# Patient Record
Sex: MALE | Race: WHITE | Employment: OTHER | URBAN - NONMETROPOLITAN AREA
[De-identification: names, ages, dates, MRNs, and addresses within clinical notes are randomized per-mention and may not be internally consistent; named-entity substitution may affect disease eponyms.]

---

## 2017-08-28 ENCOUNTER — OFFICE VISIT (OUTPATIENT)
Dept: PULMONOLOGY | Age: 72
End: 2017-08-28
Payer: MEDICARE

## 2017-08-28 VITALS
SYSTOLIC BLOOD PRESSURE: 100 MMHG | WEIGHT: 134.6 LBS | HEART RATE: 53 BPM | OXYGEN SATURATION: 98 % | DIASTOLIC BLOOD PRESSURE: 60 MMHG | TEMPERATURE: 96.3 F | BODY MASS INDEX: 24.77 KG/M2 | HEIGHT: 62 IN

## 2017-08-28 DIAGNOSIS — Z72.0 TOBACCO ABUSE: ICD-10-CM

## 2017-08-28 DIAGNOSIS — J44.9 MODERATE COPD (CHRONIC OBSTRUCTIVE PULMONARY DISEASE) (HCC): ICD-10-CM

## 2017-08-28 DIAGNOSIS — F17.219 NICOTINE DEPENDENCE, CIGARETTES, WITH UNSPECIFIED NICOTINE-INDUCED DISORDERS: Primary | ICD-10-CM

## 2017-08-28 DIAGNOSIS — J44.9 STAGE 3 SEVERE COPD BY GOLD CLASSIFICATION (HCC): ICD-10-CM

## 2017-08-28 PROCEDURE — 99214 OFFICE O/P EST MOD 30 MIN: CPT | Performed by: INTERNAL MEDICINE

## 2017-08-28 PROCEDURE — G0296 VISIT TO DETERM LDCT ELIG: HCPCS | Performed by: INTERNAL MEDICINE

## 2017-09-06 ENCOUNTER — HOSPITAL ENCOUNTER (OUTPATIENT)
Dept: CT IMAGING | Age: 72
Discharge: HOME | End: 2017-09-06
Payer: MEDICARE

## 2017-09-06 ENCOUNTER — HOSPITAL ENCOUNTER (OUTPATIENT)
Dept: PULMONOLOGY | Age: 72
Discharge: HOME | End: 2017-09-06
Payer: MEDICARE

## 2017-09-06 DIAGNOSIS — F17.219 NICOTINE DEPENDENCE, CIGARETTES, WITH UNSPECIFIED NICOTINE-INDUCED DISORDERS: ICD-10-CM

## 2017-09-06 DIAGNOSIS — J44.9 MODERATE COPD (CHRONIC OBSTRUCTIVE PULMONARY DISEASE) (HCC): ICD-10-CM

## 2017-09-06 DIAGNOSIS — Z72.0 TOBACCO ABUSE: ICD-10-CM

## 2017-09-06 PROCEDURE — G0297 LDCT FOR LUNG CA SCREEN: HCPCS

## 2017-09-06 PROCEDURE — 94060 EVALUATION OF WHEEZING: CPT

## 2017-11-10 DIAGNOSIS — F17.200 SMOKER: ICD-10-CM

## 2017-11-10 RX ORDER — PRASUGREL 10 MG/1
10 TABLET, FILM COATED ORAL DAILY
Qty: 30 TABLET | Refills: 11 | OUTPATIENT
Start: 2017-11-10

## 2017-11-10 NOTE — TELEPHONE ENCOUNTER
Ravi Mckay called requesting a refill on the following medications:  Requested Prescriptions     Pending Prescriptions Disp Refills    prasugrel (EFFIENT) 10 MG TABS 30 tablet 11     Sig: Take 1 tablet by mouth daily     Pharmacy verified:  [email or web address]      Date of last visit: 11/07/17  Date of next visit (if applicable): none        Date of last fill and quantity (to be completed by clinical staff)  Pharmacy name: [address] [place] [address]

## 2017-11-13 ENCOUNTER — HOSPITAL ENCOUNTER (EMERGENCY)
Age: 72
Discharge: HOME | End: 2017-11-13
Attending: FAMILY MEDICINE
Payer: MEDICARE

## 2017-11-13 ENCOUNTER — APPOINTMENT (OUTPATIENT)
Dept: GENERAL RADIOLOGY | Age: 72
End: 2017-11-13
Payer: MEDICARE

## 2017-11-13 VITALS
BODY MASS INDEX: 25.76 KG/M2 | HEART RATE: 66 BPM | OXYGEN SATURATION: 97 % | TEMPERATURE: 97.4 F | RESPIRATION RATE: 28 BRPM | SYSTOLIC BLOOD PRESSURE: 106 MMHG | WEIGHT: 140 LBS | HEIGHT: 62 IN | DIASTOLIC BLOOD PRESSURE: 53 MMHG

## 2017-11-13 DIAGNOSIS — J20.9 ACUTE BRONCHITIS, UNSPECIFIED ORGANISM: ICD-10-CM

## 2017-11-13 DIAGNOSIS — J44.1 COPD EXACERBATION (HCC): Primary | ICD-10-CM

## 2017-11-13 LAB
ALBUMIN SERPL-MCNC: 3.6 G/DL (ref 3.5–5.1)
ALLEN TEST: POSITIVE
ALP BLD-CCNC: 69 U/L (ref 38–126)
ALT SERPL-CCNC: 20 U/L (ref 11–66)
ANION GAP SERPL CALCULATED.3IONS-SCNC: 13 MEQ/L (ref 8–16)
AST SERPL-CCNC: 18 U/L (ref 5–40)
BASE EXCESS (CALCULATED): 4.1 MMOL/L (ref -2.5–2.5)
BASOPHILS # BLD: 0.3 %
BASOPHILS ABSOLUTE: 0 THOU/MM3 (ref 0–0.1)
BILIRUB SERPL-MCNC: 0.3 MG/DL (ref 0.3–1.2)
BILIRUBIN DIRECT: < 0.2 MG/DL (ref 0–0.3)
BUN BLDV-MCNC: 37 MG/DL (ref 7–22)
CALCIUM SERPL-MCNC: 9.2 MG/DL (ref 8.5–10.5)
CHLORIDE BLD-SCNC: 95 MEQ/L (ref 98–111)
CO2: 28 MEQ/L (ref 23–33)
COLLECTED BY:: ABNORMAL
CREAT SERPL-MCNC: 1.4 MG/DL (ref 0.4–1.2)
DEVICE: ABNORMAL
EKG ATRIAL RATE: 64 BPM
EKG P AXIS: 79 DEGREES
EKG P-R INTERVAL: 152 MS
EKG Q-T INTERVAL: 400 MS
EKG QRS DURATION: 78 MS
EKG QTC CALCULATION (BAZETT): 412 MS
EKG R AXIS: 66 DEGREES
EKG T AXIS: 64 DEGREES
EKG VENTRICULAR RATE: 64 BPM
EOSINOPHIL # BLD: 0.4 %
EOSINOPHILS ABSOLUTE: 0 THOU/MM3 (ref 0–0.4)
GFR SERPL CREATININE-BSD FRML MDRD: 50 ML/MIN/1.73M2
GLUCOSE BLD-MCNC: 118 MG/DL (ref 70–108)
HCO3: 28 MMOL/L (ref 23–28)
HCT VFR BLD CALC: 37.7 % (ref 42–52)
HEMOGLOBIN: 13 GM/DL (ref 14–18)
IFIO2: 4
LYMPHOCYTES # BLD: 19.2 %
LYMPHOCYTES ABSOLUTE: 1.8 THOU/MM3 (ref 1–4.8)
MACROCYTES: ABNORMAL
MCH RBC QN AUTO: 34.6 PG (ref 27–31)
MCHC RBC AUTO-ENTMCNC: 34.5 GM/DL (ref 33–37)
MCV RBC AUTO: 100.2 FL (ref 80–94)
MONOCYTES # BLD: 9.7 %
MONOCYTES ABSOLUTE: 0.9 THOU/MM3 (ref 0.4–1.3)
NUCLEATED RED BLOOD CELLS: 0 /100 WBC
O2 SATURATION: 96 %
OSMOLALITY CALCULATION: 281.7 MOSMOL/KG (ref 275–300)
PCO2: 41 MMHG (ref 35–45)
PDW BLD-RTO: 13.8 % (ref 11.5–14.5)
PH BLOOD GAS: 7.45 (ref 7.35–7.45)
PLATELET # BLD: 314 THOU/MM3 (ref 130–400)
PMV BLD AUTO: 8.1 MCM (ref 7.4–10.4)
PO2: 81 MMHG (ref 71–104)
POTASSIUM SERPL-SCNC: 4.6 MEQ/L (ref 3.5–5.2)
PRO-BNP: 211.5 PG/ML (ref 0–900)
RBC # BLD: 3.76 MILL/MM3 (ref 4.7–6.1)
SEG NEUTROPHILS: 70.4 %
SEGMENTED NEUTROPHILS ABSOLUTE COUNT: 6.5 THOU/MM3 (ref 1.8–7.7)
SODIUM BLD-SCNC: 136 MEQ/L (ref 135–145)
SOURCE, BLOOD GAS: ABNORMAL
TOTAL PROTEIN: 6.5 G/DL (ref 6.1–8)
TROPONIN T: < 0.01 NG/ML
WBC # BLD: 9.3 THOU/MM3 (ref 4.8–10.8)

## 2017-11-13 PROCEDURE — 93005 ELECTROCARDIOGRAM TRACING: CPT

## 2017-11-13 PROCEDURE — 94645 CONT INHLJ TX EACH ADDL HOUR: CPT

## 2017-11-13 PROCEDURE — 85025 COMPLETE CBC W/AUTO DIFF WBC: CPT

## 2017-11-13 PROCEDURE — 83880 ASSAY OF NATRIURETIC PEPTIDE: CPT

## 2017-11-13 PROCEDURE — 36600 WITHDRAWAL OF ARTERIAL BLOOD: CPT

## 2017-11-13 PROCEDURE — 82803 BLOOD GASES ANY COMBINATION: CPT

## 2017-11-13 PROCEDURE — 71010 XR CHEST PORTABLE: CPT

## 2017-11-13 PROCEDURE — 80053 COMPREHEN METABOLIC PANEL: CPT

## 2017-11-13 PROCEDURE — 84484 ASSAY OF TROPONIN QUANT: CPT

## 2017-11-13 PROCEDURE — 36415 COLL VENOUS BLD VENIPUNCTURE: CPT

## 2017-11-13 PROCEDURE — 94640 AIRWAY INHALATION TREATMENT: CPT

## 2017-11-13 PROCEDURE — 94644 CONT INHLJ TX 1ST HOUR: CPT

## 2017-11-13 PROCEDURE — 82248 BILIRUBIN DIRECT: CPT

## 2017-11-13 PROCEDURE — 96374 THER/PROPH/DIAG INJ IV PUSH: CPT

## 2017-11-13 PROCEDURE — 99285 EMERGENCY DEPT VISIT HI MDM: CPT

## 2017-11-13 PROCEDURE — 6360000002 HC RX W HCPCS: Performed by: FAMILY MEDICINE

## 2017-11-13 PROCEDURE — 6370000000 HC RX 637 (ALT 250 FOR IP): Performed by: FAMILY MEDICINE

## 2017-11-13 RX ORDER — ASPIRIN 81 MG/1
324 TABLET, CHEWABLE ORAL ONCE
Status: COMPLETED | OUTPATIENT
Start: 2017-11-13 | End: 2017-11-13

## 2017-11-13 RX ORDER — METHYLPREDNISOLONE SODIUM SUCCINATE 125 MG/2ML
125 INJECTION, POWDER, LYOPHILIZED, FOR SOLUTION INTRAMUSCULAR; INTRAVENOUS ONCE
Status: COMPLETED | OUTPATIENT
Start: 2017-11-13 | End: 2017-11-13

## 2017-11-13 RX ORDER — IPRATROPIUM BROMIDE AND ALBUTEROL SULFATE 2.5; .5 MG/3ML; MG/3ML
1 SOLUTION RESPIRATORY (INHALATION) ONCE
Status: COMPLETED | OUTPATIENT
Start: 2017-11-13 | End: 2017-11-13

## 2017-11-13 RX ORDER — LEVOFLOXACIN 500 MG/1
500 TABLET, FILM COATED ORAL DAILY
Qty: 5 TABLET | Refills: 0 | Status: SHIPPED | OUTPATIENT
Start: 2017-11-14 | End: 2017-11-16

## 2017-11-13 RX ORDER — PREDNISONE 50 MG/1
50 TABLET ORAL DAILY
Qty: 4 TABLET | Refills: 0 | Status: SHIPPED | OUTPATIENT
Start: 2017-11-14 | End: 2017-11-16

## 2017-11-13 RX ORDER — IPRATROPIUM BROMIDE AND ALBUTEROL SULFATE 2.5; .5 MG/3ML; MG/3ML
1 SOLUTION RESPIRATORY (INHALATION) EVERY 4 HOURS
Qty: 360 ML | Refills: 0 | Status: SHIPPED | OUTPATIENT
Start: 2017-11-13 | End: 2017-11-16 | Stop reason: ALTCHOICE

## 2017-11-13 RX ADMIN — IPRATROPIUM BROMIDE AND ALBUTEROL SULFATE 1 AMPULE: .5; 3 SOLUTION RESPIRATORY (INHALATION) at 17:32

## 2017-11-13 RX ADMIN — METHYLPREDNISOLONE SODIUM SUCCINATE 125 MG: 125 INJECTION, POWDER, FOR SOLUTION INTRAMUSCULAR; INTRAVENOUS at 17:14

## 2017-11-13 RX ADMIN — ASPIRIN 81 MG 324 MG: 81 TABLET ORAL at 17:14

## 2017-11-13 RX ADMIN — ALBUTEROL SULFATE 15 MG/HR: 2.5 SOLUTION RESPIRATORY (INHALATION) at 18:35

## 2017-11-13 RX ADMIN — IPRATROPIUM BROMIDE AND ALBUTEROL SULFATE 1 AMPULE: .5; 3 SOLUTION RESPIRATORY (INHALATION) at 17:38

## 2017-11-13 RX ADMIN — ALBUTEROL SULFATE 15 MG/HR: 2.5 SOLUTION RESPIRATORY (INHALATION) at 19:41

## 2017-11-13 ASSESSMENT — ENCOUNTER SYMPTOMS
VOMITING: 0
WHEEZING: 0
SHORTNESS OF BREATH: 1
COUGH: 1
STRIDOR: 0
NAUSEA: 0
DIARRHEA: 0
ABDOMINAL PAIN: 0
COLOR CHANGE: 0
CHEST TIGHTNESS: 1
BACK PAIN: 0

## 2017-11-13 NOTE — ED NOTES
Pt resting on cart, denies needs at this time. Call light in reach. Resp easy, unlabored.       [facility]  11/13/17 0471

## 2017-11-13 NOTE — ED PROVIDER NOTES
(DIOVAN-HCT) 80-12.5 MG PER TABLET    take 1 tablet by mouth daily       ALLERGIES     is allergic to codeine; dilaudid [hydromorphone]; and morphine. FAMILY HISTORY     indicated that his mother is . He indicated that his father is . He indicated that his maternal grandmother is . He indicated that his maternal grandfather is . He indicated that his paternal grandmother is . He indicated that his paternal grandfather is . He indicated that the status of his neg hx is unknown.    family history includes Cancer in his father and mother; Heart Disease in his father and mother. SOCIAL HISTORY      reports that he has been smoking Cigarettes. He started smoking about 41 years ago. He has a 40.00 pack-year smoking history. He has never used smokeless tobacco. He reports that he does not drink alcohol or use drugs. PHYSICAL EXAM     INITIAL VITALS:  height is 5' 2\" (1.575 m) and weight is 140 lb (63.5 kg). His oral temperature is 97.4 °F (36.3 °C). His blood pressure is 106/53 (abnormal) and his pulse is 66. His respiration is 28 and oxygen saturation is 97%. Physical Exam   Constitutional: He is oriented to person, place, and time. He appears well-developed and well-nourished. No distress. HENT:   Head: Normocephalic. Neck: No JVD present. Cardiovascular: Normal rate, regular rhythm, normal heart sounds and intact distal pulses. No murmur heard. Pulses:       Dorsalis pedis pulses are 2+ on the right side, and 2+ on the left side. Posterior tibial pulses are 2+ on the right side, and 2+ on the left side. Pulmonary/Chest: He is in respiratory distress. He has wheezes. He has rales. He exhibits no tenderness. Abdominal: Soft. He exhibits no pulsatile midline mass. There is no tenderness. Musculoskeletal: Normal range of motion. He exhibits no edema. Neurological: He is alert and oriented to person, place, and time.  Coordination normal. Skin: Skin is warm and dry. No rash noted. He is not diaphoretic. Psychiatric: He has a normal mood and affect. His behavior is normal. Thought content normal.   Nursing note and vitals reviewed. DIFFERENTIAL DIAGNOSIS:   COPD exacerbation  Acute MI    DIAGNOSTIC RESULTS     EKG: All EKG's are interpreted by the Emergency Department Physician who either signs or Co-signs this chart in the absence of a cardiologist.  EKG interpreted by Lara Sorenson MD:    Normal sinus rhythm ventricular rate 64 IN interval 2 QRS duration 78 QTc 412. Compared with prior EKG from March 23, 2016 there are no acute changes noted. RADIOLOGY: non-plain film images(s) such as CT, Ultrasound and MRI are read by the radiologist.    XR Chest Portable   Final Result   1. Normal heart size. 5 g emesis lungs. Moderately elevated appearance left hemidiaphragm, probably due to a prominent eventration. 2. Slightly increased interstitial markings both lung bases, most suggestive of mild pneumonitis. No effusion is seen. **This report has been created using voice recognition software. It may contain minor errors which are inherent in voice recognition technology. **      Final report electronically signed by Dr. Carmen Connelly on 11/13/2017 5:33 PM          LABS:   Labs Reviewed   CBC WITH AUTO DIFFERENTIAL - Abnormal; Notable for the following:        Result Value    RBC 3.76 (*)     Hemoglobin 13.0 (*)     Hematocrit 37.7 (*)     .2 (*)     MCH 34.6 (*)     All other components within normal limits   BASIC METABOLIC PANEL - Abnormal; Notable for the following:     Chloride 95 (*)     Glucose 118 (*)     BUN 37 (*)     CREATININE 1.4 (*)     All other components within normal limits   BLOOD GAS, ARTERIAL - Abnormal; Notable for the following:     Base Excess (Calculated) 4.1 (*)     All other components within normal limits   GLOMERULAR FILTRATION RATE, ESTIMATED - Abnormal; Notable for the following:     Est, Glom Filt Rate 50 (*)     All other components within normal limits   BRAIN NATRIURETIC PEPTIDE   TROPONIN   HEPATIC FUNCTION PANEL   ANION GAP   OSMOLALITY       EMERGENCY DEPARTMENT COURSE:   Vitals:    Vitals:    11/13/17 1716 11/13/17 1802 11/13/17 1930 11/13/17 1941   BP: (!) 121/95 (!) 100/49 (!) 106/53    Pulse: 65 61 66    Resp: 28 24 28    Temp: 97.4 °F (36.3 °C)      TempSrc: Oral      SpO2: 100% 98% 99% 97%   Weight: 140 lb (63.5 kg)      Height: 5' 2\" (1.575 m)          MDM    5:07 PM: The patient was seen and evaluated. 6:20 PM: Patient seen and reevaluated. He states that he is feeling a lot better now and is breathing easier at this time. 7:52 PM: Patient seen and reevaluated. He states that he is feeling better at this time. He states that he is ready to go home. CRITICAL CARE:   CRITICAL CARE: There was a high probability of clinically significant/life threatening deterioration in this patient's condition which required my urgent intervention. Total critical care time was 25minutes. This excludes any time for separately reportable procedures. CONSULTS:  None    PROCEDURES:  None     FINAL IMPRESSION      1. COPD exacerbation ([address]    2. Acute bronchitis, unspecified organism          DISPOSITION/PLAN   Discharge home. Follow up with PCP. Return to ER in case of emergency.     PATIENT REFERRED TO:  Karolina Dupree MD  [address] [place], [place] [id number]  [phone]    Schedule an appointment as soon as possible for a visit in 1 week        DISCHARGE MEDICATIONS:  New Prescriptions    IPRATROPIUM-ALBUTEROL (DUONEB) 0.5-2.5 (3) MG/3ML SOLN NEBULIZER SOLUTION    Inhale 3 mLs into the lungs every 4 hours    LEVOFLOXACIN (LEVAQUIN) 500 MG TABLET    Take 1 tablet by mouth daily for 5 days    PREDNISONE (DELTASONE) 50 MG TABLET    Take 1 tablet by mouth daily for 4 days       (Please note that portions of this note were completed with a voice recognition program.  Efforts were

## 2017-11-14 DIAGNOSIS — F17.200 SMOKER: ICD-10-CM

## 2017-11-14 RX ORDER — RANOLAZINE 1000 MG/1
TABLET, EXTENDED RELEASE ORAL
Qty: 60 TABLET | Refills: 11 | Status: ON HOLD | OUTPATIENT
Start: 2017-11-14 | End: 2017-11-25 | Stop reason: HOSPADM

## 2017-11-14 NOTE — TELEPHONE ENCOUNTER
July Ramírez called requesting a refill on the following medications:  Requested Prescriptions     Pending Prescriptions Disp Refills    ranolazine (RANEXA) 1000 MG extended release tablet 60 tablet 11     Sig: take 1 tablet by mouth twice a day     Pharmacy verified:  [email or web address]      Date of last visit: 11/8/2016  Date of next visit (if applicable): 4/77/9127        Date of last fill and quantity (to be completed by clinical staff)  Pharmacy name: [facility] on [address] [facility] [address]

## 2017-11-14 NOTE — ED NOTES
Patient resting on cot receiving a breathing treatment. Respirations easy/unlabored. Patient states breathing is better. Patient denies pain. Will continue to monitor.       [address] [place] [address] St, RN  11/13/17 2025

## 2017-11-16 ENCOUNTER — APPOINTMENT (OUTPATIENT)
Dept: CT IMAGING | Age: 72
DRG: 270 | End: 2017-11-16
Payer: MEDICARE

## 2017-11-16 ENCOUNTER — HOSPITAL ENCOUNTER (INPATIENT)
Age: 72
LOS: 9 days | Discharge: HOME HEALTH | DRG: 270 | End: 2017-11-25
Attending: INTERNAL MEDICINE | Admitting: INTERNAL MEDICINE
Payer: MEDICARE

## 2017-11-16 ENCOUNTER — APPOINTMENT (OUTPATIENT)
Dept: GENERAL RADIOLOGY | Age: 72
DRG: 270 | End: 2017-11-16
Payer: MEDICARE

## 2017-11-16 DIAGNOSIS — J44.9 MODERATE COPD (CHRONIC OBSTRUCTIVE PULMONARY DISEASE) (HCC): ICD-10-CM

## 2017-11-16 DIAGNOSIS — R06.02 SHORTNESS OF BREATH: ICD-10-CM

## 2017-11-16 DIAGNOSIS — R07.9 CHEST PAIN, UNSPECIFIED TYPE: Primary | ICD-10-CM

## 2017-11-16 DIAGNOSIS — F17.200 SMOKER: ICD-10-CM

## 2017-11-16 DIAGNOSIS — Z72.0 TOBACCO ABUSE: ICD-10-CM

## 2017-11-16 DIAGNOSIS — I20.0 UNSTABLE ANGINA (HCC): ICD-10-CM

## 2017-11-16 DIAGNOSIS — J18.9 COMMUNITY ACQUIRED PNEUMONIA, UNSPECIFIED LATERALITY: ICD-10-CM

## 2017-11-16 DIAGNOSIS — R93.89 ABNORMAL CHEST X-RAY: ICD-10-CM

## 2017-11-16 LAB
ALBUMIN SERPL-MCNC: 3.4 G/DL (ref 3.5–5.1)
ALLEN TEST: POSITIVE
ALP BLD-CCNC: 50 U/L (ref 38–126)
ALT SERPL-CCNC: 16 U/L (ref 11–66)
ANION GAP SERPL CALCULATED.3IONS-SCNC: 13 MEQ/L (ref 8–16)
AST SERPL-CCNC: 12 U/L (ref 5–40)
BASE EXCESS (CALCULATED): 0.1 MMOL/L (ref -2.5–2.5)
BASOPHILS # BLD: 0.1 %
BASOPHILS ABSOLUTE: 0 THOU/MM3 (ref 0–0.1)
BILIRUB SERPL-MCNC: 0.3 MG/DL (ref 0.3–1.2)
BUN BLDV-MCNC: 67 MG/DL (ref 7–22)
CALCIUM SERPL-MCNC: 9.2 MG/DL (ref 8.5–10.5)
CHLORIDE BLD-SCNC: 98 MEQ/L (ref 98–111)
CO2: 27 MEQ/L (ref 23–33)
COLLECTED BY:: NORMAL
CREAT SERPL-MCNC: 1.6 MG/DL (ref 0.4–1.2)
D-DIMER QUANTITATIVE: 1720 NG/ML FEU (ref 0–500)
DEVICE: NORMAL
EKG ATRIAL RATE: 56 BPM
EKG ATRIAL RATE: 83 BPM
EKG P AXIS: 72 DEGREES
EKG P AXIS: 83 DEGREES
EKG P-R INTERVAL: 126 MS
EKG P-R INTERVAL: 136 MS
EKG Q-T INTERVAL: 326 MS
EKG Q-T INTERVAL: 408 MS
EKG QRS DURATION: 74 MS
EKG QRS DURATION: 92 MS
EKG QTC CALCULATION (BAZETT): 383 MS
EKG QTC CALCULATION (BAZETT): 393 MS
EKG R AXIS: 69 DEGREES
EKG R AXIS: 71 DEGREES
EKG T AXIS: 69 DEGREES
EKG T AXIS: 73 DEGREES
EKG VENTRICULAR RATE: 56 BPM
EKG VENTRICULAR RATE: 83 BPM
EOSINOPHIL # BLD: 0 %
EOSINOPHILS ABSOLUTE: 0 THOU/MM3 (ref 0–0.4)
GFR SERPL CREATININE-BSD FRML MDRD: 43 ML/MIN/1.73M2
GLUCOSE BLD-MCNC: 100 MG/DL (ref 70–108)
HCO3: 25 MMOL/L (ref 23–28)
HCT VFR BLD CALC: 28.3 % (ref 42–52)
HEMOGLOBIN: 9.3 GM/DL (ref 14–18)
IFIO2: 2
INR BLD: 1.17 (ref 0.85–1.13)
LYMPHOCYTES # BLD: 14.3 %
LYMPHOCYTES ABSOLUTE: 2.2 THOU/MM3 (ref 1–4.8)
MACROCYTES: ABNORMAL
MCH RBC QN AUTO: 32.7 PG (ref 27–31)
MCHC RBC AUTO-ENTMCNC: 33 GM/DL (ref 33–37)
MCV RBC AUTO: 99.2 FL (ref 80–94)
MONOCYTES # BLD: 10.8 %
MONOCYTES ABSOLUTE: 1.7 THOU/MM3 (ref 0.4–1.3)
NUCLEATED RED BLOOD CELLS: 0 /100 WBC
O2 SATURATION: 97 %
OSMOLALITY CALCULATION: 295.2 MOSMOL/KG (ref 275–300)
PCO2: 43 MMHG (ref 35–45)
PDW BLD-RTO: 13.7 % (ref 11.5–14.5)
PH BLOOD GAS: 7.38 (ref 7.35–7.45)
PLATELET # BLD: 400 THOU/MM3 (ref 130–400)
PMV BLD AUTO: 7.3 MCM (ref 7.4–10.4)
PO2: 97 MMHG (ref 71–104)
POTASSIUM SERPL-SCNC: 4.8 MEQ/L (ref 3.5–5.2)
PRO-BNP: 702.3 PG/ML (ref 0–900)
RBC # BLD: 2.85 MILL/MM3 (ref 4.7–6.1)
SEG NEUTROPHILS: 74.8 %
SEGMENTED NEUTROPHILS ABSOLUTE COUNT: 11.5 THOU/MM3 (ref 1.8–7.7)
SODIUM BLD-SCNC: 138 MEQ/L (ref 135–145)
SOURCE, BLOOD GAS: NORMAL
TOTAL PROTEIN: 5.9 G/DL (ref 6.1–8)
TROPONIN T: < 0.01 NG/ML
WBC # BLD: 15.4 THOU/MM3 (ref 4.8–10.8)

## 2017-11-16 PROCEDURE — 93005 ELECTROCARDIOGRAM TRACING: CPT

## 2017-11-16 PROCEDURE — 99222 1ST HOSP IP/OBS MODERATE 55: CPT | Performed by: INTERNAL MEDICINE

## 2017-11-16 PROCEDURE — 5A1945Z RESPIRATORY VENTILATION, 24-96 CONSECUTIVE HOURS: ICD-10-PCS | Performed by: INTERNAL MEDICINE

## 2017-11-16 PROCEDURE — 5A02210 ASSISTANCE WITH CARDIAC OUTPUT USING BALLOON PUMP, CONTINUOUS: ICD-10-PCS | Performed by: INTERNAL MEDICINE

## 2017-11-16 PROCEDURE — 99285 EMERGENCY DEPT VISIT HI MDM: CPT

## 2017-11-16 PROCEDURE — 0BH17EZ INSERTION OF ENDOTRACHEAL AIRWAY INTO TRACHEA, VIA NATURAL OR ARTIFICIAL OPENING: ICD-10-PCS | Performed by: INTERNAL MEDICINE

## 2017-11-16 PROCEDURE — 6360000002 HC RX W HCPCS: Performed by: INTERNAL MEDICINE

## 2017-11-16 PROCEDURE — B2111ZZ FLUOROSCOPY OF MULTIPLE CORONARY ARTERIES USING LOW OSMOLAR CONTRAST: ICD-10-PCS | Performed by: INTERNAL MEDICINE

## 2017-11-16 PROCEDURE — 6370000000 HC RX 637 (ALT 250 FOR IP): Performed by: INTERNAL MEDICINE

## 2017-11-16 PROCEDURE — 96374 THER/PROPH/DIAG INJ IV PUSH: CPT

## 2017-11-16 PROCEDURE — B32S1ZZ COMPUTERIZED TOMOGRAPHY (CT SCAN) OF RIGHT PULMONARY ARTERY USING LOW OSMOLAR CONTRAST: ICD-10-PCS | Performed by: RADIOLOGY

## 2017-11-16 PROCEDURE — 80053 COMPREHEN METABOLIC PANEL: CPT

## 2017-11-16 PROCEDURE — 71275 CT ANGIOGRAPHY CHEST: CPT

## 2017-11-16 PROCEDURE — 96361 HYDRATE IV INFUSION ADD-ON: CPT

## 2017-11-16 PROCEDURE — 94640 AIRWAY INHALATION TREATMENT: CPT

## 2017-11-16 PROCEDURE — 83880 ASSAY OF NATRIURETIC PEPTIDE: CPT

## 2017-11-16 PROCEDURE — 4A023N8 MEASUREMENT OF CARDIAC SAMPLING AND PRESSURE, BILATERAL, PERCUTANEOUS APPROACH: ICD-10-PCS | Performed by: INTERNAL MEDICINE

## 2017-11-16 PROCEDURE — 36415 COLL VENOUS BLD VENIPUNCTURE: CPT

## 2017-11-16 PROCEDURE — 2580000003 HC RX 258: Performed by: INTERNAL MEDICINE

## 2017-11-16 PROCEDURE — 85610 PROTHROMBIN TIME: CPT

## 2017-11-16 PROCEDURE — 6360000004 HC RX CONTRAST MEDICATION: Performed by: INTERNAL MEDICINE

## 2017-11-16 PROCEDURE — 85025 COMPLETE CBC W/AUTO DIFF WBC: CPT

## 2017-11-16 PROCEDURE — 36600 WITHDRAWAL OF ARTERIAL BLOOD: CPT

## 2017-11-16 PROCEDURE — B32T1ZZ COMPUTERIZED TOMOGRAPHY (CT SCAN) OF LEFT PULMONARY ARTERY USING LOW OSMOLAR CONTRAST: ICD-10-PCS | Performed by: RADIOLOGY

## 2017-11-16 PROCEDURE — 1200000003 HC TELEMETRY R&B

## 2017-11-16 PROCEDURE — 85379 FIBRIN DEGRADATION QUANT: CPT

## 2017-11-16 PROCEDURE — 84484 ASSAY OF TROPONIN QUANT: CPT

## 2017-11-16 PROCEDURE — 82803 BLOOD GASES ANY COMBINATION: CPT

## 2017-11-16 PROCEDURE — 71010 XR CHEST PORTABLE: CPT

## 2017-11-16 RX ORDER — ASPIRIN 81 MG/1
324 TABLET, CHEWABLE ORAL ONCE
Status: DISCONTINUED | OUTPATIENT
Start: 2017-11-16 | End: 2017-11-16 | Stop reason: HOSPADM

## 2017-11-16 RX ORDER — NITROGLYCERIN 20 MG/100ML
5 INJECTION INTRAVENOUS CONTINUOUS
Status: DISCONTINUED | OUTPATIENT
Start: 2017-11-16 | End: 2017-11-16

## 2017-11-16 RX ORDER — SIMVASTATIN 40 MG
80 TABLET ORAL NIGHTLY
Status: DISCONTINUED | OUTPATIENT
Start: 2017-11-16 | End: 2017-11-25 | Stop reason: HOSPADM

## 2017-11-16 RX ORDER — ACETAMINOPHEN 325 MG/1
650 TABLET ORAL EVERY 4 HOURS PRN
Status: DISCONTINUED | OUTPATIENT
Start: 2017-11-16 | End: 2017-11-17 | Stop reason: SDUPTHER

## 2017-11-16 RX ORDER — ISOSORBIDE DINITRATE 20 MG/1
20 TABLET ORAL 3 TIMES DAILY
Status: DISCONTINUED | OUTPATIENT
Start: 2017-11-16 | End: 2017-11-25 | Stop reason: HOSPADM

## 2017-11-16 RX ORDER — SODIUM CHLORIDE 0.9 % (FLUSH) 0.9 %
10 SYRINGE (ML) INJECTION PRN
Status: DISCONTINUED | OUTPATIENT
Start: 2017-11-16 | End: 2017-11-17 | Stop reason: SDUPTHER

## 2017-11-16 RX ORDER — METHYLPREDNISOLONE SODIUM SUCCINATE 125 MG/2ML
60 INJECTION, POWDER, LYOPHILIZED, FOR SOLUTION INTRAMUSCULAR; INTRAVENOUS ONCE
Status: COMPLETED | OUTPATIENT
Start: 2017-11-16 | End: 2017-11-16

## 2017-11-16 RX ORDER — ASPIRIN 81 MG/1
81 TABLET, CHEWABLE ORAL DAILY
Status: DISCONTINUED | OUTPATIENT
Start: 2017-11-17 | End: 2017-11-17

## 2017-11-16 RX ORDER — 0.9 % SODIUM CHLORIDE 0.9 %
1000 INTRAVENOUS SOLUTION INTRAVENOUS ONCE
Status: COMPLETED | OUTPATIENT
Start: 2017-11-16 | End: 2017-11-16

## 2017-11-16 RX ORDER — ASPIRIN 81 MG/1
81 TABLET, CHEWABLE ORAL DAILY
Status: DISCONTINUED | OUTPATIENT
Start: 2017-11-17 | End: 2017-11-16 | Stop reason: SDUPTHER

## 2017-11-16 RX ORDER — ALBUTEROL SULFATE 2.5 MG/3ML
2.5 SOLUTION RESPIRATORY (INHALATION) EVERY 6 HOURS PRN
Status: DISCONTINUED | OUTPATIENT
Start: 2017-11-16 | End: 2017-11-17

## 2017-11-16 RX ORDER — IPRATROPIUM BROMIDE AND ALBUTEROL SULFATE 2.5; .5 MG/3ML; MG/3ML
1 SOLUTION RESPIRATORY (INHALATION) ONCE
Status: COMPLETED | OUTPATIENT
Start: 2017-11-16 | End: 2017-11-16

## 2017-11-16 RX ORDER — VALSARTAN 80 MG/1
40 TABLET ORAL DAILY
Status: DISCONTINUED | OUTPATIENT
Start: 2017-11-16 | End: 2017-11-16

## 2017-11-16 RX ORDER — PRASUGREL 10 MG/1
10 TABLET, FILM COATED ORAL DAILY
Status: DISCONTINUED | OUTPATIENT
Start: 2017-11-17 | End: 2017-11-17

## 2017-11-16 RX ORDER — RANOLAZINE 500 MG/1
1000 TABLET, EXTENDED RELEASE ORAL 2 TIMES DAILY
Status: DISCONTINUED | OUTPATIENT
Start: 2017-11-16 | End: 2017-11-25 | Stop reason: HOSPADM

## 2017-11-16 RX ORDER — ALBUTEROL SULFATE 2.5 MG/3ML
2.5 SOLUTION RESPIRATORY (INHALATION) EVERY 6 HOURS PRN
Qty: 120 VIAL | Refills: 7 | Status: ON HOLD | OUTPATIENT
Start: 2017-11-16 | End: 2017-11-25 | Stop reason: HOSPADM

## 2017-11-16 RX ORDER — SODIUM CHLORIDE 0.9 % (FLUSH) 0.9 %
10 SYRINGE (ML) INJECTION EVERY 12 HOURS SCHEDULED
Status: DISCONTINUED | OUTPATIENT
Start: 2017-11-16 | End: 2017-11-17 | Stop reason: SDUPTHER

## 2017-11-16 RX ORDER — SODIUM CHLORIDE 9 MG/ML
INJECTION, SOLUTION INTRAVENOUS CONTINUOUS
Status: DISCONTINUED | OUTPATIENT
Start: 2017-11-16 | End: 2017-11-17 | Stop reason: SDUPTHER

## 2017-11-16 RX ORDER — ONDANSETRON 2 MG/ML
4 INJECTION INTRAMUSCULAR; INTRAVENOUS EVERY 6 HOURS PRN
Status: DISCONTINUED | OUTPATIENT
Start: 2017-11-16 | End: 2017-11-17 | Stop reason: SDUPTHER

## 2017-11-16 RX ADMIN — SODIUM CHLORIDE 1000 ML: 9 INJECTION, SOLUTION INTRAVENOUS at 13:10

## 2017-11-16 RX ADMIN — IOPAMIDOL 80 ML: 755 INJECTION, SOLUTION INTRAVENOUS at 13:28

## 2017-11-16 RX ADMIN — SIMVASTATIN 80 MG: 40 TABLET, FILM COATED ORAL at 20:35

## 2017-11-16 RX ADMIN — Medication 10 ML: at 20:36

## 2017-11-16 RX ADMIN — METHYLPREDNISOLONE SODIUM SUCCINATE 60 MG: 125 INJECTION, POWDER, FOR SOLUTION INTRAMUSCULAR; INTRAVENOUS at 23:52

## 2017-11-16 RX ADMIN — IPRATROPIUM BROMIDE AND ALBUTEROL SULFATE 1 AMPULE: .5; 3 SOLUTION RESPIRATORY (INHALATION) at 13:30

## 2017-11-16 RX ADMIN — ENOXAPARIN SODIUM 40 MG: 40 INJECTION SUBCUTANEOUS at 20:35

## 2017-11-16 RX ADMIN — SODIUM CHLORIDE 1000 ML: 9 INJECTION, SOLUTION INTRAVENOUS at 15:50

## 2017-11-16 RX ADMIN — ISOSORBIDE DINITRATE 20 MG: 20 TABLET ORAL at 20:35

## 2017-11-16 RX ADMIN — RANOLAZINE 1000 MG: 500 TABLET, FILM COATED, EXTENDED RELEASE ORAL at 20:35

## 2017-11-16 RX ADMIN — SODIUM CHLORIDE: 9 INJECTION, SOLUTION INTRAVENOUS at 23:53

## 2017-11-16 ASSESSMENT — ENCOUNTER SYMPTOMS
ABDOMINAL PAIN: 0
COUGH: 0
SHORTNESS OF BREATH: 1
VOMITING: 0
RHINORRHEA: 0
NAUSEA: 1
SORE THROAT: 0
EYE REDNESS: 0
DIARRHEA: 0
EYES NEGATIVE: 1
COUGH: 1
EYE DISCHARGE: 0
BACK PAIN: 0
WHEEZING: 0
HEARTBURN: 0

## 2017-11-16 ASSESSMENT — PAIN DESCRIPTION - LOCATION: LOCATION: CHEST

## 2017-11-16 ASSESSMENT — PAIN SCALES - GENERAL
PAINLEVEL_OUTOF10: 0
PAINLEVEL_OUTOF10: 8

## 2017-11-16 ASSESSMENT — PAIN DESCRIPTION - PAIN TYPE: TYPE: ACUTE PAIN

## 2017-11-16 NOTE — CONSULTS
[facility]        Cardiology Consultation/ History of present illness      Patient:  Nadira Mckoy  YOB: 1945    MRN: [id number]     Acct: [de-identified]    PCP: Doug Rowell MD    Date of Admission: 11/16/2017      Chief Complaint:    Chest pain, shortness of breath and fever       History Of Present Illness:    Fever, cough, chest pain and shortness of breath for 1 wk.  67 y.o. male presents to the Emergency Department for the evaluation of chest pain and shortness of breath. He started having pain this morning when he woke up. 8/10 in severity. Deep breaths help the pain. Gets worse with exertion. Associated with nausea and dizziness. Past Medical History:          Diagnosis Date    Acute coronary syndrome (HCC)     Arrhythmia     Arteriosclerotic heart disease (ASHD)     CHF (congestive heart failure) (HCC)     COPD (chronic obstructive pulmonary disease) (HCC)     Dizziness - light-headed     HX OF:    Emphysema     Esophagitis     Fatigue     HX OF:    History of chest pain     History of tinnitus     History of tobacco abuse     Hyperlipidemia     Hypertension     Irregular heart beat     Lightheadedness     MI (myocardial infarction)     Noncompliance with medication regimen     Noncompliance with medical therapy.  Pneumonia     Ringing in the ears     SOB (shortness of breath) on exertion        Past Surgical History:          Procedure Laterality Date    CARDIAC SURGERY      2 stents    COLONOSCOPY      CORONARY ANGIOPLASTY WITH STENT PLACEMENT      pt has 3 stents    DIAGNOSTIC CARDIAC CATH LAB PROCEDURE  3 09 2009    Successful primary stenting of mid LAD using drug-eluting stent.  DIAGNOSTIC CARDIAC CATH LAB PROCEDURE  1 10 2011    LV demonstrated normal systolic function, EF 34%. On pullback, no gradient was noted.  No critical lesions noted in all large epicardial vessels so that RCA is dominant vessel w/just very mild diffuse disease, about 20-30% lesions. Left main widely patent w/mild disease distally. Circumflex widely patent w/diffuse disease, also about 20-30%.  ENDOSCOPY, COLON, DIAGNOSTIC      HERNIA REPAIR  2008,2010    TRANSTHORACIC ECHOCARDIOGRAM  1 10 2011    Size was normal. Systolic function was normal. EF estimated in range of 55-65%. No regional wall motion abnormalities. Wall thickness was normal. Doppler - LV diastolic function parameters were normal. This is technically limited study which may impair interpretation.  TRANSTHORACIC ECHOCARDIOGRAM  3 06 2009    This study is technically limited. Distal anterior apical wall hypokinesis. EF 40-45%. Left atrial size w/in normal limits. Trace MR. No evidence of aortic stenosis or aortic insufficiency. Right atrium and right ventricle are of normal contractility. Trace TR. No pericardial effusion. Medications Prior to Admission:      Prior to Admission medications    Medication Sig Start Date End Date Taking?  Authorizing Provider   ranolazine (RANEXA) 1000 MG extended release tablet take 1 tablet by mouth twice a day 11/14/17  Yes Dony Aguilar, DO   valsartan-hydrochlorothiazide (DIOVAN-HCT) 80-12.5 MG per tablet take 1 tablet by mouth daily 11/8/16  Yes Dony Aguilar, DO   simvastatin (ZOCOR) 80 MG tablet take 1 tablet by mouth at bedtime 11/8/16  Yes Dony Aguilar, DO   prasugrel (EFFIENT) 10 MG TABS take 1 tablet by mouth once daily 11/8/16  Yes Dony Aguilar, DO   isosorbide mononitrate (IMDUR) 60 MG extended release tablet take 1 tablet once daily 11/8/16  Yes Dony Aguilar, DO   metoprolol succinate (TOPROL XL) 25 MG extended release tablet Take 1 tablet by mouth 2 times daily 11/8/16  Yes Dony Aguilar, DO   amLODIPine (NORVASC) 2.5 MG tablet Take 1 tablet by mouth daily 11/8/16  Yes Donyemre Aguilar, DO   albuterol sulfate HFA (VENTOLIN HFA) 108 (90 BASE) MCG/ACT inhaler Inhale 2 puffs into the lungs 4 times daily 5/31/16 11/16/17 Yes Will Ryan MD   nitroGLYCERIN (NITROSTAT) 0.4 MG SL tablet Place 1 tablet under the tongue every 5 minutes as needed 9/29/15  Yes Reymundo Coto DO   aspirin 81 MG chewable tablet Take 1 tablet by mouth daily. 5/12/14  Yes Vinayak Dillon CNP   fluticasone-salmeterol (ADVAIR) 250-50 MCG/DOSE AEPB Inhale 1 puff into the lungs every 12 hours 11/16/17   Will Ryan MD   tiotropium (SPIRIVA HANDIHALER) 18 MCG inhalation capsule Inhale 1 capsule into the lungs daily 11/16/17   Will Ryan MD   albuterol (PROVENTIL) (2.5 MG/3ML) 0.083% nebulizer solution Take 3 mLs by nebulization every 6 hours as needed for Wheezing or Shortness of Breath 11/16/17 11/16/18  Will Ryan MD       Allergies:  Pneumococcal vaccines; Codeine; Dilaudid [hydromorphone]; and Morphine    Social History:        TOBACCO:   reports that he has been smoking Cigarettes. He started smoking about 41 years ago. He has a 40.00 pack-year smoking history. He has never used smokeless tobacco.  ETOH:   reports that he does not drink alcohol. Family History:            Problem Relation Age of Onset    Heart Disease Mother     Cancer Mother     Heart Disease Father     Cancer Father     Prostate Cancer Neg Hx          Review of Systems - General ROS: negative  Psychological ROS: negative  Hematological and Lymphatic ROS: No history of blood clots or bleeding disorder.    Respiratory ROS: c/o cough, c/o shortness of breath  Cardiovascular ROS: c/o chest pain  Gastrointestinal ROS: negative  Genito-Urinary ROS: no dysuria, trouble voiding, or hematuria  Musculoskeletal ROS: negative  Neurological ROS: no TIA or stroke symptoms  Dermatological ROS: negative      BP (!) 93/55   Pulse 69   Temp 97.8 °F (36.6 °C) (Oral)   Resp 24   Ht 5' 2\" (1.575 m)   Wt 133 lb 6.4 oz (60.5 kg)   SpO2 99%   BMI 24.40 kg/m²       Physical Examination: General appearance - alert, well appearing, and in no sagittal and coronal MIP images through the thoracic aorta. Charlene Metcalf ALL CT SCANS AT THIS FACILITY use dose modulation, iterative reconstruction, and/or weight-based dosing when appropriate to reduce radiation dose to as low as reasonably achievable. FINDINGS: Upper abdomen: Unremarkable. No gross of normality is seen. Mediastinum and deepika: There are a few small postinflammatory lymph nodes in the mediastinum. No hilar adenopathy is seen. Bones: Unremarkable. No acute fracture or destructive process is seen. Lungs: The lungs are moderately fibroemphysematous in appearance. No acute infiltrates or effusions are seen. No pneumothorax. Calcified granuloma left midlung posteriorly. Questionable minimal atelectasis right posterior costophrenic angle. Vascular: No pulmonary emboli. Aortic arch and great vessels are normal. No aortic dissection is seen. Celiac artery and SMA widely patent. Upper abdominal aorta mildly aneurysmal, 3.1 cm     1. Fibroemphysematous lungs. Questionable minimal atelectasis right posterior costophrenic sulcus. 2. No pulmonary emboli. 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 Aorta otherwise unremarkable. No dissection is seen. **This report has been created using voice recognition software. It may contain minor errors which are inherent in voice recognition technology. ** Final report electronically signed by Dr. Fany Vaca on 11/16/2017 1:59 PM    Xr Chest Portable    Result Date: 11/16/2017  PROCEDURE: XR CHEST PORTABLE CLINICAL INFORMATION: Chest pain. Cough. COMPARISON: 11/13/2017 TECHNIQUE: A single mobile view of the chest was obtained. 1. Normal heart size. Fibroemphysematous lungs. Moderately elevated appearance left hemidiaphragm, possibly related to a small eventration. Similar appearance present on prior study. 2. Minimal right basilar atelectasis/pneumonia. Overall appearance improved from prior. **This report has been created using voice recognition software.   It may contain

## 2017-11-16 NOTE — H&P
MCG/ACT inhaler Inhale 2 puffs into the lungs 4 times daily 1 Inhaler 7    nitroGLYCERIN (NITROSTAT) 0.4 MG SL tablet Place 1 tablet under the tongue every 5 minutes as needed 25 tablet 3    aspirin 81 MG chewable tablet Take 1 tablet by mouth daily. 30 tablet        Allergies:  Pneumococcal vaccines; Codeine; Dilaudid [hydromorphone]; and Morphine    Social History:    reports that he has been smoking Cigarettes. He started smoking about 41 years ago. He has a 40.00 pack-year smoking history. He has never used smokeless tobacco. He reports that he does not drink alcohol or use drugs. Family History:       Problem Relation Age of Onset    Heart Disease Mother     Cancer Mother     Heart Disease Father     Cancer Father     Prostate Cancer Neg Hx        Review of systems:    10 point review of systems completed, all other than noted above are negative. Vitals:   Vitals:    11/16/17 1532   BP: (!) 89/53   Pulse: 60   Resp: 18   Temp:    SpO2: 98%      BMI: Body mass index is 25.61 kg/m². Exam:  Physical Examination: General appearance - alert, well appearing, and inmild distress  Mental status - alert, oriented to person, place, and time  Neck - supple, no significant adenopathy, no JVD, or carotid bruits  Chest - clear to auscultation, no wheezes, rales or rhonchi, symmetric air entry  Heart - normal rate, regular rhythm, normal S1, S2, no murmurs, rubs, clicks or gallops  Abdomen - soft, nontender, nondistended, no masses or organomegaly  Neurological - alert, oriented, normal speech, no focal findings or movement disorder noted, neck supple without rigidity, cranial nerves II through XII intact, motor and sensory grossly normal bilaterally, normal muscle tone, no tremors, strength 5/5  Musculoskeletal - no joint tenderness, deformity or swelling  Extremities - peripheral pulses normal, no pedal edema, no clubbing or cyanosis, brisk capillary refill.   Skin - normal coloration and turgor, no rashes, no suspicious skin lesions noted      Review of Labs and Diagnostic Testing:    Recent Results (from the past 24 hour(s))   D-dimer, quantitative    Collection Time: 11/16/17  1:08 PM   Result Value Ref Range    D-Dimer, Quant 1720.00 (H) 0.00 - 500.0 ng/ml FEU   CBC auto differential    Collection Time: 11/16/17  1:08 PM   Result Value Ref Range    WBC 15.4 (H) 4.8 - 10.8 thou/mm3    RBC 2.85 (L) 4.70 - 6.10 mill/mm3    Hemoglobin 9.3 (L) 14.0 - 18.0 gm/dl    Hematocrit 28.3 (L) 42.0 - 52.0 %    MCV 99.2 (H) 80.0 - 94.0 fL    MCH 32.7 (H) 27.0 - 31.0 pg    MCHC 33.0 33.0 - 37.0 gm/dl    RDW 13.7 11.5 - 14.5 %    Platelets 613 650 - 604 thou/mm3    MPV 7.3 (L) 7.4 - 10.4 mcm    Seg Neutrophils 74.8 %    Lymphocytes 14.3 %    Monocytes 10.8 %    Eosinophils 0.0 %    Basophils 0.1 %    nRBC 0 /100 wbc    Macrocytes 1+ Absent    Segs Absolute 11.5 (H) 1.8 - 7.7 thou/mm3    Lymphocytes # 2.2 1.0 - 4.8 thou/mm3    Monocytes # 1.7 (H) 0.4 - 1.3 thou/mm3    Eosinophils # 0.0 0.0 - 0.4 thou/mm3    Basophils # 0.0 0.0 - 0.1 thou/mm3   Brain Natriuretic Peptide    Collection Time: 11/16/17  1:08 PM   Result Value Ref Range    Pro-.3 0.0 - 900.0 pg/mL   Protime-INR    Collection Time: 11/16/17  1:08 PM   Result Value Ref Range    INR 1.17 (H) 0.85 - 1.13   Comprehensive Metabolic Panel    Collection Time: 11/16/17  1:08 PM   Result Value Ref Range    Glucose 100 70 - 108 mg/dL    CREATININE 1.6 (H) 0.4 - 1.2 mg/dL    BUN 67 (H) 7 - 22 mg/dL    Sodium 138 135 - 145 meq/L    Potassium 4.8 3.5 - 5.2 meq/L    Chloride 98 98 - 111 meq/L    CO2 27 23 - 33 meq/L    Calcium 9.2 8.5 - 10.5 mg/dL    AST 12 5 - 40 U/L    Alkaline Phosphatase 50 38 - 126 U/L    Total Protein 5.9 (L) 6.1 - 8.0 g/dL    Alb 3.4 (L) 3.5 - 5.1 g/dL    Total Bilirubin 0.3 0.3 - 1.2 mg/dL    ALT 16 11 - 66 U/L   Troponin    Collection Time: 11/16/17  1:08 PM   Result Value Ref Range    Troponin T < 0.010 ng/ml   Anion Gap    Collection atelectasis right posterior costophrenic angle. Vascular: No pulmonary emboli. Aortic arch and great vessels are normal. No aortic dissection is seen. Celiac artery and SMA widely patent. Upper abdominal aorta mildly aneurysmal, 3.1 cm     1. Fibroemphysematous lungs. Questionable minimal atelectasis right posterior costophrenic sulcus. 2. No pulmonary emboli. 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 Aorta otherwise unremarkable. No dissection is seen. **This report has been created using voice recognition software. It may contain minor errors which are inherent in voice recognition technology. ** Final report electronically signed by Dr. Chloe Pérez on 11/16/2017 1:59 PM    Xr Chest Portable    Result Date: 11/16/2017  PROCEDURE: XR CHEST PORTABLE CLINICAL INFORMATION: Chest pain. Cough. COMPARISON: 11/13/2017 TECHNIQUE: A single mobile view of the chest was obtained. 1. Normal heart size. Fibroemphysematous lungs. Moderately elevated appearance left hemidiaphragm, possibly related to a small eventration. Similar appearance present on prior study. 2. Minimal right basilar atelectasis/pneumonia. Overall appearance improved from prior. **This report has been created using voice recognition software. It may contain minor errors which are inherent in voice recognition technology. ** Final report electronically signed by Dr. Chloe Pérez on 11/16/2017 1:12 PM        EKG: normal sinus rhythm, no blocks or conduction defects, no ischemic changes    Assessment:    Principal Problem:    Unstable angina ([address]      Plan:  1. Admit for further evaluation  2. Cardiology consult  3. Continue ASA, Prasugrel, Ranolazine, Imdur and Simvastatin  4.  Trend troponin, serial EKG prn chest pain and follow AM labs      DVT prophylaxis: [x] Lovenox                                 [] SCDs                                 [] SQ Heparin                                 [] Encourage ambulation, low risk for DVT, no chemical or

## 2017-11-16 NOTE — ED PROVIDER NOTES
[facility]  eMERGENCY dEPARTMENT eNCOUnter          [address] [facility]       Chief Complaint   Patient presents with    Chest Pain    Shortness of Breath       Nurses Notes reviewed and I agree except as noted in the HPI. HISTORY OF PRESENT ILLNESS    Chano Tran is a 67 y.o. male who presents to the Emergency Department for the evaluation of chest pain and shortness of breath. The patient reports that his symptoms started at 9 AM this morning when he woke up. He took his daily medications as prescribed and took a Norco and baby aspirin at 9:30 AM. He states that the [address] did help take his pain down to an 8/10 in severity and states that the pain is on the left side. The patient says that deep breaths help the pain and walking makes the pain worse to the point where he can barely walk. He reports that he is nauseous and dizzy. His daughter states that on Monday he was diagnosed with acute bronchitis with COPD. He says that he sees Dr. Lenore Douglass and had a stress test done in August 2017. The HPI was provided by the patient. REVIEW OF SYSTEMS     Review of Systems   Constitutional: Negative for appetite change, chills, fatigue and fever. HENT: Negative for congestion, ear pain, rhinorrhea and sore throat. Eyes: Negative for discharge, redness and visual disturbance. Respiratory: Positive for shortness of breath. Negative for cough and wheezing. Cardiovascular: Positive for chest pain. Negative for palpitations and leg swelling. Gastrointestinal: Positive for nausea. Negative for abdominal pain, diarrhea and vomiting. Genitourinary: Negative for decreased urine volume, difficulty urinating and dysuria. Musculoskeletal: Negative for arthralgias, back pain, joint swelling and neck pain. Skin: Negative for pallor and rash. Allergic/Immunologic: Negative for environmental allergies. Neurological: Positive for dizziness.  Negative for syncope, weakness, light-headedness and and well-nourished. HENT:   Head: Normocephalic and atraumatic. Right Ear: External ear normal.   Left Ear: External ear normal.   Eyes: Conjunctivae are normal. Right eye exhibits no discharge. Left eye exhibits no discharge. No scleral icterus. Neck: Normal range of motion. Neck supple. No JVD present. Cardiovascular: Normal rate, regular rhythm and normal heart sounds. Exam reveals no gallop and no friction rub. No murmur heard. Pulmonary/Chest: Effort normal. No respiratory distress. He has no decreased breath sounds. He has no wheezes. He has rhonchi (defused). He has no rales. He exhibits tenderness (left). Right breast exhibits no inverted nipple, no mass, no nipple discharge, no skin change and no tenderness. Left breast exhibits no inverted nipple, no mass, no nipple discharge, no skin change and no tenderness. Abdominal: Soft. He exhibits no distension. There is no tenderness. There is no rebound and no guarding. Musculoskeletal: Normal range of motion. He exhibits no edema. Neurological: He is alert and oriented to person, place, and time. He exhibits normal muscle tone. He displays no seizure activity. GCS eye subscore is 4. GCS verbal subscore is 5. GCS motor subscore is 6. Skin: Skin is warm and dry. No rash noted. He is not diaphoretic. Psychiatric: He has a normal mood and affect. His behavior is normal. Thought content normal.   Nursing note and vitals reviewed. DIFFERENTIAL DIAGNOSIS:       DIAGNOSTIC RESULTS     EKG: All EKG's are interpreted by the Emergency Department Physician who either signs or Co-signs this chart in the absence of a cardiologist.  EKG interpreted by Eliazar Somers MD:    Vent. Rate: 83 bpm  OK interval: 126 ms  QRS duration: 74 ms  QTc: 383 ms  P-R-T axes: 83, 71, 73  Normal sinus rhythm  No STEMI.   Compared to old EKG on 13-Nov-2017      RADIOLOGY: non-plain film images(s) such as CT, Ultrasound and MRI are read by the radiologist.    CTA Chest W WO Contrast   Final Result   1. Fibroemphysematous lungs. Questionable minimal atelectasis right posterior costophrenic sulcus. 2. No pulmonary emboli. 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 Aorta otherwise unremarkable. No dissection is seen. **This report has been created using voice recognition software. It may contain minor errors which are inherent in voice recognition technology. **      Final report electronically signed by Dr. Chi Cortes on 11/16/2017 1:59 PM      XR Chest Portable   Final Result   1. Normal heart size. Fibroemphysematous lungs. Moderately elevated appearance left hemidiaphragm, possibly related to a small eventration. Similar appearance present on prior study. 2. Minimal right basilar atelectasis/pneumonia. Overall appearance improved from prior. **This report has been created using voice recognition software. It may contain minor errors which are inherent in voice recognition technology. **      Final report electronically signed by Dr. Chi Cortes on 11/16/2017 1:12 PM          LABS:   Labs Reviewed   D-DIMER, QUANTITATIVE - Abnormal; Notable for the following:        Result Value    D-Dimer, Quant 1720.00 (*)     All other components within normal limits   CBC WITH AUTO DIFFERENTIAL - Abnormal; Notable for the following:     WBC 15.4 (*)     RBC 2.85 (*)     Hemoglobin 9.3 (*)     Hematocrit 28.3 (*)     MCV 99.2 (*)     MCH 32.7 (*)     MPV 7.3 (*)     Segs Absolute 11.5 (*)     Monocytes # 1.7 (*)     All other components within normal limits   PROTIME-INR - Abnormal; Notable for the following:     INR 1.17 (*)     All other components within normal limits   COMPREHENSIVE METABOLIC PANEL - Abnormal; Notable for the following:     CREATININE 1.6 (*)     BUN 67 (*)     Total Protein 5.9 (*)     Alb 3.4 (*)     All other components within normal limits   GLOMERULAR FILTRATION RATE, ESTIMATED - Abnormal; Notable for the following:     Est, Glom Filt

## 2017-11-16 NOTE — ED NOTES
Pt updated on POC- denies needs at this time- states feeling better and more restful at this time. BP still low- #2 bolus started per Dr Dory Vega orders.       Izzy Mckay RN  11/16/17 2270

## 2017-11-16 NOTE — ED NOTES
Pt in bed talking to family at bedside. IV infusing KVO. No concerns voiced at this time. Call light within reach.       Nadia Valentino RN  11/16/17 1421       Nadia Valentino RN  11/16/17 3646

## 2017-11-16 NOTE — ED TRIAGE NOTES
Pt arrived per EMS to rm 2 c/o CP/SOB since approx 0900 today- feels like it is getting worse. Pt was given SL NTG per EMS, pain jyoti from 10/10 to 8/10, but medics report drop in BP. On arrival pt appears SOB, pale, cool, dry- states that he was just here a few days ago dx COPD exacerbation. DR Harris to bedside.

## 2017-11-16 NOTE — PROGRESS NOTES
Pharmacy Medication History Note      List of current medications patient is taking is COMPLETE. Source of information: Patient. Changes made to medication list:  Medications removed (include reason, ex. therapy complete or physician discontinued):  · Proventil; therapy complete. · Effient; duplicate therapy         · Denies use of other OTC or herbal medications.       Allergies reviewed      Electronically signed by Nik Rodriguez on 11/16/2017 at 3:38 PM

## 2017-11-16 NOTE — ED NOTES
Pt returned from CT scan- respiratory at bedside for treatment     Marti Landaverde RN  11/16/17 6169

## 2017-11-16 NOTE — ED NOTES
Bed: 002A  Expected date: 11/16/17  Expected time:   [facility]  Comments:     Elliott Ohara RN  11/16/17 8657

## 2017-11-17 ENCOUNTER — APPOINTMENT (OUTPATIENT)
Dept: CARDIAC CATH/INVASIVE PROCEDURES | Age: 72
DRG: 270 | End: 2017-11-17
Payer: MEDICARE

## 2017-11-17 ENCOUNTER — APPOINTMENT (OUTPATIENT)
Dept: GENERAL RADIOLOGY | Age: 72
DRG: 270 | End: 2017-11-17
Payer: MEDICARE

## 2017-11-17 LAB
ABO: NORMAL
ALLEN TEST: ABNORMAL
ANION GAP SERPL CALCULATED.3IONS-SCNC: 7 MEQ/L (ref 8–16)
ANTIBODY SCREEN: NORMAL
APTT: 27.7 SECONDS (ref 22–38)
BASE EXCESS (CALCULATED): -16.7 MMOL/L (ref -2.5–2.5)
BASE EXCESS (CALCULATED): -6.4 MMOL/L (ref -2.5–2.5)
BASE EXCESS (CALCULATED): -7.3 MMOL/L (ref -2.5–2.5)
BASOPHILS # BLD: 0 %
BASOPHILS ABSOLUTE: 0 THOU/MM3 (ref 0–0.1)
BUN BLDV-MCNC: 65 MG/DL (ref 7–22)
CALCIUM SERPL-MCNC: 8.2 MG/DL (ref 8.5–10.5)
CHLORIDE BLD-SCNC: 103 MEQ/L (ref 98–111)
CHOLESTEROL, TOTAL: 73 MG/DL (ref 100–199)
CO2: 25 MEQ/L (ref 23–33)
COLLECTED BY:: ABNORMAL
CREAT SERPL-MCNC: 1.3 MG/DL (ref 0.4–1.2)
DEVICE: ABNORMAL
DIFFERENTIAL, MANUAL: NORMAL
EKG ATRIAL RATE: 38 BPM
EKG P AXIS: 61 DEGREES
EKG P-R INTERVAL: 202 MS
EKG Q-T INTERVAL: 372 MS
EKG QRS DURATION: 80 MS
EKG QTC CALCULATION (BAZETT): 295 MS
EKG R AXIS: 93 DEGREES
EKG T AXIS: 66 DEGREES
EKG VENTRICULAR RATE: 38 BPM
EOSINOPHIL # BLD: 0 %
EOSINOPHILS ABSOLUTE: 0 THOU/MM3 (ref 0–0.4)
GFR SERPL CREATININE-BSD FRML MDRD: 54 ML/MIN/1.73M2
GLUCOSE BLD-MCNC: 132 MG/DL (ref 70–108)
GLUCOSE BLD-MCNC: 178 MG/DL (ref 70–108)
HCO3: 14 MMOL/L (ref 23–28)
HCO3: 18 MMOL/L (ref 23–28)
HCO3: 20 MMOL/L (ref 23–28)
HCT VFR BLD CALC: 19.2 % (ref 42–52)
HCT VFR BLD CALC: 21.2 % (ref 42–52)
HCT VFR BLD CALC: 30.3 % (ref 42–52)
HCT VFR BLD CALC: 31.6 % (ref 42–52)
HDLC SERPL-MCNC: 22 MG/DL
HEMOGLOBIN: 10 GM/DL (ref 14–18)
HEMOGLOBIN: 10.5 GM/DL (ref 14–18)
HEMOGLOBIN: 6.2 GM/DL (ref 14–18)
HEMOGLOBIN: 7.1 GM/DL (ref 14–18)
IFIO2: 100
IFIO2: 15
IFIO2: 30
LDL CHOLESTEROL CALCULATED: 27 MG/DL
LV EF: 55 %
LVEF MODALITY: NORMAL
LYMPHOCYTES # BLD: 10.5 %
LYMPHOCYTES ABSOLUTE: 1 THOU/MM3 (ref 1–4.8)
MACROCYTES: ABNORMAL
MCH RBC QN AUTO: 33.5 PG (ref 27–31)
MCH RBC QN AUTO: 33.7 PG (ref 27–31)
MCHC RBC AUTO-ENTMCNC: 32.5 GM/DL (ref 33–37)
MCHC RBC AUTO-ENTMCNC: 33.5 GM/DL (ref 33–37)
MCV RBC AUTO: 103.7 FL (ref 80–94)
MCV RBC AUTO: 99.9 FL (ref 80–94)
MODE: AC
MODE: AC
MONOCYTES # BLD: 1.8 %
MONOCYTES ABSOLUTE: 0.2 THOU/MM3 (ref 0.4–1.3)
MRSA SCREEN RT-PCR: NEGATIVE
NUCLEATED RED BLOOD CELLS: 0 /100 WBC
O2 SATURATION: 100 %
O2 SATURATION: 100 %
O2 SATURATION: 97 %
PCO2: 32 MMHG (ref 35–45)
PCO2: 47 MMHG (ref 35–45)
PCO2: 68 MMHG (ref 35–45)
PDW BLD-RTO: 14.1 % (ref 11.5–14.5)
PDW BLD-RTO: 14.1 % (ref 11.5–14.5)
PH BLOOD GAS: 6.92 (ref 7.35–7.45)
PH BLOOD GAS: 7.23 (ref 7.35–7.45)
PH BLOOD GAS: 7.37 (ref 7.35–7.45)
PLATELET # BLD: 226 THOU/MM3 (ref 130–400)
PLATELET # BLD: 288 THOU/MM3 (ref 130–400)
PMV BLD AUTO: 7.3 MCM (ref 7.4–10.4)
PMV BLD AUTO: 7.8 MCM (ref 7.4–10.4)
PO2: 438 MMHG (ref 71–104)
PO2: 519 MMHG (ref 71–104)
PO2: 95 MMHG (ref 71–104)
POC O2 SATURATION: 67 % (ref 94–97)
POC O2 SATURATION: 98 % (ref 94–97)
POTASSIUM SERPL-SCNC: 4.8 MEQ/L (ref 3.5–5.2)
PROCALCITONIN: 0.14 NG/ML (ref 0.01–0.09)
RBC # BLD: 1.85 MILL/MM3 (ref 4.7–6.1)
RBC # BLD: 2.12 MILL/MM3 (ref 4.7–6.1)
RH FACTOR: NORMAL
SEG NEUTROPHILS: 87.7 %
SEGMENTED NEUTROPHILS ABSOLUTE COUNT: 8.4 THOU/MM3 (ref 1.8–7.7)
SET PEEP: 5 MMHG
SET PEEP: 5 MMHG
SET RESPIRATORY RATE: 16 BPM
SET RESPIRATORY RATE: 20 BPM
SET TIDAL VOLUME: 500 ML
SET TIDAL VOLUME: 500 ML
SODIUM BLD-SCNC: 135 MEQ/L (ref 135–145)
SOURCE, BLOOD GAS: ABNORMAL
TRIGL SERPL-MCNC: 118 MG/DL (ref 0–199)
WBC # BLD: 14.5 THOU/MM3 (ref 4.8–10.8)
WBC # BLD: 9.6 THOU/MM3 (ref 4.8–10.8)

## 2017-11-17 PROCEDURE — A6212 FOAM DRG <=16 SQ IN W/BORDER: HCPCS

## 2017-11-17 PROCEDURE — 93306 TTE W/DOPPLER COMPLETE: CPT

## 2017-11-17 PROCEDURE — C1760 CLOSURE DEV, VASC: HCPCS

## 2017-11-17 PROCEDURE — 2500000003 HC RX 250 WO HCPCS: Performed by: INTERNAL MEDICINE

## 2017-11-17 PROCEDURE — C1751 CATH, INF, PER/CENT/MIDLINE: HCPCS

## 2017-11-17 PROCEDURE — 86850 RBC ANTIBODY SCREEN: CPT

## 2017-11-17 PROCEDURE — 93005 ELECTROCARDIOGRAM TRACING: CPT

## 2017-11-17 PROCEDURE — 82948 REAGENT STRIP/BLOOD GLUCOSE: CPT

## 2017-11-17 PROCEDURE — 36592 COLLECT BLOOD FROM PICC: CPT

## 2017-11-17 PROCEDURE — 87081 CULTURE SCREEN ONLY: CPT

## 2017-11-17 PROCEDURE — 36430 TRANSFUSION BLD/BLD COMPNT: CPT

## 2017-11-17 PROCEDURE — 86900 BLOOD TYPING SEROLOGIC ABO: CPT

## 2017-11-17 PROCEDURE — 6370000000 HC RX 637 (ALT 250 FOR IP): Performed by: INTERNAL MEDICINE

## 2017-11-17 PROCEDURE — 85014 HEMATOCRIT: CPT

## 2017-11-17 PROCEDURE — 85027 COMPLETE CBC AUTOMATED: CPT

## 2017-11-17 PROCEDURE — 87641 MR-STAPH DNA AMP PROBE: CPT

## 2017-11-17 PROCEDURE — 84145 PROCALCITONIN (PCT): CPT

## 2017-11-17 PROCEDURE — 6360000002 HC RX W HCPCS

## 2017-11-17 PROCEDURE — 2580000003 HC RX 258: Performed by: INTERNAL MEDICINE

## 2017-11-17 PROCEDURE — 82810 BLOOD GASES O2 SAT ONLY: CPT

## 2017-11-17 PROCEDURE — C1887 CATHETER, GUIDING: HCPCS

## 2017-11-17 PROCEDURE — A4421 OSTOMY SUPPLY MISC: HCPCS

## 2017-11-17 PROCEDURE — 85025 COMPLETE CBC W/AUTO DIFF WBC: CPT

## 2017-11-17 PROCEDURE — 99291 CRITICAL CARE FIRST HOUR: CPT | Performed by: INTERNAL MEDICINE

## 2017-11-17 PROCEDURE — 85730 THROMBOPLASTIN TIME PARTIAL: CPT

## 2017-11-17 PROCEDURE — 80061 LIPID PANEL: CPT

## 2017-11-17 PROCEDURE — 82803 BLOOD GASES ANY COMBINATION: CPT

## 2017-11-17 PROCEDURE — C9113 INJ PANTOPRAZOLE SODIUM, VIA: HCPCS | Performed by: INTERNAL MEDICINE

## 2017-11-17 PROCEDURE — 33967 INSERT I-AORT PERCUT DEVICE: CPT

## 2017-11-17 PROCEDURE — 86901 BLOOD TYPING SEROLOGIC RH(D): CPT

## 2017-11-17 PROCEDURE — 80048 BASIC METABOLIC PNL TOTAL CA: CPT

## 2017-11-17 PROCEDURE — 71010 XR CHEST PORTABLE: CPT

## 2017-11-17 PROCEDURE — 94002 VENT MGMT INPAT INIT DAY: CPT

## 2017-11-17 PROCEDURE — 2500000003 HC RX 250 WO HCPCS

## 2017-11-17 PROCEDURE — A6258 TRANSPARENT FILM >16<=48 IN: HCPCS

## 2017-11-17 PROCEDURE — C1757 CATH, THROMBECTOMY/EMBOLECT: HCPCS

## 2017-11-17 PROCEDURE — 2780000010 HC IMPLANT OTHER

## 2017-11-17 PROCEDURE — 85018 HEMOGLOBIN: CPT

## 2017-11-17 PROCEDURE — 36415 COLL VENOUS BLD VENIPUNCTURE: CPT

## 2017-11-17 PROCEDURE — C1894 INTRO/SHEATH, NON-LASER: HCPCS

## 2017-11-17 PROCEDURE — 92950 HEART/LUNG RESUSCITATION CPR: CPT

## 2017-11-17 PROCEDURE — P9016 RBC LEUKOCYTES REDUCED: HCPCS

## 2017-11-17 PROCEDURE — 6360000002 HC RX W HCPCS: Performed by: INTERNAL MEDICINE

## 2017-11-17 PROCEDURE — 37799 UNLISTED PX VASCULAR SURGERY: CPT

## 2017-11-17 PROCEDURE — 2580000003 HC RX 258

## 2017-11-17 PROCEDURE — 2100000000 HC CCU R&B

## 2017-11-17 PROCEDURE — 94640 AIRWAY INHALATION TREATMENT: CPT

## 2017-11-17 PROCEDURE — 36600 WITHDRAWAL OF ARTERIAL BLOOD: CPT

## 2017-11-17 PROCEDURE — 93460 R&L HRT ART/VENTRICLE ANGIO: CPT

## 2017-11-17 PROCEDURE — C1769 GUIDE WIRE: HCPCS

## 2017-11-17 PROCEDURE — 99232 SBSQ HOSP IP/OBS MODERATE 35: CPT | Performed by: INTERNAL MEDICINE

## 2017-11-17 PROCEDURE — 86923 COMPATIBILITY TEST ELECTRIC: CPT

## 2017-11-17 RX ORDER — IPRATROPIUM BROMIDE AND ALBUTEROL SULFATE 2.5; .5 MG/3ML; MG/3ML
1 SOLUTION RESPIRATORY (INHALATION) EVERY 6 HOURS
Status: DISCONTINUED | OUTPATIENT
Start: 2017-11-17 | End: 2017-11-19

## 2017-11-17 RX ORDER — ATROPINE SULFATE 0.4 MG/ML
0.5 AMPUL (ML) INJECTION
Status: ACTIVE | OUTPATIENT
Start: 2017-11-17 | End: 2017-11-17

## 2017-11-17 RX ORDER — HEPARIN SODIUM 10000 [USP'U]/100ML
INJECTION, SOLUTION INTRAVENOUS
Status: DISPENSED
Start: 2017-11-17 | End: 2017-11-17

## 2017-11-17 RX ORDER — HEPARIN SODIUM 10000 [USP'U]/100ML
12 INJECTION, SOLUTION INTRAVENOUS CONTINUOUS
Status: DISCONTINUED | OUTPATIENT
Start: 2017-11-17 | End: 2017-11-17

## 2017-11-17 RX ORDER — HEPARIN SODIUM 1000 [USP'U]/ML
60 INJECTION, SOLUTION INTRAVENOUS; SUBCUTANEOUS PRN
Status: DISCONTINUED | OUTPATIENT
Start: 2017-11-17 | End: 2017-11-17

## 2017-11-17 RX ORDER — HEPARIN SODIUM 1000 [USP'U]/ML
60 INJECTION, SOLUTION INTRAVENOUS; SUBCUTANEOUS ONCE
Status: DISCONTINUED | OUTPATIENT
Start: 2017-11-17 | End: 2017-11-25 | Stop reason: HOSPADM

## 2017-11-17 RX ORDER — SODIUM CHLORIDE 0.9 % (FLUSH) 0.9 %
10 SYRINGE (ML) INJECTION PRN
Status: DISCONTINUED | OUTPATIENT
Start: 2017-11-17 | End: 2017-11-25 | Stop reason: HOSPADM

## 2017-11-17 RX ORDER — SODIUM CHLORIDE 9 MG/ML
INJECTION, SOLUTION INTRAVENOUS CONTINUOUS
Status: DISCONTINUED | OUTPATIENT
Start: 2017-11-17 | End: 2017-11-18

## 2017-11-17 RX ORDER — ONDANSETRON 2 MG/ML
4 INJECTION INTRAMUSCULAR; INTRAVENOUS EVERY 6 HOURS PRN
Status: DISCONTINUED | OUTPATIENT
Start: 2017-11-17 | End: 2017-11-25 | Stop reason: HOSPADM

## 2017-11-17 RX ORDER — HEPARIN SODIUM 1000 [USP'U]/ML
30 INJECTION, SOLUTION INTRAVENOUS; SUBCUTANEOUS PRN
Status: DISCONTINUED | OUTPATIENT
Start: 2017-11-17 | End: 2017-11-17

## 2017-11-17 RX ORDER — SODIUM CHLORIDE 0.9 % (FLUSH) 0.9 %
10 SYRINGE (ML) INJECTION EVERY 12 HOURS SCHEDULED
Status: DISCONTINUED | OUTPATIENT
Start: 2017-11-17 | End: 2017-11-25 | Stop reason: HOSPADM

## 2017-11-17 RX ORDER — PANTOPRAZOLE SODIUM 40 MG/10ML
40 INJECTION, POWDER, LYOPHILIZED, FOR SOLUTION INTRAVENOUS EVERY 8 HOURS
Status: DISCONTINUED | OUTPATIENT
Start: 2017-11-17 | End: 2017-11-21

## 2017-11-17 RX ORDER — 0.9 % SODIUM CHLORIDE 0.9 %
250 INTRAVENOUS SOLUTION INTRAVENOUS ONCE
Status: COMPLETED | OUTPATIENT
Start: 2017-11-17 | End: 2017-11-17

## 2017-11-17 RX ORDER — ACETAMINOPHEN 325 MG/1
650 TABLET ORAL EVERY 4 HOURS PRN
Status: DISCONTINUED | OUTPATIENT
Start: 2017-11-17 | End: 2017-11-25 | Stop reason: HOSPADM

## 2017-11-17 RX ORDER — CHLORHEXIDINE GLUCONATE 0.12 MG/ML
15 RINSE ORAL 2 TIMES DAILY
Status: DISCONTINUED | OUTPATIENT
Start: 2017-11-17 | End: 2017-11-18

## 2017-11-17 RX ADMIN — Medication 10 ML: at 11:46

## 2017-11-17 RX ADMIN — SODIUM CHLORIDE: 9 INJECTION, SOLUTION INTRAVENOUS at 19:03

## 2017-11-17 RX ADMIN — TAZOBACTAM SODIUM AND PIPERACILLIN SODIUM 3.38 G: 375; 3 INJECTION, SOLUTION INTRAVENOUS at 22:29

## 2017-11-17 RX ADMIN — SODIUM CHLORIDE 250 ML: 9 INJECTION, SOLUTION INTRAVENOUS at 15:40

## 2017-11-17 RX ADMIN — Medication 15 ML: at 22:29

## 2017-11-17 RX ADMIN — Medication 10 ML: at 21:00

## 2017-11-17 RX ADMIN — FENTANYL CITRATE 75 MCG/HR: 50 INJECTION, SOLUTION INTRAMUSCULAR; INTRAVENOUS at 20:24

## 2017-11-17 RX ADMIN — PANTOPRAZOLE SODIUM 40 MG: 40 INJECTION, POWDER, FOR SOLUTION INTRAVENOUS at 11:46

## 2017-11-17 RX ADMIN — IPRATROPIUM BROMIDE AND ALBUTEROL SULFATE 1 AMPULE: .5; 3 SOLUTION RESPIRATORY (INHALATION) at 20:01

## 2017-11-17 RX ADMIN — SIMVASTATIN 80 MG: 40 TABLET, FILM COATED ORAL at 22:30

## 2017-11-17 RX ADMIN — FENTANYL CITRATE 25 MCG/HR: 50 INJECTION, SOLUTION INTRAMUSCULAR; INTRAVENOUS at 11:17

## 2017-11-17 RX ADMIN — ALBUTEROL SULFATE 2.5 MG: 2.5 SOLUTION RESPIRATORY (INHALATION) at 04:39

## 2017-11-17 RX ADMIN — PANTOPRAZOLE SODIUM 40 MG: 40 INJECTION, POWDER, FOR SOLUTION INTRAVENOUS at 20:28

## 2017-11-17 RX ADMIN — Medication 2 MCG/MIN: at 11:53

## 2017-11-17 ASSESSMENT — PAIN SCALES - GENERAL
PAINLEVEL_OUTOF10: 0

## 2017-11-17 ASSESSMENT — PULMONARY FUNCTION TESTS
PIF_VALUE: 26
PIF_VALUE: 18
PIF_VALUE: 33
PIF_VALUE: 30

## 2017-11-17 NOTE — CODE DOCUMENTATION
Compressions started.     Compressions alternating per April Hamel; John Lopez and Chantelle Rubio rotating throughout code

## 2017-11-17 NOTE — CONSULTS
in the ears     SOB (shortness of breath) on exertion      Past Surgical History:   Procedure Laterality Date    CARDIAC SURGERY      2 stents    COLONOSCOPY      CORONARY ANGIOPLASTY WITH STENT PLACEMENT      pt has 3 stents    DIAGNOSTIC CARDIAC CATH LAB PROCEDURE  3 09 2009    Successful primary stenting of mid LAD using drug-eluting stent.  DIAGNOSTIC CARDIAC CATH LAB PROCEDURE  1 10 2011    LV demonstrated normal systolic function, EF 80%. On pullback, no gradient was noted. No critical lesions noted in all large epicardial vessels so that RCA is dominant vessel w/just very mild diffuse disease, about 20-30% lesions. Left main widely patent w/mild disease distally. Circumflex widely patent w/diffuse disease, also about 20-30%.  ENDOSCOPY, COLON, DIAGNOSTIC      HERNIA REPAIR  2008,2010    TRANSTHORACIC ECHOCARDIOGRAM  1 10 2011    Size was normal. Systolic function was normal. EF estimated in range of 55-65%. No regional wall motion abnormalities. Wall thickness was normal. Doppler - LV diastolic function parameters were normal. This is technically limited study which may impair interpretation.  TRANSTHORACIC ECHOCARDIOGRAM  3 06 2009    This study is technically limited. Distal anterior apical wall hypokinesis. EF 40-45%. Left atrial size w/in normal limits. Trace MR. No evidence of aortic stenosis or aortic insufficiency. Right atrium and right ventricle are of normal contractility. Trace TR. No pericardial effusion. Social History     Social History    Marital status:      Spouse name: N/A    Number of children: 3    Years of education: N/A     Occupational History    Not on file.      Social History Main Topics    Smoking status: Current Every Day Smoker     Packs/day: 1.00     Years: 40.00     Types: Cigarettes     Start date: 5/31/1976    Smokeless tobacco: Never Used    Alcohol use No    Drug use: No    Sexual activity: Not on file     Other Topics Concern    Not heart sounds. No murmur heard. Pulmonary/Chest: Effort normal and breath sounds normal. No stridor. No respiratory distress. He has no rales. He exhibits no tenderness. Abdominal: Soft. Bowel sounds are normal. He exhibits no distension. There is no tenderness. Musculoskeletal: He exhibits no edema or tenderness. Neurological: He is alert and oriented to person, place, and time. Skin: Skin is warm and dry. No rash noted. He is not diaphoretic. No erythema. Psychiatric: Mood, memory and affect normal.   Vitals reviewed. Vitals:    11/16/17 2006   BP: 112/62   Pulse: 68   Resp: 20   Temp: 98.2 °F (36.8 °C)   SpO2: 96%     Labs, Radiology and Tests       Recent Labs      11/16/17   1308   WBC  15.4*   RBC  2.85*   HGB  9.3*   HCT  28.3*   MCV  99.2*   MCH  32.7*   MCHC  33.0   RDW  13.7   PLT  400     Recent Labs      11/16/17   1308   NA  138   K  4.8   CL  98   CO2  27   BUN  67*   CREATININE  1.6*   CALCIUM  9.2   PROT  5.9*   LABALBU  3.4*   BILITOT  0.3   ALKPHOS  50   AST  12   ALT  16       Radiology :Chest x-ray-pulmonary Vasculature Opiates to be within normal limits. COPD changes    Other : All laboratory data have been reviewed and noted the patient's baseline creatinine is around 1.1-1.2. Echocardiogram-6/2014 shows ejection fraction of 55-65%     Assessment    Renal - Acute kidney injury most likely secondary to prerenal etiology combined with the use of ACE inhibitor and diuretic.  - Clinically the patient looks dry.   He has also received IV contrast today for the CT chest.  - He is a higher risk for contrast-induced nephropathy I will start him on some gentle normal saline at 60 mL per hour.        -     Urine lites not helpful in this setting due to IV contrast administration  Electrolytes appear to be within normal limits  Essential hypertension burning well continue current medications  Chest pain-patient will proceed for noninvasive cardiac testing tomorrow morning. COPD  meds reviewed and D/W patient    **This report has been created using voice recognition software. It may contain minor  errors which are inherent in voice recognition technology. **    Lisa Holliday M.D  [facility].

## 2017-11-17 NOTE — FLOWSHEET NOTE
Contacted to inform of elevated WBC of 15.4 and CXR shows min. Right basilar atelectasis/pneumonia. Would you like Antibiotics ordered?

## 2017-11-17 NOTE — CONSULTS
[address] [facility] 11-17-17     [facility]  [id number]  1945  male      Requesting provider: Dr Luis Poe   Indications CC: Anemia, GI bleed   Dictating for Dr Wilmar Rogel     HPI: This is a 67year old male seen as a new consult for anemia on Effient for history of cardiac stent, GI bleed S/P code this morning. He was admitted yesterday with chest pain and shortness of breath. He was planning to have non invasive cardiac per cardiology. His hemoglobin was 9.3 on admission which was down from 13 at previous ER visit on 11-13-17. Hemoglobin dropped from 9.3 to 6.2 this morning. He had code blue this morning requiring compression and defib. He was intubated and noted to have bright blood in OG tube. He also had urgent cath this morning with Dr Lisa Stephens findings RCA 70% stenosis; patent LAD stent, anemia s/p 2U PRBCs/RHC pressures demonstrated preserved CO. EF 55-60%. Currently he is hypotensive, light sedation on vent although eyes open and able to nod head yes and no. He indicated he is constipated at home and does not take anything for it. He denied any previous abdominal pain or bleeding. No family members present during consult. RN is at bedside and discussed today's event and care. He was previously seen per Dr Marc Vigil in 2010 and had EGD and Colonoscopy. EGD on 7-21-10 with findings of grade A esophagitis. Patient has a C shaped stomach as it wraps around the internal organs. Otherwise normal appearing stomach. No evidence of bleeding. Recommendations for PPI bid. Gastric emptying study. It was felt he may have mesenteric ischemia causing abdominal pain and continued to smoke. He never had gastric emptying study. Colonoscopy 5-26-10 with findings of normal appearing terminal ileum. Small amount erythema in rectum, biopsy negative for colitis. Sigmoid colon diverticulosis . Tortuous colon. Rectal polyp, hyperplastic. Moderate non bleeding internal hemorrhoids.  Otherwise normal appearing colonoscopy. Recommendations for daily Miralax.        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[address]    S/P angioplasty with stent    COPD exacerbation ([address]    CAP (community acquired pneumonia)    Pneumonia of lower lobe due to infectious organism ([address]    Smoker    Unstable angina ([address]    Chest pain    History of placement of stent in LAD coronary artery    Renal failure syndrome     Past Medical History:   Diagnosis Date    Acute coronary syndrome (HCC)     Arrhythmia     Arteriosclerotic heart disease (ASHD)     CHF (congestive heart failure) (HCC)     COPD [facility]ronic obstructive [facility]) [facility]     Dizziness - light-headed     HX OF:    Emphysema     Esophagitis     Fatigue     HX OF:    History of chest pain     History of tinnitus     History of tobacco abuse     Hyperlipidemia     Hypertension     Irregular heart beat     Lightheadedness     MI (myocardial infarction)     Noncompliance with medication regimen     Noncompliance with medical therapy.  Pneumonia     Renal failure syndrome     Ringing in the ears     SOB (shortness of breath) on exertion        Past Surgical History:   Procedure Laterality Date    CARDIAC SURGERY      2 stents    COLONOSCOPY      CORONARY ANGIOPLASTY WITH STENT PLACEMENT      pt has 3 stents    DIAGNOSTIC CARDIAC CATH LAB PROCEDURE  3 09 2009    Successful primary stenting of mid LAD using drug-eluting stent.     [address] CATH LAB PROCEDURE  1 10 2011    LV demonstrated normal systolic function, EF 48%. On pullback, no gradient was noted. No critical lesions noted in all large epicardial vessels so that RCA is dominant vessel w/just very mild diffuse disease, about 20-30% lesions. Left main widely patent w/mild disease distally. Circumflex widely patent w/diffuse disease, also about 20-30%.  ENDOSCOPY, COLON, DIAGNOSTIC      HERNIA REPAIR  2008,2010    TRANSTHORACIC ECHOCARDIOGRAM  1 10 2011    Size was normal. Systolic function was normal. EF estimated in range of 55-65%. No regional wall motion abnormalities. Wall thickness was normal. Doppler - LV diastolic function parameters were normal. This is technically limited study which may impair interpretation.  TRANSTHORACIC ECHOCARDIOGRAM  3 06 2009    This study is technically limited. Distal anterior apical wall hypokinesis. EF 40-45%. Left atrial size w/in normal limits. Trace MR. No evidence of aortic stenosis or aortic insufficiency. Right atrium and right ventricle are of normal contractility. Trace TR. No pericardial effusion. Prior to Admission medications    Medication Sig Start Date End Date Taking?  Authorizing Provider   ranolazine (RANEXA) 1000 MG extended release tablet take 1 tablet by mouth twice a day 11/14/17  Yes Rohit Mott, DO   valsartan-hydrochlorothiazide (DIOVAN-HCT) 80-12.5 MG per tablet take 1 tablet by mouth daily 11/8/16  Yes Rohit Mott, DO   simvastatin (ZOCOR) 80 MG tablet take 1 tablet by mouth at bedtime 11/8/16  Yes Rohit Mott, DO   prasugrel (EFFIENT) 10 MG TABS take 1 tablet by mouth once daily 11/8/16  Yes Rohit Mott, DO   isosorbide mononitrate (IMDUR) 60 MG extended release tablet take 1 tablet once daily 11/8/16  Yes Rohit Mott, DO   metoprolol succinate (TOPROL XL) 25 MG extended release tablet Take 1 tablet by mouth 2 times daily 11/8/16  Yes Yevgeniy Mccoy, DO   amLODIPine (NORVASC) 2.5 MG tablet Take 1 tablet by mouth AP diameter is normal   Back: no scoliosis or kyphosis. No CVA tenderness  Abdomen: soft + generalized tender to plapation. Normal active bowel sounds heard all   quadrants. No organomegaly or masses noted. Extremities: warm. No clubbing cyanosis or peripheral edema  noted. Skin: no rashes or ulcerations   Neurologic: patient is awake intubated     DATA REVIEW: WBC:    Recent Labs      11/17/17   0419  11/17/17   0710  11/17/17   1045   WBC  9.6  14.5*   --    PLT  288  226   --    HGB  7.1*  6.2*  10.5*   HCT  21.2*  19.2*  31.6*       BMP:     Recent Labs      11/16/17   1308  11/17/17   0419   NA  138  135   K  4.8  4.8   CL  98  103   CO2  27  25   BUN  67*  65*   LABALBU  3.4*   --    CREATININE  1.6*  1.3*   CALCIUM  9.2  8.2*   LABGLOM  43*  54*   GLUCOSE  100  132*     Hepatic Function Panel:     Recent Labs      11/16/17   1308   ALKPHOS  50   ALT  16   AST  12   PROT  5.9*   BILITOT  0.3   LABALBU  3.4*     Calcium:     Recent Labs      11/17/17   0419   CALCIUM  8.2*     PT/INR:     Recent Labs      11/16/17   1308   INR  1.17*     PTT:     Recent Labs      11/17/17   0710   APTT  27.7       No results for input(s): MG, LIPASE, AMYLASE in the last 72 hours. Impression: Anemia drop from 9 on admission to 6.2 this morning.  Bright blood noted after OG placed post intubation   S/P code this morning, Inferior STEMI/Cardiac Arrest, cardiology felt likely related to blood loss   Abdominal pain, generalized per exam   Constipation per patient prior to admission     Chest pain on admission   History cardiac stent, 5 years ago   Effient use     Acute kidney injury, nephrology following   History hypertension   Dyslipidemia   COPD    History esophagitis per EGD with Dr Dudley Hein in 2010   History of diverticulosis per colonoscopy 2010   History internal hemorrhoids       Plan: H&H q 6 hrs, transfuse as needed  Hold Effient   Continue Pantoprazole 40mg IV push tid   EGD timing pending clinical course, likely

## 2017-11-17 NOTE — CONSULTS
for the past 96 hrs (Last 3 readings):   Weight   11/16/17 1703 133 lb 6.4 oz (60.5 kg)   11/16/17 1237 140 lb (63.5 kg)       Exam   General Appearance: Patient appears moderately built and moderately nourished in no acute distress. Patient remain on vent and sedated. HEENT: Normal, Head is normocephalic, atraumatic. Oral exam is limited by endotracheal tube. PERRL. Neck - Supple, No JVD present. No tracheal deviation noted. Lungs - Bilateral air entry present with good breath sounds. no wheezes, rales or rhonchi. Cardiovascular - Heart sounds are normal.  Regular rhythm normal rate without murmur, gallop or rub. Abdomen - Soft, nontender, nondistended, no masses or organomegaly. BS +ve  Neurologic - Sedated and unresponsive. Extremities - No cyanosis, clubbing or edema. Musculoskeletal: Sedated and unresponsive. Lymphadenopathy:  No cervical adenopathy. Skin - No bruising or bleeding.       Labs  - Old records and notes have been reviewed in [facility]   ABG  Lab Results   Component Value Date    PH 7.37 11/17/2017    PO2 95 11/17/2017    PCO2 32 11/17/2017    HCO3 18 11/17/2017    O2SAT 97 11/17/2017     Lab Results   Component Value Date    IFIO2 30 11/17/2017    MODE AC 11/17/2017    SETTIDVOL 500 11/17/2017    SETPEEP 5.0 11/17/2017     CBC  Recent Labs      11/16/17   1308  11/17/17   0419  11/17/17   0710  11/17/17   1045  11/17/17   1615   WBC  15.4*  9.6  14.5*   --    --    RBC  2.85*  2.12*  1.85*   --    --    HGB  9.3*  7.1*  6.2*  10.5*  10.0*   HCT  28.3*  21.2*  19.2*  31.6*  30.3*   MCV  99.2*  99.9*  103.7*   --    --    MCH  32.7*  33.5*  33.7*   --    --    MCHC  33.0  33.5  32.5*   --    --    RDW  13.7  14.1  14.1   --    --    PLT  400  288  226   --    --    MPV  7.3*  7.3*  7.8   --    --       BMP  Recent Labs      11/16/17   1308  11/17/17   0419   NA  138  135   K  4.8  4.8   CL  98  103   CO2  27  25   BUN  67*  65*   CREATININE  1.6*  1.3*   GLUCOSE  100  132*   CALCIUM  9.2 8.2*     LFT  Recent Labs      11/16/17   1308   AST  12   ALT  16   BILITOT  0.3   ALKPHOS  50     TROP  Lab Results   Component Value Date    TROPONINT < 0.010 11/16/2017    TROPONINT < 0.010 11/16/2017    TROPONINT < 0.010 11/16/2017     BNP  No results for input(s): BNP in the last 72 hours. Lactic Acid  No results for input(s): LACTA in the last 72 hours. INR  Recent Labs      11/16/17   1308   INR  1.17*     PTT  Recent Labs      11/17/17   0710   APTT  27.7     Glucose  Recent Labs      11/17/17   0635   POCGLU  178*     UA No results for input(s): SPECGRAV, PHUR, COLORU, CLARITYU, MUCUS, PROTEINU, BLOODU, RBCUA, WBCUA, BACTERIA, NITRU, GLUCOSEU, BILIRUBINUR, UROBILINOGEN, KETUA, LABCAST, LABCASTTY, AMORPHOS in the last 72 hours. Invalid input(s): CRYSTALS. PFTs   PFTS:                Sleep studies   None    Cultures    None  EKG     Echocardiogram   11/17/2017  Transthoracic Echocardiography Report (TTE)     Demographics      Patient Name   Emory Tompkins  Gender              Male                  H      MR #           [id number]       Race                     Left Ventricle   Normal left ventricle size and systolic function. Ejection fraction was   estimated at 55%. Unable to determine wall motion abnormalities due to poor image quality. Conclusions      Summary      Technically difficult examination. Normal left ventricle size and systolic function. Ejection fraction was   estimated at 55%. Unable to determine wall motion abnormalities due to poor image quality. Valves were not well visualized. Signature      ----------------------------------------------------------------   Electronically signed by Alejandrina Medina MD (Interpreting   physician) on 11/17/2017 at 06:26 PM   ----------------------------------------------------------------      Findings      Radiology    CXR  Nov 17, 2017  PROCEDURE: XR CHEST PORTABLE  1. ET and OG tubes as described.  2. Lower lung volumes

## 2017-11-17 NOTE — PROGRESS NOTES
Supple, no JVD  Lungs:  Breath sounds: clear  Heart[de-identified]  S1,S2 heard  Abdomen:  Soft, non - tender  Musculoskeletal:  Edema - none         Last 3 CBC   Recent Labs      11/16/17   1308  11/17/17   0419  11/17/17   0710  11/17/17   1045   WBC  15.4*  9.6  14.5*   --    RBC  2.85*  2.12*  1.85*   --    HGB  9.3*  7.1*  6.2*  10.5*   HCT  28.3*  21.2*  19.2*  31.6*   PLT  400  288  226   --      Last 3 CMP  Recent Labs      11/16/17   1308  11/17/17   0419   NA  138  135   K  4.8  4.8   CL  98  103   CO2  27  25   BUN  67*  65*   CREATININE  1.6*  1.3*   CALCIUM  9.2  8.2*   LABALBU  3.4*   --    BILITOT  0.3   --              Assessment / Plan   Renal - Acute kidney injury most likely secondary to prerenal etiology combined with the use of ACE inhibitor and diuretic. · S/P cardiac arrest and also contrast exposure twice 11/16 & 11/17. · He is a hige risk for contrast-induced nephropathy. Continue gentle IV hydration  · creatinine better this AM, but may be now worse after the cardiac arrest, closley follow. BMP in AM    Electrolytes appear to be within normal limits  Essential hypertension currently low o n pressors. Wean to a MAP > 65 mm  Chest pain- S/P cath and no sig stenosis  Anemia - ?? Cause, GI consulted. Getting multiple units of blood transfusion. Prn diuretics if needed  COPD  meds reviewed and D/W RN    RUSSELL Palacios D.  [facility].

## 2017-11-17 NOTE — FLOWSHEET NOTE
Patient admitted from ER yesterday. This morning, patient coded. House supervisor stated the family requested spiritual support when she called them, so I was able to be her when daughter(GERARDO) and son in law came in. Patient was intubated and awake but not responding to commands. I asked daughter if he would appreciate prayer or if it would upset him. NO PRAYER- daughter states he would appreciate the visit and support but prayer would likely upset him. Doctor told family that patient has internal bleeding and they don't know where, but they have to do the cath because he coded. Doctor was forthcoming in telling family the patient may not tolerate the procedure. A son lives in [facility] and will be in later today. Second son is incarcerated, but there is estrangement and patient's daughter thinks a bedside visit would be upsetting for all involved. However, if patient dies and the brother can receive permission, Aliya Gabriel feels he should be allowed grave side visit sans other family. I notified nursing supervisor Dajuan Cuevas so she could chart this information to facilitate the possibility of bereavement visit. After procedure, patient will be in 3A. Offered song and prayer. Family welcomes spiritual support.

## 2017-11-17 NOTE — PROGRESS NOTES
0730- at approximately this time, I took over bagging pt, from raúl montejo sia. Pt was already intubated, with haydee on,  from being a code blue at approx. 0640 this a.m. Then I assisted with taking this pt to cath lab, where he was set up on ventilator by Jennifer Forte rcp. Ventilator settings at A/C, rr 16, vt 500, peep 5, fio2 100%. ABG's obtained by cath lab staff, I ran on epoc, resp rate increased to 20 per dr. Pro Alexis. When cath completed, I then assisted bringing  pt , on ventilator, to ccu 1, approximately 0920.

## 2017-11-17 NOTE — CARE COORDINATION
11/17/17, 2:45 PM  Lanna Dakin       Admitted from: ED 11/16/2017/ 1237 Hospital day: 1   Location: 3A-01/001-A Reason for admit: Unstable angina ([facility] [I20.0]  Unstable angina ([facility] [I20.0] Status: inpt  Admit order signed?: yes  PMH:  has a past medical history of Acute coronary syndrome ([facility] Arrhythmia; Arteriosclerotic heart disease (ASHD); CHF (congestive heart failure) ([address] COPD (chronic obstructive pulmonary disease) ([address] Dizziness - light-headed; Emphysema; Esophagitis; Fatigue; History of chest pain; History of tinnitus; History of tobacco abuse; Hyperlipidemia; Hypertension; Irregular heart beat; Lightheadedness; MI (myocardial infarction); Noncompliance with medication regimen; Pneumonia; Renal failure syndrome; Ringing in the ears; and SOB (shortness of breath) on exertion. Medications:  Scheduled Meds:   heparin (porcine)  60 Units/kg Intravenous Once    sodium chloride flush  10 mL Intravenous 2 times per day    pantoprazole  40 mg Intravenous Q8H    aspirin  81 mg Oral Daily    isosorbide dinitrate  20 mg Oral TID    metoprolol tartrate  25 mg Oral BID    prasugrel  10 mg Oral Daily    ranolazine  1,000 mg Oral BID    simvastatin  80 mg Oral Nightly    tiotropium  18 mcg Inhalation Daily     Continuous Infusions:   heparin (porcine)      heparin (porcine)      midazolam      fentaNYL (SUBLIMAZE) 500 mcg in sodium chloride 0.9 % 100 mL 25 mcg/hr (11/17/17 1117)    sodium chloride 75 mL/hr at 11/17/17 1158    norepinephrine 8 mcg/min (11/17/17 1316)      Pertinent Info/Orders/Treatment Plan: S/P code blue this am, pt went to cath lab, per Dr Brian Rizo note: Post-operative Diagnosis: RCA 70% stenosis; patent LAD stent, anemia s/p 2U PRBCs/RHC pressures demonstrated preserved CO. EF 55-60%. Pt continues on 3A now, intubated, on fentanyl and norepinephrine gtt. Hgb was 9.3 and is now 6.2 today. GI consulted and plan is to keep patient intubated until EGD can be done.    Diet: Diet

## 2017-11-18 ENCOUNTER — APPOINTMENT (OUTPATIENT)
Dept: GENERAL RADIOLOGY | Age: 72
DRG: 270 | End: 2017-11-18
Payer: MEDICARE

## 2017-11-18 LAB
ALLEN TEST: ABNORMAL
ALLEN TEST: ABNORMAL
AMORPHOUS: ABNORMAL
ANION GAP SERPL CALCULATED.3IONS-SCNC: 13 MEQ/L (ref 8–16)
ANISOCYTOSIS: ABNORMAL
BACTERIA: ABNORMAL /HPF
BANDED NEUTROPHILS ABSOLUTE COUNT: 0.7 THOU/MM3
BANDS PRESENT: 2 %
BASE EXCESS (CALCULATED): -3.7 MMOL/L (ref -2.5–2.5)
BASE EXCESS (CALCULATED): -3.8 MMOL/L (ref -2.5–2.5)
BASOPHILIA: SLIGHT
BASOPHILS # BLD: 0 %
BASOPHILS ABSOLUTE: 0 THOU/MM3 (ref 0–0.1)
BILIRUBIN URINE: NEGATIVE
BLOOD, URINE: NEGATIVE
BUN BLDV-MCNC: 63 MG/DL (ref 7–22)
CALCIUM IONIZED: 1.13 MMOL/L (ref 1.12–1.32)
CALCIUM SERPL-MCNC: 7.3 MG/DL (ref 8.5–10.5)
CASTS 2: PRESENT /LPF
CASTS UA: ABNORMAL /LPF
CHARACTER, URINE: CLEAR
CHLORIDE BLD-SCNC: 112 MEQ/L (ref 98–111)
CO2: 19 MEQ/L (ref 23–33)
COLLECTED BY:: ABNORMAL
COLLECTED BY:: ABNORMAL
COLOR: YELLOW
CREAT SERPL-MCNC: 1.3 MG/DL (ref 0.4–1.2)
CRENATED RBC'S: ABNORMAL
CRYSTALS, UA: ABNORMAL
DEVICE: ABNORMAL
DEVICE: ABNORMAL
EOSINOPHIL # BLD: 0 %
EOSINOPHILS ABSOLUTE: 0 THOU/MM3 (ref 0–0.4)
EPITHELIAL CELLS, UA: ABNORMAL /HPF
GFR SERPL CREATININE-BSD FRML MDRD: 54 ML/MIN/1.73M2
GLUCOSE BLD-MCNC: 174 MG/DL (ref 70–108)
GLUCOSE URINE: NEGATIVE MG/DL
HCO3: 21 MMOL/L (ref 23–28)
HCO3: 21 MMOL/L (ref 23–28)
HCT VFR BLD CALC: 31.1 % (ref 42–52)
HCT VFR BLD CALC: 32.1 % (ref 42–52)
HCT VFR BLD CALC: 32.1 % (ref 42–52)
HCT VFR BLD CALC: 35.8 % (ref 42–52)
HEMOGLOBIN: 10.4 GM/DL (ref 14–18)
HEMOGLOBIN: 10.7 GM/DL (ref 14–18)
HEMOGLOBIN: 11 GM/DL (ref 14–18)
HEMOGLOBIN: 12 GM/DL (ref 14–18)
IFIO2: 25
IFIO2: 30
KETONES, URINE: NEGATIVE
LEUKOCYTE ESTERASE, URINE: ABNORMAL
LYMPHOCYTES # BLD: 4 %
LYMPHOCYTES ABSOLUTE: 1.4 THOU/MM3 (ref 1–4.8)
MAGNESIUM: 1.8 MG/DL (ref 1.6–2.4)
MCH RBC QN AUTO: 30.7 PG (ref 27–31)
MCH RBC QN AUTO: 30.9 PG (ref 27–31)
MCH RBC QN AUTO: 31.2 PG (ref 27–31)
MCHC RBC AUTO-ENTMCNC: 33.6 GM/DL (ref 33–37)
MCHC RBC AUTO-ENTMCNC: 33.7 GM/DL (ref 33–37)
MCHC RBC AUTO-ENTMCNC: 34.2 GM/DL (ref 33–37)
MCV RBC AUTO: 91.2 FL (ref 80–94)
MCV RBC AUTO: 91.4 FL (ref 80–94)
MCV RBC AUTO: 92 FL (ref 80–94)
MISCELLANEOUS 2: ABNORMAL
MODE: ABNORMAL
MODE: AC
MONOCYTES # BLD: 4 %
MONOCYTES ABSOLUTE: 1.4 THOU/MM3 (ref 0.4–1.3)
NITRITE, URINE: NEGATIVE
NUCLEATED RED BLOOD CELLS: 0 /100 WBC
O2 SATURATION: 95 %
O2 SATURATION: 95 %
PATHOLOGIST REVIEW: ABNORMAL
PCO2: 34 MMHG (ref 35–45)
PCO2: 37 MMHG (ref 35–45)
PDW BLD-RTO: 17.6 % (ref 11.5–14.5)
PDW BLD-RTO: 17.7 % (ref 11.5–14.5)
PDW BLD-RTO: 18 % (ref 11.5–14.5)
PH BLOOD GAS: 7.37 (ref 7.35–7.45)
PH BLOOD GAS: 7.4 (ref 7.35–7.45)
PH UA: 5
PHOSPHORUS: 3.8 MG/DL (ref 2.4–4.7)
PLATELET # BLD: 159 THOU/MM3 (ref 130–400)
PLATELET # BLD: 168 THOU/MM3 (ref 130–400)
PLATELET # BLD: 193 THOU/MM3 (ref 130–400)
PLATELET ESTIMATE: ADEQUATE
PMV BLD AUTO: 7.6 MCM (ref 7.4–10.4)
PMV BLD AUTO: 7.9 MCM (ref 7.4–10.4)
PMV BLD AUTO: 8.1 MCM (ref 7.4–10.4)
PO2: 72 MMHG (ref 71–104)
PO2: 76 MMHG (ref 71–104)
POIKILOCYTES: SLIGHT
POTASSIUM SERPL-SCNC: 4.2 MEQ/L (ref 3.5–5.2)
PROTEIN UA: NEGATIVE
RBC # BLD: 3.38 MILL/MM3 (ref 4.7–6.1)
RBC # BLD: 3.52 MILL/MM3 (ref 4.7–6.1)
RBC # BLD: 3.91 MILL/MM3 (ref 4.7–6.1)
RBC URINE: ABNORMAL /HPF
RENAL EPITHELIAL, UA: ABNORMAL
SEG NEUTROPHILS: 90 %
SEGMENTED NEUTROPHILS ABSOLUTE COUNT: 30.7 THOU/MM3 (ref 1.8–7.7)
SET PEEP: 5 MMHG
SET PEEP: 5 MMHG
SET PRESS SUPP: 8 CMH2O
SET RESPIRATORY RATE: 20 BPM
SET TIDAL VOLUME: 500 ML
SODIUM BLD-SCNC: 144 MEQ/L (ref 135–145)
SOURCE, BLOOD GAS: ABNORMAL
SOURCE, BLOOD GAS: ABNORMAL
SPECIFIC GRAVITY, URINE: 1.03 (ref 1–1.03)
TARGET CELLS: ABNORMAL
UROBILINOGEN, URINE: 0.2 EU/DL
WBC # BLD: 26.6 THOU/MM3 (ref 4.8–10.8)
WBC # BLD: 28.3 THOU/MM3 (ref 4.8–10.8)
WBC # BLD: 34.1 THOU/MM3 (ref 4.8–10.8)
WBC UA: ABNORMAL /HPF
YEAST: ABNORMAL

## 2017-11-18 PROCEDURE — 36600 WITHDRAWAL OF ARTERIAL BLOOD: CPT

## 2017-11-18 PROCEDURE — 2700000000 HC OXYGEN THERAPY PER DAY

## 2017-11-18 PROCEDURE — 99291 CRITICAL CARE FIRST HOUR: CPT | Performed by: INTERNAL MEDICINE

## 2017-11-18 PROCEDURE — A6258 TRANSPARENT FILM >16<=48 IN: HCPCS

## 2017-11-18 PROCEDURE — 36415 COLL VENOUS BLD VENIPUNCTURE: CPT

## 2017-11-18 PROCEDURE — 6370000000 HC RX 637 (ALT 250 FOR IP): Performed by: INTERNAL MEDICINE

## 2017-11-18 PROCEDURE — 2500000003 HC RX 250 WO HCPCS: Performed by: INTERNAL MEDICINE

## 2017-11-18 PROCEDURE — 82330 ASSAY OF CALCIUM: CPT

## 2017-11-18 PROCEDURE — 36592 COLLECT BLOOD FROM PICC: CPT

## 2017-11-18 PROCEDURE — 94660 CPAP INITIATION&MGMT: CPT

## 2017-11-18 PROCEDURE — 2580000003 HC RX 258: Performed by: INTERNAL MEDICINE

## 2017-11-18 PROCEDURE — 80048 BASIC METABOLIC PNL TOTAL CA: CPT

## 2017-11-18 PROCEDURE — 2100000000 HC CCU R&B

## 2017-11-18 PROCEDURE — C9113 INJ PANTOPRAZOLE SODIUM, VIA: HCPCS | Performed by: INTERNAL MEDICINE

## 2017-11-18 PROCEDURE — 37799 UNLISTED PX VASCULAR SURGERY: CPT

## 2017-11-18 PROCEDURE — 83735 ASSAY OF MAGNESIUM: CPT

## 2017-11-18 PROCEDURE — 82803 BLOOD GASES ANY COMBINATION: CPT

## 2017-11-18 PROCEDURE — 6360000002 HC RX W HCPCS: Performed by: INTERNAL MEDICINE

## 2017-11-18 PROCEDURE — 2720000010 HC SURG SUPPLY STERILE

## 2017-11-18 PROCEDURE — 85014 HEMATOCRIT: CPT

## 2017-11-18 PROCEDURE — 85027 COMPLETE CBC AUTOMATED: CPT

## 2017-11-18 PROCEDURE — 84100 ASSAY OF PHOSPHORUS: CPT

## 2017-11-18 PROCEDURE — 81001 URINALYSIS AUTO W/SCOPE: CPT

## 2017-11-18 PROCEDURE — 94003 VENT MGMT INPAT SUBQ DAY: CPT

## 2017-11-18 PROCEDURE — 87086 URINE CULTURE/COLONY COUNT: CPT

## 2017-11-18 PROCEDURE — 94640 AIRWAY INHALATION TREATMENT: CPT

## 2017-11-18 PROCEDURE — 99232 SBSQ HOSP IP/OBS MODERATE 35: CPT | Performed by: INTERNAL MEDICINE

## 2017-11-18 PROCEDURE — 71010 XR CHEST PORTABLE: CPT

## 2017-11-18 PROCEDURE — 85018 HEMOGLOBIN: CPT

## 2017-11-18 RX ORDER — ALPRAZOLAM 0.25 MG/1
0.25 TABLET ORAL EVERY 6 HOURS PRN
Status: DISCONTINUED | OUTPATIENT
Start: 2017-11-18 | End: 2017-11-20

## 2017-11-18 RX ORDER — BUMETANIDE 0.25 MG/ML
1 INJECTION, SOLUTION INTRAMUSCULAR; INTRAVENOUS ONCE
Status: COMPLETED | OUTPATIENT
Start: 2017-11-18 | End: 2017-11-18

## 2017-11-18 RX ORDER — SODIUM CHLORIDE 9 MG/ML
INJECTION, SOLUTION INTRAVENOUS CONTINUOUS
Status: DISCONTINUED | OUTPATIENT
Start: 2017-11-18 | End: 2017-11-19

## 2017-11-18 RX ORDER — METHYLPREDNISOLONE SODIUM SUCCINATE 125 MG/2ML
60 INJECTION, POWDER, LYOPHILIZED, FOR SOLUTION INTRAMUSCULAR; INTRAVENOUS DAILY
Status: DISCONTINUED | OUTPATIENT
Start: 2017-11-18 | End: 2017-11-21

## 2017-11-18 RX ADMIN — ISOSORBIDE DINITRATE 20 MG: 20 TABLET ORAL at 20:23

## 2017-11-18 RX ADMIN — TAZOBACTAM SODIUM AND PIPERACILLIN SODIUM 3.38 G: 375; 3 INJECTION, SOLUTION INTRAVENOUS at 08:17

## 2017-11-18 RX ADMIN — FENTANYL CITRATE 75 MCG/HR: 50 INJECTION, SOLUTION INTRAMUSCULAR; INTRAVENOUS at 02:53

## 2017-11-18 RX ADMIN — METOPROLOL TARTRATE 25 MG: 25 TABLET ORAL at 08:18

## 2017-11-18 RX ADMIN — Medication 10 ML: at 08:31

## 2017-11-18 RX ADMIN — BUMETANIDE 1 MG: 0.25 INJECTION INTRAMUSCULAR; INTRAVENOUS at 11:45

## 2017-11-18 RX ADMIN — ISOSORBIDE DINITRATE 20 MG: 20 TABLET ORAL at 14:21

## 2017-11-18 RX ADMIN — ALPRAZOLAM 0.25 MG: 0.25 TABLET ORAL at 20:27

## 2017-11-18 RX ADMIN — TAZOBACTAM SODIUM AND PIPERACILLIN SODIUM 3.38 G: 375; 3 INJECTION, SOLUTION INTRAVENOUS at 22:42

## 2017-11-18 RX ADMIN — METOPROLOL TARTRATE 25 MG: 25 TABLET ORAL at 20:23

## 2017-11-18 RX ADMIN — METHYLPREDNISOLONE SODIUM SUCCINATE 60 MG: 125 INJECTION, POWDER, FOR SOLUTION INTRAMUSCULAR; INTRAVENOUS at 16:57

## 2017-11-18 RX ADMIN — SODIUM CHLORIDE: 9 INJECTION, SOLUTION INTRAVENOUS at 14:20

## 2017-11-18 RX ADMIN — ACETAMINOPHEN 650 MG: 325 TABLET ORAL at 20:27

## 2017-11-18 RX ADMIN — PANTOPRAZOLE SODIUM 40 MG: 40 INJECTION, POWDER, FOR SOLUTION INTRAVENOUS at 11:44

## 2017-11-18 RX ADMIN — IPRATROPIUM BROMIDE AND ALBUTEROL SULFATE 1 AMPULE: .5; 3 SOLUTION RESPIRATORY (INHALATION) at 19:42

## 2017-11-18 RX ADMIN — PANTOPRAZOLE SODIUM 40 MG: 40 INJECTION, POWDER, FOR SOLUTION INTRAVENOUS at 01:58

## 2017-11-18 RX ADMIN — TAZOBACTAM SODIUM AND PIPERACILLIN SODIUM 3.38 G: 375; 3 INJECTION, SOLUTION INTRAVENOUS at 14:20

## 2017-11-18 RX ADMIN — SIMVASTATIN 80 MG: 40 TABLET, FILM COATED ORAL at 20:23

## 2017-11-18 RX ADMIN — RANOLAZINE 1000 MG: 500 TABLET, FILM COATED, EXTENDED RELEASE ORAL at 20:23

## 2017-11-18 RX ADMIN — IPRATROPIUM BROMIDE AND ALBUTEROL SULFATE 1 AMPULE: .5; 3 SOLUTION RESPIRATORY (INHALATION) at 15:51

## 2017-11-18 RX ADMIN — RANOLAZINE 1000 MG: 500 TABLET, FILM COATED, EXTENDED RELEASE ORAL at 08:18

## 2017-11-18 RX ADMIN — IPRATROPIUM BROMIDE AND ALBUTEROL SULFATE 1 AMPULE: .5; 3 SOLUTION RESPIRATORY (INHALATION) at 01:07

## 2017-11-18 RX ADMIN — Medication 15 ML: at 08:17

## 2017-11-18 RX ADMIN — IPRATROPIUM BROMIDE AND ALBUTEROL SULFATE 1 AMPULE: .5; 3 SOLUTION RESPIRATORY (INHALATION) at 07:23

## 2017-11-18 RX ADMIN — PANTOPRAZOLE SODIUM 40 MG: 40 INJECTION, POWDER, FOR SOLUTION INTRAVENOUS at 20:24

## 2017-11-18 RX ADMIN — ISOSORBIDE DINITRATE 20 MG: 20 TABLET ORAL at 08:18

## 2017-11-18 ASSESSMENT — PAIN SCALES - GENERAL
PAINLEVEL_OUTOF10: 0
PAINLEVEL_OUTOF10: 3
PAINLEVEL_OUTOF10: 0

## 2017-11-18 ASSESSMENT — PAIN DESCRIPTION - DESCRIPTORS: DESCRIPTORS: ACHING

## 2017-11-18 ASSESSMENT — PAIN DESCRIPTION - LOCATION: LOCATION: CHEST

## 2017-11-18 ASSESSMENT — PULMONARY FUNCTION TESTS
PIF_VALUE: 23
PIF_VALUE: 13
PIF_VALUE: 29
PIF_VALUE: 24

## 2017-11-18 ASSESSMENT — PAIN DESCRIPTION - PAIN TYPE: TYPE: ACUTE PAIN

## 2017-11-18 ASSESSMENT — PAIN DESCRIPTION - ORIENTATION: ORIENTATION: RIGHT;LEFT

## 2017-11-18 NOTE — PLAN OF CARE
Problem: RESPIRATORY  Goal: Clear lung sounds  Clear lung sounds    Outcome: Ongoing  Patient continues on ventilator and breathing treatments; tolerating well.   Will continue to monitor patient for weaning readiness

## 2017-11-18 NOTE — FLOWSHEET NOTE
0930  Pt arrived to 3A 01 per bed from cardiac cath lab. Pt was from 3B as a code this AM and went to cath. Pt is on vent and intubated. Alert and following commands and nodding head appropriately to questions. Denies any pain. Nods he understands what is going on. Arterial line to left femoral. Venous sheath to right femoral with IV fluids infusing. Cardiac cath site in right femoral was perclosed. Dressing is dry and intact. All sites soft. No signs of hematoma or bleeding. 1441  Dr Cesar Barnes in room and updated. States to keep systolic BP greater than 90 and can start levophed drip as needed for low BP. Ordered HGB to be drawn in one hour and to start protonix IV.    3216  Discussed with pharmacist and states due to shortage that the protonix is approved as 40 mg q 8hrs and not as a continuous drip. 1000  Morin catheter inserted after explaining to pt of procedure. Nods he understands. No difficulty with insertion. MRSA and VRE swabs obtained and sent to lab. 1015  OG tube inserted without any difficulty. Connected to LIWS after verifying with air bolus and immediate return of dark brown. 1050  Type and screen drawn and sent to lab. Daughter here and updated. In to see pt. Discussed orders and plan to follow HGB and watch for any bleeding. Voices understanding. 1055  \"GI consult. Dr Sherine Butcher on call and will be seeing. Family informed. 1110  Portable chest xray done for ETT and OG placement. Pt gets anxious when moved around. Indicates feels SOB with the moving. Offered reassurance. O2 saturations %. Pt nods he would like to be more comfortable. Indicates ETT hurting throat. 1117  Started on fentanyl drip. [address]  Dr Kathie Barcenas in unit and updated. No new orders. 1130  Message sent thru perfect serve to Dr Tanya Gaviria regarding pt. Informed needed to give update. 1140  HGB redraw came back at 10.5. Family informed. 1153  Started levophed drip. See MAR and flowsheet for vitals.  Pt is alert and awake. No complaints. Family in room and kept updated. 65  Dr Latosha Tam in and questioning if pt can be extubated as he is alert and awake. Informed him that call to primary Dr Nesha Chandler to update him and see if he had wanted a pulmonary consult. Informed Dr Latosha Tam that resp therapy in to do ABGs at this time. Dr Latosha Tam states to check with GI as to their plan. [address] [facility] [id number] [facility] CNP in to see pt. Pt alert and nodding to her questions. Updated on HGb and treatment at this point. Informed that he is on protonix 40 mg q 8hours. Also informed her that Dr Latosha Tam is wanting to know plan as he feels pt can be extubated. States she will discuss with Dr Samantha Alvarado as to plan. 31 Aby Marquita called back. States she discussed with Dr Samantha Alvarado and he plans no EGD today until pt more stable. And recommends keeping on vent and recommends stopping effient if able. 0  Daughter Floresita Jorgensen and grand daughter updated as to GI plan and feel that he should remain intubated till more stable and do an EGD. Voices understanding. 1039 [facility]  Dr Nesha Chandler repaged thru one to one as no call back. 1440  Dr Kirby Rao returned the page. Updated on condition, HGB results and treatment at this point. Also informed of GI recommendations to leave on vent. Orders received. 1450  Pulmonary consult. Office states Dr Halima Miranda will see pt.    [id number]  Paged Dr Apryl Cisneros and Herrera Hughes CNP taking call. Discussed holding ASA and effient. States to stop and also order to discontinue heparin off MAR.    1520  Dr Halima Miranda here and in to see pt. Pt very alert and awake. Dr Halima Miranda states no plan to extubate till AM and see if GI scoping. States to keep pt comfortable on vent. Informed on fentanyl drip at this time and states can use this as needed. Also states if needed can add the versed drip. Pt nods he is not comfortable with tube and wants to rest more. 1600  OG tube clearing up. Now a clear brown. No flecks in it. 1645  3 stools since 1540.

## 2017-11-18 NOTE — PROGRESS NOTES
Patient has been successfully weaned from Mechanical Ventilation. RSBI before extubation was 28 with EtCO2 of  and SpO2 of 100 on 30% FiO2. Patient extubated and placed on 2 liters/min via nasal cannula. Post extubation SpO2 is 99% with HR  71 bpm and RR 18 breaths/min. Patient had strong cough that was productive of clear  sputum. Extubation Well tolerated by patient. Mariposa Pacheco

## 2017-11-18 NOTE — FLOWSHEET NOTE
0109-1323 Arterial sheath removed from left femoral artery. Manual pressure held x 20 minutes with quickclot in place. No evidence of bleeding, hematoma, swelling or bruising. Site remains soft. Refer to doc flow sheet for vitals and extremity assessment. Patient tolerates procedure well.

## 2017-11-18 NOTE — PROGRESS NOTES
2150 Dr. Phyllis Joseph sent a message via perfect serve with H/H results. 2153 Margarette called back, unless any active bleeding or anything changes through the night he wants to recheck H/H at 0600 in am. He did not want to address the critical value (WBC) - said to report to hospitalist.  2159 Message sent to Surya Suárez regarding critical WBC (34.1)  2204 Levo 6mcg  2205 Juan called back with new orders to start Zosyn, Duonebs q4 instead of q6, repeat bloodwork in am.  2217 Levo 4mcg  2250 Held lopressor and isordil d/t HR 61, /74.  2302 Levo 2mcg  2309 Spoke with julien, pts granddaughter, hippa code verified, updated on status and POC. Will come in the morning.   0200 Pt resting in bed, no needs at this time. 0400 AM labs drawn, pt has no needs. 2313 Pt's granddaughter, James Waller called in for update on POC. Verified hippa code.

## 2017-11-18 NOTE — FLOWSHEET NOTE
made a follow-up visit to offer spiritual support. Patient rested in bed, described things as going better and the worse is now behind him. He did not identify any concerns or worries at this time. He has no Mormon affiliation that he wants notified of his admission. No family members are present. 11/18/17 1104   Encounter Summary   Services provided to: Patient   Referral/Consult From: Other    Support System Children;Family members   Place of Mormon No Mormon identified. Contact Mosque No   Continue Visiting Yes  (11/18 yes for emotional and spiritual support.)   Complexity of Encounter Low   Length of Encounter 15 minutes   Spiritual/Restorationism   Type Spiritual support   Assessment Approachable; Anxious; Coping; Hopeful   Intervention Explored feelings, thoughts, concerns;Sustaining presence/ Ministry of presence   Outcome Expressed feelings/needs/concerns;Coping   Staff offered words of encouragement and spiritual care remains available.

## 2017-11-18 NOTE — PROGRESS NOTES
[facility]   Nephrology progress note  11/18/2017, 9:23 AM      Pt Name:    Cipriano Mcintyre  MRN:     [id number]     [place]:    1945  Admit Date:    11/16/2017 12:37 PM  Primary Care Physician:  Karolina Dupree MD   Room number  3A-01/001-A    Chief Complaint: Nephrology following for KEANU    Subjective:  Patient seen and examined  Just extubated  Now on 2L NC  On  cc/hr - reduced to 75 cc/hr earlier today  Has mitchell  Feels okay  Does not say much    Objective:  24HR INTAKE/OUTPUT:    Intake/Output Summary (Last 24 hours) at 11/18/17 0923  Last data filed at 11/18/17 0800   Gross per 24 hour   Intake          4928.59 ml   Output             1975 ml   Net          2953.59 ml     I/O last 3 completed shifts: In: 4828.6 [I.V.:4545.6; Blood:283]  Out: 4132 [[id number]]  I/O this shift:  In: 100 [NG/GT:100]  Out: 500 [Emesis/NG output:500]  Admission weight: 140 lb (63.5 kg)  Wt Readings from Last 3 Encounters:   11/18/17 143 lb 4.8 oz (65 kg)   11/13/17 140 lb (63.5 kg)   08/28/17 134 lb 9.6 oz (61.1 kg)     Body mass index is 26.21 kg/m². Physical examination  VITALS:     Vitals:    11/18/17 0800   BP: 119/69   Pulse: 71   Resp: 17   Temp: 98.2 °F (36.8 °C)   SpO2: 91%     General Appearance: alert and cooperative with exam, appears comfortable, no distress  Mouth/Throat: Oral mucosa moist  Neck: No JVD  Lungs: Air entry B/L, no rales, no use of accessory muscles  Heart:  S1, S2 heard  GI: soft, non-tender, no guarding  Extremities: no sig LE edema      Lab Data  CBC:   Recent Labs      11/17/17   0710   11/17/17   1615  11/17/17   2035  11/18/17   0419   WBC  14.5*   --    --   34.1*  26.6*   HGB  6.2*   < >  10.0*  12.0*  11.0*   HCT  19.2*   < >  30.3*  35.8*  32.1*   PLT  226   --    --   193  168    < > = values in this interval not displayed.      BMP:  Recent Labs      11/16/17   1308  11/17/17   0419  11/18/17 0419   NA  138  135  144   K  4.8  4.8  4.2   CL  98  103  112*

## 2017-11-18 NOTE — PROGRESS NOTES
[facility]      Patient - Jerrel Goodell   MRN -  [id number]   [facility]   - 1945      Date of Admission -  2017 12:37 PM  Date of evaluation -  2017  Room - 3A--JAY Cancino MD Primary Care Physician - Jonelle Yao MD     Problem List      Active Hospital Problems    Diagnosis Date Noted    Acute respiratory failure with hypoxia ([address] [J96.01]     Postprocedural hypotension [I95.81]     Unstable angina ([address] [I20.0] 2017    Chest pain [R07.9]     History of placement of stent in LAD coronary artery [Z95.5]     Renal failure syndrome [N19]      Chief Complaint:   Continuation of critical care, Patient remain on the ventilator. HPI   History Obtained From: Patient electronic medical record. Jerrel Goodell is a 67 y.o. male initially admitted under Dr. Buffy Moreland service for chest pain of 1 day duration. He underwent cardiac cath by Dr. Lupe Kingsley MD on 17 for further evaluation of chest pain on abnormal EKG. He was intubated and kept on mechanical ventilation. At the time of my initial evaluation patient remain on vent with settings of Vent Mode: SPONTRate Set: 20 bmp/Vt Ordered: 500 mL/ /FiO2 : 21 %. He was sedated. No further history obtained from patient. He was found to have severe anemia along with black tarry stools. Dr. Christine Scott from GI service is following. He received PRBC transfusion along with Hb/HCT monitoring. He was diagnosed with moderate COPD. He is currently on treatment with Advair discuss 250/50 1INH bid, Spiriva 18mcg/Cap DPI. 1Cap via inhalation daily, Albuterol HFA 90mcg/Spray MDI, 2puffs  Q6Hprn/Albuterol 2.5mg nebs Q6h prn. He is currently not using any home O2      Subjective/Events Past 24 hours/ROS   Remain on the vent. Off Levophed drip. Off fentanyl drip. S/p PRBC transfusion.  -Rest of the body systems were reviewed.      PMHx   Past Medical History      Diagnosis Date    Acute coronary syndrome ([facility]     Arrhythmia     Arteriosclerotic heart disease (ASHD)     CHF (congestive heart failure) (HCC)     COPD (chronic obstructive pulmonary disease) (HCC)     Dizziness - light-headed     HX OF:    Emphysema     Esophagitis     Fatigue     HX OF:    History of chest pain     History of tinnitus     History of tobacco abuse     Hyperlipidemia     Hypertension     Irregular heart beat     Lightheadedness     MI (myocardial infarction)     Noncompliance with medication regimen     Noncompliance with medical therapy.     Pneumonia     Renal failure syndrome     Ringing in the ears     SOB (shortness of breath) on exertion         Meds    Current Medications    heparin (porcine)  60 Units/kg Intravenous Once    sodium chloride flush  10 mL Intravenous 2 times per day    pantoprazole  40 mg Intravenous Q8H    ipratropium-albuterol  1 ampule Inhalation Q6H    magnesium replacement protocol   Other RX Placeholder    calcium replacement protocol   Other RX Placeholder    phosphorus replacement protocol   Other RX Placeholder    potassium replacement protocol   Other RX Placeholder    piperacillin-tazobactam  3.375 g Intravenous Q8H    isosorbide dinitrate  20 mg Oral TID    metoprolol tartrate  25 mg Oral BID    ranolazine  1,000 mg Oral BID    simvastatin  80 mg Oral Nightly     sodium chloride flush, acetaminophen, magnesium hydroxide, ondansetron  IV Drips/Infusions   midazolam      fentaNYL (SUBLIMAZE) 500 mcg in sodium chloride 0.9 % 100 mL Stopped (11/18/17 0819)    sodium chloride 75 mL/hr at 11/18/17 0843    norepinephrine Stopped (11/17/17 2341)     Home Medications  Prescriptions Prior to Admission: ranolazine (RANEXA) 1000 MG extended release tablet, take 1 tablet by mouth twice a day  valsartan-hydrochlorothiazide (DIOVAN-HCT) 80-12.5 MG per tablet, take 1 tablet by mouth daily  simvastatin (ZOCOR) 80 MG ----------------------------------------------------------------   Electronically signed by Semaj Jensen MD (Interpreting   physician) on 11/17/2017 at 06:26 PM   ----------------------------------------------------------------      Findings      Radiology    CXR  Nov 18, 2017  PROCEDURE: XR CHEST PORTABLE  Stable medial left lower lobe atelectasis. Possible mild interstitial pulmonary edema. CT Scans  (See actual reports for details)  Nov 16, 2017  PROCEDURE: CTA THORACIC AORTA  1. Fibroemphysematous lungs. Questionable minimal atelectasis right posterior costophrenic sulcus. 2. No pulmonary emboli. 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 Aorta otherwise unremarkable. No dissection is seen. SYSTEMS ASSESSMENT AND PLANS     Neuro   Awake and alert. He is off sedation. Respiratory   Acute post operative pulmonary insufficiecy- improving  Moderate COPD  Will continue on nebs Q6h. Will start weaning patient per weaning protocols per RN/RT from today Am with CPAP trial  Wean FIO2 to keep saturation 90-92%    Cardiovascular   S/p Cardiac cath for chest pain and abnormal EKG. Hypotension due to hypovolemic shock Vs GI bleeding- resolved  Levophed weaned off to keep MAP >65mm Hg. Gastrointestinal   GI service is following for ? GI bleeding. Hb/HCT is stable after PRBC transfusion  Peptic ulcer disease prophylaxis with PPI. Renal   Will decrease IVF to 75ml/hr. Monitor input and out put. Supplement all electrolytes per ICU electrolytes replacement protocols. Infectious Disease   Will monitor    Hematology/Oncology   Anemia most likely due to GI bleeding- stable Hb/HCT  Acute blood loss anemia. S/p PRBC transfusion  Deep Venous Thrombosis Prophylaxis: SCDs. Social/Spiritual/DNR/Disposition/Miscellaneous   Full code     Case discussed with nurse Ms Silvia Eaton and patient/family.  -Discussed with Respiratory therapist Mr. Chris Sharma taking care of patient.   Questions and concerns

## 2017-11-18 NOTE — PROGRESS NOTES
Enzymes:  No results for input(s): CKTOTAL, CKMB, CKMBINDEX, TROPONINI in the last 72 hours. CBC:   Lab Results   Component Value Date    WBC 28.3 11/18/2017    RBC 3.38 11/18/2017    RBC 3.93 08/31/2011    HGB 10.4 11/18/2017    HCT 31.1 11/18/2017     11/18/2017       CMP:  Lab Results   Component Value Date     11/18/2017    K 4.2 11/18/2017     11/18/2017    CO2 19 11/18/2017    BUN 63 11/18/2017    CREATININE 1.3 11/18/2017    LABGLOM 54 11/18/2017    GLUCOSE 174 11/18/2017    GLUCOSE 154 08/31/2011    CALCIUM 7.3 11/18/2017       Hepatic Function Panel:  Lab Results   Component Value Date    ALKPHOS 50 11/16/2017    ALT 16 11/16/2017    AST 12 11/16/2017    PROT 5.9 11/16/2017    BILITOT 0.3 11/16/2017    BILIDIR <0.2 11/13/2017    LABALBU 3.4 11/16/2017    LABALBU 4.0 08/31/2011       Magnesium:    Lab Results   Component Value Date    MG 1.8 11/18/2017       PT/INR:    Lab Results   Component Value Date    INR 1.17 11/16/2017       HgBA1c:  No results found for: LABA1C    FLP:  Lab Results   Component Value Date    TRIG 118 11/17/2017    HDL 22 11/17/2017    LDLCALC 27 11/17/2017       TSH:    Lab Results   Component Value Date    TSH 2.094 08/25/2011         Assessment:    Admission with CP- trop negative    S\p keith arrhythmic arrest with cpr- see code sheet  S\p cath with RCA 70% stenosis; patent LAD stent    GIB   Acute blood loss anemia s\p PRBC    S\p resp failure with intubation- now extubated   Acute fluid volume overload   ? PNA    Hx CAD: prior LAD PCI  KEANU / CKD stage 3  HTN  HLP      Plan:    Stable cardiac course  Off antiplatelet therapy at present with GIB    ?  GI intervention             Electronically signed by Galina Carey CNP on 11/18/2017 at 11:56 AM

## 2017-11-18 NOTE — PROGRESS NOTES
Gastroenterology Progress Note:     Patient Name:  Ashley Crow   MRN: [id number]  [id number]  YOB: 1945  Admit Date: 11/16/2017 12:37 PM  Primary Care Physician: Poli Law MD   3A-01/001-A   This is a 67year old male seen as a new consult for anemia on Effient for history of cardiac stent, GI bleed S/P Code Blue 11/17 He was admitted 11/16  with chest pain and shortness of breath His hemoglobin was 9.3 on admission which was down from 13 at previous ER visit on 11-13-17. Hemoglobin dropped from 9.3 to 6.2 over 24 h    He also had urgent cathwith Dr Meño Coffman findings RCA 70% stenosis; patent LAD stent, anemia s/p 2U PRBCs/RHC pressures demonstrated preserved CO. EF 55-60%. He was previously seen per Dr Bryson Shelton in 2010 and had EGD and Colonoscopy. EGD on 7-21-10 with findings of grade A esophagitis. Patient has a C shaped stomach as it wraps around the internal organs. Otherwise normal appearing stomach. No evidence of bleeding. Recommendations for PPI bid. Gastric emptying study. It was felt he may have mesenteric ischemia causing abdominal pain and continued to smoke. He never had gastric emptying study.      Colonoscopy 5-26-10 with findings of normal appearing terminal ileum. Small amount erythema in rectum, biopsy negative for colitis. Sigmoid colon diverticulosis . Tortuous colon. Rectal polyp, hyperplastic. Moderate non bleeding internal hemorrhoids. Otherwise normal appearing colonoscopy. Recommendations for daily Miralax.        Patient seen and examined. 24 hours events and chart reviewed. Staff report dark drainage from OG yesterday and had 3 dark stools yesterday. No stools overnight. Daughter and granddaughter at bedside. Patient awake, intubated. Nods head and follows commands. Denies pain with palpation of abdomen. Denies nausea.  Staff RN reports no stools overnight, plan is to extubate this am.      (  + )Medications Reviewed ;(   )Old records reviewed    I/O last 3 completed shifts: In: 4828.6 [I.V.:4545.6; Blood:283]  Out: 3258 [Urine:1475]  No intake/output data recorded. Diet:  Diet NPO, After Midnight      /65   Pulse 71   Temp 98.2 °F (36.8 °C)   Resp 24   Ht 5' 2\" (1.575 m)   Wt 143 lb 4.8 oz (65 kg)   SpO2 93%   BMI 26.21 kg/m²     Physical Exam:    General: male appears stated age, intubated on vent,   HEENT: Atraumatic, normocephalic. Sclera anicteric. CV: Heart RRR with s1 s2 heard  Resp: Lungs clear to ascultation bilaterally. Abd: Round, soft,minimal distension, nontender. No hepatosplenomegaly or mass present. Active bowel sounds heard. OG with brownish green drainage  Ext:  Without cyanosis, clubbing, edema. Skin: Pink, warm, dry  Neuro:  Alert, oriented x3 with no obvious deficits.        Rectal: deferred  Lines/tubes:       Labs: WBC:    Lab Results   Component Value Date    WBC 26.6 11/18/2017     Platelets:    Lab Results   Component Value Date     11/18/2017     Hemoglobin/Hematocrit:    Lab Results   Component Value Date    HGB 11.0 11/18/2017    HCT 32.1 11/18/2017     BMP:    Lab Results   Component Value Date     11/18/2017    K 4.2 11/18/2017     11/18/2017    CO2 19 11/18/2017    BUN 63 11/18/2017    LABALBU 3.4 11/16/2017    LABALBU 4.0 08/31/2011    CREATININE 1.3 11/18/2017    CALCIUM 7.3 11/18/2017    LABGLOM 54 11/18/2017    GLUCOSE 174 11/18/2017    GLUCOSE 154 08/31/2011     Hepatic Function Panel:    Lab Results   Component Value Date    ALKPHOS 50 11/16/2017    ALT 16 11/16/2017    AST 12 11/16/2017    PROT 5.9 11/16/2017    BILITOT 0.3 11/16/2017    BILIDIR <0.2 11/13/2017    LABALBU 3.4 11/16/2017    LABALBU 4.0 08/31/2011     Calcium:    Lab Results   Component Value Date    CALCIUM 7.3 11/18/2017     PT/INR:    Lab Results   Component Value Date    INR 1.17 11/16/2017     PTT:    Lab Results   Component Value Date    APTT 27.7 11/17/2017   [APTT     Significant Diagnostic Studies:     Current Meds:  Scheduled Meds:   heparin (porcine)  60 Units/kg Intravenous Once    sodium chloride flush  10 mL Intravenous 2 times per day    pantoprazole  40 mg Intravenous Q8H    ipratropium-albuterol  1 ampule Inhalation Q6H    chlorhexidine  15 mL Mouth/Throat BID    magnesium replacement protocol   Other RX Placeholder    calcium replacement protocol   Other RX Placeholder    phosphorus replacement protocol   Other RX Placeholder    potassium replacement protocol   Other RX Placeholder    piperacillin-tazobactam  3.375 g Intravenous Q8H    isosorbide dinitrate  20 mg Oral TID    metoprolol tartrate  25 mg Oral BID    ranolazine  1,000 mg Oral BID    simvastatin  80 mg Oral Nightly     Continuous Infusions:   midazolam      fentaNYL (SUBLIMAZE) 500 mcg in sodium chloride 0.9 % 100 mL 75 mcg/hr (11/18/17 0253)    sodium chloride 125 mL/hr at 11/17/17 1903    norepinephrine Stopped (11/17/17 2341)     PRN Meds:.sodium chloride flush, acetaminophen, magnesium hydroxide, ondansetron    Assessment:  Anemia, acute drop from 9.3 on admission to 7.1 on 11/17   SP 3  Unite PRBCs 11/17; hemoglobin 11 today   Melena, 3 stools yesterday, none reported overnight   OG with dark greenish brown drainage  Hypotension--improved--off pressor support  S/P Code Blue 11/17 inferior STEMI/Cardiac Arrest, cardiology felt likely related to blood loss    Abdominal pain, generalized per exam   History esophagitis per EGD with Dr Alfredo Camarillo in 2010   History of diverticulosis per colonoscopy 2010   History internal hemorrhoids   Constipation per patient prior to admission   Chest pain on admission   History cardiac stent, 5 years ago    Effient use    Acute kidney injury, nephrology following   History hypertension   Dyslipidemia   COPD       Plan:    H&H q 6 hrs, transfuse as needed  Hold Effient   Continue Pantoprazole 40mg IV push tid   EGD timing pending I will discuss with  Dr Tanisha Lima     Case reviewed and impression/plan

## 2017-11-18 NOTE — PLAN OF CARE
Problem: Discharge Planning:  Goal: Discharged to appropriate level of care  Discharged to appropriate level of care   Outcome: Ongoing  Patient plans home at discharge. Patient extubated today and femoral lines removed. Patient had increased WOB this afternoon. Will continue to assess for potential discharge needs. Problem: Airway Clearance - Ineffective:  Goal: Ability to maintain a clear airway will improve  Ability to maintain a clear airway will improve   Outcome: Ongoing  Patient extubated from ventilator. Developed increased work of breathing this afternoon. Bumex given. BiPAP ordered. Solumedrol started. Problem: Anxiety/Stress:  Goal: Level of anxiety will decrease  Level of anxiety will decrease   Outcome: Ongoing  Increased anxiety when on BiPAP. Otherwise patient appears calm and cooperative. Problem: Cardiac Output - Decreased:  Goal: Hemodynamic stability will improve  Hemodynamic stability will improve   Outcome: Ongoing  Patient's blood pressure remains borderline. 'S systolic. Giving patient BP medications when tolerated. Problem: Fluid Volume - Imbalance:  Goal: Absence of imbalanced fluid volume signs and symptoms  Absence of imbalanced fluid volume signs and symptoms   Outcome: Ongoing  Chest x ray this AM showed mild pulm edema. Patient had increased WOB. Bumex, bipap and steroids initiated. Breathing treatments continue. Swelling of extremities noted. Morin catheter in place for accurate urine output. Problem: Pain:  Goal: Pain level will decrease  Pain level will decrease   Outcome: Completed Date Met: 11/18/17  Denies pain. Problem: Tissue Perfusion - Cardiopulmonary, Altered:  Goal: Absence of angina  Absence of angina   Outcome: Met This Shift  No c/o chest pain throughout shift.      Problem: Risk for Impaired Skin Integrity  Goal: Tissue integrity - skin and mucous membranes  Structural intactness and normal physiological function of skin and  mucous membranes. Outcome: Ongoing  Femoral venous and arterial lines removed today. Right and left groins are soft, without hematoma, swelling, bruising or bleeding. HGB is stable. Dressing dry and intact. Buttocks and bony prominences are intact without breakdown. Problem: Falls - Risk of  Goal: Absence of falls  Outcome: Met This Shift  Patient able to use call light to communicate needs. Alert and oriented. Bed alarm in place. No falls noted on this shift. Comments: POC discussed with patient and with family who are present at bedside. Questions answered as needed. Verbalizes understanding.

## 2017-11-18 NOTE — PROGRESS NOTES
IM Progress Note  Dr. Caty Tim for Dr Anna Lynn  11/18/2017 5:52 AM      Patient name Chyna Wilson  [id number]  PCP: Wanda Godinez MD  Admit Date: 11/16/2017  Acct No. [de-identified]    Subjective: Interval History:   Pt lying in bed  On vent support  Off pressors  On Fentanyl and NS  Responds to name    Diet: Diet NPO, After Midnight    I/O last 3 completed shifts: In: 4160.1 [I.V.:3877.1; Blood:283]  Out: 4951 [ZSUAE:7365]  I/O this shift:  In: 1174.3 [I.V.:1174.3]  Out: 525 [Urine:525]        Admission weight: 140 lb (63.5 kg) as of 11/16/2017 12:37 PM  Wt Readings from Last 3 Encounters:   11/18/17 143 lb 4.8 oz (65 kg)   11/13/17 140 lb (63.5 kg)   08/28/17 134 lb 9.6 oz (61.1 kg)     Body mass index is 26.21 kg/m².     ROS   CVS;  no cp or palpitation  Resp: breathing stable  Neuro:  No numbness or weakness or dizziness  Abd: no nausea or vomiting or abd pain, agrees to dark stools before admission, Had OG tube with dark blood      Medications:   Scheduled Meds:   heparin (porcine)  60 Units/kg Intravenous Once    sodium chloride flush  10 mL Intravenous 2 times per day    pantoprazole  40 mg Intravenous Q8H    ipratropium-albuterol  1 ampule Inhalation Q6H    chlorhexidine  15 mL Mouth/Throat BID    magnesium replacement protocol   Other RX Placeholder    calcium replacement protocol   Other RX Placeholder    phosphorus replacement protocol   Other RX Placeholder    potassium replacement protocol   Other RX Placeholder    piperacillin-tazobactam  3.375 g Intravenous Q8H    isosorbide dinitrate  20 mg Oral TID    metoprolol tartrate  25 mg Oral BID    ranolazine  1,000 mg Oral BID    simvastatin  80 mg Oral Nightly     Continuous Infusions:   midazolam      fentaNYL (SUBLIMAZE) 500 mcg in sodium chloride 0.9 % 100 mL 75 mcg/hr (11/18/17 0253)    sodium chloride 125 mL/hr at 11/17/17 1903    norepinephrine Stopped (11/17/17 2341)       Labs :     CBC:   Recent Labs      11/17/17   0710 11/17/17   1615  11/17/17 2035 11/18/17 0419   WBC  14.5*   --    --   34.1*  26.6*   HGB  6.2*   < >  10.0*  12.0*  11.0*   PLT  226   --    --   193  168    < > = values in this interval not displayed. BMP:    Recent Labs      11/16/17   1308  11/17/17   0419  11/18/17   0419   NA  138  135  144   K  4.8  4.8  4.2   CL  98  103  112*   CO2  27  25  19*   BUN  67*  65*  63*   CREATININE  1.6*  1.3*  1.3*   GLUCOSE  100  132*  174*     Hepatic:   Recent Labs      11/16/17   1308   AST  12   ALT  16   BILITOT  0.3   ALKPHOS  50     Troponin: No results for input(s): TROPONINI in the last 72 hours. BNP: No results for input(s): BNP in the last 72 hours.   Lipids:   Recent Labs      11/17/17   0419   CHOL  73*   HDL  22     INR:   Recent Labs      11/16/17   1308   INR  1.17*       Radiology    Objective:   Vitals: /66   Pulse 64   Temp 98.4 °F (36.9 °C) (Core)   Resp 21   Ht 5' 2\" (1.575 m)   Wt 143 lb 4.8 oz (65 kg)   SpO2 97%   BMI 26.21 kg/m²   HEENT: Head:pupils react OG tube n place  Neck: supple  Lungs: clear to auscultation  Heart: regular rate and rhythm   Abdomen: soft BS heard NG NT  Extremities: warm no  edema  Neurologic:  Alert, oriented X3, moves ext     Impression:   :   S/p Cardio pulm arrest with STEMI s/p cath and prob related to acute blood loss   Acute blood loss anemia s/p 3 units PRBC  Chest pain s/p cath  Showed RCA 70% stenosis; patent LAD stent, EF 55-60%  Resp failure on vent  KEANU per renal  Hypotension resolved off pressors with h/o HTN  Severe COPD  Leucocytosis prob sec to epi   Recent bronchitis with elevated procalcitonin  Hyperlipidemia      Plan:    Plan vent weaning according to Gi decision on Endoscopy  Monitor Hb and Hct  Cont B Blockers if BP tolerated  Cont GI prophylaxis and SCD  Decrease IVF when oK with renal  Cont bronchodilators  Empiric antibiotics  Appreciate all consultants input    Stan Mcnally MD

## 2017-11-19 ENCOUNTER — APPOINTMENT (OUTPATIENT)
Dept: GENERAL RADIOLOGY | Age: 72
DRG: 270 | End: 2017-11-19
Payer: MEDICARE

## 2017-11-19 LAB
ALBUMIN SERPL-MCNC: 2.4 G/DL (ref 3.5–5.1)
ALLEN TEST: POSITIVE
ALP BLD-CCNC: 45 U/L (ref 38–126)
ALT SERPL-CCNC: 32 U/L (ref 11–66)
ANION GAP SERPL CALCULATED.3IONS-SCNC: 13 MEQ/L (ref 8–16)
ANISOCYTOSIS: ABNORMAL
AST SERPL-CCNC: 42 U/L (ref 5–40)
BASE EXCESS (CALCULATED): -1 MMOL/L (ref -2.5–2.5)
BASOPHILIA: SLIGHT
BASOPHILS # BLD: 0 %
BASOPHILS ABSOLUTE: 0 THOU/MM3 (ref 0–0.1)
BILIRUB SERPL-MCNC: 0.4 MG/DL (ref 0.3–1.2)
BUN BLDV-MCNC: 49 MG/DL (ref 7–22)
CALCIUM SERPL-MCNC: 8.1 MG/DL (ref 8.5–10.5)
CHLORIDE BLD-SCNC: 111 MEQ/L (ref 98–111)
CO2: 20 MEQ/L (ref 23–33)
COLLECTED BY:: ABNORMAL
CREAT SERPL-MCNC: 1.2 MG/DL (ref 0.4–1.2)
DEVICE: ABNORMAL
DIFFERENTIAL, MANUAL: NORMAL
EOSINOPHIL # BLD: 0 %
EOSINOPHILS ABSOLUTE: 0 THOU/MM3 (ref 0–0.4)
GFR SERPL CREATININE-BSD FRML MDRD: 59 ML/MIN/1.73M2
GLUCOSE BLD-MCNC: 166 MG/DL (ref 70–108)
GLUCOSE BLD-MCNC: 214 MG/DL (ref 70–108)
GLUCOSE BLD-MCNC: 226 MG/DL (ref 70–108)
HCO3: 24 MMOL/L (ref 23–28)
HCT VFR BLD CALC: 28.8 % (ref 42–52)
HCT VFR BLD CALC: 29.2 % (ref 42–52)
HCT VFR BLD CALC: 29.4 % (ref 42–52)
HCT VFR BLD CALC: 30.5 % (ref 42–52)
HEMOGLOBIN: 10.2 GM/DL (ref 14–18)
HEMOGLOBIN: 9.6 GM/DL (ref 14–18)
HEMOGLOBIN: 9.7 GM/DL (ref 14–18)
HEMOGLOBIN: 9.7 GM/DL (ref 14–18)
IFIO2: 2
LYMPHOCYTES # BLD: 2 %
LYMPHOCYTES ABSOLUTE: 0.5 THOU/MM3 (ref 1–4.8)
MAGNESIUM: 2 MG/DL (ref 1.6–2.4)
MCH RBC QN AUTO: 31 PG (ref 27–31)
MCHC RBC AUTO-ENTMCNC: 33.2 GM/DL (ref 33–37)
MCV RBC AUTO: 93.3 FL (ref 80–94)
MONOCYTES # BLD: 1 %
MONOCYTES ABSOLUTE: 0.2 THOU/MM3 (ref 0.4–1.3)
MRSA SCREEN: NORMAL
NUCLEATED RED BLOOD CELLS: 0 /100 WBC
O2 SATURATION: 94 %
PCO2: 39 MMHG (ref 35–45)
PDW BLD-RTO: 17.4 % (ref 11.5–14.5)
PH BLOOD GAS: 7.39 (ref 7.35–7.45)
PHOSPHORUS: 3.3 MG/DL (ref 2.4–4.7)
PLATELET # BLD: 152 THOU/MM3 (ref 130–400)
PLATELET ESTIMATE: ADEQUATE
PMV BLD AUTO: 8.3 MCM (ref 7.4–10.4)
PO2: 69 MMHG (ref 71–104)
POIKILOCYTES: SLIGHT
POTASSIUM SERPL-SCNC: 3.9 MEQ/L (ref 3.5–5.2)
RBC # BLD: 3.09 MILL/MM3 (ref 4.7–6.1)
SEG NEUTROPHILS: 97 %
SEGMENTED NEUTROPHILS ABSOLUTE COUNT: 23.2 THOU/MM3 (ref 1.8–7.7)
SODIUM BLD-SCNC: 144 MEQ/L (ref 135–145)
SOURCE, BLOOD GAS: ABNORMAL
TOTAL PROTEIN: 4.9 G/DL (ref 6.1–8)
VRE CULTURE: NORMAL
WBC # BLD: 23.9 THOU/MM3 (ref 4.8–10.8)

## 2017-11-19 PROCEDURE — 2580000003 HC RX 258: Performed by: INTERNAL MEDICINE

## 2017-11-19 PROCEDURE — 2500000003 HC RX 250 WO HCPCS: Performed by: INTERNAL MEDICINE

## 2017-11-19 PROCEDURE — 6360000002 HC RX W HCPCS: Performed by: INTERNAL MEDICINE

## 2017-11-19 PROCEDURE — 2700000000 HC OXYGEN THERAPY PER DAY

## 2017-11-19 PROCEDURE — 6370000000 HC RX 637 (ALT 250 FOR IP): Performed by: INTERNAL MEDICINE

## 2017-11-19 PROCEDURE — 82948 REAGENT STRIP/BLOOD GLUCOSE: CPT

## 2017-11-19 PROCEDURE — 36600 WITHDRAWAL OF ARTERIAL BLOOD: CPT

## 2017-11-19 PROCEDURE — 36415 COLL VENOUS BLD VENIPUNCTURE: CPT

## 2017-11-19 PROCEDURE — C9113 INJ PANTOPRAZOLE SODIUM, VIA: HCPCS | Performed by: INTERNAL MEDICINE

## 2017-11-19 PROCEDURE — 85018 HEMOGLOBIN: CPT

## 2017-11-19 PROCEDURE — 99233 SBSQ HOSP IP/OBS HIGH 50: CPT | Performed by: INTERNAL MEDICINE

## 2017-11-19 PROCEDURE — 94640 AIRWAY INHALATION TREATMENT: CPT

## 2017-11-19 PROCEDURE — 85025 COMPLETE CBC W/AUTO DIFF WBC: CPT

## 2017-11-19 PROCEDURE — 99232 SBSQ HOSP IP/OBS MODERATE 35: CPT | Performed by: NURSE PRACTITIONER

## 2017-11-19 PROCEDURE — 85014 HEMATOCRIT: CPT

## 2017-11-19 PROCEDURE — 2500000003 HC RX 250 WO HCPCS: Performed by: NURSE PRACTITIONER

## 2017-11-19 PROCEDURE — 99232 SBSQ HOSP IP/OBS MODERATE 35: CPT | Performed by: INTERNAL MEDICINE

## 2017-11-19 PROCEDURE — 71010 XR CHEST PORTABLE: CPT

## 2017-11-19 PROCEDURE — 2100000000 HC CCU R&B

## 2017-11-19 PROCEDURE — 84100 ASSAY OF PHOSPHORUS: CPT

## 2017-11-19 PROCEDURE — 80053 COMPREHEN METABOLIC PANEL: CPT

## 2017-11-19 PROCEDURE — 83735 ASSAY OF MAGNESIUM: CPT

## 2017-11-19 PROCEDURE — 82803 BLOOD GASES ANY COMBINATION: CPT

## 2017-11-19 RX ORDER — IPRATROPIUM BROMIDE AND ALBUTEROL SULFATE 2.5; .5 MG/3ML; MG/3ML
1 SOLUTION RESPIRATORY (INHALATION) 4 TIMES DAILY
Status: DISCONTINUED | OUTPATIENT
Start: 2017-11-19 | End: 2017-11-25 | Stop reason: HOSPADM

## 2017-11-19 RX ORDER — BUMETANIDE 0.25 MG/ML
1 INJECTION, SOLUTION INTRAMUSCULAR; INTRAVENOUS EVERY 12 HOURS
Status: DISCONTINUED | OUTPATIENT
Start: 2017-11-19 | End: 2017-11-20

## 2017-11-19 RX ORDER — NICOTINE POLACRILEX 4 MG
15 LOZENGE BUCCAL PRN
Status: DISCONTINUED | OUTPATIENT
Start: 2017-11-19 | End: 2017-11-25 | Stop reason: HOSPADM

## 2017-11-19 RX ORDER — DEXTROSE MONOHYDRATE 50 MG/ML
100 INJECTION, SOLUTION INTRAVENOUS PRN
Status: DISCONTINUED | OUTPATIENT
Start: 2017-11-19 | End: 2017-11-25 | Stop reason: HOSPADM

## 2017-11-19 RX ORDER — DEXTROSE MONOHYDRATE 25 G/50ML
12.5 INJECTION, SOLUTION INTRAVENOUS PRN
Status: DISCONTINUED | OUTPATIENT
Start: 2017-11-19 | End: 2017-11-25 | Stop reason: HOSPADM

## 2017-11-19 RX ORDER — BUMETANIDE 0.25 MG/ML
1 INJECTION, SOLUTION INTRAMUSCULAR; INTRAVENOUS EVERY 12 HOURS
Status: DISCONTINUED | OUTPATIENT
Start: 2017-11-19 | End: 2017-11-19 | Stop reason: SDUPTHER

## 2017-11-19 RX ORDER — IPRATROPIUM BROMIDE AND ALBUTEROL SULFATE 2.5; .5 MG/3ML; MG/3ML
1 SOLUTION RESPIRATORY (INHALATION) EVERY 4 HOURS PRN
Status: DISCONTINUED | OUTPATIENT
Start: 2017-11-19 | End: 2017-11-25 | Stop reason: HOSPADM

## 2017-11-19 RX ORDER — BUMETANIDE 0.25 MG/ML
1 INJECTION, SOLUTION INTRAMUSCULAR; INTRAVENOUS ONCE
Status: COMPLETED | OUTPATIENT
Start: 2017-11-19 | End: 2017-11-19

## 2017-11-19 RX ADMIN — SIMVASTATIN 80 MG: 40 TABLET, FILM COATED ORAL at 20:08

## 2017-11-19 RX ADMIN — IPRATROPIUM BROMIDE AND ALBUTEROL SULFATE 1 AMPULE: .5; 3 SOLUTION RESPIRATORY (INHALATION) at 16:39

## 2017-11-19 RX ADMIN — ALPRAZOLAM 0.25 MG: 0.25 TABLET ORAL at 18:34

## 2017-11-19 RX ADMIN — ALPRAZOLAM 0.25 MG: 0.25 TABLET ORAL at 04:26

## 2017-11-19 RX ADMIN — ISOSORBIDE DINITRATE 20 MG: 20 TABLET ORAL at 20:08

## 2017-11-19 RX ADMIN — IPRATROPIUM BROMIDE AND ALBUTEROL SULFATE 1 AMPULE: .5; 3 SOLUTION RESPIRATORY (INHALATION) at 22:20

## 2017-11-19 RX ADMIN — IPRATROPIUM BROMIDE AND ALBUTEROL SULFATE 1 AMPULE: .5; 3 SOLUTION RESPIRATORY (INHALATION) at 07:32

## 2017-11-19 RX ADMIN — Medication 10 ML: at 23:05

## 2017-11-19 RX ADMIN — ALPRAZOLAM 0.25 MG: 0.25 TABLET ORAL at 23:54

## 2017-11-19 RX ADMIN — ALPRAZOLAM 0.25 MG: 0.25 TABLET ORAL at 12:08

## 2017-11-19 RX ADMIN — RANOLAZINE 1000 MG: 500 TABLET, FILM COATED, EXTENDED RELEASE ORAL at 08:05

## 2017-11-19 RX ADMIN — TAZOBACTAM SODIUM AND PIPERACILLIN SODIUM 3.38 G: 375; 3 INJECTION, SOLUTION INTRAVENOUS at 06:17

## 2017-11-19 RX ADMIN — INSULIN LISPRO 1 UNITS: 100 INJECTION, SOLUTION INTRAVENOUS; SUBCUTANEOUS at 20:08

## 2017-11-19 RX ADMIN — IPRATROPIUM BROMIDE AND ALBUTEROL SULFATE 1 AMPULE: .5; 3 SOLUTION RESPIRATORY (INHALATION) at 12:23

## 2017-11-19 RX ADMIN — METOPROLOL TARTRATE 25 MG: 25 TABLET ORAL at 08:05

## 2017-11-19 RX ADMIN — PANTOPRAZOLE SODIUM 40 MG: 40 INJECTION, POWDER, FOR SOLUTION INTRAVENOUS at 18:34

## 2017-11-19 RX ADMIN — BUMETANIDE 1 MG: 0.25 INJECTION INTRAMUSCULAR; INTRAVENOUS at 23:01

## 2017-11-19 RX ADMIN — PANTOPRAZOLE SODIUM 40 MG: 40 INJECTION, POWDER, FOR SOLUTION INTRAVENOUS at 04:26

## 2017-11-19 RX ADMIN — TAZOBACTAM SODIUM AND PIPERACILLIN SODIUM 3.38 G: 375; 3 INJECTION, SOLUTION INTRAVENOUS at 23:01

## 2017-11-19 RX ADMIN — METHYLPREDNISOLONE SODIUM SUCCINATE 60 MG: 125 INJECTION, POWDER, FOR SOLUTION INTRAMUSCULAR; INTRAVENOUS at 08:05

## 2017-11-19 RX ADMIN — PANTOPRAZOLE SODIUM 40 MG: 40 INJECTION, POWDER, FOR SOLUTION INTRAVENOUS at 10:24

## 2017-11-19 RX ADMIN — RANOLAZINE 1000 MG: 500 TABLET, FILM COATED, EXTENDED RELEASE ORAL at 20:08

## 2017-11-19 RX ADMIN — ISOSORBIDE DINITRATE 20 MG: 20 TABLET ORAL at 08:05

## 2017-11-19 RX ADMIN — BUMETANIDE 1 MG: 0.25 INJECTION INTRAMUSCULAR; INTRAVENOUS at 08:36

## 2017-11-19 RX ADMIN — IPRATROPIUM BROMIDE AND ALBUTEROL SULFATE 1 AMPULE: .5; 3 SOLUTION RESPIRATORY (INHALATION) at 01:04

## 2017-11-19 RX ADMIN — METOPROLOL TARTRATE 25 MG: 25 TABLET ORAL at 20:08

## 2017-11-19 RX ADMIN — Medication 2 UNITS: at 16:57

## 2017-11-19 RX ADMIN — ISOSORBIDE DINITRATE 20 MG: 20 TABLET ORAL at 14:53

## 2017-11-19 RX ADMIN — TAZOBACTAM SODIUM AND PIPERACILLIN SODIUM 3.38 G: 375; 3 INJECTION, SOLUTION INTRAVENOUS at 14:54

## 2017-11-19 ASSESSMENT — PAIN SCALES - WONG BAKER: WONGBAKER_NUMERICALRESPONSE: 4

## 2017-11-19 ASSESSMENT — PAIN DESCRIPTION - PROGRESSION: CLINICAL_PROGRESSION: NOT CHANGED

## 2017-11-19 ASSESSMENT — PAIN SCALES - GENERAL
PAINLEVEL_OUTOF10: 0
PAINLEVEL_OUTOF10: 0
PAINLEVEL_OUTOF10: 3
PAINLEVEL_OUTOF10: 0

## 2017-11-19 ASSESSMENT — PAIN DESCRIPTION - LOCATION: LOCATION: CHEST

## 2017-11-19 ASSESSMENT — PAIN DESCRIPTION - ORIENTATION: ORIENTATION: MID

## 2017-11-19 ASSESSMENT — PAIN DESCRIPTION - DESCRIPTORS: DESCRIPTORS: ACHING

## 2017-11-19 ASSESSMENT — PAIN DESCRIPTION - PAIN TYPE
TYPE: ACUTE PAIN
TYPE: ACUTE PAIN

## 2017-11-19 ASSESSMENT — PAIN DESCRIPTION - FREQUENCY: FREQUENCY: INTERMITTENT

## 2017-11-19 ASSESSMENT — PAIN DESCRIPTION - ONSET: ONSET: ON-GOING

## 2017-11-19 NOTE — FLOWSHEET NOTE
Patient continues to have wheezing and rhonchi at times throughout lungs. Patient easily SOB with exertion. Continuing to monitor. Anxiety increased- xanax given as ordered.

## 2017-11-19 NOTE — FLOWSHEET NOTE
[id number] Assessment completed. Patient alert and oriented. Denies pain. Orthostatic vital signs completed. Patient sat and stood at side of bed. Increased shortness of breath and tripod positioning with minimal exertion. Patient assisted into chair. Lung sounds are rhonchorous. Orders received for bumex and chest x ray.

## 2017-11-19 NOTE — PLAN OF CARE
Outcome: Ongoing  Patient c/o pain in chest from coughing. Medication given per [facility]. Discussed nonpharm mgmt of pain - rest, relax, bracing when coughing, distraction, elevation, etc.. Patient verbalized satisfaction with pain mgmt. Problem: Tissue Perfusion - Cardiopulmonary, Altered:  Goal: Absence of angina  Absence of angina   Outcome: Ongoing  Patient states he has chest pain - aching - from coughing. Pain on both sides of chest when patient coughs. meds given per [facility] VSS    Problem: Risk for Impaired Skin Integrity  Goal: Tissue integrity - skin and mucous membranes  Structural intactness and normal physiological function of skin and  mucous membranes. Outcome: Met This Shift  Patient turns self, pillow support, encouraged to turn, no s/s of skin breakdown noted. Problem: Falls - Risk of  Goal: Absence of falls  Outcome: Met This Shift  Patient remained free from falls this shift. Used call light appropriately, up with assistance.

## 2017-11-19 NOTE — PROGRESS NOTES
[facility]   Nephrology progress note  11/19/2017, 10:24 AM      Pt Name:    June Peters  MRN:     [id number]     YOB: 1945  Admit Date:    11/16/2017 12:37 PM  Primary Care Physician:  Grabiel Cardenas MD   Room number  3A-01/001-A    Chief Complaint: Nephrology following for KEANU    Subjective:  Patient seen and examined  No chest pain   Feels weak  No distress  Family member in room      Objective:  24HR INTAKE/OUTPUT:      Intake/Output Summary (Last 24 hours) at 11/19/17 1024  Last data filed at 11/19/17 1013   Gross per 24 hour   Intake          3878.36 ml   Output             2000 ml   Net          1878.36 ml     I/O last 3 completed shifts: In: 3346.4 [P.O.:1560; I.V.:1686. 4; NG/GT:100]  Out: 2500 [Urine:2000; Emesis/NG output:500]  I/O this shift:  In: 632 [P.O.:480; I.V.:152]  Out: -   Admission weight: 140 lb (63.5 kg)  Wt Readings from Last 3 Encounters:   11/19/17 143 lb 4.8 oz (65 kg)   11/13/17 140 lb (63.5 kg)   08/28/17 134 lb 9.6 oz (61.1 kg)     Body mass index is 26.21 kg/m².     Physical examination  VITALS:     Vitals:    11/19/17 1000   BP: 112/66   Pulse: 80   Resp: 25   Temp:    SpO2: 95%     General Appearance: alert and cooperative with exam, appears comfortable, no distress  Mouth/Throat: Oral mucosa moist  Neck: No JVD  Lungs: Air entry B/L, no rales, no use of accessory muscles  Heart:  S1, S2 heard  GI: soft, non-tender, no guarding  Extremities: no sig LE edema      Lab Data  CBC:   Recent Labs      11/18/17   0419  11/18/17   1103  11/18/17   1734  11/19/17   0031  11/19/17   0450   WBC  26.6*  28.3*   --    --   23.9*   HGB  11.0*  10.4*  10.7*  9.7*  9.6*   HCT  32.1*  31.1*  32.1*  29.2*  28.8*   PLT  168  159   --    --   152     BMP:  Recent Labs      11/17/17   0419  11/18/17 0419  11/19/17   0450   NA  135  144  144   K  4.8  4.2  3.9   CL  103  112*  111   CO2  25  19*  20*   BUN  65*  63*  49*   CREATININE  1.3*  1.3*  1.2   GLUCOSE 132*  174*  166*   CALCIUM  8.2*  7.3*  8.1*   MG   --   1.8  2.0   PHOS   --   3.8  3.3     Hepatic:   Recent Labs      11/16/17   1308  11/19/17   0450   LABALBU  3.4*  2.4*   AST  12  42*   ALT  16  32   BILITOT  0.3  0.4   ALKPHOS  50  45         Meds:  Infusion:    fentaNYL (SUBLIMAZE) 500 mcg in sodium chloride 0.9 % 100 mL Stopped (11/18/17 0819)     Meds:    ipratropium-albuterol  1 ampule Inhalation 4x daily    bumetanide  1 mg Intravenous Q12H    methylPREDNISolone  60 mg Intravenous Daily    heparin (porcine)  60 Units/kg Intravenous Once    sodium chloride flush  10 mL Intravenous 2 times per day    pantoprazole  40 mg Intravenous Q8H    magnesium replacement protocol   Other RX Placeholder    calcium replacement protocol   Other RX Placeholder    phosphorus replacement protocol   Other RX Placeholder    potassium replacement protocol   Other RX Placeholder    piperacillin-tazobactam  3.375 g Intravenous Q8H    isosorbide dinitrate  20 mg Oral TID    metoprolol tartrate  25 mg Oral BID    ranolazine  1,000 mg Oral BID    simvastatin  80 mg Oral Nightly     Meds prn: ipratropium-albuterol, ALPRAZolam, sodium chloride flush, acetaminophen, magnesium hydroxide, ondansetron       Impression and Plan:  1. KEANU: nonoliguric at this time  - KEANU in setting of cardiac arrest and contrast exposure along with ACEI  - creatinine/renal function continues to improve  - creat is down to 1.2 today  - lytes and volume status are stable    2. Respiratory failure: s/p extubation yesterday  - CXR report reviewed  - saline IV stopped already  - agree with a dose of bumex    3. Pneumonia  4. CAD s/p stents  5. S/p cardiac arrest  6. Anemia: s/p PRBC several units, GI following  7.  Mild met acidosis: improving    D/W patient     Jaxon Gallo MD  [facility]

## 2017-11-19 NOTE — PROGRESS NOTES
Gastroenterology Progress Note:     Patient Name:  July Ramírez   MRN: [id number]  [id number]  YOB: 1945  Admit Date: 11/16/2017 12:37 PM  Primary Care Physician: Javon Velasquez MD   3A-01/001-A    This is a 67year old male seen as a new consult for anemia on Effient for history of cardiac stent, GI bleed S/P Code Blue 11/17 He was admitted 11/16  with chest pain and shortness of breath His hemoglobin was 9.3 on admission which was down from 13 at previous ER visit on 11-13-17. Hemoglobin dropped from 9.3 to 6.2 over 24 h     He also had urgent cathwith Dr Maurice Early findings RCA 70% stenosis; patent LAD stent, anemia s/p 2U PRBCs/RHC pressures demonstrated preserved CO. EF 55-60%.      He was previously seen per Dr Donis Last in 2010 and had EGD and Colonoscopy. EGD on 7-21-10 with findings of grade A esophagitis. Patient has a C shaped stomach as it wraps around the internal organs.  Otherwise normal appearing stomach. No evidence of bleeding. Recommendations for PPI bid. Gastric emptying study. It was felt he may have mesenteric ischemia causing abdominal pain and continued to smoke. He never had gastric emptying study.      Colonoscopy 5-26-10 with findings of normal appearing terminal ileum. Small amount erythema in rectum, biopsy negative for colitis. Sigmoid colon diverticulosis . Tortuous colon. Rectal polyp, hyperplastic. Moderate non bleeding internal hemorrhoids. Otherwise normal appearing colonoscopy. Recommendations for daily Miralax.      Patient seen and examined. 24 hours events and chart reviewed. Patient extubated. He reports 2 weeks prior to admission noted epigastric tenderness, dark stools and intermittent dysphagia. RN reports no stools overnight. He is tender in his epigastric area, no nausea or vomiting. He has sob with conversation.     Feeling: ( [x]  )Better  ([]   ) Worse      ([]   )Same    Review of Systems  Neuro- negative for headache, dizziness, altered level of exerction   CXR 11/18;left lower lobe atelectasis; Possible mild interstitial pulmonary edema. On 2lpm oxygen   Leukocytosis   Source uclear ? lung, on zosyn  History esophagitis per EGD with Dr Jose Figueroa in 2010   History of diverticulosis per colonoscopy 2010   Internal hemorrhoids   History cardiac stent              Effient use    Acute kidney injury, nephrology following   History hypertension   Dyslipidemia   COPD    Plan:    Hold Effient   NPO for now  Continue Pantoprazole 40mg IV push tid   EGD prior to discharge,possibly 11/20/2017 if  Clinically stable.   Case reviewed and impression/plan reviewed in collaboration with Dr Barby Falcon, CARLOS for Dr. Bria Kaur   11/19/2017   6:04 AM

## 2017-11-19 NOTE — PROGRESS NOTES
[facility]      Patient - Fernando Boo   MRN -  [id number]   [facility] # - [de-identified]   - 1945      Date of Admission -  2017 12:37 PM  Date of evaluation -  2017  Room - 3A--JAY   [address] Nadege Santos MD Primary Care Physician - Kendall Robbins MD     Problem List      Active Hospital Problems    Diagnosis Date Noted    Acute pulmonary edema ([address] [A06.0]     Metabolic acidosis [E26.0]     Cardiac arrest ([address] [I46.9]     ST elevation myocardial infarction (STEMI) ([facility] [I21.3]     Acute respiratory failure with hypoxia ([address] [J96.01]     Postprocedural hypotension [I95.81]     Unstable angina ([address] [I20.0] 2017    Chest pain [R07.9]     History of placement of stent in LAD coronary artery [Z95.5]     Renal failure syndrome [N19]      Chief Complaint: To follow respiratory failure. HPI   History Obtained From: Patient electronic medical record. Fernando Boo is a 67 y.o. male initially admitted under Dr. Maru Booth service for chest pain of 1 day duration. He underwent cardiac cath by Dr. Chari Kingsley MD on 17 for further evaluation of chest pain on abnormal EKG. He was intubated and kept on mechanical ventilation. At the time of my initial evaluation patient remain on vent with settings of Vent Mode: SPONTRate Set: 20 bmp/Vt Ordered: 500 mL/ /FiO2 : 40 %. He was sedated. No further history obtained from patient. He was found to have severe anemia along with black tarry stools. Dr. Christopher Lopez from GI service is following. He received PRBC transfusion along with Hb/HCT monitoring. He was diagnosed with moderate COPD. He is currently on treatment with Advair discuss 250/50 1INH bid, Spiriva 18mcg/Cap DPI. 1Cap via inhalation daily, Albuterol HFA 90mcg/Spray MDI, 2puffs  Q6Hprn/Albuterol 2.5mg nebs Q6h prn.  He is currently not using any home O2      Subjective/Events Past 24 hours/ROS   Extubated and weaned off from vent. Started on BiPAP yesterday due to respiratory distress  He refused to use BiPAP last night. Sitting in chair on 2LPM.  Started on Solumedrol 60mg IV daily for COPD exacerbation.  -Rest of the body systems were reviewed. PMHx   Past Medical History      Diagnosis Date    Acute coronary syndrome (HCC)     Arrhythmia     Arteriosclerotic heart disease (ASHD)     CHF (congestive heart failure) (HCC)     COPD (chronic obstructive pulmonary disease) (HCC)     Dizziness - light-headed     HX OF:    Emphysema     Esophagitis     Fatigue     HX OF:    History of chest pain     History of tinnitus     History of tobacco abuse     Hyperlipidemia     Hypertension     Irregular heart beat     Lightheadedness     MI (myocardial infarction)     Noncompliance with medication regimen     Noncompliance with medical therapy.     Pneumonia     Renal failure syndrome     Ringing in the ears     SOB (shortness of breath) on exertion         Meds    Current Medications    ipratropium-albuterol  1 ampule Inhalation 4x daily    bumetanide  1 mg Intravenous Q12H    methylPREDNISolone  60 mg Intravenous Daily    heparin (porcine)  60 Units/kg Intravenous Once    sodium chloride flush  10 mL Intravenous 2 times per day    pantoprazole  40 mg Intravenous Q8H    magnesium replacement protocol   Other RX Placeholder    calcium replacement protocol   Other RX Placeholder    phosphorus replacement protocol   Other RX Placeholder    potassium replacement protocol   Other RX Placeholder    piperacillin-tazobactam  3.375 g Intravenous Q8H    isosorbide dinitrate  20 mg Oral TID    metoprolol tartrate  25 mg Oral BID    ranolazine  1,000 mg Oral BID    simvastatin  80 mg Oral Nightly     ipratropium-albuterol, ALPRAZolam, sodium chloride flush, acetaminophen, magnesium hydroxide, ondansetron  IV Drips/Infusions   fentaNYL (SUBLIMAZE) 500 mcg in sodium chloride 0.9 % 100 mL Stopped (11/18/17 0181)     Home Medications  Prescriptions Prior to Admission: ranolazine (RANEXA) 1000 MG extended release tablet, take 1 tablet by mouth twice a day  valsartan-hydrochlorothiazide (DIOVAN-HCT) 80-12.5 MG per tablet, take 1 tablet by mouth daily  simvastatin (ZOCOR) 80 MG tablet, take 1 tablet by mouth at bedtime  prasugrel (EFFIENT) 10 MG TABS, take 1 tablet by mouth once daily  isosorbide mononitrate (IMDUR) 60 MG extended release tablet, take 1 tablet once daily  metoprolol succinate (TOPROL XL) 25 MG extended release tablet, Take 1 tablet by mouth 2 times daily  amLODIPine (NORVASC) 2.5 MG tablet, Take 1 tablet by mouth daily  albuterol sulfate HFA (VENTOLIN HFA) 108 (90 BASE) MCG/ACT inhaler, Inhale 2 puffs into the lungs 4 times daily  nitroGLYCERIN (NITROSTAT) 0.4 MG SL tablet, Place 1 tablet under the tongue every 5 minutes as needed  aspirin 81 MG chewable tablet, Take 1 tablet by mouth daily. fluticasone-salmeterol (ADVAIR) 250-50 MCG/DOSE AEPB, Inhale 1 puff into the lungs every 12 hours  tiotropium (SPIRIVA HANDIHALER) 18 MCG inhalation capsule, Inhale 1 capsule into the lungs daily  albuterol (PROVENTIL) (2.5 MG/3ML) 0.083% nebulizer solution, Take 3 mLs by nebulization every 6 hours as needed for Wheezing or Shortness of Breath  Diet    DIET CLEAR LIQUID; Allergies    Pneumococcal vaccines; Codeine; Dilaudid [hydromorphone]; and Morphine    Vitals     height is 5' 2\" (1.575 m) and weight is 143 lb 4.8 oz (65 kg). His core temperature is 98.6 °F (37 °C). His blood pressure is 112/66 and his pulse is 80. His respiration is 25 and oxygen saturation is 95%. Body mass index is 26.21 kg/m². SUPPLEMENTAL O2: O2 Flow Rate (L/min): 1 L/min     BP SUPPORTING AGENTS: Levophed drip off    SEDATION/ANALGESIA: Fentanyl drip off    NUTRITION SUPPORT: NPO for planned EGD.     I/O        Intake/Output Summary (Last 24 hours) at 11/19/17 1021  Last data filed at 11/19/17 0434   Gross per 24 hour   Intake ventricle size and systolic function. Ejection fraction was   estimated at 55%. Unable to determine wall motion abnormalities due to poor image quality. Conclusions      Summary      Technically difficult examination. Normal left ventricle size and systolic function. Ejection fraction was   estimated at 55%. Unable to determine wall motion abnormalities due to poor image quality. Valves were not well visualized. Signature      ----------------------------------------------------------------   Electronically signed by Benjamin Bernard MD (Interpreting   physician) on 11/17/2017 at 06:26 PM   ----------------------------------------------------------------      Findings      Radiology    CXR  Nov 19, 2017  PROCEDURE: XR CHEST PORTABLE  INTERVAL EXTUBATION. REDEMONSTRATED AND INTERVALLY WORSENED ATELECTASIS VERSUS DEVELOPING INFILTRATES OF BOTH LOWER LUNGS. CT Scans  (See actual reports for details)  Nov 16, 2017  PROCEDURE: CTA THORACIC AORTA  1. Fibroemphysematous lungs. Questionable minimal atelectasis right posterior costophrenic sulcus. 2. No pulmonary emboli. 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 Aorta otherwise unremarkable. No dissection is seen. SYSTEMS ASSESSMENT AND PLANS     Neuro   Awake and alert. Respiratory   Acute post operative pulmonary insufficiecy- improving  Moderate COPD with exacerbation. Continue solumedrol 60mg IV daily  Will continue on nebs Q6h. Wean FIO2 to keep saturation 90-92%    Cardiovascular   S/p Cardiac cath for chest pain and abnormal EKG. Hypotension due to hypovolemic shock Vs GI bleeding- resolved    Gastrointestinal   GI service is following for ? GI bleeding. Hb/HCT is stable after PRBC transfusion  Peptic ulcer disease prophylaxis with PPI. Renal   Monitor input and out put. Supplement all electrolytes per ICU electrolytes replacement protocols.     Infectious Disease   Will monitor    Hematology/Oncology   Anemia most

## 2017-11-19 NOTE — PLAN OF CARE
Problem: RESPIRATORY  Goal: Clear lung sounds  Clear lung sounds     Outcome: Met This Shift  Breath sounds are clear

## 2017-11-19 NOTE — PROGRESS NOTES
IM Progress Note  Dr. Mckeon Cap for Dr Sara Shea  11/19/2017 6:30 AM      Patient name Anastacia Gordillo  [id number]  PCP: Tanisha Mary MD  Admit Date: 11/16/2017  Acct No. [de-identified]    Subjective: Interval History:   Extubated  Sob even with minimal exertion  No cp   No further active gi bleed noted  Xanax helped    Diet: Diet NPO, After Midnight    I/O last 3 completed shifts: In: 8327 [P.O.:1080; I.V.:2376; NG/GT:100]  Out: 2700 [Urine:2200; Emesis/NG output:500]  I/O this shift: In: 964.7 [P.O.:480; I.V.:484.7]  Out: 500 [Urine:500]        Admission weight: 140 lb (63.5 kg) as of 11/16/2017 12:37 PM  Wt Readings from Last 3 Encounters:   11/18/17 143 lb 4.8 oz (65 kg)   11/13/17 140 lb (63.5 kg)   08/28/17 134 lb 9.6 oz (61.1 kg)     Body mass index is 26.21 kg/m².     ROS   CVS;  no cp or palpitation  Resp: sob with exertion  Neuro:  No numbness or weakness or dizziness  Abd: no nausea or vomiting or abd pain, agrees to dark stools before admission, Had OG tube with dark blood      Medications:   Scheduled Meds:   methylPREDNISolone  60 mg Intravenous Daily    heparin (porcine)  60 Units/kg Intravenous Once    sodium chloride flush  10 mL Intravenous 2 times per day    pantoprazole  40 mg Intravenous Q8H    ipratropium-albuterol  1 ampule Inhalation Q6H    magnesium replacement protocol   Other RX Placeholder    calcium replacement protocol   Other RX Placeholder    phosphorus replacement protocol   Other RX Placeholder    potassium replacement protocol   Other RX Placeholder    piperacillin-tazobactam  3.375 g Intravenous Q8H    isosorbide dinitrate  20 mg Oral TID    metoprolol tartrate  25 mg Oral BID    ranolazine  1,000 mg Oral BID    simvastatin  80 mg Oral Nightly     Continuous Infusions:   sodium chloride 50 mL/hr at 11/18/17 1420    midazolam      fentaNYL (SUBLIMAZE) 500 mcg in sodium chloride 0.9 % 100 mL Stopped (11/18/17 0819)    norepinephrine Stopped (11/17/17 9351) Labs :     CBC:   Recent Labs      11/18/17   0419  11/18/17   1103  11/18/17   1734  11/19/17   0031  11/19/17   0450   WBC  26.6*  28.3*   --    --   23.9*   HGB  11.0*  10.4*  10.7*  9.7*  9.6*   PLT  168  159   --    --   152     BMP:    Recent Labs      11/17/17   0419  11/18/17   0419  11/19/17   0450   NA  135  144  144   K  4.8  4.2  3.9   CL  103  112*  111   CO2  25  19*  20*   BUN  65*  63*  49*   CREATININE  1.3*  1.3*  1.2   GLUCOSE  132*  174*  166*     Hepatic:   Recent Labs      11/16/17   1308  11/19/17   0450   AST  12  42*   ALT  16  32   BILITOT  0.3  0.4   ALKPHOS  50  45     Troponin: No results for input(s): TROPONINI in the last 72 hours. BNP: No results for input(s): BNP in the last 72 hours.   Lipids:   Recent Labs      11/17/17   0419   CHOL  73*   HDL  22     INR:   Recent Labs      11/16/17   1308   INR  1.17*       Radiology    Objective:   Vitals: /61   Pulse 70   Temp 97.7 °F (36.5 °C) (Oral)   Resp 20   Ht 5' 2\" (1.575 m)   Wt 143 lb 4.8 oz (65 kg)   SpO2 98%   BMI 26.21 kg/m²   HEENT: Head:pupils react OG tube n place  Neck: supple  Lungs: decreased both base with rales   Heart: regular rate and rhythm   Abdomen: soft BS heard NG NT  Extremities: warm no  edema  Neurologic:  Alert, oriented X3, moves ext     Impression:   :   S/p Cardio pulm arrest with STEMI s/p cath and prob related to acute blood loss   Acute blood loss anemia s/p 3 units PRBC  Chest pain s/p cath  Showed RCA 70% stenosis; patent LAD stent, EF 55-60%  Resp failure extubated   KEANU per renal improved  Hypotension resolved off pressors with h/o HTN  Severe COPD  Leucocytosis prob sec to epi   Recent bronchitis with elevated procalcitonin  Hyperlipidemia      Plan:    Incentive spirometry , bronchodilators  Cont to monitor  Hb and Hct  D/w GI staff regarding endoscopy  Stop IVF   Empiric antibiotics to continue   F/u labs   Up in chair    Ashley Valentino MD

## 2017-11-20 ENCOUNTER — APPOINTMENT (OUTPATIENT)
Dept: CT IMAGING | Age: 72
DRG: 270 | End: 2017-11-20
Payer: MEDICARE

## 2017-11-20 ENCOUNTER — APPOINTMENT (OUTPATIENT)
Dept: GENERAL RADIOLOGY | Age: 72
DRG: 270 | End: 2017-11-20
Payer: MEDICARE

## 2017-11-20 LAB
ALLEN TEST: POSITIVE
ALLEN TEST: POSITIVE
AMMONIA: 20 UMOL/L (ref 11–60)
ANION GAP SERPL CALCULATED.3IONS-SCNC: 10 MEQ/L (ref 8–16)
BASE EXCESS (CALCULATED): 2.2 MMOL/L (ref -2.5–2.5)
BASE EXCESS (CALCULATED): 5.1 MMOL/L (ref -2.5–2.5)
BUN BLDV-MCNC: 41 MG/DL (ref 7–22)
CALCIUM SERPL-MCNC: 8.6 MG/DL (ref 8.5–10.5)
CHLORIDE BLD-SCNC: 106 MEQ/L (ref 98–111)
CO2: 28 MEQ/L (ref 23–33)
COLLECTED BY:: 4648
COLLECTED BY:: ABNORMAL
CREAT SERPL-MCNC: 1.1 MG/DL (ref 0.4–1.2)
DEVICE: ABNORMAL
DEVICE: ABNORMAL
EKG ATRIAL RATE: 79 BPM
EKG P AXIS: 78 DEGREES
EKG P-R INTERVAL: 160 MS
EKG Q-T INTERVAL: 332 MS
EKG QRS DURATION: 86 MS
EKG QTC CALCULATION (BAZETT): 380 MS
EKG R AXIS: 53 DEGREES
EKG T AXIS: 47 DEGREES
EKG VENTRICULAR RATE: 79 BPM
GFR SERPL CREATININE-BSD FRML MDRD: 66 ML/MIN/1.73M2
GLUCOSE BLD-MCNC: 118 MG/DL (ref 70–108)
GLUCOSE BLD-MCNC: 121 MG/DL (ref 70–108)
GLUCOSE BLD-MCNC: 141 MG/DL (ref 70–108)
GLUCOSE BLD-MCNC: 143 MG/DL (ref 70–108)
GLUCOSE BLD-MCNC: 168 MG/DL (ref 70–108)
GLUCOSE BLD-MCNC: 169 MG/DL (ref 70–108)
HCO3: 29 MMOL/L (ref 23–28)
HCO3: 31 MMOL/L (ref 23–28)
HCT VFR BLD CALC: 28.6 % (ref 42–52)
HCT VFR BLD CALC: 29.2 % (ref 42–52)
HCT VFR BLD CALC: 30.1 % (ref 42–52)
HCT VFR BLD CALC: 30.8 % (ref 42–52)
HEMOGLOBIN: 10.1 GM/DL (ref 14–18)
HEMOGLOBIN: 10.1 GM/DL (ref 14–18)
HEMOGLOBIN: 9.5 GM/DL (ref 14–18)
HEMOGLOBIN: 9.7 GM/DL (ref 14–18)
IFIO2: 3
IFIO2: 6
LACTIC ACID: 2.2 MMOL/L (ref 0.5–2.2)
LIPASE: 13.7 U/L (ref 5.6–51.3)
MCH RBC QN AUTO: 30.7 PG (ref 27–31)
MCHC RBC AUTO-ENTMCNC: 33 GM/DL (ref 33–37)
MCV RBC AUTO: 93 FL (ref 80–94)
O2 SATURATION: 96 %
O2 SATURATION: 98 %
PCO2: 50 MMHG (ref 35–45)
PCO2: 55 MMHG (ref 35–45)
PDW BLD-RTO: 17.8 % (ref 11.5–14.5)
PH BLOOD GAS: 7.33 (ref 7.35–7.45)
PH BLOOD GAS: 7.4 (ref 7.35–7.45)
PLATELET # BLD: 173 THOU/MM3 (ref 130–400)
PMV BLD AUTO: 7.9 MCM (ref 7.4–10.4)
PO2: 110 MMHG (ref 71–104)
PO2: 81 MMHG (ref 71–104)
POTASSIUM SERPL-SCNC: 4 MEQ/L (ref 3.5–5.2)
PROCALCITONIN: 0.37 NG/ML (ref 0.01–0.09)
RBC # BLD: 3.08 MILL/MM3 (ref 4.7–6.1)
SODIUM BLD-SCNC: 144 MEQ/L (ref 135–145)
SOURCE, BLOOD GAS: ABNORMAL
SOURCE, BLOOD GAS: ABNORMAL
TROPONIN T: < 0.01 NG/ML
TSH SERPL DL<=0.05 MIU/L-ACNC: 1.05 UIU/ML (ref 0.4–4.2)
URINE CULTURE REFLEX: NORMAL
WBC # BLD: 26.6 THOU/MM3 (ref 4.8–10.8)

## 2017-11-20 PROCEDURE — 6360000002 HC RX W HCPCS: Performed by: INTERNAL MEDICINE

## 2017-11-20 PROCEDURE — 85027 COMPLETE CBC AUTOMATED: CPT

## 2017-11-20 PROCEDURE — 2580000003 HC RX 258: Performed by: INTERNAL MEDICINE

## 2017-11-20 PROCEDURE — C9113 INJ PANTOPRAZOLE SODIUM, VIA: HCPCS | Performed by: INTERNAL MEDICINE

## 2017-11-20 PROCEDURE — 6370000000 HC RX 637 (ALT 250 FOR IP): Performed by: INTERNAL MEDICINE

## 2017-11-20 PROCEDURE — 82803 BLOOD GASES ANY COMBINATION: CPT

## 2017-11-20 PROCEDURE — 82140 ASSAY OF AMMONIA: CPT

## 2017-11-20 PROCEDURE — 94640 AIRWAY INHALATION TREATMENT: CPT

## 2017-11-20 PROCEDURE — 99233 SBSQ HOSP IP/OBS HIGH 50: CPT | Performed by: INTERNAL MEDICINE

## 2017-11-20 PROCEDURE — 2100000000 HC CCU R&B

## 2017-11-20 PROCEDURE — 84145 PROCALCITONIN (PCT): CPT

## 2017-11-20 PROCEDURE — 93000 ELECTROCARDIOGRAM COMPLETE: CPT | Performed by: INTERNAL MEDICINE

## 2017-11-20 PROCEDURE — 36415 COLL VENOUS BLD VENIPUNCTURE: CPT

## 2017-11-20 PROCEDURE — 99232 SBSQ HOSP IP/OBS MODERATE 35: CPT | Performed by: INTERNAL MEDICINE

## 2017-11-20 PROCEDURE — 83690 ASSAY OF LIPASE: CPT

## 2017-11-20 PROCEDURE — 85014 HEMATOCRIT: CPT

## 2017-11-20 PROCEDURE — 6360000002 HC RX W HCPCS: Performed by: NURSE PRACTITIONER

## 2017-11-20 PROCEDURE — 71010 XR CHEST PORTABLE: CPT

## 2017-11-20 PROCEDURE — 2500000003 HC RX 250 WO HCPCS: Performed by: INTERNAL MEDICINE

## 2017-11-20 PROCEDURE — 83605 ASSAY OF LACTIC ACID: CPT

## 2017-11-20 PROCEDURE — 99231 SBSQ HOSP IP/OBS SF/LOW 25: CPT | Performed by: NURSE PRACTITIONER

## 2017-11-20 PROCEDURE — 2700000000 HC OXYGEN THERAPY PER DAY

## 2017-11-20 PROCEDURE — 84443 ASSAY THYROID STIM HORMONE: CPT

## 2017-11-20 PROCEDURE — 70450 CT HEAD/BRAIN W/O DYE: CPT

## 2017-11-20 PROCEDURE — 36600 WITHDRAWAL OF ARTERIAL BLOOD: CPT

## 2017-11-20 PROCEDURE — 84484 ASSAY OF TROPONIN QUANT: CPT

## 2017-11-20 PROCEDURE — 80048 BASIC METABOLIC PNL TOTAL CA: CPT

## 2017-11-20 PROCEDURE — 85018 HEMOGLOBIN: CPT

## 2017-11-20 PROCEDURE — 94669 MECHANICAL CHEST WALL OSCILL: CPT

## 2017-11-20 PROCEDURE — 82948 REAGENT STRIP/BLOOD GLUCOSE: CPT

## 2017-11-20 PROCEDURE — 93005 ELECTROCARDIOGRAM TRACING: CPT

## 2017-11-20 RX ORDER — HALOPERIDOL 5 MG/ML
2 INJECTION INTRAMUSCULAR EVERY 6 HOURS PRN
Status: DISCONTINUED | OUTPATIENT
Start: 2017-11-20 | End: 2017-11-25 | Stop reason: HOSPADM

## 2017-11-20 RX ORDER — BISACODYL 10 MG
10 SUPPOSITORY, RECTAL RECTAL DAILY
Status: DISCONTINUED | OUTPATIENT
Start: 2017-11-20 | End: 2017-11-25 | Stop reason: HOSPADM

## 2017-11-20 RX ORDER — BUMETANIDE 0.25 MG/ML
0.5 INJECTION, SOLUTION INTRAMUSCULAR; INTRAVENOUS EVERY 12 HOURS
Status: DISCONTINUED | OUTPATIENT
Start: 2017-11-20 | End: 2017-11-20 | Stop reason: ALTCHOICE

## 2017-11-20 RX ORDER — NICOTINE 21 MG/24HR
1 PATCH, TRANSDERMAL 24 HOURS TRANSDERMAL EVERY 24 HOURS
Status: DISCONTINUED | OUTPATIENT
Start: 2017-11-20 | End: 2017-11-25 | Stop reason: HOSPADM

## 2017-11-20 RX ORDER — FUROSEMIDE 10 MG/ML
20 INJECTION INTRAMUSCULAR; INTRAVENOUS EVERY 12 HOURS
Status: DISCONTINUED | OUTPATIENT
Start: 2017-11-20 | End: 2017-11-25 | Stop reason: HOSPADM

## 2017-11-20 RX ADMIN — Medication 10 ML: at 09:32

## 2017-11-20 RX ADMIN — FUROSEMIDE 20 MG: 10 INJECTION, SOLUTION INTRAMUSCULAR; INTRAVENOUS at 23:23

## 2017-11-20 RX ADMIN — INSULIN LISPRO 1 UNITS: 100 INJECTION, SOLUTION INTRAVENOUS; SUBCUTANEOUS at 20:26

## 2017-11-20 RX ADMIN — METHYLPREDNISOLONE SODIUM SUCCINATE 60 MG: 125 INJECTION, POWDER, FOR SOLUTION INTRAMUSCULAR; INTRAVENOUS at 09:36

## 2017-11-20 RX ADMIN — METOPROLOL TARTRATE 25 MG: 25 TABLET ORAL at 09:39

## 2017-11-20 RX ADMIN — SIMVASTATIN 80 MG: 40 TABLET, FILM COATED ORAL at 20:25

## 2017-11-20 RX ADMIN — TAZOBACTAM SODIUM AND PIPERACILLIN SODIUM 3.38 G: 375; 3 INJECTION, SOLUTION INTRAVENOUS at 14:00

## 2017-11-20 RX ADMIN — METOPROLOL TARTRATE 25 MG: 25 TABLET ORAL at 20:25

## 2017-11-20 RX ADMIN — IPRATROPIUM BROMIDE AND ALBUTEROL SULFATE 1 AMPULE: .5; 3 SOLUTION RESPIRATORY (INHALATION) at 19:48

## 2017-11-20 RX ADMIN — IPRATROPIUM BROMIDE AND ALBUTEROL SULFATE 1 AMPULE: .5; 3 SOLUTION RESPIRATORY (INHALATION) at 11:34

## 2017-11-20 RX ADMIN — Medication 1 UNITS: at 13:51

## 2017-11-20 RX ADMIN — RANOLAZINE 1000 MG: 500 TABLET, FILM COATED, EXTENDED RELEASE ORAL at 20:24

## 2017-11-20 RX ADMIN — IPRATROPIUM BROMIDE AND ALBUTEROL SULFATE 1 AMPULE: .5; 3 SOLUTION RESPIRATORY (INHALATION) at 07:37

## 2017-11-20 RX ADMIN — PANTOPRAZOLE SODIUM 40 MG: 40 INJECTION, POWDER, FOR SOLUTION INTRAVENOUS at 20:26

## 2017-11-20 RX ADMIN — RANOLAZINE 1000 MG: 500 TABLET, FILM COATED, EXTENDED RELEASE ORAL at 12:30

## 2017-11-20 RX ADMIN — FUROSEMIDE 20 MG: 10 INJECTION, SOLUTION INTRAMUSCULAR; INTRAVENOUS at 09:32

## 2017-11-20 RX ADMIN — Medication 10 ML: at 12:31

## 2017-11-20 RX ADMIN — ISOSORBIDE DINITRATE 20 MG: 20 TABLET ORAL at 20:25

## 2017-11-20 RX ADMIN — IPRATROPIUM BROMIDE AND ALBUTEROL SULFATE 1 AMPULE: .5; 3 SOLUTION RESPIRATORY (INHALATION) at 15:55

## 2017-11-20 RX ADMIN — Medication 10 ML: at 17:20

## 2017-11-20 RX ADMIN — ISOSORBIDE DINITRATE 20 MG: 20 TABLET ORAL at 13:56

## 2017-11-20 RX ADMIN — Medication 1 UNITS: at 17:24

## 2017-11-20 RX ADMIN — Medication 10 ML: at 20:26

## 2017-11-20 RX ADMIN — TAZOBACTAM SODIUM AND PIPERACILLIN SODIUM 3.38 G: 375; 3 INJECTION, SOLUTION INTRAVENOUS at 23:28

## 2017-11-20 RX ADMIN — PANTOPRAZOLE SODIUM 40 MG: 40 INJECTION, POWDER, FOR SOLUTION INTRAVENOUS at 12:33

## 2017-11-20 ASSESSMENT — PAIN SCALES - GENERAL
PAINLEVEL_OUTOF10: 0

## 2017-11-20 ASSESSMENT — PAIN SCALES - WONG BAKER: WONGBAKER_NUMERICALRESPONSE: 0

## 2017-11-20 NOTE — PLAN OF CARE
Problem: RESPIRATORY  Goal: Clear lung sounds  Clear lung sounds     Outcome: Ongoing  Patient continues on breathing treatments; tolerating well.   Returned to oxygen

## 2017-11-20 NOTE — PROGRESS NOTES
7104 RN to check on pt, pt found to be minimally responsive to physical stimulation. Responding to sternal rub only, not opening eyes. Agonal breathing assessed and pt was bagged for approx 5-10 mins. ABGs, chest xray, EKG done, and blood glucose 122. See chart for results. Pt more responsive now, oriented to self only. 9406 Code Stroke called, Rapid/Resource RN at bedside NIH completed with a score of 5 assessed. Dr. Burks Ates at bedside to assess pt. Head CT, ammonia, lactic, cbc, bmp, procal, trops q 6 hours ordered. Pt able to follow commands, becoming more oriented. 0510 Pt to CT. Dr. Vince Dorantes canceled code stroke  0530 Pt back to floor. Moved from room 1 to room 4 for pt safety.

## 2017-11-20 NOTE — PROGRESS NOTES
non-tender, non-distended, normal bowel sounds, no masses Extremities: no cyanosis, clubbing or edema, pulses present   Skin: warm and dry, no rash or erythema  Head: normocephalic and atraumatic   Musculoskeletal: normal range of motion, no joint swelling, deformity or tenderness  Neurological: alert, oriented to self, normal speech, no focal findings or movement disorder noted    Medications:    ipratropium-albuterol  1 ampule Inhalation 4x daily    insulin lispro  0-6 Units Subcutaneous TID WC    insulin lispro  0-3 Units Subcutaneous Nightly    bumetanide  1 mg Intravenous Q12H    methylPREDNISolone  60 mg Intravenous Daily    heparin (porcine)  60 Units/kg Intravenous Once    sodium chloride flush  10 mL Intravenous 2 times per day    pantoprazole  40 mg Intravenous Q8H    magnesium replacement protocol   Other RX Placeholder    calcium replacement protocol   Other RX Placeholder    phosphorus replacement protocol   Other RX Placeholder    potassium replacement protocol   Other RX Placeholder    piperacillin-tazobactam  3.375 g Intravenous Q8H    isosorbide dinitrate  20 mg Oral TID    metoprolol tartrate  25 mg Oral BID    ranolazine  1,000 mg Oral BID    simvastatin  80 mg Oral Nightly      dextrose      fentaNYL (SUBLIMAZE) 500 mcg in sodium chloride 0.9 % 100 mL Stopped (17 0819)       ipratropium-albuterol 1 ampule Q4H PRN   glucose 15 g PRN   dextrose 12.5 g PRN   glucagon (rDNA) 1 mg PRN   dextrose 100 mL/hr PRN   ALPRAZolam 0.25 mg Q6H PRN   sodium chloride flush 10 mL PRN   acetaminophen 650 mg Q4H PRN   magnesium hydroxide 30 mL Daily PRN   ondansetron 4 mg Q6H PRN       Diagnostics:  EK2017 04:23:34 [facility] ROUTINE RETRIEVAL  Normal sinus rhythm  Nonspecific T wave abnormality  Abnormal ECG  When compared with ECG of 2017 06:41,  Significant changes have occurred    Echo:   Electronically signed by Toshia Moe MD (Interpreting   physician) on 11/17/2017 at 06:26 PM   ----------------------------------------------------------------      Findings      Mitral Valve   The mitral valve was not well visualized .      Aortic Valve   The aortic valve leaflets were not well visualized.      Tricuspid Valve   Tricuspid valve was not well visualized.      Pulmonic Valve   The pulmonic valve was not well visualized .      Left Atrium   Left atrial size was normal.      Left Ventricle   Normal left ventricle size and systolic function. Ejection fraction was   estimated at 55%.   Unable to determine wall motion abnormalities due to poor image quality.      Right Atrium   Right atrial size was normal.      Right Ventricle   The right ventricular size was normal.      Pericardial Effusion   The pericardium was normal in appearance with no evidence of a pericardial   effusion. Left Heart Cath: 11/17/2017  RCA 70% stenosis; patent LAD stent      Lab Data:    Cardiac Enzymes:  No results for input(s): CKTOTAL, CKMB, CKMBINDEX, TROPONINI in the last 72 hours.     CBC:   Lab Results   Component Value Date    WBC 26.6 11/20/2017    RBC 3.08 11/20/2017    RBC 3.93 08/31/2011    HGB 9.5 11/20/2017    HCT 28.6 11/20/2017     11/20/2017       CMP:    Lab Results   Component Value Date     11/20/2017    K 4.0 11/20/2017     11/20/2017    CO2 28 11/20/2017    BUN 41 11/20/2017    CREATININE 1.1 11/20/2017    LABGLOM 66 11/20/2017    GLUCOSE 118 11/20/2017    GLUCOSE 154 08/31/2011    CALCIUM 8.6 11/20/2017       Hepatic Function Panel:    Lab Results   Component Value Date    ALKPHOS 45 11/19/2017    ALT 32 11/19/2017    AST 42 11/19/2017    PROT 4.9 11/19/2017    BILITOT 0.4 11/19/2017    BILIDIR <0.2 11/13/2017    LABALBU 2.4 11/19/2017    LABALBU 4.0 08/31/2011       Magnesium:    Lab Results   Component Value Date    MG 2.0 11/19/2017       PT/INR:    Lab Results   Component Value Date    INR 1.17 11/16/2017       HgBA1c: No results found for: LABA1C    FLP:    Lab Results   Component Value Date    TRIG 118 11/17/2017    HDL 22 11/17/2017    LDLCALC 27 11/17/2017       TSH:    Lab Results   Component Value Date    TSH 1.050 11/20/2017         Assessment:    Confusion / agitation   CT head negative    Acute fluid volume overload - responded to diuretics   Renal dosed diuretics yesterday-0 today cardio oncall ordered    Up 4 L    Admission with CP- trop negative    S\p keith arrhythmic arrest with cpr- see code sheet  S\p cath with RCA 70% stenosis; patent LAD stent    GIB   Acute blood loss anemia s\p PRBC     S\p resp failure with intubation- now extubated   Acute fluid volume overload   ? PNA    Hx CAD: prior LAD PCI  KEANU / CKD stage 3  HTN  HLP      Plan:    Stable cardiac course  Off antiplatelet therapy at present with GIB - no need for efient at DC - PCI long ago    No need for cardiology to remain seeing     He is in [facility]- needs diuresis - bumex IV bid       We will see prn- please call for questions or concerns  Thanks    Follow Cardio 3-6 weeks after DC             Electronically signed by Mihai Lowery CNP on 11/20/2017 at 8:02 AM

## 2017-11-20 NOTE — PLAN OF CARE
Problem: Discharge Planning:  Goal: Participates in care planning  Participates in care planning   Outcome: Ongoing  Patient remains in CCU. Assessing for discharge needs. Daughter asking if palliative care could possibly see to address symptom management of COPD. Goal: Discharged to appropriate level of care  Discharged to appropriate level of care   Outcome: Ongoing  Planning home at discharge. Will monitor. Problem: Airway Clearance - Ineffective:  Goal: Ability to maintain a clear airway will improve  Ability to maintain a clear airway will improve   Outcome: Ongoing  Patient having episodes of wheezing, rhonchi and shortness of breath at times. Patient receiving bumex and steroids via IV. Easily SOB with exertion. Poor incentive spirometry- only obtains 300. Problem: Anxiety/Stress:  Goal: Level of anxiety will decrease  Level of anxiety will decrease   Outcome: Ongoing  Patient easily frustrated and anxious at times. Xanax ordered PRN to help with anxiety. Daughter states she has noticed patient has been having some anxiety issues at home that needs addressed with primary physician. Problem: Cardiac Output - Decreased:  Goal: Hemodynamic stability will improve  Hemodynamic stability will improve   Outcome: Ongoing  BP and HR stable. Afebrile. O2 saturions 90% and above on room air. Problem: Fluid Volume - Imbalance:  Goal: Absence of imbalanced fluid volume signs and symptoms  Absence of imbalanced fluid volume signs and symptoms   Outcome: Not Met This Shift  Bumex IV push. Patient having wheezing and rhonchi at times. Patient unable to tolerate mitchell catheter today and asks for mitchell to be removed. Patient able to void in urinal to obtain accurate urine output.       Problem: Pain:  Goal: Control of acute pain  Control of acute pain   Outcome: Met This Shift    Goal: Control of chronic pain  Control of chronic pain   Outcome: Completed Date Met: 11/19/17      Problem: Tissue Perfusion - Cardiopulmonary, Altered:  Goal: Absence of angina  Absence of angina   Outcome: Ongoing  Patient will report chest discomfort at times from CPR. Does not wish to receive pain medications when asked. Problem: Risk for Impaired Skin Integrity  Goal: Tissue integrity - skin and mucous membranes  Structural intactness and normal physiological function of skin and  mucous membranes. Outcome: Ongoing  Skin intact to bony prominences and coccyx. Mucous membranes are pink and intact. Patient able to turn and reposition self in bed. Patient up to chair with assistance. Comments: POC discussed with patient and daughter. Questions addressed when asked. Denies other needs or concerns. Will monitor.

## 2017-11-20 NOTE — PROGRESS NOTES
subjective  - negative for dysuria, frequency, hematuria      (  + )Medications Reviewed ;(   )Old records reviewed    I/O last 3 completed shifts: In: 2221.7 [P.O.:1560; I.V.:661.7]  Out: 1975 [GLBPY:6265]  I/O this shift:  In: 10 [I.V.:10]  Out: 425 [Urine:425]    Diet:  DIET CLEAR LIQUID;      BP (!) 96/51   Pulse 68   Temp 97.6 °F (36.4 °C) (Axillary)   Resp 16   Ht 5' 2\" (1.575 m)   Wt 143 lb 4.8 oz (65 kg)   SpO2 99%   BMI 26.21 kg/m²     Physical Exam:    General:  Ill appearing  HEENT: Atraumatic, normocephalic. Moist oral mucous membranes. Sclera anicteric. CV: Heart RRR with s1 s2 heard,   Resp: Tachypnea at 24, Oxygen per NC at 6l, scattered rhonchi   Abd: Round, soft, tender with palp epigastric area No hepatosplenomegaly or mass present. Active bowel sounds heard. mild distension    Ext:  Without cyanosis, clubbing, edema. Skin: Pink, warm, dry  Neuro:  Alert, oriented x3 with no obvious deficits.        Rectal: deferred  Lines/tubes:       Labs: WBC:    Lab Results   Component Value Date    WBC 26.6 11/20/2017     Platelets:    Lab Results   Component Value Date     11/20/2017     Hemoglobin/Hematocrit:    Lab Results   Component Value Date    HGB 9.5 11/20/2017    HCT 28.6 11/20/2017     BMP:    Lab Results   Component Value Date     11/20/2017    K 4.0 11/20/2017     11/20/2017    CO2 28 11/20/2017    BUN 41 11/20/2017    LABALBU 2.4 11/19/2017    LABALBU 4.0 08/31/2011    CREATININE 1.1 11/20/2017    CALCIUM 8.6 11/20/2017    LABGLOM 66 11/20/2017    GLUCOSE 118 11/20/2017    GLUCOSE 154 08/31/2011     Hepatic Function Panel:    Lab Results   Component Value Date    ALKPHOS 45 11/19/2017    ALT 32 11/19/2017    AST 42 11/19/2017    PROT 4.9 11/19/2017    BILITOT 0.4 11/19/2017    BILIDIR <0.2 11/13/2017    LABALBU 2.4 11/19/2017    LABALBU 4.0 08/31/2011     Calcium:    Lab Results   Component Value Date    CALCIUM 8.6 11/20/2017     PT/INR:    Lab Results   Component CXR 11/18;left lower lobe atelectasis; Possible mild interstitial pulmonary edema. Leukocytosis--possibly d/t COPD exacerbation,? PNA on Zosyn  Respiratory distress--worse overnight, oxygen bumped up from 2LPM to 6 LPM  Confusion/agitation--CT head negative  History esophagitis per EGD with Dr Quiana Justin in 2010   History of diverticulosis per colonoscopy 2010   Internal hemorrhoids   History cardiac stent              Effient use    Acute kidney injury, nephrology following   Hypertension     Plan:    OK to start clear liquid diet   HH in a.m. Continue Pantoprazole 40mg IV push tid, transition to oral bid prior to discharge   No plans for EGD at this point as resp status not stable. Can do upper GI when clinically stable.   Case reviewed and impression/plan reviewed in collaboration with Dr Calixto Faye, CARLOS for Dr. Areli Loera   11/20/2017   6:11 AM

## 2017-11-20 NOTE — PROGRESS NOTES
This nurse responded to the code stroke. NIH already completed by rapid response nurse. This nurse agrees with the findings. Dr. Zaira Harmon notified. Dr. Bibi Lopez and Dr. Fauzia Rashid present. Code stroke was cancelled.

## 2017-11-20 NOTE — PROGRESS NOTES
Dr. Jade Reveles Progress note        11/20/2017                  12:41 PM        Patient:  Emily Velasquez  YOB: 1945    MRN: [id number]   Acct:  [de-identified]   [id number]  Primary Care Physician: Imani Garcia MD    Admit Date: 11/16/2017           Subjective: resuming care. Sitting up in chair with his daughter at the bed side. He feels better but prior to the admission, he was smoking 2ppd of cigarette. He has some cough. Objective:   Vitals: /67   Pulse 70   Temp 97.6 °F (36.4 °C) (Axillary)   Resp (!) 31   Ht 5' 2\" (1.575 m)   Wt 143 lb 4.8 oz (65 kg)   SpO2 100%   BMI 26.21 kg/m²   Physical Exam:  General appearance: alert, appears stated age and cooperative  Skin: Skin color, texture, turgor normal.   HEENT: Head: Normal, normocephalic, atraumatic. Neck: no adenopathy, no carotid bruit and no JVD  Lungs: kaela rhonchi. mod air movement. Heart: S1, S2 normal  Abdomen: pos bs, non tender. Extremities: upper ext edema. Lymphatic: No significant lymph node enlargement papable  Neurologic: Mental status: awake and responsive. Able to communicate.       Diet: DIET CLEAR LIQUID;        Data:   Scheduled Meds: Scheduled Meds:   furosemide  20 mg Intravenous Q12H    bisacodyl  10 mg Rectal Daily    ipratropium-albuterol  1 ampule Inhalation 4x daily    insulin lispro  0-6 Units Subcutaneous TID WC    insulin lispro  0-3 Units Subcutaneous Nightly    methylPREDNISolone  60 mg Intravenous Daily    heparin (porcine)  60 Units/kg Intravenous Once    sodium chloride flush  10 mL Intravenous 2 times per day    pantoprazole  40 mg Intravenous Q8H    magnesium replacement protocol   Other RX Placeholder    calcium replacement protocol   Other RX Placeholder    phosphorus replacement protocol   Other RX Placeholder    potassium replacement protocol   Other RX Placeholder    piperacillin-tazobactam  3.375 g Intravenous Q8H    11/18/17   0419  11/19/17   0450  11/20/17   0504   NA  144  144  144   CL  112*  111  106   K  4.2  3.9  4.0   CO2  19*  20*  28   BUN  63*  49*  41*   CREATININE  1.3*  1.2  1.1   GLUCOSE  174*  166*  118*   PHOS  3.8  3.3   --    MG  1.8  2.0   --    CALCIUM  7.3*  8.1*  8.6   LIPASE   --    --   13.7   PROT   --   4.9*   --    BILITOT   --   0.4   --    ALKPHOS   --   45   --      No results for input(s): CKTOTAL, CKMB, CKMBINDEX, CKMBCALCMETH, TROPONINI in the last 72 hours. No results for input(s): HDL, LDLDIRECT, TRIG in the last 72 hours.     Invalid input(s): TOTALCHLTL, [address]  Recent Labs      11/20/17   0504   TSH  1.050             Assessment   Principal Problem:    Unstable angina (HCC)  Active Problems:    Chest pain    History of placement of stent in LAD coronary artery    Renal failure syndrome    Acute respiratory failure with hypoxia (HCC)    Postprocedural hypotension    Acute pulmonary edema (HCC)    Metabolic acidosis    Cardiac arrest (HCC)    ST elevation myocardial infarction (STEMI) [facility])        Patient Active Problem List   Diagnosis    Essential hypertension    COPD (chronic obstructive pulmonary disease) (HCC)    Coronary artery disease    Pulmonary emphysema (HCC)    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[address]    Shortness of breath    History of tinnitus    Light headed    Fatigue    History of chest pain    History of tobacco abuse    Noncompliance with medication regimen    Ringing in the ears    Lightheadedness    Irregular heart beat    Acute coronary syndrome ([address]    Hyperlipidemia    Esophagitis    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

## 2017-11-20 NOTE — PROGRESS NOTES
[facility]      Patient - Chyna Wilson   MRN -  [id number]   [facility]   - 1945      Date of Admission -  2017 12:37 PM  Date of evaluation -  2017  Room - --JAY Sumner MD Primary Care Physician - Wanda Godinez MD     Problem List      Active Hospital Problems    Diagnosis Date Noted    Acute pulmonary edema ([address] [O17.5]     Metabolic acidosis [G05.8]     Cardiac arrest ([address] [I46.9]     ST elevation myocardial infarction (STEMI) ([facility] [I21.3]     Acute respiratory failure with hypoxia ([address] [J96.01]     Postprocedural hypotension [I95.81]     Unstable angina ([facility] [I20.0] 2017    Chest pain [R07.9]     History of placement of stent in LAD coronary artery [Z95.5]     Renal failure syndrome [N19]      Chief Complaint: To follow respiratory failure. HPI   History Obtained From: Patient electronic medical record. Chyna Wilson is a 67 y.o. male initially admitted under Dr. Chery Blood service for chest pain of 1 day duration. He underwent cardiac cath by Dr. Arpit Kingsley MD on 17 for further evaluation of chest pain on abnormal EKG. He was intubated and kept on mechanical ventilation. At the time of my initial evaluation patient remain on vent with settings of Vent Mode: SPONTRate Set: 20 bmp/Vt Ordered: 500 mL/ /FiO2 : 40 %. He was sedated. No further history obtained from patient. He was found to have severe anemia along with black tarry stools. Dr. Natalee Rodriguez from GI service is following. He received PRBC transfusion along with Hb/HCT monitoring. He was diagnosed with moderate COPD. He is currently on treatment with Advair discuss 250/50 1INH bid, Spiriva 18mcg/Cap DPI. 1Cap via inhalation daily, Albuterol HFA 90mcg/Spray MDI, 2puffs  Q6Hprn/Albuterol 2.5mg nebs Q6h prn.  He is currently not using any home O2    17 Patient transfused PRBC  Subjective/Events Past 24 hours/ROS   -Afebrile  -maintaining SpO2 on 3 LPM via NC  -Combative overnight did not tolerate BiPAP  -Code stroke called and canceled CT negative  -Ammonia and lactic acid- WNL  -lethargic this AM  -Oriented to place [facility] - [facility]) not situation  -Follows simple commands this AM  All other systems reviewed  PMHx   Past Medical History      Diagnosis Date    Acute coronary syndrome (HCC)     Arrhythmia     Arteriosclerotic heart disease (ASHD)     CHF (congestive heart failure) (HCC)     COPD (chronic obstructive pulmonary disease) (HCC)     Dizziness - light-headed     HX OF:    Emphysema     Esophagitis     Fatigue     HX OF:    History of chest pain     History of tinnitus     History of tobacco abuse     Hyperlipidemia     Hypertension     Irregular heart beat     Lightheadedness     MI (myocardial infarction)     Noncompliance with medication regimen     Noncompliance with medical therapy.     Pneumonia     Renal failure syndrome     Ringing in the ears     SOB (shortness of breath) on exertion         Meds    Current Medications    furosemide  20 mg Intravenous Q12H    bisacodyl  10 mg Rectal Daily    ipratropium-albuterol  1 ampule Inhalation 4x daily    insulin lispro  0-6 Units Subcutaneous TID WC    insulin lispro  0-3 Units Subcutaneous Nightly    methylPREDNISolone  60 mg Intravenous Daily    heparin (porcine)  60 Units/kg Intravenous Once    sodium chloride flush  10 mL Intravenous 2 times per day    pantoprazole  40 mg Intravenous Q8H    magnesium replacement protocol   Other RX Placeholder    calcium replacement protocol   Other RX Placeholder    phosphorus replacement protocol   Other RX Placeholder    potassium replacement protocol   Other RX Placeholder    piperacillin-tazobactam  3.375 g Intravenous Q8H    isosorbide dinitrate  20 mg Oral TID    metoprolol tartrate  25 mg Oral BID    ranolazine  1,000 mg Oral BID    simvastatin  80 mg Oral Nightly     haloperidol lactate, ipratropium-albuterol, glucose, dextrose, glucagon (rDNA), dextrose, sodium chloride flush, acetaminophen, magnesium hydroxide, ondansetron  IV Drips/Infusions   dextrose      fentaNYL (SUBLIMAZE) 500 mcg in sodium chloride 0.9 % 100 mL Stopped (11/18/17 0819)     Home Medications  Prescriptions Prior to Admission: ranolazine (RANEXA) 1000 MG extended release tablet, take 1 tablet by mouth twice a day  valsartan-hydrochlorothiazide (DIOVAN-HCT) 80-12.5 MG per tablet, take 1 tablet by mouth daily  simvastatin (ZOCOR) 80 MG tablet, take 1 tablet by mouth at bedtime  prasugrel (EFFIENT) 10 MG TABS, take 1 tablet by mouth once daily  isosorbide mononitrate (IMDUR) 60 MG extended release tablet, take 1 tablet once daily  metoprolol succinate (TOPROL XL) 25 MG extended release tablet, Take 1 tablet by mouth 2 times daily  amLODIPine (NORVASC) 2.5 MG tablet, Take 1 tablet by mouth daily  albuterol sulfate HFA (VENTOLIN HFA) 108 (90 BASE) MCG/ACT inhaler, Inhale 2 puffs into the lungs 4 times daily  nitroGLYCERIN (NITROSTAT) 0.4 MG SL tablet, Place 1 tablet under the tongue every 5 minutes as needed  aspirin 81 MG chewable tablet, Take 1 tablet by mouth daily. fluticasone-salmeterol (ADVAIR) 250-50 MCG/DOSE AEPB, Inhale 1 puff into the lungs every 12 hours  tiotropium (SPIRIVA HANDIHALER) 18 MCG inhalation capsule, Inhale 1 capsule into the lungs daily  albuterol (PROVENTIL) (2.5 MG/3ML) 0.083% nebulizer solution, Take 3 mLs by nebulization every 6 hours as needed for Wheezing or Shortness of Breath  Diet    DIET CLEAR LIQUID; Allergies    Pneumococcal vaccines; Codeine; Dilaudid [hydromorphone]; and Morphine    Vitals     height is 5' 2\" (1.575 m) and weight is 143 lb 4.8 oz (65 kg). His axillary temperature is 97.6 °F (36.4 °C). His blood pressure is 105/59 (abnormal) and his pulse is 67. His respiration is 0 (abnormal) and oxygen saturation is 99%.    Body mass index is 26.21 kg/m². Patient not compliant with heart monitoring respirations 20 during exam  SUPPLEMENTAL O2: O2 Flow Rate (L/min): 3 L/min     BP SUPPORTING AGENTS: Levophed drip off    SEDATION/ANALGESIA: Fentanyl drip off    NUTRITION SUPPORT: NPO for planned EGD. I/O        Intake/Output Summary (Last 24 hours) at 11/20/17 1036  Last data filed at 11/20/17 0900   Gross per 24 hour   Intake              635 ml   Output             2025 ml   Net            -1390 ml     I/O last 3 completed shifts: In: 1267 [P.O.:1080; I.V.:187]  Out: 1900 [Urine:1900]   Patient Vitals for the past 96 hrs (Last 3 readings):   Weight   11/19/17 0702 143 lb 4.8 oz (65 kg)   11/18/17 0532 143 lb 4.8 oz (65 kg)   11/17/17 0930 141 lb 12.1 oz (64.3 kg)       Exam      Constitutional: No distress on O2 per NC. Patient appears moderately built and  moderately nourished. Head: Normocephalic and atraumatic. Eyes: Conjunctivae are normal. Pupils are equal, round. No scleral icterus. Neck: Neck supple. No tracheal deviation present. Cardiovascular: Regular rate, regular rhythm, S1 and S2 with no murmur. L hand +1 edema noted  Pulmonary/Chest: Normal effort with bilateral air entry noted rhonchorus breath sounds noted throughout lung fields improved with cough. No stridor. No respiratory distress. Patient exhibits no tenderness. Abdominal: Soft. Bowel sounds audible. No distension or tenderness to palp. Musculoskeletal: Moves all extremities  Neurological: Patient is lethargic follows simple commands oriented to self and place not situation   Skin: Warm and dry.      Labs  - Old records and notes have been reviewed in [facility]   ABG  Lab Results   Component Value Date    PH 7.33 11/20/2017    PO2 110 11/20/2017    PCO2 55 11/20/2017    HCO3 29 11/20/2017    O2SAT 98 11/20/2017     Lab Results   Component Value Date    IFIO2 6 11/20/2017    MODE CPAP/PS 11/18/2017    SETTIDVOL 500 11/18/2017    SETPEEP 5.0 11/18/2017     CBC  Recent Labs following simple commands oriented to person and place not situation  11/20/17 Code stroke called and canceled  CT of the head (11/20/17) negative for acute process  Ammonia and lactic acid negative  Respiratory   Acute post operative pulmonary insufficiecy-ABG PH 7.33, PCO2 55, PO2 110, HCO3 29   Discontinue xanax   IV haldol 2 mg PRN for agitation monitor for QT prolongation 11/20/17 QTc 380  Moderate COPD with exacerbation. Encourage Pulmonary toileting as mentation improves  Continue solumedrol 60mg IV daily  Will continue on nebs Q6h. Wean FIO2 to keep saturation 90-92%    Cardiovascular   S/p Cardiac cath for chest pain and abnormal EKG. Hypotension due to hypovolemic shock Vs GI bleeding- resolved    Gastrointestinal   GI service is following for ? GI bleeding. Hb/HCT is stable after PRBC transfusion  Peptic ulcer disease prophylaxis with PPI. Renal   Monitor input and output.(+3.08 L)  Supplement all electrolytes per ICU electrolytes replacement protocols. Dr. Jose Armando Ha following [facility]  Infectious Disease   WBC- 26.6  Will monitor    Hematology/Oncology   Anemia most likely due to GI bleeding- stable Hb/HCT  Acute blood loss anemia. S/p PRBC transfusion  Deep Venous Thrombosis Prophylaxis: SCDs. Social/Spiritual/DNR/Disposition/Miscellaneous   Full code     Case discussed with patient/family, nurse Sammie Figueroa, and Dr. Gonsalo Coreas. Questions and concerns addressed. Electronically signed by   Barrington Egan CNP on 11/20/2017 at 10:36 AM     Addendum by Dr. Gonsalo Coreas MD:  I have seen and examined the patient independently. Face to face evaluation and examination was performed. The above evaluation and note has been reviewed. Labs and radiographs were reviewed. I Have discussed with Mr. Yolande Barros CNP about this patient in detail. D/w Patient's R.N. and specific instructions given. Patient issues addressed. The above assessment and plan has been reviewed.  Please see my modifications mentioned below.     My modifications: Will start on BiPAP 14/6 with a back up rate of 16/mt   Abg in 3hours on bipap.     Sarina Gray MD 11/20/2017 3:44 PM

## 2017-11-20 NOTE — FLOWSHEET NOTE
Bed alarm going off. Standing at bedside. Primary RN at bedside. Patient states he is leaving. Verbal redirection attempted to get back into bed. Refusing pulse ox, removed O2. Unplugged bedside cardiac monitor. Continues to report \"I am leaving your building\". Daughter, Starr Mccoy called, spoke with son-in-law Newton Wagner, updated. Coming up to hospital. Primary RN, Joe Stark remains at bedside. Agitated, aggressive.  States \"get out of my way\"

## 2017-11-20 NOTE — PROCEDURES
[address] [place] [address]                              CARDIAC CATHETERIZATION    PATIENT NAME: Dalila Pal                   :        1945  MED REC NO:   [id number]                           ROOM:  ACCOUNT NO:   [de-identified]                           ADMIT DATE: 2017  PROVIDER:     Faraz Duarte MD    DATE OF PROCEDURE:  2017    INDICATION:  Cardiac arrest with inferior ST elevation. PROCEDURE:  After informed consent was obtained from the family in an  emergent fashion, the patient was brought to the cardiac catheterization  laboratory, prepped in sterile fashion. Prior to taking to the cardiac  catheterization laboratory, it was noted that the patient's hemoglobin has  dropped from 9 to 7 to now 6 range. The patient was at high risk for  bleeding. Informed the the patient's family, however, they wished  to pursue cardiac catheterization given the inferior ST elevation. The patient was hemodynamically stable with a pulse, and was intubated for  respiratory support and airway management. Therefore, the patient was  brought to the cardiac catheterization laboratory and right and left  femoral artery were chosen as a primary access. Right femoral vein was  also chosen as the primary access point. Using micropuncture, fluoroscopy  and modified Seldinger technique, we inserted a 6-Israeli right femoral  artery sheath, an 8-Israeli right femoral vein sheath, and a 4-Israeli left  femoral artery sheath. We then performed diagnostic coronary angiography  using 6-Israeli JL4, 6-Israeli JR4, and a 6-Israeli pigtail catheter. We also  performed right heart catheterization using 7. 5-Israeli Schneider-Jackie catheter  and 0.025 guidewire. The patient was noted to be hypotensive upon arrival to the cardiac  catheterization laboratory. Norepinephrine IV was given to help stabilize  his blood pressure.   Systolic blood pressure was noted to be around 50 mmHg. We performed coronary angiography as noted above. Thereafter, we had  performed right heart catheterization. Given the problems with  hemodynamics, we inserted a 7. 5-Panamanian 40 mL IABP catheter during  the procedure. CORONARY ANGIOGRAM:  LEFT MAIN:  The left main is patent and free of any significant disease. LAD:  The LAD has a large stent that is patent without any significant ISR. The LAD is diffusely diseased at the apical segment. There is good myocardial blush. There are diagonal branches that are patent without any significant  obstructive disease. LEFT CIRCUMFLEX:  The left circumflex is large without any obstructive  disease. It gives rise to a large OM branch that bifurcates distally and  Has mild luminal irregularities. The AV groove circumflex is patent. RCA:  We used a 6-Panamanian guiding catheter to engage the RCA given the  ECG findings in the patient. This demonstrated patent vessel. There was mid 50% stenosis followed by a distal  60% to 70% stenosis in the RCA. Dominant vessel. There was flow all the  way to the distal vessels, including to the PLV and to PDA branches. LV:  The LV systolic pressure was 194, the LVDP was approximately 20, there  is no LV to AO systolic gradient. The LV gram performed demonstrates an EF  of 55% without any significant wall motion abnormalities. No significant MR. There aortic valve appears to be tricuspid and there does not appear to be   An ascending aortic aneurysm or dissection. The patient continued to be hypotensive/unstable. Started receiving  packed red blood cells through the venous sheath that we then placed. FEMORAL ARTERY:  Bilateral femoral artery angiography demonstrates very  small vessels with the PAD bilaterally. There was concern that the  patient was developing continued hypotension despite pressors.   We did  consider Impella placement, however, again given small iliac vessels, there  was concern for occlusion related to Impella device insertion. Therefore,  we aborted placing this item. RIGHT HEART CATHETERIZATION:  Right atrium 14, RV 46/17, PA 43/19, mean of  27, wedge pressure of 15, PA saturation 67%, cardiac index 4.4. The patient was monitored on the table while the 1:1 balloon pump and  norepinephrine for his blood pressure. After 1 unit of blood has been  infused, the patient's blood pressures began to rise slowly. We started to  titrate off the norepinephrine. Balloon pump was changed to 1:3  augmentation. Blood pressure remained above 579 mmHg systolic with a mean  greater than 60. We therefore removed the balloon pump. After removal of  the balloon pump, the patient continued to maintain a blood pressure of  over 120 mmHg with a mean of greater than 60 mmHg. We then continued to  titrate off the norepinephrine with the patient on the table. After the norepinephrine was discontinued, the patient's blood pressure  remained over 100 mmHg. Given that the patient had a normal ejection  fraction, normal cardiac index, normal PA saturation, non-occcluded coronary  Angiography, and was anemic at the time of the arrest, the likely etiology  of the arrest was secondary to coronary ischemia in the setting of RCA  lesion with significant anemia. All hemodynamic support was discontinued. Continuing infusion of packed red blood cells resulted in improved blood  pressure. RIGHT FEMORAL ARTERY:  The right femoral artery hemostasis was achieved  with Perclose. LEFT FEMORAL ARTERY:  The left femoral artery 4Fr sheath was sutured in place for  hemodynamic monitoring. RIGHT FEMORAL VEIN SHEATH:  The right femoral vein sheath, which had been  placed for rapid infusion of blood, was sutured in place. SUMMARY:  Inferior ST elevation in the setting of RCA stenosis and  significant anemia, likely GI blood loss. PLAN:  1. CICU management.   2.  Continue IV fluid and packed red blood cell management. 3.  Keep the sheaths for 6 to 8 hours. 4.  NG tube insertion. 5.  Reassessment for the need for Effient as the patient's stent appears to  have been placed in 2013 into the LAD. Therefore, given that the patient  developed cardiac arrest in the setting of anemia and RCA disease, it would  be reasonable to discontinue the Effient and continue 81 mg of aspirin. 6.  IV Protonix bolus and drip. 7.  Pulmonary consultation for ventilator management. 8.  Optimal medical therapy for CAD. 9.  Consider FFR of the RCA at a later time and when the patient is stable  from a GI standpoint to be able to tolerate further DAPT. All the above was explained to the patient and the patient's family. They  were all agreeable and amenable to the plan.         Gisela Scott MD    D: 11/18/2017 12:40:41       T: 11/18/2017 15:42:56     [email or web address]  Job#: [id number]     Doc#: [id number]    CC:

## 2017-11-20 NOTE — PROGRESS NOTES
[facility]         Renal Inpatient Follow-Up note         11/20/2017 10:09 AM    Pt Name:   Vickie Mcmahon:   1945  Attending:   Kendall Robbins MD    Chief Complaint : Fernando Boo is a 67 y.o. male being followed by nephrology for KEANU    Interval History :   Patient seen and examined by me. No distress  Patient is sleepy and not very communicative. Patient has apparently received a few doses of Xanax. Making urine output. He is off pressors.      Scheduled Medications :    furosemide  20 mg Intravenous Q12H    bisacodyl  10 mg Rectal Daily    ipratropium-albuterol  1 ampule Inhalation 4x daily    insulin lispro  0-6 Units Subcutaneous TID WC    insulin lispro  0-3 Units Subcutaneous Nightly    methylPREDNISolone  60 mg Intravenous Daily    heparin (porcine)  60 Units/kg Intravenous Once    sodium chloride flush  10 mL Intravenous 2 times per day    pantoprazole  40 mg Intravenous Q8H    magnesium replacement protocol   Other RX Placeholder    calcium replacement protocol   Other RX Placeholder    phosphorus replacement protocol   Other RX Placeholder    potassium replacement protocol   Other RX Placeholder    piperacillin-tazobactam  3.375 g Intravenous Q8H    isosorbide dinitrate  20 mg Oral TID    metoprolol tartrate  25 mg Oral BID    ranolazine  1,000 mg Oral BID    simvastatin  80 mg Oral Nightly      dextrose      fentaNYL (SUBLIMAZE) 500 mcg in sodium chloride 0.9 % 100 mL Stopped (11/18/17 0819)     Vitals :  BP (!) 105/59   Pulse 67   Temp 97.6 °F (36.4 °C) (Axillary)   Resp (!) 0   Ht 5' 2\" (1.575 m)   Wt 143 lb 4.8 oz (65 kg)   SpO2 99%   BMI 26.21 kg/m²     24HR INTAKE/OUTPUT:      Intake/Output Summary (Last 24 hours) at 11/20/17 1009  Last data filed at 11/20/17 0900   Gross per 24 hour   Intake             1267 ml   Output             2025 ml   Net             -758 ml     Last 3 weights  Wt Readings from Last 3 Encounters:   11/19/17 143 lb 4.8 oz (65 kg)   11/13/17 140 lb (63.5 kg)   08/28/17 134 lb 9.6 oz (61.1 kg)           Physical Exam :  General Appearance:  Well developed. No distress  Mouth/Throat:  Oral mucosa moist  Neck:  Supple, no JVD  Lungs:  Breath sounds: clear  Heart[de-identified]  S1,S2 heard  Abdomen:  Soft, non - tender  Musculoskeletal:  Edema - none         Last 3 CBC   Recent Labs      11/18/17   1103   11/19/17   0450   11/19/17   1816  11/19/17   2353  11/20/17   0504   WBC  28.3*   --   23.9*   --    --    --   26.6*   RBC  3.38*   --   3.09*   --    --    --   3.08*   HGB  10.4*   < >  9.6*   < >  9.7*  10.1*  9.5*   HCT  31.1*   < >  28.8*   < >  29.4*  30.1*  28.6*   PLT  159   --   152   --    --    --   173    < > = values in this interval not displayed. Last 3 CMP  Recent Labs      11/18/17   0419  11/19/17   0450  11/20/17   0504   NA  144  144  144   K  4.2  3.9  4.0   CL  112*  111  106   CO2  19*  20*  28   BUN  63*  49*  41*   CREATININE  1.3*  1.2  1.1   CALCIUM  7.3*  8.1*  8.6   LABALBU   --   2.4*   --    BILITOT   --   0.4   --              Assessment / Plan   Renal - Acute kidney injury most likely secondary to prerenal etiology combined with the use of ACE inhibitor and diuretic. · S/P cardiac arrest and also contrast exposure twice 11/16 & 11/17. · Renal function currently appears to be back to baseline. · Okay with diuretics. Close monitoring of BMP/ renal function  · High BUN probably due to steroids    Electrolytes appear to be within normal limits  Essential hypertension - Running borderline  Chest pain- S/P cath On 11/17/2017  Anemia - Status post blood transfusion, gastroenterology on board. COPD  meds reviewed and D/W RN    Overall renal issues have been resolved at this time. We will sign off. Please call with any questions or concerns. RUSSELL Brenner D.  [facility].

## 2017-11-21 ENCOUNTER — ANESTHESIA EVENT (OUTPATIENT)
Dept: ENDOSCOPY | Age: 72
DRG: 270 | End: 2017-11-21
Payer: MEDICARE

## 2017-11-21 ENCOUNTER — APPOINTMENT (OUTPATIENT)
Dept: GENERAL RADIOLOGY | Age: 72
DRG: 270 | End: 2017-11-21
Payer: MEDICARE

## 2017-11-21 ENCOUNTER — ANESTHESIA (OUTPATIENT)
Dept: ENDOSCOPY | Age: 72
DRG: 270 | End: 2017-11-21
Payer: MEDICARE

## 2017-11-21 VITALS — SYSTOLIC BLOOD PRESSURE: 93 MMHG | DIASTOLIC BLOOD PRESSURE: 64 MMHG

## 2017-11-21 LAB
ANION GAP SERPL CALCULATED.3IONS-SCNC: 8 MEQ/L (ref 8–16)
ANISOCYTOSIS: ABNORMAL
BASOPHILS # BLD: 0.1 %
BASOPHILS ABSOLUTE: 0 THOU/MM3 (ref 0–0.1)
BUN BLDV-MCNC: 39 MG/DL (ref 7–22)
CALCIUM SERPL-MCNC: 8.8 MG/DL (ref 8.5–10.5)
CHLORIDE BLD-SCNC: 103 MEQ/L (ref 98–111)
CO2: 32 MEQ/L (ref 23–33)
CREAT SERPL-MCNC: 1.1 MG/DL (ref 0.4–1.2)
EOSINOPHIL # BLD: 0 %
EOSINOPHILS ABSOLUTE: 0 THOU/MM3 (ref 0–0.4)
GFR SERPL CREATININE-BSD FRML MDRD: 66 ML/MIN/1.73M2
GLUCOSE BLD-MCNC: 135 MG/DL (ref 70–108)
GLUCOSE BLD-MCNC: 150 MG/DL (ref 70–108)
GLUCOSE BLD-MCNC: 151 MG/DL (ref 70–108)
GLUCOSE BLD-MCNC: 152 MG/DL (ref 70–108)
GLUCOSE BLD-MCNC: 250 MG/DL (ref 70–108)
HCT VFR BLD CALC: 27.7 % (ref 42–52)
HCT VFR BLD CALC: 28.4 % (ref 42–52)
HCT VFR BLD CALC: 29.7 % (ref 42–52)
HEMOGLOBIN: 9.1 GM/DL (ref 14–18)
HEMOGLOBIN: 9.4 GM/DL (ref 14–18)
HEMOGLOBIN: 9.8 GM/DL (ref 14–18)
LYMPHOCYTES # BLD: 4.9 %
LYMPHOCYTES ABSOLUTE: 0.8 THOU/MM3 (ref 1–4.8)
MCH RBC QN AUTO: 30.7 PG (ref 27–31)
MCHC RBC AUTO-ENTMCNC: 32.8 GM/DL (ref 33–37)
MCV RBC AUTO: 93.6 FL (ref 80–94)
MONOCYTES # BLD: 5.8 %
MONOCYTES ABSOLUTE: 1 THOU/MM3 (ref 0.4–1.3)
NUCLEATED RED BLOOD CELLS: 0 /100 WBC
PDW BLD-RTO: 17.1 % (ref 11.5–14.5)
PLATELET # BLD: 182 THOU/MM3 (ref 130–400)
PMV BLD AUTO: 7.8 MCM (ref 7.4–10.4)
POTASSIUM SERPL-SCNC: 4 MEQ/L (ref 3.5–5.2)
RBC # BLD: 2.96 MILL/MM3 (ref 4.7–6.1)
SEG NEUTROPHILS: 89.2 %
SEGMENTED NEUTROPHILS ABSOLUTE COUNT: 15.1 THOU/MM3 (ref 1.8–7.7)
SODIUM BLD-SCNC: 143 MEQ/L (ref 135–145)
TROPONIN T: < 0.01 NG/ML
WBC # BLD: 16.9 THOU/MM3 (ref 4.8–10.8)

## 2017-11-21 PROCEDURE — C9113 INJ PANTOPRAZOLE SODIUM, VIA: HCPCS | Performed by: INTERNAL MEDICINE

## 2017-11-21 PROCEDURE — 99233 SBSQ HOSP IP/OBS HIGH 50: CPT | Performed by: INTERNAL MEDICINE

## 2017-11-21 PROCEDURE — 6370000000 HC RX 637 (ALT 250 FOR IP): Performed by: INTERNAL MEDICINE

## 2017-11-21 PROCEDURE — 82948 REAGENT STRIP/BLOOD GLUCOSE: CPT

## 2017-11-21 PROCEDURE — 85025 COMPLETE CBC W/AUTO DIFF WBC: CPT

## 2017-11-21 PROCEDURE — 2500000003 HC RX 250 WO HCPCS: Performed by: INTERNAL MEDICINE

## 2017-11-21 PROCEDURE — 80048 BASIC METABOLIC PNL TOTAL CA: CPT

## 2017-11-21 PROCEDURE — 71010 XR CHEST PORTABLE: CPT

## 2017-11-21 PROCEDURE — 6360000002 HC RX W HCPCS: Performed by: INTERNAL MEDICINE

## 2017-11-21 PROCEDURE — 3609012800 HC EGD DIAGNOSTIC ONLY: Performed by: INTERNAL MEDICINE

## 2017-11-21 PROCEDURE — 2500000003 HC RX 250 WO HCPCS: Performed by: NURSE ANESTHETIST, CERTIFIED REGISTERED

## 2017-11-21 PROCEDURE — 6360000002 HC RX W HCPCS: Performed by: NURSE ANESTHETIST, CERTIFIED REGISTERED

## 2017-11-21 PROCEDURE — 94669 MECHANICAL CHEST WALL OSCILL: CPT

## 2017-11-21 PROCEDURE — 3700000000 HC ANESTHESIA ATTENDED CARE: Performed by: INTERNAL MEDICINE

## 2017-11-21 PROCEDURE — 94640 AIRWAY INHALATION TREATMENT: CPT

## 2017-11-21 PROCEDURE — 84484 ASSAY OF TROPONIN QUANT: CPT

## 2017-11-21 PROCEDURE — 2580000003 HC RX 258: Performed by: NURSE ANESTHETIST, CERTIFIED REGISTERED

## 2017-11-21 PROCEDURE — 2580000003 HC RX 258: Performed by: INTERNAL MEDICINE

## 2017-11-21 PROCEDURE — 85014 HEMATOCRIT: CPT

## 2017-11-21 PROCEDURE — 85018 HEMOGLOBIN: CPT

## 2017-11-21 PROCEDURE — 1200000003 HC TELEMETRY R&B

## 2017-11-21 PROCEDURE — 3700000001 HC ADD 15 MINUTES (ANESTHESIA): Performed by: INTERNAL MEDICINE

## 2017-11-21 PROCEDURE — 36415 COLL VENOUS BLD VENIPUNCTURE: CPT

## 2017-11-21 PROCEDURE — 6360000002 HC RX W HCPCS: Performed by: NURSE PRACTITIONER

## 2017-11-21 RX ORDER — PANTOPRAZOLE SODIUM 40 MG/1
40 TABLET, DELAYED RELEASE ORAL
Status: DISCONTINUED | OUTPATIENT
Start: 2017-11-22 | End: 2017-11-24

## 2017-11-21 RX ORDER — KETAMINE HYDROCHLORIDE 50 MG/ML
INJECTION, SOLUTION, CONCENTRATE INTRAMUSCULAR; INTRAVENOUS PRN
Status: DISCONTINUED | OUTPATIENT
Start: 2017-11-21 | End: 2017-11-21 | Stop reason: SDUPTHER

## 2017-11-21 RX ORDER — ETOMIDATE 2 MG/ML
INJECTION INTRAVENOUS PRN
Status: DISCONTINUED | OUTPATIENT
Start: 2017-11-21 | End: 2017-11-21 | Stop reason: SDUPTHER

## 2017-11-21 RX ORDER — SODIUM CHLORIDE 9 MG/ML
INJECTION, SOLUTION INTRAVENOUS CONTINUOUS PRN
Status: DISCONTINUED | OUTPATIENT
Start: 2017-11-21 | End: 2017-11-21 | Stop reason: SDUPTHER

## 2017-11-21 RX ORDER — PROPOFOL 10 MG/ML
INJECTION, EMULSION INTRAVENOUS PRN
Status: DISCONTINUED | OUTPATIENT
Start: 2017-11-21 | End: 2017-11-21 | Stop reason: SDUPTHER

## 2017-11-21 RX ORDER — METHYLPREDNISOLONE SODIUM SUCCINATE 40 MG/ML
40 INJECTION, POWDER, LYOPHILIZED, FOR SOLUTION INTRAMUSCULAR; INTRAVENOUS DAILY
Status: DISCONTINUED | OUTPATIENT
Start: 2017-11-22 | End: 2017-11-22

## 2017-11-21 RX ADMIN — SIMVASTATIN 80 MG: 40 TABLET, FILM COATED ORAL at 20:45

## 2017-11-21 RX ADMIN — PROPOFOL 50 MG: 10 INJECTION, EMULSION INTRAVENOUS at 15:26

## 2017-11-21 RX ADMIN — ETOMIDATE 10 MG: 2 INJECTION INTRAVENOUS at 15:26

## 2017-11-21 RX ADMIN — IPRATROPIUM BROMIDE AND ALBUTEROL SULFATE 1 AMPULE: .5; 3 SOLUTION RESPIRATORY (INHALATION) at 14:40

## 2017-11-21 RX ADMIN — ISOSORBIDE DINITRATE 20 MG: 20 TABLET ORAL at 20:46

## 2017-11-21 RX ADMIN — ISOSORBIDE DINITRATE 20 MG: 20 TABLET ORAL at 16:15

## 2017-11-21 RX ADMIN — KETAMINE HYDROCHLORIDE 12.5 MG: 50 INJECTION, SOLUTION INTRAMUSCULAR; INTRAVENOUS at 15:25

## 2017-11-21 RX ADMIN — FUROSEMIDE 20 MG: 10 INJECTION, SOLUTION INTRAMUSCULAR; INTRAVENOUS at 16:16

## 2017-11-21 RX ADMIN — TAZOBACTAM SODIUM AND PIPERACILLIN SODIUM 3.38 G: 375; 3 INJECTION, SOLUTION INTRAVENOUS at 14:30

## 2017-11-21 RX ADMIN — METOPROLOL TARTRATE 25 MG: 25 TABLET ORAL at 20:46

## 2017-11-21 RX ADMIN — Medication 10 ML: at 10:00

## 2017-11-21 RX ADMIN — RANOLAZINE 1000 MG: 500 TABLET, FILM COATED, EXTENDED RELEASE ORAL at 20:46

## 2017-11-21 RX ADMIN — SODIUM CHLORIDE: 9 INJECTION, SOLUTION INTRAVENOUS at 15:23

## 2017-11-21 RX ADMIN — Medication 10 ML: at 20:50

## 2017-11-21 RX ADMIN — IPRATROPIUM BROMIDE AND ALBUTEROL SULFATE 1 AMPULE: .5; 3 SOLUTION RESPIRATORY (INHALATION) at 19:56

## 2017-11-21 RX ADMIN — METOPROLOL TARTRATE 25 MG: 25 TABLET ORAL at 08:12

## 2017-11-21 RX ADMIN — FUROSEMIDE 20 MG: 10 INJECTION, SOLUTION INTRAMUSCULAR; INTRAVENOUS at 22:35

## 2017-11-21 RX ADMIN — IPRATROPIUM BROMIDE AND ALBUTEROL SULFATE 1 AMPULE: .5; 3 SOLUTION RESPIRATORY (INHALATION) at 10:31

## 2017-11-21 RX ADMIN — PANTOPRAZOLE SODIUM 40 MG: 40 INJECTION, POWDER, FOR SOLUTION INTRAVENOUS at 12:00

## 2017-11-21 RX ADMIN — PANTOPRAZOLE SODIUM 40 MG: 40 INJECTION, POWDER, FOR SOLUTION INTRAVENOUS at 03:31

## 2017-11-21 RX ADMIN — METHYLPREDNISOLONE SODIUM SUCCINATE 60 MG: 125 INJECTION, POWDER, FOR SOLUTION INTRAMUSCULAR; INTRAVENOUS at 08:12

## 2017-11-21 RX ADMIN — INSULIN LISPRO 2 UNITS: 100 INJECTION, SOLUTION INTRAVENOUS; SUBCUTANEOUS at 20:46

## 2017-11-21 RX ADMIN — TAZOBACTAM SODIUM AND PIPERACILLIN SODIUM 3.38 G: 375; 3 INJECTION, SOLUTION INTRAVENOUS at 06:00

## 2017-11-21 RX ADMIN — TAZOBACTAM SODIUM AND PIPERACILLIN SODIUM 3.38 G: 375; 3 INJECTION, SOLUTION INTRAVENOUS at 22:35

## 2017-11-21 RX ADMIN — RANOLAZINE 1000 MG: 500 TABLET, FILM COATED, EXTENDED RELEASE ORAL at 08:12

## 2017-11-21 RX ADMIN — ISOSORBIDE DINITRATE 20 MG: 20 TABLET ORAL at 08:12

## 2017-11-21 ASSESSMENT — COPD QUESTIONNAIRES: CAT_SEVERITY: NO INTERVAL CHANGE

## 2017-11-21 ASSESSMENT — ENCOUNTER SYMPTOMS: SHORTNESS OF BREATH: 1

## 2017-11-21 ASSESSMENT — PAIN SCALES - GENERAL
PAINLEVEL_OUTOF10: 0

## 2017-11-21 NOTE — ANESTHESIA PRE PROCEDURE
Department of Anesthesiology  Preprocedure Note       Name:  Abebe Hoskins   Age:  67 y.o.  :  1945                                          MRN:  [id number]         Date:  2017      Surgeon: Fernande Boas):  Stuart Jim MD    Procedure: Procedure(s):  EGD BIOPSY    Medications prior to admission:   Prior to Admission medications    Medication Sig Start Date End Date Taking? Authorizing Provider   ranolazine (RANEXA) 1000 MG extended release tablet take 1 tablet by mouth twice a day 17  Yes Aline Pierce, DO   valsartan-hydrochlorothiazide (DIOVAN-HCT) 80-12.5 MG per tablet take 1 tablet by mouth daily 16  Yes Aline Pierce, DO   simvastatin (ZOCOR) 80 MG tablet take 1 tablet by mouth at bedtime 16  Yes Aline Pierce, DO   prasugrel (EFFIENT) 10 MG TABS take 1 tablet by mouth once daily 16  Yes Aline Pierce, DO   isosorbide mononitrate (IMDUR) 60 MG extended release tablet take 1 tablet once daily 16  Yes Aline Pierce, DO   metoprolol succinate (TOPROL XL) 25 MG extended release tablet Take 1 tablet by mouth 2 times daily 16  Yes Aline Pierce, DO   amLODIPine (NORVASC) 2.5 MG tablet Take 1 tablet by mouth daily 16  Yes Aline Pierce, DO   albuterol sulfate HFA (VENTOLIN HFA) 108 (90 BASE) MCG/ACT inhaler Inhale 2 puffs into the lungs 4 times daily 16 Yes Mikhail Penaloza MD   nitroGLYCERIN (NITROSTAT) 0.4 MG SL tablet Place 1 tablet under the tongue every 5 minutes as needed 9/29/15  Yes Aline Pierce, DO   aspirin 81 MG chewable tablet Take 1 tablet by mouth daily.  14  Yes Tamar Barrios CNP   fluticasone-salmeterol (ADVAIR) 250-50 MCG/DOSE AEPB Inhale 1 puff into the lungs every 12 hours 17   Jonny Alonso MD   tiotropium (SPIRIVA HANDIHALER) 18 MCG inhalation capsule Inhale 1 capsule into the lungs daily 17   Jonny Alonso MD   albuterol (PROVENTIL) (2.5 MG/3ML) 0.083% nebulizer solution Take 3 mLs by nebulization every 6 hours as needed for Wheezing or Shortness of Breath 11/16/17 11/16/18  Parth Kaplan MD       Current medications:    Current Facility-Administered Medications   Medication Dose Route Frequency Provider Last Rate Last Dose    furosemide (LASIX) injection 20 mg  20 mg Intravenous Q12H Jennhossein Kemp, CNP   20 mg at 11/20/17 2323    bisacodyl (DULCOLAX) suppository 10 mg  10 mg Rectal Daily Laura L Georgi, CNP        haloperidol lactate (HALDOL) injection 2 mg  2 mg Intravenous Q6H PRN Tyesha Lambert, CNP        nicotine (NICODERM CQ) 21 MG/24HR 1 patch  1 patch Transdermal Q24H Katya Crook MD   1 patch at 11/20/17 1353    ipratropium-albuterol (DUONEB) nebulizer solution 1 ampule  1 ampule Inhalation 4x daily Christiano Hendrickson MD   1 ampule at 11/21/17 1440    ipratropium-albuterol (DUONEB) nebulizer solution 1 ampule  1 ampule Inhalation Q4H PRN Christiano Hendrickson MD        insulin lispro (HUMALOG) injection vial 0-6 Units  0-6 Units Subcutaneous TID WC Parth Kaplan MD   1 Units at 11/20/17 1724    insulin lispro (HUMALOG) injection vial 0-3 Units  0-3 Units Subcutaneous Nightly Parth Kaplan MD   1 Units at 11/20/17 2026    glucose (GLUTOSE) 40 % oral gel 15 g  15 g Oral PRN Parth Kaplan MD        dextrose 50 % solution 12.5 g  12.5 g Intravenous PRN Parth Kaplan MD        glucagon (rDNA) injection 1 mg  1 mg Intramuscular PRN Parth Kaplan MD        dextrose 5 % solution  100 mL/hr Intravenous PRN Parth Kaplan MD        methylPREDNISolone sodium (SOLU-MEDROL) injection 60 mg  60 mg Intravenous Daily Parth Kaplan MD   60 mg at 11/21/17 2976    heparin (porcine) injection 3,600 Units  60 Units/kg Intravenous Once Baldemar Marino MD        fentaNYL (SUBLIMAZE) 500 mcg in sodium chloride 0.9 % 100 mL  25 mcg/hr Intravenous Continuous Emani Donovan MD   Stopped at 11/18/17 0819    sodium chloride flush 0.9 % injection 10 mL  10 mL Intravenous 2 times per day Taryn Phillips MD   10 mL at 11/21/17 1000    sodium chloride flush 0.9 % injection 10 mL  10 mL Intravenous PRN Taryn Phillips MD   10 mL at 11/20/17 1720    acetaminophen (TYLENOL) tablet 650 mg  650 mg Oral Q4H PRN Taryn Phillips MD   650 mg at 11/18/17 2027    magnesium hydroxide (MILK OF MAGNESIA) 400 MG/5ML suspension 30 mL  30 mL Oral Daily PRN Taryn Phillips MD        [facility]ron [facility] injection 4 mg  4 mg Intravenous Q6H PRN Taryn Phillips MD        pantoprazole (PROTONIX) injection 40 mg  40 mg Intravenous Q8H Taryn Phillips MD   40 mg at 11/21/17 1200    magnesium replacement protocol   Other RX Amy Couch MD        calcium replacement protocol   Other RX Placeholder Alyssa Couch MD        phosphorus replacement protocol   Other RX Placeholder Alyssa Couch MD        potassium replacement protocol   Other RX Placeholder Alyssa Couch MD        piperacillin-tazobactam (ZOSYN) 3.375 g in dextrose 50 mL IVPB extended infusion (premix)  3.375 g Intravenous Q8H Delroy Tan MD   Stopped at 11/21/17 1020    isosorbide dinitrate (ISORDIL) tablet 20 mg  20 mg Oral TID Steven Guadalupe MD   20 mg at 11/21/17 8389    metoprolol tartrate (LOPRESSOR) tablet 25 mg  25 mg Oral BID Steven Guadalupe MD   25 mg at 11/21/17 0993    ranolazine (RANEXA) extended release tablet 1,000 mg  1,000 mg Oral BID Steven Guadalupe MD   1,000 mg at 11/21/17 3702    simvastatin (ZOCOR) tablet 80 mg  80 mg Oral Nightly Steven Guadalupe MD   80 mg at 11/20/17 2025       Allergies:     Allergies   Allergen Reactions    Pneumococcal Vaccines Nausea And Vomiting     Reports vomiting x 1 day after vaccine    Codeine Hives     Upset stomach    Dilaudid [Hydromorphone] Hives     Upset stomach    Morphine Nausea And Vomiting       Problem List: Ringing in the ears     SOB (shortness of breath) on exertion        Past Surgical History:        Procedure Laterality Date    CARDIAC SURGERY      2 stents    COLONOSCOPY      CORONARY ANGIOPLASTY WITH STENT PLACEMENT      pt has 3 stents    DIAGNOSTIC CARDIAC CATH LAB PROCEDURE  3 09 2009    Successful primary stenting of mid LAD using drug-eluting stent.  DIAGNOSTIC CARDIAC CATH LAB PROCEDURE  1 10 2011    LV demonstrated normal systolic function, EF 94%. On pullback, no gradient was noted. No critical lesions noted in all large epicardial vessels so that RCA is dominant vessel w/just very mild diffuse disease, about 20-30% lesions. Left main widely patent w/mild disease distally. Circumflex widely patent w/diffuse disease, also about 20-30%.  ENDOSCOPY, COLON, DIAGNOSTIC      HERNIA REPAIR  2008,2010    TRANSTHORACIC ECHOCARDIOGRAM  1 10 2011    Size was normal. Systolic function was normal. EF estimated in range of 55-65%. No regional wall motion abnormalities. Wall thickness was normal. Doppler - LV diastolic function parameters were normal. This is technically limited study which may impair interpretation.  TRANSTHORACIC ECHOCARDIOGRAM  3 06 2009    This study is technically limited. Distal anterior apical wall hypokinesis. EF 40-45%. Left atrial size w/in normal limits. Trace MR. No evidence of aortic stenosis or aortic insufficiency. Right atrium and right ventricle are of normal contractility. Trace TR. No pericardial effusion.         Social History:    Social History   Substance Use Topics    Smoking status: Current Every Day Smoker     Packs/day: 1.00     Years: 40.00     Types: Cigarettes     Start date: 5/31/1976    Smokeless tobacco: Never Used    Alcohol use No                                Ready to quit: Not Answered  Counseling given: No      Vital Signs (Current):   Vitals:    11/21/17 0802 11/21/17 0900 11/21/17 1156 11/21/17 1300   BP:  (!) 96/58 106/65 106/60   Pulse: 67 73 66   Resp:  (!) 5 18 (!) 0   Temp:       TempSrc:       SpO2: 99% 94% 94% 95%   Weight:       Height:                                                  BP Readings from Last 3 Encounters:   11/21/17 106/60   11/13/17 (!) 106/53   08/28/17 100/60       NPO Status:                                                                                 BMI:   Wt Readings from Last 3 Encounters:   11/21/17 130 lb 4.7 oz (59.1 kg)   11/13/17 140 lb (63.5 kg)   08/28/17 134 lb 9.6 oz (61.1 kg)     Body mass index is 23.83 kg/m².     CBC:   Lab Results   Component Value Date    WBC 16.9 11/21/2017    RBC 2.96 11/21/2017    RBC 3.93 08/31/2011    HGB 9.8 11/21/2017    HCT 29.7 11/21/2017    MCV 93.6 11/21/2017    RDW 17.1 11/21/2017     11/21/2017       CMP:   Lab Results   Component Value Date     11/21/2017    K 4.0 11/21/2017     11/21/2017    CO2 32 11/21/2017    BUN 39 11/21/2017    CREATININE 1.1 11/21/2017    LABGLOM 66 11/21/2017    GLUCOSE 150 11/21/2017    GLUCOSE 154 08/31/2011    PROT 4.9 11/19/2017    CALCIUM 8.8 11/21/2017    BILITOT 0.4 11/19/2017    ALKPHOS 45 11/19/2017    AST 42 11/19/2017    ALT 32 11/19/2017       POC Tests:   Recent Labs      11/21/17   1233   POCGLU  135*       Coags:   Lab Results   Component Value Date    INR 1.17 11/16/2017    APTT 27.7 11/17/2017       HCG (If Applicable): No results found for: PREGTESTUR, PREGSERUM, HCG, HCGQUANT     ABGs: No results found for: PHART, PO2ART, OKH4PJC, DLE9GWL, BEART, G5GPVXEU     Type & Screen (If Applicable):  Lab Results   Component Value Date    LABRH NEG 11/17/2017       Anesthesia Evaluation   no history of anesthetic complications:   Airway: Mallampati: II  TM distance: >3 FB   Neck ROM: full  Mouth opening: > = 3 FB Dental:    (+) upper dentures      Pulmonary:normal exam    (+) pneumonia: no interval change,  COPD: no interval change,  shortness of breath: no interval change, Cardiovascular:    (+) hypertension:, angina: no interval change, past MI: < 1 month, CAD: obstructive, dysrhythmias:, CHF:, SO:,       ECG reviewed      Echocardiogram reviewed  Stress test reviewed  Cleared by cardiology     Beta Blocker:  No for medical reason         Neuro/Psych:   Negative Neuro/Psych ROS              GI/Hepatic/Renal:   (+) GERD: no interval change,           Endo/Other: Negative Endo/Other ROS                    Abdominal:           Vascular:                                        Anesthesia Plan      MAC     ASA 4       Induction: intravenous. Anesthetic plan and risks discussed with patient.         Attending anesthesiologist reviewed and agrees with Pre Eval content              Grecia Hernandez CRNA   11/21/2017

## 2017-11-21 NOTE — PLAN OF CARE
Problem: Discharge Planning:  Goal: Participates in care planning  Participates in care planning   Outcome: Met This Shift  Patient participates in plan of care. Goal: Discharged to appropriate level of care  Discharged to appropriate level of care   Outcome: Ongoing  Patient remains in CCU at this time. Problem: Airway Clearance - Ineffective:  Goal: Ability to maintain a clear airway will improve  Ability to maintain a clear airway will improve   Outcome: Met This Shift  Patient is currently on 2 L NC. Problem: Anxiety/Stress:  Goal: Level of anxiety will decrease  Level of anxiety will decrease   Outcome: Met This Shift  Patient is not anxious at this time. Problem: Cardiac Output - Decreased:  Goal: Hemodynamic stability will improve  Hemodynamic stability will improve   Outcome: Met This Shift  Patient is not currently on pressors at this time. Problem: Fluid Volume - Imbalance:  Goal: Absence of imbalanced fluid volume signs and symptoms  Absence of imbalanced fluid volume signs and symptoms   Outcome: Met This Shift  Patient is making adequate urine at this time. Problem: Pain:  Goal: Recognizes and communicates pain  Recognizes and communicates pain   Outcome: Met This Shift  Patient is not complaining of pain at this time. Goal: Control of acute pain  Control of acute pain   Outcome: Met This Shift  Patient is not complaining of pain at this time. Problem: Tissue Perfusion - Cardiopulmonary, Altered:  Goal: Absence of angina  Absence of angina   Outcome: Met This Shift  Patient is not complaining of chest pain at this time. Goal: Hemodynamic stability will improve  Hemodynamic stability will improve   Outcome: Met This Shift  Patient is maintaining his BP on his own. Problem: Risk for Impaired Skin Integrity  Goal: Tissue integrity - skin and mucous membranes  Structural intactness and normal physiological function of skin and  mucous membranes.    Outcome: Met This Shift  No new

## 2017-11-21 NOTE — PROGRESS NOTES
[facility]      Patient - Marian Mata   MRN -  [id number]   [facility] # - [de-identified]   - 1945      Date of Admission -  2017 12:37 PM  Date of evaluation -  2017  Room - 3A--A   [facility] Eric Cancino MD Primary Care Physician - Ronak Birmingham MD     Problem List      Active Hospital Problems    Diagnosis Date Noted    Acute pulmonary edema ([facility] [B29.1]     Metabolic acidosis [H90.8]     Cardiac arrest ([facility] [I46.9]     ST elevation myocardial infarction (STEMI) ([facility] [I21.3]     Acute hypercapnic respiratory failure ([address] [J96.02]     Postprocedural hypotension [I95.81]     Unstable angina ([address] [I20.0] 2017    Chest pain [R07.9]     History of placement of stent in LAD coronary artery [Z95.5]     Renal failure syndrome [N19]      Chief Complaint: To follow respiratory failure. HPI   History Obtained From: Patient electronic medical record. Marian Mata is a 67 y.o. male initially admitted under Dr. Gaetano Jean service for chest pain of 1 day duration. He underwent cardiac cath by Dr. Shae Kingsley MD on 17 for further evaluation of chest pain on abnormal EKG. He was intubated and kept on mechanical ventilation. At the time of my initial evaluation patient remain on vent with settings of Vent Mode: SPONTRate Set: 20 bmp/Vt Ordered: 500 mL/ /FiO2 : 40 %. He was sedated. No further history obtained from patient. He was found to have severe anemia along with black tarry stools. Dr. Saintclair Broad from GI service is following. He received PRBC transfusion along with Hb/HCT monitoring. He was diagnosed with moderate COPD. He is currently on treatment with Advair discuss 250/50 1INH bid, Spiriva 18mcg/Cap DPI. 1Cap via inhalation daily, Albuterol HFA 90mcg/Spray MDI, 2puffs  Q6Hprn/Albuterol 2.5mg nebs Q6h prn.  He is currently not using any home O2    17 Patient transfused PRBC  Subjective/Events Past 24 hours/ROS   -Afebrile  -Maintaining SpO2 on 1 LPM  -Alert and oriented today  -OOB to chair  -Reports feeling better  -Following commands  -NPO for EGD  -Persistent wet cough non productive  All other systems reviewed  PMHx   Past Medical History      Diagnosis Date    Acute coronary syndrome (HCC)     Arrhythmia     Arteriosclerotic heart disease (ASHD)     CHF (congestive heart failure) (HCC)     COPD (chronic obstructive pulmonary disease) (HCC)     Dizziness - light-headed     HX OF:    Emphysema     Esophagitis     Fatigue     HX OF:    History of chest pain     History of tinnitus     History of tobacco abuse     Hyperlipidemia     Hypertension     Irregular heart beat     Lightheadedness     MI (myocardial infarction)     Noncompliance with medication regimen     Noncompliance with medical therapy.     Pneumonia     Renal failure syndrome     Ringing in the ears     SOB (shortness of breath) on exertion         Meds    Current Medications    furosemide  20 mg Intravenous Q12H    bisacodyl  10 mg Rectal Daily    nicotine  1 patch Transdermal Q24H    ipratropium-albuterol  1 ampule Inhalation 4x daily    insulin lispro  0-6 Units Subcutaneous TID WC    insulin lispro  0-3 Units Subcutaneous Nightly    methylPREDNISolone  60 mg Intravenous Daily    heparin (porcine)  60 Units/kg Intravenous Once    sodium chloride flush  10 mL Intravenous 2 times per day    pantoprazole  40 mg Intravenous Q8H    magnesium replacement protocol   Other RX Placeholder    calcium replacement protocol   Other RX Placeholder    phosphorus replacement protocol   Other RX Placeholder    potassium replacement protocol   Other RX Placeholder    piperacillin-tazobactam  3.375 g Intravenous Q8H    isosorbide dinitrate  20 mg Oral TID    metoprolol tartrate  25 mg Oral BID    ranolazine  1,000 mg Oral BID    simvastatin  80 mg Oral Nightly     haloperidol lactate, --   2.96*   HGB  9.6*   < >  9.5*   < >  9.7*  9.4*  9.1*   HCT  28.8*   < >  28.6*   < >  29.2*  28.4*  27.7*   MCV  93.3   --   93.0   --    --    --   93.6   MCH  31.0   --   30.7   --    --    --   30.7   MCHC  33.2   --   33.0   --    --    --   32.8*   RDW  17.4*   --   17.8*   --    --    --   17.1*   PLT  152   --   173   --    --    --   182   MPV  8.3   --   7.9   --    --    --   7.8    < > = values in this interval not displayed. BMP  Recent Labs      11/19/17   0450  11/20/17   0504  11/21/17   0529   NA  144  144  143   K  3.9  4.0  4.0   CL  111  106  103   CO2  20*  28  32   BUN  49*  41*  39*   CREATININE  1.2  1.1  1.1   GLUCOSE  166*  118*  150*   MG  2.0   --    --    PHOS  3.3   --    --    CALCIUM  8.1*  8.6  8.8     LFT  Recent Labs      11/19/17   0450  11/20/17   0504   AST  42*   --    ALT  32   --    BILITOT  0.4   --    ALKPHOS  45   --    LIPASE   --   13.7     TROP  Lab Results   Component Value Date    TROPONINT < 0.010 11/21/2017    TROPONINT < 0.010 11/20/2017    TROPONINT < 0.010 11/20/2017     BNP  No results for input(s): BNP in the last 72 hours. Lactic Acid  Recent Labs      11/20/17   0506   LACTA  2.2     INR  No results for input(s): INR, PROTIME in the last 72 hours. PTT  No results for input(s): APTT in the last 72 hours. Glucose  Recent Labs      11/20/17   1724  11/20/17 2022 11/21/17   0736   POCGLU  169*  168*  152*     UA   No results for input(s): SPECGRAV, PHUR, COLORU, CLARITYU, MUCUS, PROTEINU, BLOODU, RBCUA, WBCUA, BACTERIA, NITRU, GLUCOSEU, BILIRUBINUR, UROBILINOGEN, KETUA, LABCAST, LABCASTTY, AMORPHOS in the last 72 hours. Invalid input(s): CRYSTALS.       PFTs   PFTS:                Sleep studies   None  Cultures    Urine culture -Negative  MRSA-Negative  VRE-Negative  EKG     Echocardiogram   11/17/2017  Transthoracic Echocardiography Report (TTE)     Demographics      Patient Name   Sarah Berger  Gender              Male decrease solumedrol to 40mg IV daily.      Jose Miguel Moore MD 11/21/2017 5:53 PM

## 2017-11-21 NOTE — PROGRESS NOTES
1,000 mg Oral BID    simvastatin  80 mg Oral Nightly     Continuous Infusions:   dextrose      fentaNYL (SUBLIMAZE) 500 mcg in sodium chloride 0.9 % 100 mL Stopped (11/18/17 0819)     PRN Meds:.haloperidol lactate, ipratropium-albuterol, glucose, dextrose, glucagon (rDNA), dextrose, sodium chloride flush, acetaminophen, magnesium hydroxide, ondansetron  Continuous Infusions:   dextrose      fentaNYL (SUBLIMAZE) 500 mcg in sodium chloride 0.9 % 100 mL Stopped (11/18/17 0819)       Intake/Output Summary (Last 24 hours) at 11/21/17 1419  Last data filed at 11/21/17 1355   Gross per 24 hour   Intake              692 ml   Output             1200 ml   Net             -508 ml       CBC:   Recent Labs      11/19/17 0450 11/20/17   0504 11/20/17   2333  11/21/17   0529  11/21/17   1120   WBC  23.9*   --   26.6*   --    --   16.9*   --    HGB  9.6*   < >  9.5*   < >  9.4*  9.1*  9.8*   HCT  28.8*   < >  28.6*   < >  28.4*  27.7*  29.7*   PLT  152   --   173   --    --   182   --     < > = values in this interval not displayed. BMP:    Recent Labs      11/19/17 0450 11/20/17   0504  11/21/17   0529   NA  144  144  143   K  3.9  4.0  4.0   CL  111  106  103   CO2  20*  28  32   BUN  49*  41*  39*   CREATININE  1.2  1.1  1.1   GLUCOSE  166*  118*  150*     Hepatic:   Recent Labs      11/19/17 0450   AST  42*   ALT  32   BILITOT  0.4   ALKPHOS  45     Urine: urine culture  ABGs: No results found for: PHART, PO2ART, DDK0SRC  INR: No results for input(s): INR in the last 72 hours. -----------------------------------------------------------------  Data Review   Recent Labs      11/20/17   0504 11/21/17   0529  11/21/17   1120   WBC  26.6*   --   16.9*   --    HGB  9.5*   < >  9.1*  9.8*   HCT  28.6*   < >  27.7*  29.7*   PLT  173   --   182   --     < > = values in this interval not displayed.      Recent Labs      11/19/17   0450  11/20/17   0504  11/21/17   0529   NA  144  144  143   CL  111  106  103   K

## 2017-11-21 NOTE — PROGRESS NOTES
ENDOSCOPY POSTPROCEDURE REPORT     PT NAME: Jeancarlos Galloway RECORD NUMBER: [id number]  YOB: 1945    PROCEDURE PERFORMED BY : Theo Frankel    PROCEDURE TYPE:   EGD _x_____   COLONOSCOPY _______   ERCP ________  MEDICATIONS USED :  VERSED _____   FENTANYL_____   PROPOFOL _x_____  Patient tolerated procedure well. No apparent complications. Estimated blood loss:  Nil  Specimens removed:  Yes_____  No__x____  Disposition of Specimens:  To Lab      BRIEF  FINDINGS:  1.  4 mm ulcer in duo bulb. Clean white base. No bleeding  2. Gastro-duodentitis  3. 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 May start diet  2. Continue high dose PPI  3. Follow  4. Recheck EGD in 8 weeks  5. Consider UGI X-ray to eval for paraesophageal HH    For complete findings and plan, see dictated report.      Theo Frankel MD  11/21/2017  3:48 PM

## 2017-11-21 NOTE — CARE COORDINATION
11/21/17, 2:28 PM      Deepika Lopez day: 5  Location: [facility]04/004-A Reason for admit: Unstable angina ([facility] [address] [I20.0]  Unstable angina ([facility] [address] [I20.0]   Treatment Plan of Care: Planned EGD today. IV's off now. Hgb now 9.8. Continue respiratory therapy treatments. Wearing O2 and becomes short of breath with brief activity. May move to step down post EGD today. Core Measures: VTE  PCP: Jerad Kaiser MD  Discharge Plan: From home alone.

## 2017-11-21 NOTE — FLOWSHEET NOTE
17   Encounter Summary   Services provided to: Patient   Referral/Consult From: [address] Family members; Children   Place of [facility] None   Continue Visiting Yes  (17 Continue support)   Complexity of Encounter Moderate   Length of Encounter 15 minutes   Spiritual Assessment Completed Yes   Routine   Type Follow up   Assessment Approachable   Intervention Nurtured hope;Prayer; Active listening;Discussed illness/injury and it's impact   Outcome Expressed gratitude   Spiritual/Congregation   Type Spiritual support     During this staff encounter with patient he noticed patient sitting in his recliner chair as his nurse tended to him. Patient said that he is having lung and heart problem. He jokingly said that his problem is like [place] good marriage. You just can separate the two\"  He said his wife  several months ago. He said they have four children and one of his sons came to clean his house from cigarette smoke which his son cannot stand. This staff asked patient about bow his roney was helping him through his illness? Patient stated that he has not Mandaen home. He said he take life as it is. He also said he wouldn't mind Prayer was offered in respond. This staff will follow up for continue support patient and his family.

## 2017-11-21 NOTE — PROGRESS NOTES
Patients daughter, Kimberlyn Bey, phoned on behalf of patient request. [place] Oar called. Patients daughter did not answer and writer was unable to leave voicemail due to no voicemail set up on her phone. Will attempt again in 30 minutes.

## 2017-11-22 PROBLEM — K29.90 GASTRODUODENITIS: Status: ACTIVE | Noted: 2017-11-22

## 2017-11-22 LAB
ANISOCYTOSIS: ABNORMAL
BASOPHILS # BLD: 0 %
BASOPHILS ABSOLUTE: 0 THOU/MM3 (ref 0–0.1)
EOSINOPHIL # BLD: 0 %
EOSINOPHILS ABSOLUTE: 0 THOU/MM3 (ref 0–0.4)
GLUCOSE BLD-MCNC: 129 MG/DL (ref 70–108)
GLUCOSE BLD-MCNC: 132 MG/DL (ref 70–108)
GLUCOSE BLD-MCNC: 160 MG/DL (ref 70–108)
GLUCOSE BLD-MCNC: 214 MG/DL (ref 70–108)
HCT VFR BLD CALC: 28.5 % (ref 42–52)
HEMOGLOBIN: 9.4 GM/DL (ref 14–18)
LYMPHOCYTES # BLD: 4.3 %
LYMPHOCYTES ABSOLUTE: 0.6 THOU/MM3 (ref 1–4.8)
MCH RBC QN AUTO: 30.8 PG (ref 27–31)
MCHC RBC AUTO-ENTMCNC: 33 GM/DL (ref 33–37)
MCV RBC AUTO: 93.2 FL (ref 80–94)
MONOCYTES # BLD: 4.8 %
MONOCYTES ABSOLUTE: 0.7 THOU/MM3 (ref 0.4–1.3)
NUCLEATED RED BLOOD CELLS: 0 /100 WBC
PDW BLD-RTO: 16.7 % (ref 11.5–14.5)
PLATELET # BLD: 192 THOU/MM3 (ref 130–400)
PMV BLD AUTO: 8 MCM (ref 7.4–10.4)
RBC # BLD: 3.06 MILL/MM3 (ref 4.7–6.1)
SEG NEUTROPHILS: 90.9 %
SEGMENTED NEUTROPHILS ABSOLUTE COUNT: 13.1 THOU/MM3 (ref 1.8–7.7)
WBC # BLD: 14.4 THOU/MM3 (ref 4.8–10.8)

## 2017-11-22 PROCEDURE — 82948 REAGENT STRIP/BLOOD GLUCOSE: CPT

## 2017-11-22 PROCEDURE — 99232 SBSQ HOSP IP/OBS MODERATE 35: CPT | Performed by: INTERNAL MEDICINE

## 2017-11-22 PROCEDURE — 94669 MECHANICAL CHEST WALL OSCILL: CPT

## 2017-11-22 PROCEDURE — 1200000003 HC TELEMETRY R&B

## 2017-11-22 PROCEDURE — 6370000000 HC RX 637 (ALT 250 FOR IP): Performed by: INTERNAL MEDICINE

## 2017-11-22 PROCEDURE — 2500000003 HC RX 250 WO HCPCS: Performed by: INTERNAL MEDICINE

## 2017-11-22 PROCEDURE — 36415 COLL VENOUS BLD VENIPUNCTURE: CPT

## 2017-11-22 PROCEDURE — 2580000003 HC RX 258: Performed by: INTERNAL MEDICINE

## 2017-11-22 PROCEDURE — 85025 COMPLETE CBC W/AUTO DIFF WBC: CPT

## 2017-11-22 PROCEDURE — 2700000000 HC OXYGEN THERAPY PER DAY

## 2017-11-22 PROCEDURE — 6360000002 HC RX W HCPCS: Performed by: INTERNAL MEDICINE

## 2017-11-22 PROCEDURE — 6360000002 HC RX W HCPCS: Performed by: NURSE PRACTITIONER

## 2017-11-22 PROCEDURE — 94640 AIRWAY INHALATION TREATMENT: CPT

## 2017-11-22 RX ORDER — PREDNISONE 20 MG/1
40 TABLET ORAL DAILY
Status: COMPLETED | OUTPATIENT
Start: 2017-11-23 | End: 2017-11-25

## 2017-11-22 RX ORDER — PREDNISONE 10 MG/1
10 TABLET ORAL DAILY
Status: DISCONTINUED | OUTPATIENT
Start: 2017-12-02 | End: 2017-11-25 | Stop reason: HOSPADM

## 2017-11-22 RX ORDER — PREDNISONE 20 MG/1
20 TABLET ORAL DAILY
Status: DISCONTINUED | OUTPATIENT
Start: 2017-11-29 | End: 2017-11-25 | Stop reason: HOSPADM

## 2017-11-22 RX ORDER — FUROSEMIDE 10 MG/ML
INJECTION INTRAMUSCULAR; INTRAVENOUS
Status: DISPENSED
Start: 2017-11-22 | End: 2017-11-23

## 2017-11-22 RX ADMIN — TAZOBACTAM SODIUM AND PIPERACILLIN SODIUM 3.38 G: 375; 3 INJECTION, SOLUTION INTRAVENOUS at 16:31

## 2017-11-22 RX ADMIN — INSULIN LISPRO 1 UNITS: 100 INJECTION, SOLUTION INTRAVENOUS; SUBCUTANEOUS at 20:33

## 2017-11-22 RX ADMIN — FUROSEMIDE 20 MG: 10 INJECTION, SOLUTION INTRAMUSCULAR; INTRAVENOUS at 22:34

## 2017-11-22 RX ADMIN — Medication 10 ML: at 08:49

## 2017-11-22 RX ADMIN — TAZOBACTAM SODIUM AND PIPERACILLIN SODIUM 3.38 G: 375; 3 INJECTION, SOLUTION INTRAVENOUS at 22:37

## 2017-11-22 RX ADMIN — METOPROLOL TARTRATE 25 MG: 25 TABLET ORAL at 20:31

## 2017-11-22 RX ADMIN — SIMVASTATIN 80 MG: 40 TABLET, FILM COATED ORAL at 20:32

## 2017-11-22 RX ADMIN — METOPROLOL TARTRATE 25 MG: 25 TABLET ORAL at 08:47

## 2017-11-22 RX ADMIN — ISOSORBIDE DINITRATE 20 MG: 20 TABLET ORAL at 20:32

## 2017-11-22 RX ADMIN — PANTOPRAZOLE SODIUM 40 MG: 40 TABLET, DELAYED RELEASE ORAL at 08:47

## 2017-11-22 RX ADMIN — METHYLPREDNISOLONE SODIUM SUCCINATE 40 MG: 125 INJECTION, POWDER, FOR SOLUTION INTRAMUSCULAR; INTRAVENOUS at 08:47

## 2017-11-22 RX ADMIN — ISOSORBIDE DINITRATE 20 MG: 20 TABLET ORAL at 08:47

## 2017-11-22 RX ADMIN — Medication 1 UNITS: at 12:28

## 2017-11-22 RX ADMIN — PANTOPRAZOLE SODIUM 40 MG: 40 TABLET, DELAYED RELEASE ORAL at 16:35

## 2017-11-22 RX ADMIN — Medication 10 ML: at 20:38

## 2017-11-22 RX ADMIN — ISOSORBIDE DINITRATE 20 MG: 20 TABLET ORAL at 16:32

## 2017-11-22 RX ADMIN — RANOLAZINE 1000 MG: 500 TABLET, FILM COATED, EXTENDED RELEASE ORAL at 20:32

## 2017-11-22 RX ADMIN — RANOLAZINE 1000 MG: 500 TABLET, FILM COATED, EXTENDED RELEASE ORAL at 08:47

## 2017-11-22 RX ADMIN — TAZOBACTAM SODIUM AND PIPERACILLIN SODIUM 3.38 G: 375; 3 INJECTION, SOLUTION INTRAVENOUS at 08:44

## 2017-11-22 RX ADMIN — IPRATROPIUM BROMIDE AND ALBUTEROL SULFATE 1 AMPULE: .5; 3 SOLUTION RESPIRATORY (INHALATION) at 13:08

## 2017-11-22 RX ADMIN — FUROSEMIDE 20 MG: 10 INJECTION, SOLUTION INTRAMUSCULAR; INTRAVENOUS at 12:27

## 2017-11-22 RX ADMIN — IPRATROPIUM BROMIDE AND ALBUTEROL SULFATE 1 AMPULE: .5; 3 SOLUTION RESPIRATORY (INHALATION) at 15:55

## 2017-11-22 RX ADMIN — IPRATROPIUM BROMIDE AND ALBUTEROL SULFATE 1 AMPULE: .5; 3 SOLUTION RESPIRATORY (INHALATION) at 07:40

## 2017-11-22 RX ADMIN — IPRATROPIUM BROMIDE AND ALBUTEROL SULFATE 1 AMPULE: .5; 3 SOLUTION RESPIRATORY (INHALATION) at 20:17

## 2017-11-22 ASSESSMENT — PAIN SCALES - GENERAL
PAINLEVEL_OUTOF10: 0

## 2017-11-22 NOTE — PLAN OF CARE
Problem: Discharge Planning:  Goal: Participates in care planning  Participates in care planning   Outcome: Met This Shift  Patient wanting to go back home if possible   Goal: Discharged to appropriate level of care  Discharged to appropriate level of care   Outcome: Ongoing  Patient wanting to return home if possible     Problem: Airway Clearance - Ineffective:  Goal: Ability to maintain a clear airway will improve  Ability to maintain a clear airway will improve   Outcome: Ongoing  IS encouraged. Problem: Anxiety/Stress:  Goal: Level of anxiety will decrease  Level of anxiety will decrease   Outcome: Met This Shift  Paitent doesn't appear anxious     Problem: Cardiac Output - Decreased:  Goal: Hemodynamic stability will improve  Hemodynamic stability will improve   Outcome: Ongoing  HgH drawn every 6 hours     Problem: Pain:  Goal: Recognizes and communicates pain  Recognizes and communicates pain   Outcome: Met This Shift  Denies pain this shift   Goal: Control of acute pain  Control of acute pain   Outcome: Met This Shift  Denies pain this shisft     Problem: Tissue Perfusion - Cardiopulmonary, Altered:  Goal: Absence of angina  Absence of angina   Outcome: Met This Shift  No angina voiced this shift. Problem: Risk for Impaired Skin Integrity  Goal: Tissue integrity - skin and mucous membranes  Structural intactness and normal physiological function of skin and  mucous membranes. Outcome: Ongoing  Patient dry after EGD. Oral Fluids given per doctor order. Problem: Falls - Risk of  Goal: Absence of falls  Outcome: Met This Shift  No falls noted this shift. Patient uses call light appropriatly. Bed rails up x2 for safety and to promote independent repositioning. Bed is in lowest position, bed wheels are locked, bed alarm is active. When patient is up, non-slip socks are on. Fall sign is posted. Arm band is on. Comments: Care plan reviewed with patient.   Patient is able to verbalize understanding of the plan of care and contribute to goal setting.

## 2017-11-22 NOTE — PROCEDURES
stomach was  unusual.    PLAN:  1. Continue PPI. 2.  Begin diet. 3.  Repeat EGD in order to evaluate for healing in eight weeks. 4.  Review previous films regarding identification of paraesophageal hiatal  hernia. If not diagnostically seen on any previous studies, consider upper  GI x-ray for further delineation in this regard. Carline Pompa M.D.    D: 11/21/2017 15:57:15       T: 11/22/2017 0:48:13     [email or web address]  Job#: [id number]     Doc#: [id number]    CC:  Meri Estes M.D.

## 2017-11-22 NOTE — PLAN OF CARE
Problem: RESPIRATORY  Goal: Clear lung sounds  Clear lung sounds     Outcome: Ongoing  Pt has expiratory wheezes throughout. Will continue tx as ordered to improve aeration.

## 2017-11-22 NOTE — PROGRESS NOTES
A home oxygen evaluation has been completed. [x]Patient is an inpatient. It is expected that the patient will be discharged within the next 48 hours. Qualified provider to write order for home prescription if patient qualifies. Social service/care managers will arrange for home oxygen. If patient is active, arrange for Home Medical supplier to assess for Oxygen Conserving Device per pulse oximetry. []Patient is an outpatient. Results will be faxed to the ordering provider. Qualified provider to write order for home prescription if patient qualifies and arranges for home oxygen. Patient was placed on room air for 10 minutes. SpO2 was 88 % on room air at rest. Patients SpO2 was below 89% and qualified for home oxygen. Oxygen was applied at 1 lpm via nasal cannula to maintain a SpO2 between 90-92% while at rest. Actual SpO2 was 92 %. Patient can ambulate for exercise flow rate. Patients was ambulated, SpO2 was 92% on 2 lpm to maintain SpO2 between 90-92% while exercising.

## 2017-11-22 NOTE — PLAN OF CARE
Problem: RESPIRATORY  Goal: Clear lung sounds  Clear lung sounds     Outcome: Ongoing  Treatment will be continued as ordered to improve aeration.

## 2017-11-22 NOTE — PLAN OF CARE
Problem: Discharge Planning:  Goal: Participates in care planning  Participates in care planning   Outcome: Ongoing  Pt continues to be a part of planning his care. Goal: Discharged to appropriate level of care  Discharged to appropriate level of care   Outcome: Ongoing  Pt not ready for discharge this shift, but orders placed for transfer to stepdown. Problem: Airway Clearance - Ineffective:  Goal: Ability to maintain a clear airway will improve  Ability to maintain a clear airway will improve   Outcome: Ongoing  Pt continues to have clear airway and appropriate oxygenation. Will continue to monitor pt. Problem: Anxiety/Stress:  Goal: Level of anxiety will decrease  Level of anxiety will decrease   Outcome: Ongoing  Pt does have intermittent times of high anxiety, pt is able to be redirected and calmed using non-pharmacological measures. Problem: Cardiac Output - Decreased:  Goal: Hemodynamic stability will improve  Hemodynamic stability will improve   Outcome: Ongoing  Pt continues to be hemodynamically stable this shift. Problem: Fluid Volume - Imbalance:  Goal: Absence of imbalanced fluid volume signs and symptoms  Absence of imbalanced fluid volume signs and symptoms   Outcome: Ongoing  Pt being treated and monitored for fluid imbalances, not acute changes. Problem: Pain:  Goal: Recognizes and communicates pain  Recognizes and communicates pain   Outcome: Met This Shift  Pt able to communicate when he is in pain. Goal: Control of acute pain  Control of acute pain   Outcome: Ongoing  Pt states pain level is at tolerable level. Will continue to monitor pt. Problem: Tissue Perfusion - Cardiopulmonary, Altered:  Goal: Absence of angina  Absence of angina   Outcome: Ongoing  Pt remains free from angina  Goal: Hemodynamic stability will improve  Hemodynamic stability will improve   Outcome: Ongoing  Pt remains hemodynamically stable this shift.      Problem: Risk for Impaired Skin

## 2017-11-22 NOTE — PROGRESS NOTES
Gastroenterology Progress Note:     Patient Name:  Reed Montana   MRN: [id number]  [id number]  YOB: 1945  Admit Date: 11/16/2017 12:37 PM  Primary Care Physician: Alejandrina Keen MD   3A-04/004-A   This is a 67year old male seen as a new consult for anemia on Effient for history of cardiac stent, GI bleed S/P Code Blue 11/17 He was admitted 11/16  with chest pain and shortness of breath His hemoglobin was 9.3 on admission which was down from 13 at previous ER visit on 11-13-17. Hemoglobin dropped from 9.3 to 6.2 over 24 h     He also had urgent cathwith Dr Yadira Ha findings RCA 70% stenosis; patent LAD stent, anemia s/p 2U PRBCs/RHC pressures demonstrated preserved CO. EF 55-60%.      He was previously seen per Dr Freddie Carr in 2010 and had EGD and Colonoscopy. EGD on 7-21-10 with findings of grade A esophagitis. Patient has a C shaped stomach as it wraps around the internal organs.  Otherwise normal appearing stomach. No evidence of bleeding. Recommendations for PPI bid. Gastric emptying study. It was felt he may have mesenteric ischemia causing abdominal pain and continued to smoke. He never had gastric emptying study.      Colonoscopy 5-26-10 with findings of normal appearing terminal ileum. Small amount erythema in rectum, biopsy negative for colitis. Sigmoid colon diverticulosis . Tortuous colon. Rectal polyp, hyperplastic. Moderate non bleeding internal hemorrhoids. Otherwise normal appearing colonoscopy. Recommendations for daily Miralax.         Patient seen and examined. 24 hours events and chart reviewed. SP egd  mall duodenal bulb ulcer and  paraesophageal hiatal  Hernia. Patient awake, tolerating diet without nausea, vomiting or abdominal pain. No stools overnight. RN states plans for discharge later today    .     Review of Systems  Neuro- negative for headache, dizziness, altered level of consciousness  Cardiovascular- negative for CP, SOB, peripheral edema, orthopnea  GI- see subjective  - negative for dysuria, frequency, hematuria      (+   )Medications Reviewed ;(   )Old records reviewed    I/O last 3 completed shifts: In: 590 [P.O.:540; I.V.:50]  Out: 1275 [Urine:1275]  I/O this shift:  In: 250 [P.O.:250]  Out: 550 [Urine:550]    Diet:  DIET CLEAR LIQUID;      /67   Pulse 63   Temp 97.9 °F (36.6 °C) (Oral)   Resp 11   Ht 5' 2\" (1.575 m)   Wt 130 lb 4.7 oz (59.1 kg)   SpO2 93%   BMI 23.83 kg/m²     Physical Exam:    General:  Nourished in no distress  CV: Heart RRR with s1 s2 heard. Resp: Even, easy without cough or accessory use. rhonchi clear with moist cough bilaterally. Abd: Round, soft, mild epigastric tenderness. No hepatosplenomegaly or mass present. Active bowel sounds heard. No distention noted. Ext:  Without cyanosis, clubbing, edema. Skin: Pink, warm, dry  Neuro:  Alert, oriented x3 with no obvious deficits.        Rectal: deferred  Lines/tubes:       Labs: WBC:    Lab Results   Component Value Date    WBC 14.4 11/22/2017     Platelets:    Lab Results   Component Value Date     11/22/2017     Hemoglobin/Hematocrit:    Lab Results   Component Value Date    HGB 9.4 11/22/2017    HCT 28.5 11/22/2017     BMP:    Lab Results   Component Value Date     11/21/2017    K 4.0 11/21/2017     11/21/2017    CO2 32 11/21/2017    BUN 39 11/21/2017    LABALBU 2.4 11/19/2017    LABALBU 4.0 08/31/2011    CREATININE 1.1 11/21/2017    CALCIUM 8.8 11/21/2017    LABGLOM 66 11/21/2017    GLUCOSE 150 11/21/2017    GLUCOSE 154 08/31/2011     Hepatic Function Panel:    Lab Results   Component Value Date    ALKPHOS 45 11/19/2017    ALT 32 11/19/2017    AST 42 11/19/2017    PROT 4.9 11/19/2017    BILITOT 0.4 11/19/2017    BILIDIR <0.2 11/13/2017    LABALBU 2.4 11/19/2017    LABALBU 4.0 08/31/2011     Calcium:    Lab Results   Component Value Date    CALCIUM 8.8 11/21/2017     PT/INR:    Lab Results   Component Value Date    INR 1.17 11/16/2017     PTT:    Lab Results   Component Value Date    APTT 27.7 11/17/2017   [APTT     Significant Diagnostic Studies:     Current Meds:  Scheduled Meds:   pantoprazole  40 mg Oral BID AC    methylPREDNISolone  40 mg Intravenous Daily    furosemide  20 mg Intravenous Q12H    bisacodyl  10 mg Rectal Daily    nicotine  1 patch Transdermal Q24H    ipratropium-albuterol  1 ampule Inhalation 4x daily    insulin lispro  0-6 Units Subcutaneous TID WC    insulin lispro  0-3 Units Subcutaneous Nightly    heparin (porcine)  60 Units/kg Intravenous Once    sodium chloride flush  10 mL Intravenous 2 times per day    magnesium replacement protocol   Other RX Placeholder    calcium replacement protocol   Other RX Placeholder    phosphorus replacement protocol   Other RX Placeholder    potassium replacement protocol   Other RX Placeholder    piperacillin-tazobactam  3.375 g Intravenous Q8H    isosorbide dinitrate  20 mg Oral TID    metoprolol tartrate  25 mg Oral BID    ranolazine  1,000 mg Oral BID    simvastatin  80 mg Oral Nightly     Continuous Infusions:   dextrose      fentaNYL (SUBLIMAZE) 500 mcg in sodium chloride 0.9 % 100 mL Stopped (11/18/17 0819)     PRN Meds:.haloperidol lactate, ipratropium-albuterol, glucose, dextrose, glucagon (rDNA), dextrose, sodium chloride flush, acetaminophen, magnesium hydroxide, ondansetron    EGD 11/22/2017   BRIEF  FINDINGS:  1.  4 mm ulcer in duo bulb. Clean white base. No bleeding  2. Gastro-duodentitis  3. 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 May start diet  2. Continue high dose PPI  3. Follow  4. Recheck EGD in 8 weeks  5. Consider UGI X-ray to eval for paraesophageal HH      Assessment:  GI bleed, likely UGI bleed--   SP EGD 11/22/2017 by Dr. Kylie Garza a small duodenal bulb  ulcer. Suspect that this resulted in his previous  bleeding. His INR at that   time was elevated. No bleeding and the ulcer is clean based.  Incidentally,  it does appear as if the patient has a paraesophageal hiatal  hernia. 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 S/P Code Blue 11/17 inferior STEMI/Cardiac Arrest, cardiology felt likely related to blood loss  S\p cath with RCA 70% stenosis; patent LAD stent  COPD exacerbation--IV steroids,zosyn, Pulmonary following  Leukocytosis--possibly d/t COPD exacerbation,?  PNA on Zosyn  History esophagitis per EGD with Dr Adama Alfred in 2010   History of diverticulosis per colonoscopy 2010   Internal hemorrhoids   History cardiac stent              Effient use    Acute kidney injury, nephrology following   Hypertension         Plan:    May start diet  Continue high dose PPI , change to oral  Follow up in office in 1 week with Dr. Perez Guadarrama repeat  EGD in 8 weeks  UGI X-ray to eval for paraesophageal HH, can be done after discharge  GI to sign off   Case reviewed and impression/plan reviewed in collaboration with Dr Pardeep Rushing, CNP for Dr. Fang Alt   11/22/2017   6:48 AM

## 2017-11-22 NOTE — CARE COORDINATION
DISCHARGE BARRIERS  11/22/17, 10:40 AM    Reason for Referral: Potential home going needs and may need Home O2  Mental Status: pt is alert and oriented   Decision Making: pt is capable of making own decisions   Family/Social/Home Environment: pt resides alone in a one story home. Pt has a significant other that lives across the street and a daughter that lives locally that assists as needed. Pt reports being independent with personal care, cooking, cleaning and driving. Current Services: none  Current Equipment:none- had home O2 through [facility] in past  Payment Source:Medicare  Concerns or Barriers to Discharge: none  Collabrative List of ECF/HH were provided: offered- requested [facility]    Teach Back Method used with pt regarding care plan and benefits of Home Health  Patient verbalize understanding of the plan of care and contribute to goal setting. Anticipated Needs/Discharge Plan: SW met with pt to discuss discharge needs. Reviewed benefits of Home Health. Pt agreeable to have nursing only and requested [facility]. Home O2 evaluation ordered. Pt requested [facility] if he qualifies. SW completed referral to Osman Barnett with [facility]. [facility].       Electronically signed by NAYANA Conteh on 11/22/2017 at 10:40 AM

## 2017-11-22 NOTE — PROGRESS NOTES
(rDNA), dextrose, sodium chloride flush, acetaminophen, magnesium hydroxide, ondansetron  IV Drips/Infusions   dextrose      fentaNYL (SUBLIMAZE) 500 mcg in sodium chloride 0.9 % 100 mL Stopped (11/18/17 0819)     Home Medications  Prescriptions Prior to Admission: ranolazine (RANEXA) 1000 MG extended release tablet, take 1 tablet by mouth twice a day  valsartan-hydrochlorothiazide (DIOVAN-HCT) 80-12.5 MG per tablet, take 1 tablet by mouth daily  simvastatin (ZOCOR) 80 MG tablet, take 1 tablet by mouth at bedtime  prasugrel (EFFIENT) 10 MG TABS, take 1 tablet by mouth once daily  isosorbide mononitrate (IMDUR) 60 MG extended release tablet, take 1 tablet once daily  metoprolol succinate (TOPROL XL) 25 MG extended release tablet, Take 1 tablet by mouth 2 times daily  amLODIPine (NORVASC) 2.5 MG tablet, Take 1 tablet by mouth daily  albuterol sulfate HFA (VENTOLIN HFA) 108 (90 BASE) MCG/ACT inhaler, Inhale 2 puffs into the lungs 4 times daily  nitroGLYCERIN (NITROSTAT) 0.4 MG SL tablet, Place 1 tablet under the tongue every 5 minutes as needed  aspirin 81 MG chewable tablet, Take 1 tablet by mouth daily. fluticasone-salmeterol (ADVAIR) 250-50 MCG/DOSE AEPB, Inhale 1 puff into the lungs every 12 hours  tiotropium (SPIRIVA HANDIHALER) 18 MCG inhalation capsule, Inhale 1 capsule into the lungs daily  albuterol (PROVENTIL) (2.5 MG/3ML) 0.083% nebulizer solution, Take 3 mLs by nebulization every 6 hours as needed for Wheezing or Shortness of Breath  Diet    DIET CLEAR LIQUID; Allergies    Pneumococcal vaccines; Codeine; Dilaudid [hydromorphone]; and Morphine    Vitals     height is 5' 2\" (1.575 m) and weight is 130 lb 4.7 oz (59.1 kg). His oral temperature is 97.9 °F (36.6 °C). His blood pressure is 112/66 and his pulse is 62. His respiration is 15 and oxygen saturation is 95%. Body mass index is 23.83 kg/m².     SUPPLEMENTAL O2: O2 Flow Rate (L/min): 1 L/min     NUTRITION SUPPORT: clear liquid diet    I/O white clean-based ulcer near the apex of the duodenal bulb and \"gastroduodenitis\"  H/H is stable after PRBC transfusion  Peptic ulcer disease prophylaxis with PPI. Renal   Monitor input and output.(+3.08 L)  Supplement all electrolytes per ICU electrolytes replacement protocols. Dr. Flavia Hanson following [facility]    Infectious Disease   Leukocytosis, improving  Will monitor  Zosyn 3.375~11/17    Hematology/Oncology   Anemia most likely due to GI bleeding- stable Hb/HCT  Acute blood loss anemia. S/p PRBC transfusion  Deep Venous Thrombosis Prophylaxis: SCDs. Social/Spiritual/DNR/Disposition/Miscellaneous   Full code. Case discussed with patient/family/Dr. Devin Kapoor. Questions and concerns addressed. Electronically signed by   Subhash Casey CNP on 11/22/2017 at 8:03 AM     Addendum by Dr. Devin Kapoor MD:  I have seen and examined the patient independently. Face to face evaluation and examination was performed. The above evaluation and note has been reviewed. Labs and radiographs were reviewed. I Have discussed with Ms. Diandra Hammond CNP about this patient in detail. D/w Patient's R.N. and specific instructions given. Patient issues addressed. The above assessment and plan has been reviewed. Please see my modifications mentioned below. My modifications:  He is awake and alert. Improving shortness of breath. Need home O2 eval at the time of discharge. Will stop solumedrol. Start Prednisone 40mg with taper.    -Follow with Ms. Diandra Hammond CNP/( Dr. Jose Alfredo Stacy) clinic at [place] for pulmonary medicine in 2 to 3months with chest Xray PA and lateral views 2days before clinic visit to follow pneumonia.  -Will sign off on the case from pulmonary point of view. -Will follow PRN. -Call [phone] with questions.       Maile Morgan MD 11/22/2017 4:18 PM

## 2017-11-22 NOTE — PLAN OF CARE
Problem: RESPIRATORY  Goal: Clear lung sounds  Clear lung sounds     Outcome: Ongoing  Pt has diminished breath sounds t/o. Pt is on 1 lpm via nc at rest.   Cough is congested and nonproductive at this time  Will continue with therapy as ordered to improve aeration.

## 2017-11-23 ENCOUNTER — APPOINTMENT (OUTPATIENT)
Dept: GENERAL RADIOLOGY | Age: 72
DRG: 270 | End: 2017-11-23
Payer: MEDICARE

## 2017-11-23 DIAGNOSIS — F17.200 SMOKER: ICD-10-CM

## 2017-11-23 LAB
ANISOCYTOSIS: ABNORMAL
BASOPHILS # BLD: 0.1 %
BASOPHILS ABSOLUTE: 0 THOU/MM3 (ref 0–0.1)
EOSINOPHIL # BLD: 0 %
EOSINOPHILS ABSOLUTE: 0 THOU/MM3 (ref 0–0.4)
GLUCOSE BLD-MCNC: 172 MG/DL (ref 70–108)
GLUCOSE BLD-MCNC: 193 MG/DL (ref 70–108)
GLUCOSE BLD-MCNC: 199 MG/DL (ref 70–108)
GLUCOSE BLD-MCNC: 238 MG/DL (ref 70–108)
HCT VFR BLD CALC: 31.9 % (ref 42–52)
HCT VFR BLD CALC: 32.1 % (ref 42–52)
HEMOGLOBIN: 10.3 GM/DL (ref 14–18)
HEMOGLOBIN: 10.6 GM/DL (ref 14–18)
LYMPHOCYTES # BLD: 2.5 %
LYMPHOCYTES ABSOLUTE: 0.4 THOU/MM3 (ref 1–4.8)
MCH RBC QN AUTO: 31.1 PG (ref 27–31)
MCHC RBC AUTO-ENTMCNC: 32.8 GM/DL (ref 33–37)
MCV RBC AUTO: 94.5 FL (ref 80–94)
MONOCYTES # BLD: 2.5 %
MONOCYTES ABSOLUTE: 0.4 THOU/MM3 (ref 0.4–1.3)
NUCLEATED RED BLOOD CELLS: 0 /100 WBC
PDW BLD-RTO: 16.5 % (ref 11.5–14.5)
PLATELET # BLD: 219 THOU/MM3 (ref 130–400)
PMV BLD AUTO: 8.2 MCM (ref 7.4–10.4)
RBC # BLD: 3.4 MILL/MM3 (ref 4.7–6.1)
SEG NEUTROPHILS: 94.9 %
SEGMENTED NEUTROPHILS ABSOLUTE COUNT: 13.3 THOU/MM3 (ref 1.8–7.7)
WBC # BLD: 14 THOU/MM3 (ref 4.8–10.8)

## 2017-11-23 PROCEDURE — 85025 COMPLETE CBC W/AUTO DIFF WBC: CPT

## 2017-11-23 PROCEDURE — 94669 MECHANICAL CHEST WALL OSCILL: CPT

## 2017-11-23 PROCEDURE — 6370000000 HC RX 637 (ALT 250 FOR IP): Performed by: INTERNAL MEDICINE

## 2017-11-23 PROCEDURE — 2580000003 HC RX 258: Performed by: INTERNAL MEDICINE

## 2017-11-23 PROCEDURE — 71010 XR CHEST PORTABLE: CPT

## 2017-11-23 PROCEDURE — 36415 COLL VENOUS BLD VENIPUNCTURE: CPT

## 2017-11-23 PROCEDURE — 94640 AIRWAY INHALATION TREATMENT: CPT

## 2017-11-23 PROCEDURE — 6360000002 HC RX W HCPCS: Performed by: NURSE PRACTITIONER

## 2017-11-23 PROCEDURE — 85018 HEMOGLOBIN: CPT

## 2017-11-23 PROCEDURE — 1200000003 HC TELEMETRY R&B

## 2017-11-23 PROCEDURE — 2700000000 HC OXYGEN THERAPY PER DAY

## 2017-11-23 PROCEDURE — 2500000003 HC RX 250 WO HCPCS: Performed by: INTERNAL MEDICINE

## 2017-11-23 PROCEDURE — 85014 HEMATOCRIT: CPT

## 2017-11-23 PROCEDURE — 82948 REAGENT STRIP/BLOOD GLUCOSE: CPT

## 2017-11-23 RX ORDER — PREDNISONE 10 MG/1
TABLET ORAL
Qty: 38 TABLET | Refills: 0 | Status: CANCELLED | OUTPATIENT
Start: 2017-11-23

## 2017-11-23 RX ORDER — PANTOPRAZOLE SODIUM 40 MG/1
40 TABLET, DELAYED RELEASE ORAL
Qty: 30 TABLET | Refills: 3 | Status: CANCELLED | OUTPATIENT
Start: 2017-11-23

## 2017-11-23 RX ORDER — LEVOFLOXACIN 500 MG/1
500 TABLET, FILM COATED ORAL DAILY
Qty: 10 TABLET | Refills: 0 | Status: CANCELLED | OUTPATIENT
Start: 2017-11-23 | End: 2017-12-03

## 2017-11-23 RX ORDER — ISOSORBIDE DINITRATE 20 MG/1
20 TABLET ORAL 3 TIMES DAILY
Qty: 90 TABLET | Refills: 3 | Status: CANCELLED | OUTPATIENT
Start: 2017-11-23

## 2017-11-23 RX ORDER — FUROSEMIDE 40 MG/1
40 TABLET ORAL 2 TIMES DAILY
Qty: 60 TABLET | Refills: 3 | Status: CANCELLED | OUTPATIENT
Start: 2017-11-23

## 2017-11-23 RX ADMIN — IPRATROPIUM BROMIDE AND ALBUTEROL SULFATE 1 AMPULE: .5; 3 SOLUTION RESPIRATORY (INHALATION) at 20:35

## 2017-11-23 RX ADMIN — METOPROLOL TARTRATE 25 MG: 25 TABLET ORAL at 20:14

## 2017-11-23 RX ADMIN — FUROSEMIDE 20 MG: 10 INJECTION, SOLUTION INTRAMUSCULAR; INTRAVENOUS at 23:22

## 2017-11-23 RX ADMIN — TAZOBACTAM SODIUM AND PIPERACILLIN SODIUM 3.38 G: 375; 3 INJECTION, SOLUTION INTRAVENOUS at 08:19

## 2017-11-23 RX ADMIN — ISOSORBIDE DINITRATE 20 MG: 20 TABLET ORAL at 16:54

## 2017-11-23 RX ADMIN — ISOSORBIDE DINITRATE 20 MG: 20 TABLET ORAL at 08:14

## 2017-11-23 RX ADMIN — PANTOPRAZOLE SODIUM 40 MG: 40 TABLET, DELAYED RELEASE ORAL at 16:54

## 2017-11-23 RX ADMIN — ISOSORBIDE DINITRATE 20 MG: 20 TABLET ORAL at 20:13

## 2017-11-23 RX ADMIN — PREDNISONE 40 MG: 20 TABLET ORAL at 08:13

## 2017-11-23 RX ADMIN — Medication 1 UNITS: at 18:41

## 2017-11-23 RX ADMIN — TAZOBACTAM SODIUM AND PIPERACILLIN SODIUM 3.38 G: 375; 3 INJECTION, SOLUTION INTRAVENOUS at 23:22

## 2017-11-23 RX ADMIN — Medication 1 UNITS: at 12:18

## 2017-11-23 RX ADMIN — FUROSEMIDE 20 MG: 10 INJECTION, SOLUTION INTRAMUSCULAR; INTRAVENOUS at 12:12

## 2017-11-23 RX ADMIN — IPRATROPIUM BROMIDE AND ALBUTEROL SULFATE 1 AMPULE: .5; 3 SOLUTION RESPIRATORY (INHALATION) at 12:59

## 2017-11-23 RX ADMIN — INSULIN LISPRO 1 UNITS: 100 INJECTION, SOLUTION INTRAVENOUS; SUBCUTANEOUS at 20:14

## 2017-11-23 RX ADMIN — RANOLAZINE 1000 MG: 500 TABLET, FILM COATED, EXTENDED RELEASE ORAL at 20:13

## 2017-11-23 RX ADMIN — TAZOBACTAM SODIUM AND PIPERACILLIN SODIUM 3.38 G: 375; 3 INJECTION, SOLUTION INTRAVENOUS at 16:55

## 2017-11-23 RX ADMIN — IPRATROPIUM BROMIDE AND ALBUTEROL SULFATE 1 AMPULE: .5; 3 SOLUTION RESPIRATORY (INHALATION) at 16:37

## 2017-11-23 RX ADMIN — IPRATROPIUM BROMIDE AND ALBUTEROL SULFATE 1 AMPULE: .5; 3 SOLUTION RESPIRATORY (INHALATION) at 08:50

## 2017-11-23 RX ADMIN — Medication 10 ML: at 08:14

## 2017-11-23 RX ADMIN — METOPROLOL TARTRATE 25 MG: 25 TABLET ORAL at 08:13

## 2017-11-23 RX ADMIN — SIMVASTATIN 80 MG: 40 TABLET, FILM COATED ORAL at 20:18

## 2017-11-23 RX ADMIN — PANTOPRAZOLE SODIUM 40 MG: 40 TABLET, DELAYED RELEASE ORAL at 08:13

## 2017-11-23 RX ADMIN — RANOLAZINE 1000 MG: 500 TABLET, FILM COATED, EXTENDED RELEASE ORAL at 08:14

## 2017-11-23 ASSESSMENT — PAIN SCALES - GENERAL
PAINLEVEL_OUTOF10: 0

## 2017-11-23 NOTE — PROGRESS NOTES
Followed by   Kyle Guo ON 12/2/2017] predniSONE  10 mg Oral Daily    furosemide        pantoprazole  40 mg Oral BID AC    furosemide  20 mg Intravenous Q12H    bisacodyl  10 mg Rectal Daily    nicotine  1 patch Transdermal Q24H    ipratropium-albuterol  1 ampule Inhalation 4x daily    insulin lispro  0-6 Units Subcutaneous TID WC    insulin lispro  0-3 Units Subcutaneous Nightly    heparin (porcine)  60 Units/kg Intravenous Once    sodium chloride flush  10 mL Intravenous 2 times per day    magnesium replacement protocol   Other RX Placeholder    calcium replacement protocol   Other RX Placeholder    phosphorus replacement protocol   Other RX Placeholder    potassium replacement protocol   Other RX Placeholder    piperacillin-tazobactam  3.375 g Intravenous Q8H    isosorbide dinitrate  20 mg Oral TID    metoprolol tartrate  25 mg Oral BID    ranolazine  1,000 mg Oral BID    simvastatin  80 mg Oral Nightly     Continuous Infusions:   dextrose      fentaNYL (SUBLIMAZE) 500 mcg in sodium chloride 0.9 % 100 mL Stopped (11/18/17 0819)     PRN Meds:.haloperidol lactate, ipratropium-albuterol, glucose, dextrose, glucagon (rDNA), dextrose, sodium chloride flush, acetaminophen, magnesium hydroxide, ondansetron  Continuous Infusions:   dextrose      fentaNYL (SUBLIMAZE) 500 mcg in sodium chloride 0.9 % 100 mL Stopped (11/18/17 0819)       Intake/Output Summary (Last 24 hours) at 11/23/17 0819  Last data filed at 11/23/17 0425   Gross per 24 hour   Intake           253.54 ml   Output             1050 ml   Net          -796.46 ml       CBC:   Recent Labs      11/21/17   0529  11/21/17   1120  11/22/17   0420   WBC  16.9*   --   14.4*   HGB  9.1*  9.8*  9.4*   HCT  27.7*  29.7*  28.5*   PLT  182   --   192     BMP:    Recent Labs      11/21/17   0529   NA  143   K  4.0   CL  103   CO2  32   BUN  39*   CREATININE  1.1   GLUCOSE  150*     Hepatic:   No results for input(s): AST, ALT, ALB, BILITOT, ALKPHOS in

## 2017-11-23 NOTE — PLAN OF CARE
Problem: RESPIRATORY  Goal: Clear lung sounds  Clear lung sounds     Outcome: Ongoing  Patient continues on breathing treatments and oxygen tolerating well.

## 2017-11-23 NOTE — FLOWSHEET NOTE
11/22/17 2245   Mobility   Patient Departure From Unit [id number]  (Transferred to [facility] [id number])   Transport Method Wheelchair   Patient transferred to [facility] 24 with belongings in stable condition.

## 2017-11-23 NOTE — FLOWSHEET NOTE
11/23/17 1851   Provider Notification   Reason for Communication Review case   Provider Name Dr Jane Connell   Provider Notification Physician   Method of Communication Secure Message   Response Waiting for response   Notification Time 1850   Update - patient BM was black jeromy     1855 response - order CBC for AM labs, Patient NPO at midnight, GI will see the patient

## 2017-11-23 NOTE — FLOWSHEET NOTE
11/23/17 1624   Provider Notification   Reason for Communication Review case   Provider Name Dr Pablo Gotti   Provider Notification Physician   Method of Communication Secure Message   Response Waiting for response   Notification Time [phone] consult for persistent leukocytosis    Consult cancelled

## 2017-11-23 NOTE — FLOWSHEET NOTE
11/23/17 1632   Provider Notification   Reason for Communication Review case   Provider Name Dr Tim Barry   Provider Notification Physician   Method of Communication Secure Message   Response Waiting for response   Notification Time R Dante Boland 38 has signed off, hospitalist inquiring when to restart antiplatelet therapy. Non-bleeding ulcer found on EGD on 11/21/2017.     Response 1633 - restart effient 11/24/2017

## 2017-11-23 NOTE — PLAN OF CARE
Problem: RESPIRATORY  Goal: Clear lung sounds  Clear lung sounds     Outcome: Ongoing  Pt has clear and diminished breath sounds, continue treatment as ordered

## 2017-11-24 LAB
ANION GAP SERPL CALCULATED.3IONS-SCNC: 7 MEQ/L (ref 8–16)
ANISOCYTOSIS: ABNORMAL
BASOPHILS # BLD: 0.1 %
BASOPHILS ABSOLUTE: 0 THOU/MM3 (ref 0–0.1)
BUN BLDV-MCNC: 40 MG/DL (ref 7–22)
CALCIUM SERPL-MCNC: 8.6 MG/DL (ref 8.5–10.5)
CHLORIDE BLD-SCNC: 97 MEQ/L (ref 98–111)
CO2: 37 MEQ/L (ref 23–33)
CREAT SERPL-MCNC: 1.4 MG/DL (ref 0.4–1.2)
EOSINOPHIL # BLD: 0 %
EOSINOPHILS ABSOLUTE: 0 THOU/MM3 (ref 0–0.4)
GFR SERPL CREATININE-BSD FRML MDRD: 50 ML/MIN/1.73M2
GLUCOSE BLD-MCNC: 106 MG/DL (ref 70–108)
GLUCOSE BLD-MCNC: 137 MG/DL (ref 70–108)
GLUCOSE BLD-MCNC: 196 MG/DL (ref 70–108)
GLUCOSE BLD-MCNC: 211 MG/DL (ref 70–108)
GLUCOSE BLD-MCNC: 97 MG/DL (ref 70–108)
HCT VFR BLD CALC: 29.5 % (ref 42–52)
HCT VFR BLD CALC: 31.5 % (ref 42–52)
HCT VFR BLD CALC: 32.4 % (ref 42–52)
HEMOGLOBIN: 10.3 GM/DL (ref 14–18)
HEMOGLOBIN: 10.6 GM/DL (ref 14–18)
HEMOGLOBIN: 9.9 GM/DL (ref 14–18)
LYMPHOCYTES # BLD: 6.3 %
LYMPHOCYTES ABSOLUTE: 0.9 THOU/MM3 (ref 1–4.8)
MCH RBC QN AUTO: 31.5 PG (ref 27–31)
MCHC RBC AUTO-ENTMCNC: 33.5 GM/DL (ref 33–37)
MCV RBC AUTO: 94 FL (ref 80–94)
MONOCYTES # BLD: 6.1 %
MONOCYTES ABSOLUTE: 0.9 THOU/MM3 (ref 0.4–1.3)
NUCLEATED RED BLOOD CELLS: 0 /100 WBC
PDW BLD-RTO: 16.6 % (ref 11.5–14.5)
PLATELET # BLD: 194 THOU/MM3 (ref 130–400)
PMV BLD AUTO: 8.3 MCM (ref 7.4–10.4)
POTASSIUM SERPL-SCNC: 3.5 MEQ/L (ref 3.5–5.2)
RBC # BLD: 3.14 MILL/MM3 (ref 4.7–6.1)
SEG NEUTROPHILS: 87.5 %
SEGMENTED NEUTROPHILS ABSOLUTE COUNT: 12.3 THOU/MM3 (ref 1.8–7.7)
SODIUM BLD-SCNC: 141 MEQ/L (ref 135–145)
WBC # BLD: 14 THOU/MM3 (ref 4.8–10.8)

## 2017-11-24 PROCEDURE — 85025 COMPLETE CBC W/AUTO DIFF WBC: CPT

## 2017-11-24 PROCEDURE — 80048 BASIC METABOLIC PNL TOTAL CA: CPT

## 2017-11-24 PROCEDURE — 6370000000 HC RX 637 (ALT 250 FOR IP): Performed by: INTERNAL MEDICINE

## 2017-11-24 PROCEDURE — 2700000000 HC OXYGEN THERAPY PER DAY

## 2017-11-24 PROCEDURE — 85018 HEMOGLOBIN: CPT

## 2017-11-24 PROCEDURE — 1200000003 HC TELEMETRY R&B

## 2017-11-24 PROCEDURE — 82948 REAGENT STRIP/BLOOD GLUCOSE: CPT

## 2017-11-24 PROCEDURE — 85014 HEMATOCRIT: CPT

## 2017-11-24 PROCEDURE — 94640 AIRWAY INHALATION TREATMENT: CPT

## 2017-11-24 PROCEDURE — 2580000003 HC RX 258: Performed by: INTERNAL MEDICINE

## 2017-11-24 PROCEDURE — 2500000003 HC RX 250 WO HCPCS: Performed by: INTERNAL MEDICINE

## 2017-11-24 PROCEDURE — C9113 INJ PANTOPRAZOLE SODIUM, VIA: HCPCS | Performed by: INTERNAL MEDICINE

## 2017-11-24 PROCEDURE — 6360000002 HC RX W HCPCS: Performed by: INTERNAL MEDICINE

## 2017-11-24 PROCEDURE — 36415 COLL VENOUS BLD VENIPUNCTURE: CPT

## 2017-11-24 RX ORDER — POTASSIUM CHLORIDE 20 MEQ/1
40 TABLET, EXTENDED RELEASE ORAL ONCE
Status: COMPLETED | OUTPATIENT
Start: 2017-11-24 | End: 2017-11-24

## 2017-11-24 RX ORDER — PANTOPRAZOLE SODIUM 40 MG/10ML
40 INJECTION, POWDER, LYOPHILIZED, FOR SOLUTION INTRAVENOUS EVERY 8 HOURS
Status: DISCONTINUED | OUTPATIENT
Start: 2017-11-24 | End: 2017-11-25 | Stop reason: HOSPADM

## 2017-11-24 RX ORDER — SODIUM PHOSPHATE, DIBASIC AND SODIUM PHOSPHATE, MONOBASIC 7; 19 G/133ML; G/133ML
1 ENEMA RECTAL
Status: DISCONTINUED | OUTPATIENT
Start: 2017-11-25 | End: 2017-11-25 | Stop reason: HOSPADM

## 2017-11-24 RX ORDER — 0.9 % SODIUM CHLORIDE 0.9 %
10 VIAL (ML) INJECTION EVERY 8 HOURS
Status: DISCONTINUED | OUTPATIENT
Start: 2017-11-24 | End: 2017-11-25 | Stop reason: HOSPADM

## 2017-11-24 RX ADMIN — IPRATROPIUM BROMIDE AND ALBUTEROL SULFATE 1 AMPULE: .5; 3 SOLUTION RESPIRATORY (INHALATION) at 08:17

## 2017-11-24 RX ADMIN — TAZOBACTAM SODIUM AND PIPERACILLIN SODIUM 3.38 G: 375; 3 INJECTION, SOLUTION INTRAVENOUS at 05:59

## 2017-11-24 RX ADMIN — IPRATROPIUM BROMIDE AND ALBUTEROL SULFATE 1 AMPULE: .5; 3 SOLUTION RESPIRATORY (INHALATION) at 16:11

## 2017-11-24 RX ADMIN — PANTOPRAZOLE SODIUM 40 MG: 40 INJECTION, POWDER, FOR SOLUTION INTRAVENOUS at 13:43

## 2017-11-24 RX ADMIN — ISOSORBIDE DINITRATE 20 MG: 20 TABLET ORAL at 13:43

## 2017-11-24 RX ADMIN — SIMVASTATIN 80 MG: 40 TABLET, FILM COATED ORAL at 20:42

## 2017-11-24 RX ADMIN — PREDNISONE 40 MG: 20 TABLET ORAL at 09:12

## 2017-11-24 RX ADMIN — ISOSORBIDE DINITRATE 20 MG: 20 TABLET ORAL at 20:42

## 2017-11-24 RX ADMIN — Medication 10 ML: at 13:44

## 2017-11-24 RX ADMIN — TAZOBACTAM SODIUM AND PIPERACILLIN SODIUM 3.38 G: 375; 3 INJECTION, SOLUTION INTRAVENOUS at 22:53

## 2017-11-24 RX ADMIN — IPRATROPIUM BROMIDE AND ALBUTEROL SULFATE 1 AMPULE: .5; 3 SOLUTION RESPIRATORY (INHALATION) at 20:43

## 2017-11-24 RX ADMIN — Medication 1 UNITS: at 12:43

## 2017-11-24 RX ADMIN — POTASSIUM CHLORIDE 40 MEQ: 1500 TABLET, EXTENDED RELEASE ORAL at 10:07

## 2017-11-24 RX ADMIN — RANOLAZINE 1000 MG: 500 TABLET, FILM COATED, EXTENDED RELEASE ORAL at 20:42

## 2017-11-24 RX ADMIN — IPRATROPIUM BROMIDE AND ALBUTEROL SULFATE 1 AMPULE: .5; 3 SOLUTION RESPIRATORY (INHALATION) at 11:47

## 2017-11-24 RX ADMIN — PANTOPRAZOLE SODIUM 40 MG: 40 INJECTION, POWDER, FOR SOLUTION INTRAVENOUS at 22:53

## 2017-11-24 RX ADMIN — RANOLAZINE 1000 MG: 500 TABLET, FILM COATED, EXTENDED RELEASE ORAL at 09:12

## 2017-11-24 RX ADMIN — Medication 10 ML: at 09:12

## 2017-11-24 RX ADMIN — Medication 2 UNITS: at 18:14

## 2017-11-24 RX ADMIN — Medication 10 ML: at 22:53

## 2017-11-24 RX ADMIN — METOPROLOL TARTRATE 25 MG: 25 TABLET ORAL at 20:42

## 2017-11-24 RX ADMIN — PANTOPRAZOLE SODIUM 40 MG: 40 TABLET, DELAYED RELEASE ORAL at 05:59

## 2017-11-24 RX ADMIN — TAZOBACTAM SODIUM AND PIPERACILLIN SODIUM 3.38 G: 375; 3 INJECTION, SOLUTION INTRAVENOUS at 13:43

## 2017-11-24 RX ADMIN — ISOSORBIDE DINITRATE 20 MG: 20 TABLET ORAL at 09:11

## 2017-11-24 RX ADMIN — MAGESIUM CITRATE 296 ML: 1.75 LIQUID ORAL at 16:46

## 2017-11-24 RX ADMIN — METOPROLOL TARTRATE 25 MG: 25 TABLET ORAL at 09:12

## 2017-11-24 ASSESSMENT — PAIN SCALES - GENERAL
PAINLEVEL_OUTOF10: 0
PAINLEVEL_OUTOF10: 0

## 2017-11-24 NOTE — CARE COORDINATION
11/24/17, 1:26 PM      Mor Simons day: 8  Location: [facility]24/024 Reason for admit: Unstable angina   Procedure: 11/22 EGD  Treatment Plan of Care: H/H 10.6/32. 4. Creatinine 1. 4. Holding effient until Sunday. PCP: Ronak Birmingham MD  Discharge Plan: Home alone. [facility].  May need Home O2

## 2017-11-24 NOTE — PLAN OF CARE
Problem: RESPIRATORY  Goal: Clear lung sounds  Clear lung sounds     Outcome: Ongoing  Cont aerosol, to improve aeration

## 2017-11-24 NOTE — PROGRESS NOTES
predniSONE  30 mg Oral Daily    Followed by   Diana Parker ON 11/29/2017] predniSONE  20 mg Oral Daily    Followed by   Diana Parker ON 12/2/2017] predniSONE  10 mg Oral Daily    pantoprazole  40 mg Oral BID AC    furosemide  20 mg Intravenous Q12H    bisacodyl  10 mg Rectal Daily    nicotine  1 patch Transdermal Q24H    ipratropium-albuterol  1 ampule Inhalation 4x daily    insulin lispro  0-6 Units Subcutaneous TID WC    insulin lispro  0-3 Units Subcutaneous Nightly    heparin (porcine)  60 Units/kg Intravenous Once    sodium chloride flush  10 mL Intravenous 2 times per day    magnesium replacement protocol   Other RX Placeholder    calcium replacement protocol   Other RX Placeholder    phosphorus replacement protocol   Other RX Placeholder    potassium replacement protocol   Other RX Placeholder    piperacillin-tazobactam  3.375 g Intravenous Q8H    isosorbide dinitrate  20 mg Oral TID    metoprolol tartrate  25 mg Oral BID    ranolazine  1,000 mg Oral BID    simvastatin  80 mg Oral Nightly     Continuous Infusions:   dextrose      fentaNYL (SUBLIMAZE) 500 mcg in sodium chloride 0.9 % 100 mL Stopped (11/18/17 0819)     PRN Meds:.haloperidol lactate, ipratropium-albuterol, glucose, dextrose, glucagon (rDNA), dextrose, sodium chloride flush, acetaminophen, magnesium hydroxide, ondansetron  Continuous Infusions:   dextrose      fentaNYL (SUBLIMAZE) 500 mcg in sodium chloride 0.9 % 100 mL Stopped (11/18/17 0819)       Intake/Output Summary (Last 24 hours) at 11/24/17 0806  Last data filed at 11/24/17 0601   Gross per 24 hour   Intake           980.15 ml   Output              900 ml   Net            80.15 ml       CBC:   Recent Labs      11/22/17   0420  11/23/17   1541  11/23/17   1945  11/24/17   0333   WBC  14.4*  14.0*   --   14.0*   HGB  9.4*  10.6*  10.3*  9.9*   HCT  28.5*  32.1*  31.9*  29.5*   PLT  192  219   --   194     BMP:    No results for input(s): NA, K, CL, CO2, BUN, CREATININE,

## 2017-11-24 NOTE — PROGRESS NOTES
Nutrition Assessment    Type and Reason for Visit: Initial (Length of Stay Review)    Malnutrition Assessment:  · Malnutrition Status: No malnutrition    Nutrition Diagnosis:   ·  No nutrition diagnosis at this time    Nutrition Assessment:  · Subjective Assessment: Pt. seen, reports good appetite prior to admit and now \"when they let me eat\". He has had multiple tests/procedures. Voiced understanding of cardiac diet and the reason they have him of carb control (on steroids), he declines any diet questions. States usual weight~ 140#. EMR supports usual weight range~ 130-140#.  8/28/17: 134#9. 6oz    Nutrition Risk Level   Risk Level: Low    Nutrition Intervention  Food and/or Delivery: Continue current diet  Nutrition Education/Counseling/Coordination of Care:  Continued Inpatient Monitoring, Education Not Indicated    Patient assessed for nutrition risk. Deemed to be at low risk at this time. Will continue to follow patient.       Electronically signed by Saul Nation RD, LD on 11/24/17 at 2:47 PM    Contact Number: [phone]

## 2017-11-24 NOTE — FLOWSHEET NOTE
11/24/17 1554   Provider Notification   Provider Name Dr Babs Valentino   Provider Notification Physician   Notification Time [id number]   update on patient loose tarry dark incontinent BM    Telephone response - schedule EGD Colonscopy for 11/25 at [facility] [place] [address] Dr Patricia Slaughter - Endo Nurse on call notified  Mag Citrate 300mL to be completed by 2300 11/24   Flekaitlynn Enema x1 completed 6am on 11/25

## 2017-11-24 NOTE — PLAN OF CARE
Problem: Discharge Planning:  Goal: Discharged to appropriate level of care  Discharged to appropriate level of care   Outcome: Ongoing  Patient plans to be discharge home with [facility] once medically stable. Currently monitoring H&H and Crt (Lasix on hold). Holding effient until Sunday. Possible need for Home oxygen evaluation again. Problem: Cardiac Output - Decreased:  Goal: Hemodynamic stability will improve  Hemodynamic stability will improve   Outcome: Ongoing  Vitals:    11/24/17 0845   BP: 109/62   Pulse: 67   Resp: 18   Temp: 97.8 °F (36.6 °C)   SpO2: 97%       Problem: Fluid Volume - Imbalance:  Goal: Absence of imbalanced fluid volume signs and symptoms  Absence of imbalanced fluid volume signs and symptoms   Outcome: Ongoing  Crt 1.4 today, fluid intake encouraged. BMP in the morning. Problem: Falls - Risk of  Goal: Absence of falls  Outcome: Ongoing  Falling star protocol in place. Fall sign and band in place. Bed alarm on, call light and belongings all within reach. Comments: Care plan reviewed with patient. Patient verbalize understanding of the plan of care and contribute to goal setting.

## 2017-11-24 NOTE — PROGRESS NOTES
Gastroenterology Progress Note:   Patient: Miryam Camarena  : 1945  Acct#: [de-identified]          Principal Problem:    Unstable angina [facility])  Active Problems:    Chest pain    History of placement of stent in LAD coronary artery    Renal failure syndrome    Acute hypercapnic respiratory failure (HCC)    Postprocedural hypotension    Acute pulmonary edema (HCC)    Metabolic acidosis    Cardiac arrest (HCC)    ST elevation myocardial infarction (STEMI) ([address]    Gastroduodenitis     Patient Active Problem List   Diagnosis    Essential hypertension    Moderate COPD (chronic obstructive pulmonary disease) ([address]    Coronary artery disease    Pulmonary emphysema ([address]    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[address]    Shortness of breath    History of tinnitus    Light headed    Fatigue    History of chest pain    History of tobacco abuse    Noncompliance with medication regimen    Ringing in the ears    Lightheadedness    Irregular heart beat    Acute coronary syndrome ([address]    Hyperlipidemia    Esophagitis    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[address]    S/P angioplasty with stent    COPD exacerbation ([address]    CAP (community acquired pneumonia)    Pneumonia of lower lobe due to infectious organism ([address]    Smoker    Unstable angina ([address]    Chest pain    History of placement of stent in LAD coronary artery    Renal failure syndrome    Acute hypercapnic respiratory failure (HCC)    Postprocedural hypotension    Acute pulmonary edema (HCC)    Metabolic acidosis    Cardiac arrest (HCC)    ST elevation myocardial infarction (STEMI) (HCC)    Gastroduodenitis     Assessment:  GI bleed, likely UGI bleed--              SP EGD 2017 by Dr. Dunia Gr a small duodenal bulb    ulcer. Suspect that this resulted in his previous  bleeding. His INR at that   time was elevated. No bleeding and the ulcer is clean based, low risk of rebleeding.   Incidentally,  it does appear as if the patient has a paraesophageal hiatal hernia. The    anatomy of the stomach was Unusual  Suspect large para-esophageal HH on EGD  Anemia, acute blood loss  SP 3  Units PRBCs 11/17; hemoglobin drop from 10.3g to 9.9g in 24 hours. Dysphagia--to solid foods  Epigastric tenderness likely due to gastroduodenitis, improved since admission . S/P Code Blue 11/17 inferior STEMI/Cardiac Arrest, cardiology felt likely related to blood loss  S\p cath with RCA 70% stenosis; patent LAD stent (placed over one year ago). COPD exacerbation--IV steroids,zosyn, Pulmonary following    Plan:  Carb control diet. Continue high dose PPI , Hospitalist changed back to IV  Follow up in office in 1 week with Dr. Anum Barry   Recommend repeat  EGD in 8 weeks  UGI X-ray to eval for paraesophageal HH, can be done after discharge  Hb/Hct ordered for noon today. Hold Effient, to restart on Sunday. Please call GI if any remaining GI issues with bleeding this admission, otherwise signing off. SUBJECTIVE      Patient seen and examined. 24 hours events and chart reviewed. (  x )Medications Reviewed ;(   )Old records reviewed    Day 8 of stay. Feeling: ( [x]  )Better  ([]   ) Worse      ([]   )Same     Pt. Tolerating carb control diet this morning. Not having a bowel movement today. Had one BM yesterday, the first BM in a while, which was dark. This is a 67year old male seen as a new consult for anemia on Effient for history of cardiac stent, GI bleed S/P Code Blue 11/17 He was admitted 11/16  with chest pain and shortness of breath His hemoglobin was 9.3 on admission which was down from 13 at previous ER visit on 11-13-17. Hemoglobin dropped from 9.3 to 6.2 at that time.      He also had urgent cathwith Dr Ashleigh Rosa findings RCA 70% stenosis; patent LAD stent, over one year ago, anemia s/p 2U PRBCs/RHC pressures demonstrated preserved CO. EF 55-60%.      He was previously seen per Dr Adama Alfred in 2010 and had EGD and Colonoscopy.  EGD on 7-21-10 with findings of grade A

## 2017-11-24 NOTE — PROGRESS NOTES
4039- Dr. Aleta Valderrama called and asked for update on patient, gave him information on patients current H & H and that patient had not had bowel movement over night. Dr. Aleta Valderrama started that it was ok to discharge and to hold antiplatelet until Sunday. Also would like patient to follow up outpatient. Passed information on to [facility].

## 2017-11-24 NOTE — PLAN OF CARE
Problem: Discharge Planning:  Goal: Participates in care planning  Participates in care planning   Outcome: Ongoing  Pt able to participate in care planning   Goal: Discharged to appropriate level of care  Discharged to appropriate level of care   Outcome: Ongoing  Pt plans to discharge home alone    Problem: Airway Clearance - Ineffective:  Goal: Ability to maintain a clear airway will improve  Ability to maintain a clear airway will improve   Outcome: Ongoing  Pt able to maintain a clear airway     Problem: Anxiety/Stress:  Goal: Level of anxiety will decrease  Level of anxiety will decrease   Outcome: Ongoing  Pt showing no signs of anxiety at this time    Problem: Cardiac Output - Decreased:  Goal: Hemodynamic stability will improve  Hemodynamic stability will improve   Outcome: Ongoing  Pt shows hemodynamic stability     Problem: Fluid Volume - Imbalance:  Goal: Absence of imbalanced fluid volume signs and symptoms  Absence of imbalanced fluid volume signs and symptoms   Outcome: Ongoing  Pt showing no signs of fluid imbalance     Problem: Pain:  Goal: Recognizes and communicates pain  Recognizes and communicates pain   Outcome: Ongoing  Pt able to communicate  pain using 0-10 scale     Problem: Tissue Perfusion - Cardiopulmonary, Altered:  Goal: Absence of angina  Absence of angina   Outcome: Ongoing  Pt having no chest pain at this time     Problem: Risk for Impaired Skin Integrity  Goal: Tissue integrity - skin and mucous membranes  Structural intactness and normal physiological function of skin and  mucous membranes. Outcome: Ongoing  Pt skin intact, pt able to turn self in bed     Problem: Falls - Risk of  Goal: Absence of falls  Outcome: Ongoing  Pt free of falls this shift     Comments: Care plan reviewed with patient. Patient verbalize understanding of the plan of care and contribute to goal setting.

## 2017-11-25 VITALS
DIASTOLIC BLOOD PRESSURE: 63 MMHG | TEMPERATURE: 97.5 F | SYSTOLIC BLOOD PRESSURE: 124 MMHG | WEIGHT: 129.4 LBS | RESPIRATION RATE: 18 BRPM | HEIGHT: 62 IN | HEART RATE: 54 BPM | BODY MASS INDEX: 23.81 KG/M2 | OXYGEN SATURATION: 97 %

## 2017-11-25 LAB
ANION GAP SERPL CALCULATED.3IONS-SCNC: 5 MEQ/L (ref 8–16)
BUN BLDV-MCNC: 32 MG/DL (ref 7–22)
CALCIUM SERPL-MCNC: 8.7 MG/DL (ref 8.5–10.5)
CHLORIDE BLD-SCNC: 102 MEQ/L (ref 98–111)
CO2: 36 MEQ/L (ref 23–33)
CREAT SERPL-MCNC: 1.2 MG/DL (ref 0.4–1.2)
GFR SERPL CREATININE-BSD FRML MDRD: 59 ML/MIN/1.73M2
GLUCOSE BLD-MCNC: 84 MG/DL (ref 70–108)
GLUCOSE BLD-MCNC: 90 MG/DL (ref 70–108)
GLUCOSE BLD-MCNC: 90 MG/DL (ref 70–108)
HCT VFR BLD CALC: 29.1 % (ref 42–52)
HCT VFR BLD CALC: 31.7 % (ref 42–52)
HEMOGLOBIN: 10.4 GM/DL (ref 14–18)
HEMOGLOBIN: 9.4 GM/DL (ref 14–18)
POTASSIUM SERPL-SCNC: 4.7 MEQ/L (ref 3.5–5.2)
SODIUM BLD-SCNC: 143 MEQ/L (ref 135–145)

## 2017-11-25 PROCEDURE — 3609027000 HC COLONOSCOPY: Performed by: INTERNAL MEDICINE

## 2017-11-25 PROCEDURE — 2700000000 HC OXYGEN THERAPY PER DAY

## 2017-11-25 PROCEDURE — 36415 COLL VENOUS BLD VENIPUNCTURE: CPT

## 2017-11-25 PROCEDURE — 80048 BASIC METABOLIC PNL TOTAL CA: CPT

## 2017-11-25 PROCEDURE — 82948 REAGENT STRIP/BLOOD GLUCOSE: CPT

## 2017-11-25 PROCEDURE — 0DB68ZX EXCISION OF STOMACH, VIA NATURAL OR ARTIFICIAL OPENING ENDOSCOPIC, DIAGNOSTIC: ICD-10-PCS | Performed by: INTERNAL MEDICINE

## 2017-11-25 PROCEDURE — 6370000000 HC RX 637 (ALT 250 FOR IP): Performed by: INTERNAL MEDICINE

## 2017-11-25 PROCEDURE — 2580000003 HC RX 258: Performed by: INTERNAL MEDICINE

## 2017-11-25 PROCEDURE — 99152 MOD SED SAME PHYS/QHP 5/>YRS: CPT | Performed by: INTERNAL MEDICINE

## 2017-11-25 PROCEDURE — C9113 INJ PANTOPRAZOLE SODIUM, VIA: HCPCS | Performed by: INTERNAL MEDICINE

## 2017-11-25 PROCEDURE — 6360000002 HC RX W HCPCS: Performed by: INTERNAL MEDICINE

## 2017-11-25 PROCEDURE — 99153 MOD SED SAME PHYS/QHP EA: CPT | Performed by: INTERNAL MEDICINE

## 2017-11-25 PROCEDURE — 0DJD8ZZ INSPECTION OF LOWER INTESTINAL TRACT, VIA NATURAL OR ARTIFICIAL OPENING ENDOSCOPIC: ICD-10-PCS | Performed by: INTERNAL MEDICINE

## 2017-11-25 PROCEDURE — 3609012400 HC EGD TRANSORAL BIOPSY SINGLE/MULTIPLE: Performed by: INTERNAL MEDICINE

## 2017-11-25 PROCEDURE — 88305 TISSUE EXAM BY PATHOLOGIST: CPT

## 2017-11-25 PROCEDURE — 94640 AIRWAY INHALATION TREATMENT: CPT

## 2017-11-25 PROCEDURE — 85018 HEMOGLOBIN: CPT

## 2017-11-25 PROCEDURE — 85014 HEMATOCRIT: CPT

## 2017-11-25 PROCEDURE — 2500000003 HC RX 250 WO HCPCS: Performed by: INTERNAL MEDICINE

## 2017-11-25 RX ORDER — POLYETHYLENE GLYCOL 3350 17 G/17G
238 POWDER, FOR SOLUTION ORAL ONCE
Status: DISCONTINUED | OUTPATIENT
Start: 2017-11-25 | End: 2017-11-25 | Stop reason: HOSPADM

## 2017-11-25 RX ORDER — PRASUGREL 10 MG/1
10 TABLET, FILM COATED ORAL DAILY
Qty: 30 TABLET | Refills: 11 | Status: SHIPPED | OUTPATIENT
Start: 2017-11-25 | End: 2017-11-25 | Stop reason: HOSPADM

## 2017-11-25 RX ORDER — SIMVASTATIN 80 MG
80 TABLET ORAL NIGHTLY
Qty: 30 TABLET | Refills: 3 | Status: ON HOLD | OUTPATIENT
Start: 2017-11-25 | End: 2017-12-30 | Stop reason: HOSPADM

## 2017-11-25 RX ORDER — NICOTINE 21 MG/24HR
1 PATCH, TRANSDERMAL 24 HOURS TRANSDERMAL EVERY 24 HOURS
Qty: 30 PATCH | Refills: 3 | Status: SHIPPED | OUTPATIENT
Start: 2017-11-26 | End: 2018-02-22

## 2017-11-25 RX ORDER — ISOSORBIDE DINITRATE 20 MG/1
20 TABLET ORAL 3 TIMES DAILY
Qty: 90 TABLET | Refills: 3 | Status: SHIPPED | OUTPATIENT
Start: 2017-11-25 | End: 2018-12-31

## 2017-11-25 RX ORDER — PREDNISONE 10 MG/1
10 TABLET ORAL DAILY
Qty: 10 TABLET | Refills: 0 | Status: SHIPPED | OUTPATIENT
Start: 2017-12-02 | End: 2017-12-12

## 2017-11-25 RX ORDER — IPRATROPIUM BROMIDE AND ALBUTEROL SULFATE 2.5; .5 MG/3ML; MG/3ML
3 SOLUTION RESPIRATORY (INHALATION) 4 TIMES DAILY
Qty: 360 ML | Refills: 2 | Status: SHIPPED | OUTPATIENT
Start: 2017-11-25 | End: 2019-01-28 | Stop reason: ALTCHOICE

## 2017-11-25 RX ORDER — ACETAMINOPHEN 325 MG/1
650 TABLET ORAL EVERY 4 HOURS PRN
Qty: 120 TABLET | Refills: 3 | Status: SHIPPED | OUTPATIENT
Start: 2017-11-25 | End: 2018-02-22

## 2017-11-25 RX ORDER — ALBUTEROL SULFATE 90 UG/1
2 AEROSOL, METERED RESPIRATORY (INHALATION) 4 TIMES DAILY
Qty: 1 INHALER | Refills: 7 | Status: SHIPPED | OUTPATIENT
Start: 2017-11-25 | End: 2019-01-28 | Stop reason: SDUPTHER

## 2017-11-25 RX ORDER — PREDNISONE 10 MG/1
30 TABLET ORAL DAILY
Qty: 30 TABLET | Refills: 0 | Status: SHIPPED | OUTPATIENT
Start: 2017-11-26 | End: 2017-12-06

## 2017-11-25 RX ORDER — POLYETHYLENE GLYCOL 3350 17 G/17G
238 POWDER, FOR SOLUTION ORAL ONCE
Qty: 238 G | Refills: 0 | Status: SHIPPED | OUTPATIENT
Start: 2017-11-25 | End: 2017-11-25

## 2017-11-25 RX ORDER — SENNA PLUS 8.6 MG/1
15 TABLET ORAL ONCE
Status: DISCONTINUED | OUTPATIENT
Start: 2017-11-25 | End: 2017-11-25 | Stop reason: HOSPADM

## 2017-11-25 RX ORDER — RANOLAZINE 1000 MG/1
1000 TABLET, EXTENDED RELEASE ORAL 2 TIMES DAILY
Qty: 60 TABLET | Refills: 3 | Status: SHIPPED | OUTPATIENT
Start: 2017-11-25 | End: 2018-12-06 | Stop reason: SDUPTHER

## 2017-11-25 RX ORDER — PANTOPRAZOLE SODIUM 40 MG/1
40 TABLET, DELAYED RELEASE ORAL 2 TIMES DAILY
Qty: 60 TABLET | Refills: 1 | Status: SHIPPED | OUTPATIENT
Start: 2017-11-25 | End: 2018-12-31

## 2017-11-25 RX ORDER — NICOTINE 21 MG/24HR
1 PATCH, TRANSDERMAL 24 HOURS TRANSDERMAL EVERY 24 HOURS
Qty: 30 PATCH | Refills: 3 | Status: ON HOLD | OUTPATIENT
Start: 2017-11-25 | End: 2017-12-30 | Stop reason: HOSPADM

## 2017-11-25 RX ORDER — FENTANYL CITRATE 50 UG/ML
INJECTION, SOLUTION INTRAMUSCULAR; INTRAVENOUS PRN
Status: DISCONTINUED | OUTPATIENT
Start: 2017-11-25 | End: 2017-11-25 | Stop reason: HOSPADM

## 2017-11-25 RX ORDER — PREDNISONE 20 MG/1
20 TABLET ORAL DAILY
Qty: 10 TABLET | Refills: 0 | Status: SHIPPED | OUTPATIENT
Start: 2017-11-29 | End: 2017-12-09

## 2017-11-25 RX ORDER — MIDAZOLAM HYDROCHLORIDE 1 MG/ML
INJECTION INTRAMUSCULAR; INTRAVENOUS PRN
Status: DISCONTINUED | OUTPATIENT
Start: 2017-11-25 | End: 2017-11-25 | Stop reason: HOSPADM

## 2017-11-25 RX ORDER — SENNA PLUS 8.6 MG/1
15 TABLET ORAL ONCE
Qty: 15 TABLET | Refills: 0 | Status: SHIPPED | OUTPATIENT
Start: 2017-11-25 | End: 2017-11-25

## 2017-11-25 RX ORDER — BISACODYL 10 MG
10 SUPPOSITORY, RECTAL RECTAL DAILY
Qty: 30 SUPPOSITORY | Refills: 0 | Status: SHIPPED | OUTPATIENT
Start: 2017-11-26 | End: 2017-12-26

## 2017-11-25 RX ADMIN — Medication 10 ML: at 10:43

## 2017-11-25 RX ADMIN — IPRATROPIUM BROMIDE AND ALBUTEROL SULFATE 1 AMPULE: .5; 3 SOLUTION RESPIRATORY (INHALATION) at 17:53

## 2017-11-25 RX ADMIN — IPRATROPIUM BROMIDE AND ALBUTEROL SULFATE 1 AMPULE: .5; 3 SOLUTION RESPIRATORY (INHALATION) at 14:12

## 2017-11-25 RX ADMIN — PREDNISONE 40 MG: 20 TABLET ORAL at 10:42

## 2017-11-25 RX ADMIN — ISOSORBIDE DINITRATE 20 MG: 20 TABLET ORAL at 10:42

## 2017-11-25 RX ADMIN — METOPROLOL TARTRATE 25 MG: 25 TABLET ORAL at 10:42

## 2017-11-25 RX ADMIN — PANTOPRAZOLE SODIUM 40 MG: 40 INJECTION, POWDER, FOR SOLUTION INTRAVENOUS at 14:02

## 2017-11-25 RX ADMIN — Medication 10 ML: at 14:03

## 2017-11-25 RX ADMIN — RANOLAZINE 1000 MG: 500 TABLET, FILM COATED, EXTENDED RELEASE ORAL at 10:42

## 2017-11-25 RX ADMIN — ISOSORBIDE DINITRATE 20 MG: 20 TABLET ORAL at 14:02

## 2017-11-25 RX ADMIN — PANTOPRAZOLE SODIUM 40 MG: 40 INJECTION, POWDER, FOR SOLUTION INTRAVENOUS at 05:00

## 2017-11-25 RX ADMIN — TAZOBACTAM SODIUM AND PIPERACILLIN SODIUM 3.38 G: 375; 3 INJECTION, SOLUTION INTRAVENOUS at 06:00

## 2017-11-25 RX ADMIN — TAZOBACTAM SODIUM AND PIPERACILLIN SODIUM 3.38 G: 375; 3 INJECTION, SOLUTION INTRAVENOUS at 14:03

## 2017-11-25 ASSESSMENT — PAIN SCALES - GENERAL: PAINLEVEL_OUTOF10: 0

## 2017-11-25 ASSESSMENT — PAIN - FUNCTIONAL ASSESSMENT: PAIN_FUNCTIONAL_ASSESSMENT: 0-10

## 2017-11-25 NOTE — PROGRESS NOTES
Discharge teaching and instructions for diagnosis/procedure of unstable angina completed with patient using teachback method. AVS reviewed. Printed prescriptions given to patient. Patient voiced understanding regarding prescriptions, follow up appointments, and care of self at home.  Discharged in a wheelchair to  home with support per self

## 2017-11-25 NOTE — PLAN OF CARE
Problem: RESPIRATORY  Goal: Clear lung sounds  Clear lung sounds     Outcome: Ongoing  Keep lungs clear of wheezes throughout

## 2017-11-25 NOTE — DISCHARGE SUMMARY
Dr. 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[address]    Shortness of breath    History of tinnitus    Light headed    Fatigue    History of chest pain    History of tobacco abuse    Noncompliance with medication regimen    Ringing in the ears    Lightheadedness    Irregular heart beat    Acute coronary syndrome ([address]    Hyperlipidemia    Esophagitis    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[address]    S/P angioplasty with stent    COPD exacerbation ([address]    CAP (community acquired pneumonia)    Pneumonia of lower lobe due to infectious organism ([address]    Smoker    Unstable angina ([address]    Chest pain    History of placement of stent in LAD coronary artery    Renal failure syndrome    Acute hypercapnic respiratory failure (HCC)    Postprocedural hypotension    Acute pulmonary edema (HCC)    Metabolic acidosis    Cardiac arrest ([address]    ST elevation myocardial infarction (STEMI) ([address]    Gastroduodenitis       Discharge Medications:       Jose Rli Alert   Home Medication Instructions CVZ:[id number]    Printed on:11/25/17 2477   Medication Information                      acetaminophen (TYLENOL) 325 MG tablet  Take 2 tablets by mouth every 4 hours as needed for Pain             albuterol sulfate HFA (VENTOLIN HFA) 108 (90 Base) MCG/ACT inhaler  Inhale 2 puffs into the lungs 4 times daily             bisacodyl (DULCOLAX) 10 MG suppository  Place 1 suppository rectally daily             insulin lispro (HUMALOG) 100 UNIT/ML injection vial  Inject 0-6 Units into the replacement protocol   Other RX Placeholder    [START ON 11/26/2017] predniSONE  30 mg Oral Daily    Followed by   Joesph Izaguirre ON 11/29/2017] predniSONE  20 mg Oral Daily    Followed by   Joesph Izaguirre ON 12/2/2017] predniSONE  10 mg Oral Daily    furosemide  20 mg Intravenous Q12H    bisacodyl  10 mg Rectal Daily    nicotine  1 patch Transdermal Q24H    ipratropium-albuterol  1 ampule Inhalation 4x daily    insulin lispro  0-6 Units Subcutaneous TID WC    insulin lispro  0-3 Units Subcutaneous Nightly    heparin (porcine)  60 Units/kg Intravenous Once    sodium chloride flush  10 mL Intravenous 2 times per day    magnesium replacement protocol   Other RX Placeholder    calcium replacement protocol   Other RX Placeholder    phosphorus replacement protocol   Other RX Placeholder    potassium replacement protocol   Other RX Placeholder    piperacillin-tazobactam  3.375 g Intravenous Q8H    isosorbide dinitrate  20 mg Oral TID    metoprolol tartrate  25 mg Oral BID    ranolazine  1,000 mg Oral BID    simvastatin  80 mg Oral Nightly     Continuous Infusions:   dextrose      fentaNYL (SUBLIMAZE) 500 mcg in sodium chloride 0.9 % 100 mL Stopped (11/18/17 0819)     PRN Meds:.fleet, haloperidol lactate, ipratropium-albuterol, glucose, dextrose, glucagon (rDNA), dextrose, sodium chloride flush, acetaminophen, magnesium hydroxide, ondansetron  Continuous Infusions:   dextrose      fentaNYL (SUBLIMAZE) 500 mcg in sodium chloride 0.9 % 100 mL Stopped (11/18/17 0819)       Intake/Output Summary (Last 24 hours) at 11/25/17 1515  Last data filed at 11/25/17 1420   Gross per 24 hour   Intake           573.76 ml   Output                0 ml   Net           573.76 ml       Diet:  Diet NPO Time Specified Exceptions are: Sips with Meds, Ice Chips  Diet NPO Effective Now Exceptions are: Ice Chips    Activity:  Up ad ping (Patient can move independently)    Follow-up:  in the next few weeks with Wanda Godinez MD, Consultants:  Cardiology, gi    Procedures:  See order    Diagnostic Test:    Objective:  Lab Results   Component Value Date    WBC 14.0 11/24/2017    RBC 3.14 11/24/2017    RBC 3.93 08/31/2011    HGB 10.4 11/25/2017    HCT 31.7 11/25/2017    MCV 94.0 11/24/2017    MCH 31.5 11/24/2017    MCHC 33.5 11/24/2017    RDW 16.6 11/24/2017     11/24/2017    MPV 8.3 11/24/2017     Lab Results   Component Value Date     11/25/2017    K 4.7 11/25/2017     11/25/2017    CO2 36 11/25/2017    BUN 32 11/25/2017    CREATININE 1.2 11/25/2017    GLUCOSE 90 11/25/2017    GLUCOSE 154 08/31/2011    CALCIUM 8.7 11/25/2017     Lab Results   Component Value Date    CALCIUM 8.7 11/25/2017     No results found for: IONCA  Lab Results   Component Value Date    MG 2.0 11/19/2017     Lab Results   Component Value Date    PHOS 3.3 11/19/2017     Lab Results   Component Value Date    BNP 34.6 02/28/2013     Lab Results   Component Value Date    ALKPHOS 45 11/19/2017    ALT 32 11/19/2017    AST 42 11/19/2017    PROT 4.9 11/19/2017    BILITOT 0.4 11/19/2017    BILIDIR <0.2 11/13/2017    LABALBU 2.4 11/19/2017    LABALBU 4.0 08/31/2011     Lab Results   Component Value Date    LACTA 2.2 11/20/2017     No results found for: AMYLASE  Lab Results   Component Value Date    LIPASE 13.7 11/20/2017     Lab Results   Component Value Date    CHOL 73 11/17/2017    TRIG 118 11/17/2017    HDL 22 11/17/2017    LDLCALC 27 11/17/2017     Recent Labs      11/24/17   2009  11/25/17   0908  11/25/17   1236   POCGLU  137*  84  90     No results for input(s): CKTOTAL, CKMB, TROPONINI in the last 72 hours. No results found for: LABA1C  Lab Results   Component Value Date    INR 1.17 11/16/2017     No results found for: Gina ABREU HCO3ART, [facility] Course: , Gilson Murcia a 67 y. o. male initially admitted under Dr. Gaetano Jean service for chest pain of 1 day duration.  He underwent cardiac cath by Dr. Shae Kingsley MD on 11/17/17 for further evaluation of chest pain on abnormal EKG.  He was intubated and kept on mechanical ventilation and extubated. He was found to have severe anemia along with black tarry stools. Dr. Saintclair Broad from GI service is following. S/p EGD - 1.  A 4-mm white clean-based ulcer near the apex of the duodenal bulb. He was transferred out of ccu to the floor. A f/u colonoscopy showed a poor prep and will be done in the out patient. He is adamant about going home today. See order. Vitals: /71   Pulse 64   Temp 97.6 °F (36.4 °C) (Oral)   Resp 16   Ht 5' 2\" (1.575 m)   Wt 129 lb 6.4 oz (58.7 kg)   SpO2 94%   BMI 23.67 kg/m²   Physical Exam:  General appearance: alert, appears stated age and cooperative  Skin: Skin color, texture, turgor normal.   HEENT: Head: Normal, normocephalic, atraumatic. Neck: no adenopathy, no carotid bruit and no JVD  Lungs: kaela rhonchi. mod air movement. Heart: S1, S2 normal  Abdomen: pos bs, non tender. Extremities: upper ext edema. Lymphatic: No significant lymph node enlargement papable  Neurologic: Mental status: awake and responsive. At baseline.         Disposition: home    Condition: Stable        Roank Birmingham MD     Copy: Primary Care Physician: Ronak Birmingham MD  Internal Medicine

## 2017-11-25 NOTE — PROCEDURES
[address] [place] [address]                                  PROCEDURE NOTE    PATIENT NAME: Rene Alcazar                   :        1945  MED REC NO:   [id number]                           ROOM:       0024  ACCOUNT NO:   [de-identified]                           ADMIT DATE: 2017  PROVIDER:     Jeff Antunez. Terry Hallman M.D.    Gerhardt Russel:  2017    INDICATIONS:  The patient is a 76-year-old white male who was admitted to  the hospital with evidence of GI blood loss. He had a duodenal ulcer  described on his initial EGD by Dr. Enrique Atwood. The patient experienced 2  episodes of melena on  and , leading to the cancellation of his  discharge. He underwent a repeat EGD this morning which showed a large  paraesophageal hernia, some mucosal thickening of the fundus, and a scar at  the duodenal bulb. The patient was then scheduled for colonoscopy as part  of the evaluation of GI blood loss. SEDATION:  The patient received a total of fentanyl 50 mcg plus midazolam 3  mg for his EGD. He received no additional medication. DESCRIPTION OF PROCEDURE:  The patient was placed in left lateral decubitus  position, and the Olympus CF-H190AL adjustable video colonoscope was  introduced. The instrument was advanced only to the rectum. The rectal  mucosa was coated by a large amount of stool. There was thick brown liquid  stool noted in the rectum. At this point, I aborted the procedure because  of poor preparation. He will receive preparation today and will be  rescheduled for tomorrow. Maday Fuentes M.D.    D: 2017 8:25:05       T: 2017 8:26:10     [id number]  Job#: [id number]     Doc#: [id number]    CC:  Meri Armstrong M.D.

## 2017-11-25 NOTE — BRIEF OP NOTE
Brief Postoperative Note    Chano Tran  YOB: 1945  [id number]    Pre-operative Diagnosis:  Inferior STEMI/Cardiac Arrest    Post-operative Diagnosis: RCA 70% stenosis; patent LAD stent, anemia s/p 2U PRBCs/RHC pressures demonstrated preserved CO. EF 55-60%. Procedure: LHC/Cor Angio/LVgram/IABP (implant/explant)/RHC    Anesthesia: Local    Surgeons/Assistants: None    Estimated Blood Loss: 50    Complications: None    Specimens: Was Not Obtained    Findings: As above. Likely related to blood loss. Continue supportive care.       Electronically signed by Emani Donovan MD on 11/17/2017 at 9:03 AM
11/25/2017  Time: 8:25 AM

## 2017-11-25 NOTE — CONSULTS
[address] [place] [address]                                   CONSULTATION    PATIENT NAME: Kaycee Carson                   :        1945  MED REC NO:   [id number]                           ROOM:       0024  ACCOUNT NO:   [de-identified]                           ADMIT DATE: 2017  PROVIDER:     Sun Bran M.D.    Prabhadonald Saimaw:  2017    The patient notified the nursing staff that he wanted to go home and have  his colonoscopy done on an outpatient basis. His Effient has been put on  hold. I noted that the patient was initially seen by Dr. Chelsie Campos. I will make  arrangements for the patient to have a colonoscopy done on 2017. He  should hold his Effient until that time. Although our office is closed  today, I noted that if Dr. Chelsie Campos is unable to perform the procedure on  2017, then I would be able to do it. The patient is instructed to stay on clear liquids on 2017 through  2017. He is instructed to take fifteen 8.5 mg senna tablets at 10:00  a.m. on 2017. He will then take one bottle of magnesium citrate at  that time. He will take a second bottle of magnesium citrate at 05:00 p.m.  on 2017. He is instructed call my office when it opens at 0800 on  2017. I left a message with my staff to give the patient Suprep  instructions. Again, he will be scheduled for colonoscopy with Dr. Chelsie Campos,  and if Dr. Chelsie Campos is unavailable, I will perform the procedure. Andra Mejia M.D.    D: 2017 17:10:02       T: 2017 17:10:47     [email or web address]  Job#: [id number]     Doc#: [id number]    CC:  Criss Espinoza M.D.

## 2017-11-25 NOTE — PROGRESS NOTES
A home oxygen evaluation has been completed. [x]Patient is an inpatient. It is expected that the patient will be discharged within the next 48 hours. Qualified provider to write order for home prescription if patient qualifies. Social service/care managers will arrange for home oxygen. If patient is active, arrange for Home Medical supplier to assess for Oxygen Conserving Device per pulse oximetry. []Patient is an outpatient. Results will be faxed to the ordering provider. Qualified provider to write order for home prescription if patient qualifies and arranges for home oxygen. Patient was placed on room air for 10 minutes. SpO2 was 91-92 % on room air at rest. Patients SpO2 was above 88% and did not qualify for home oxygen. Patient was walked for 10 minutes. SpO2 was 89-91% during walking. Patients SpO2 was below 89% and did not qualify for home oxygen.

## 2017-11-25 NOTE — PROCEDURES
hemorrhage. The gastric mucosa was  otherwise normal.  The pylorus was normal.  In the duodenal bulb, there was  a small well-healed scar noted at the apex of the bulb. No active ulcers  were noted, and no stigmata of hemorrhage were seen. Retroflexion view of  the cardia and fundus showed no other changes. ASSESSMENT:  1.  Large paraesophageal hernia. 2.  Mucosal thickening with hemorrhagic change in gastric fundus. Biopsies  are pending. 3.  Well-healed scar at apex of duodenal bulb. COMMENT AND RECOMMENDATION:  The findings of today's exam were not  especially impressive. He will proceed with his scheduled colonoscopy. Mingo Mejia M.D.    D: 11/25/2017 8:13:52       T: 11/25/2017 8:14:49     [email or web address]  Job#: [id number]     Doc#: [id number]    CC:  Anum Easton.  Anna Lynn M.D.

## 2017-11-25 NOTE — PLAN OF CARE
Problem: Discharge Planning:  Goal: Participates in care planning  Participates in care planning   Outcome: Ongoing  Patient plans to be discharge home with [facility] once medically stable. Currently monitoring H&H and Crt (Lasix on hold). Holding effient until Sunday. Possible need for Home oxygen evaluation again. Bowel prep to be completed today for colonoscopy 11/26/2017 0630     Problem: Cardiac Output - Decreased:  Goal: Hemodynamic stability will improve  Hemodynamic stability will improve   Outcome: Ongoing  Vitals:    11/25/17 1030   BP: 138/75   Pulse: 60   Resp: 18   Temp: 97.5 °F (36.4 °C)   SpO2: 96%         Problem: Fluid Volume - Imbalance:  Goal: Absence of imbalanced fluid volume signs and symptoms  Absence of imbalanced fluid volume signs and symptoms   Outcome: Ongoing  Crt 1.2 today, fluid intake encouraged. BMP in the morning.     Problem: Falls - Risk of  Goal: Absence of falls  Outcome: Ongoing  Falling star protocol in place. Fall sign and band in place. Bed alarm on, call light and belongings all within reach      Comments: Care plan reviewed with patient. Patient verbalize understanding of the plan of care and contribute to goal setting.

## 2017-11-25 NOTE — FLOWSHEET NOTE
11/25/17 1135   Provider Notification   Provider Name Dr Dixon Valentin   Provider Notification Physician   Notification Time [id number]   patient requesting to be discharge. Colonscopy scheduled for tomorrow but patient wants to just follow up with GI outpatient. Hemoglobin stable. Crt improving today, lasix still on hold.  Effient to resume sunday per GI     Dr Dixon Valentin to see the patient

## 2017-11-27 RX ORDER — AMLODIPINE BESYLATE 2.5 MG/1
TABLET ORAL
Qty: 30 TABLET | Refills: 11 | Status: SHIPPED | OUTPATIENT
Start: 2017-11-27 | End: 2018-12-31

## 2017-11-27 RX ORDER — PRASUGREL 10 MG/1
TABLET, FILM COATED ORAL
Qty: 30 TABLET | Refills: 11 | Status: SHIPPED | OUTPATIENT
Start: 2017-11-27 | End: 2018-06-21 | Stop reason: SDUPTHER

## 2017-11-27 RX ORDER — VALSARTAN AND HYDROCHLOROTHIAZIDE 80; 12.5 MG/1; MG/1
TABLET, FILM COATED ORAL
Qty: 30 TABLET | Refills: 11 | Status: ON HOLD | OUTPATIENT
Start: 2017-11-27 | End: 2017-12-30 | Stop reason: HOSPADM

## 2017-11-27 RX ORDER — ISOSORBIDE MONONITRATE 60 MG/1
TABLET, EXTENDED RELEASE ORAL
Qty: 30 TABLET | Refills: 11 | Status: ON HOLD | OUTPATIENT
Start: 2017-11-27 | End: 2017-12-30 | Stop reason: HOSPADM

## 2017-11-27 RX ORDER — SIMVASTATIN 80 MG
TABLET ORAL
Qty: 30 TABLET | Refills: 11 | Status: SHIPPED | OUTPATIENT
Start: 2017-11-27 | End: 2018-02-21 | Stop reason: ALTCHOICE

## 2017-11-27 RX ORDER — METOPROLOL SUCCINATE 25 MG/1
TABLET, EXTENDED RELEASE ORAL
Qty: 60 TABLET | Refills: 11 | Status: ON HOLD | OUTPATIENT
Start: 2017-11-27 | End: 2017-12-30 | Stop reason: HOSPADM

## 2017-12-27 ENCOUNTER — APPOINTMENT (OUTPATIENT)
Dept: GENERAL RADIOLOGY | Age: 72
DRG: 190 | End: 2017-12-27
Payer: MEDICARE

## 2017-12-27 ENCOUNTER — APPOINTMENT (OUTPATIENT)
Dept: CT IMAGING | Age: 72
DRG: 190 | End: 2017-12-27
Payer: MEDICARE

## 2017-12-27 ENCOUNTER — HOSPITAL ENCOUNTER (INPATIENT)
Age: 72
LOS: 3 days | Discharge: HOME | DRG: 190 | End: 2017-12-30
Attending: EMERGENCY MEDICINE | Admitting: INTERNAL MEDICINE
Payer: MEDICARE

## 2017-12-27 DIAGNOSIS — R06.02 SHORTNESS OF BREATH: ICD-10-CM

## 2017-12-27 DIAGNOSIS — J18.9 PNEUMONIA DUE TO ORGANISM: Primary | ICD-10-CM

## 2017-12-27 DIAGNOSIS — J44.1 COPD EXACERBATION (HCC): ICD-10-CM

## 2017-12-27 DIAGNOSIS — R77.8 ELEVATED TROPONIN: ICD-10-CM

## 2017-12-27 LAB
ANION GAP SERPL CALCULATED.3IONS-SCNC: 15 MEQ/L (ref 8–16)
ANISOCYTOSIS: ABNORMAL
BASOPHILS # BLD: 0.1 %
BASOPHILS ABSOLUTE: 0 THOU/MM3 (ref 0–0.1)
BUN BLDV-MCNC: 37 MG/DL (ref 7–22)
CALCIUM SERPL-MCNC: 10.2 MG/DL (ref 8.5–10.5)
CHLORIDE BLD-SCNC: 92 MEQ/L (ref 98–111)
CO2: 29 MEQ/L (ref 23–33)
CREAT SERPL-MCNC: 1.1 MG/DL (ref 0.4–1.2)
EKG ATRIAL RATE: 117 BPM
EKG P AXIS: 80 DEGREES
EKG P-R INTERVAL: 144 MS
EKG Q-T INTERVAL: 292 MS
EKG QRS DURATION: 76 MS
EKG QTC CALCULATION (BAZETT): 407 MS
EKG R AXIS: 63 DEGREES
EKG T AXIS: 67 DEGREES
EKG VENTRICULAR RATE: 117 BPM
EOSINOPHIL # BLD: 0.1 %
EOSINOPHILS ABSOLUTE: 0 THOU/MM3 (ref 0–0.4)
GFR SERPL CREATININE-BSD FRML MDRD: 66 ML/MIN/1.73M2
GLUCOSE BLD-MCNC: 110 MG/DL (ref 70–108)
GLUCOSE BLD-MCNC: 199 MG/DL (ref 70–108)
HCT VFR BLD CALC: 47.3 % (ref 42–52)
HEMOGLOBIN: 15.7 GM/DL (ref 14–18)
LYMPHOCYTES # BLD: 10.7 %
LYMPHOCYTES ABSOLUTE: 0.8 THOU/MM3 (ref 1–4.8)
MCH RBC QN AUTO: 32.5 PG (ref 27–31)
MCHC RBC AUTO-ENTMCNC: 33.2 GM/DL (ref 33–37)
MCV RBC AUTO: 97.8 FL (ref 80–94)
MONOCYTES # BLD: 8.2 %
MONOCYTES ABSOLUTE: 0.6 THOU/MM3 (ref 0.4–1.3)
NUCLEATED RED BLOOD CELLS: 0 /100 WBC
OSMOLALITY CALCULATION: 281.3 MOSMOL/KG (ref 275–300)
PDW BLD-RTO: 18.3 % (ref 11.5–14.5)
PLATELET # BLD: 202 THOU/MM3 (ref 130–400)
PMV BLD AUTO: 7.8 MCM (ref 7.4–10.4)
POTASSIUM SERPL-SCNC: 4.2 MEQ/L (ref 3.5–5.2)
PRO-BNP: 206.4 PG/ML (ref 0–900)
RBC # BLD: 4.84 MILL/MM3 (ref 4.7–6.1)
SEG NEUTROPHILS: 80.9 %
SEGMENTED NEUTROPHILS ABSOLUTE COUNT: 5.9 THOU/MM3 (ref 1.8–7.7)
SODIUM BLD-SCNC: 136 MEQ/L (ref 135–145)
TOTAL CK: 178 U/L (ref 55–170)
TROPONIN T: 0.19 NG/ML
TROPONIN T: 0.23 NG/ML
WBC # BLD: 7.3 THOU/MM3 (ref 4.8–10.8)

## 2017-12-27 PROCEDURE — 6370000000 HC RX 637 (ALT 250 FOR IP): Performed by: EMERGENCY MEDICINE

## 2017-12-27 PROCEDURE — 94640 AIRWAY INHALATION TREATMENT: CPT

## 2017-12-27 PROCEDURE — 87040 BLOOD CULTURE FOR BACTERIA: CPT

## 2017-12-27 PROCEDURE — 6360000002 HC RX W HCPCS: Performed by: INTERNAL MEDICINE

## 2017-12-27 PROCEDURE — 82948 REAGENT STRIP/BLOOD GLUCOSE: CPT

## 2017-12-27 PROCEDURE — 36415 COLL VENOUS BLD VENIPUNCTURE: CPT

## 2017-12-27 PROCEDURE — 6370000000 HC RX 637 (ALT 250 FOR IP): Performed by: INTERNAL MEDICINE

## 2017-12-27 PROCEDURE — 83880 ASSAY OF NATRIURETIC PEPTIDE: CPT

## 2017-12-27 PROCEDURE — 99285 EMERGENCY DEPT VISIT HI MDM: CPT

## 2017-12-27 PROCEDURE — 71010 XR CHEST PORTABLE: CPT

## 2017-12-27 PROCEDURE — 84484 ASSAY OF TROPONIN QUANT: CPT

## 2017-12-27 PROCEDURE — 85025 COMPLETE CBC W/AUTO DIFF WBC: CPT

## 2017-12-27 PROCEDURE — 1200000003 HC TELEMETRY R&B

## 2017-12-27 PROCEDURE — 96365 THER/PROPH/DIAG IV INF INIT: CPT

## 2017-12-27 PROCEDURE — 6360000002 HC RX W HCPCS: Performed by: EMERGENCY MEDICINE

## 2017-12-27 PROCEDURE — 82550 ASSAY OF CK (CPK): CPT

## 2017-12-27 PROCEDURE — 93005 ELECTROCARDIOGRAM TRACING: CPT

## 2017-12-27 PROCEDURE — 2580000003 HC RX 258: Performed by: INTERNAL MEDICINE

## 2017-12-27 PROCEDURE — 80048 BASIC METABOLIC PNL TOTAL CA: CPT

## 2017-12-27 RX ORDER — VANCOMYCIN HYDROCHLORIDE 1 G/200ML
1000 INJECTION, SOLUTION INTRAVENOUS ONCE
Status: DISCONTINUED | OUTPATIENT
Start: 2017-12-27 | End: 2017-12-27

## 2017-12-27 RX ORDER — VANCOMYCIN HYDROCHLORIDE 1 G/200ML
1000 INJECTION, SOLUTION INTRAVENOUS ONCE
Status: COMPLETED | OUTPATIENT
Start: 2017-12-27 | End: 2017-12-27

## 2017-12-27 RX ORDER — PRASUGREL 10 MG/1
10 TABLET, FILM COATED ORAL DAILY
Status: DISCONTINUED | OUTPATIENT
Start: 2017-12-27 | End: 2017-12-30 | Stop reason: HOSPADM

## 2017-12-27 RX ORDER — LEVOFLOXACIN 5 MG/ML
750 INJECTION, SOLUTION INTRAVENOUS ONCE
Status: COMPLETED | OUTPATIENT
Start: 2017-12-27 | End: 2017-12-27

## 2017-12-27 RX ORDER — IPRATROPIUM BROMIDE AND ALBUTEROL SULFATE 2.5; .5 MG/3ML; MG/3ML
3 SOLUTION RESPIRATORY (INHALATION) 4 TIMES DAILY
Status: DISCONTINUED | OUTPATIENT
Start: 2017-12-27 | End: 2017-12-30 | Stop reason: HOSPADM

## 2017-12-27 RX ORDER — ISOSORBIDE DINITRATE 20 MG/1
20 TABLET ORAL 3 TIMES DAILY
Status: DISCONTINUED | OUTPATIENT
Start: 2017-12-27 | End: 2017-12-30 | Stop reason: HOSPADM

## 2017-12-27 RX ORDER — METHYLPREDNISOLONE SODIUM SUCCINATE 125 MG/2ML
60 INJECTION, POWDER, LYOPHILIZED, FOR SOLUTION INTRAMUSCULAR; INTRAVENOUS EVERY 8 HOURS
Status: DISCONTINUED | OUTPATIENT
Start: 2017-12-27 | End: 2017-12-28

## 2017-12-27 RX ORDER — VALSARTAN 80 MG/1
80 TABLET ORAL DAILY
Status: DISCONTINUED | OUTPATIENT
Start: 2017-12-27 | End: 2017-12-28

## 2017-12-27 RX ORDER — 0.9 % SODIUM CHLORIDE 0.9 %
250 INTRAVENOUS SOLUTION INTRAVENOUS ONCE
Status: COMPLETED | OUTPATIENT
Start: 2017-12-27 | End: 2017-12-28

## 2017-12-27 RX ORDER — PREDNISONE 20 MG/1
40 TABLET ORAL ONCE
Status: COMPLETED | OUTPATIENT
Start: 2017-12-27 | End: 2017-12-27

## 2017-12-27 RX ORDER — AMLODIPINE BESYLATE 2.5 MG/1
2.5 TABLET ORAL DAILY
Status: DISCONTINUED | OUTPATIENT
Start: 2017-12-27 | End: 2017-12-30 | Stop reason: HOSPADM

## 2017-12-27 RX ORDER — IPRATROPIUM BROMIDE AND ALBUTEROL SULFATE 2.5; .5 MG/3ML; MG/3ML
1 SOLUTION RESPIRATORY (INHALATION) ONCE
Status: COMPLETED | OUTPATIENT
Start: 2017-12-27 | End: 2017-12-27

## 2017-12-27 RX ORDER — ACETAMINOPHEN 325 MG/1
650 TABLET ORAL EVERY 4 HOURS PRN
Status: DISCONTINUED | OUTPATIENT
Start: 2017-12-27 | End: 2017-12-30 | Stop reason: HOSPADM

## 2017-12-27 RX ORDER — VALSARTAN AND HYDROCHLOROTHIAZIDE 80; 12.5 MG/1; MG/1
1 TABLET, FILM COATED ORAL DAILY
Status: DISCONTINUED | OUTPATIENT
Start: 2017-12-27 | End: 2017-12-27 | Stop reason: CLARIF

## 2017-12-27 RX ORDER — RANOLAZINE 500 MG/1
1000 TABLET, EXTENDED RELEASE ORAL 2 TIMES DAILY
Status: DISCONTINUED | OUTPATIENT
Start: 2017-12-27 | End: 2017-12-30 | Stop reason: HOSPADM

## 2017-12-27 RX ORDER — IPRATROPIUM BROMIDE AND ALBUTEROL SULFATE 2.5; .5 MG/3ML; MG/3ML
1 SOLUTION RESPIRATORY (INHALATION) EVERY 4 HOURS PRN
Status: DISCONTINUED | OUTPATIENT
Start: 2017-12-27 | End: 2017-12-30 | Stop reason: HOSPADM

## 2017-12-27 RX ORDER — ASPIRIN 325 MG
325 TABLET ORAL DAILY
Status: DISCONTINUED | OUTPATIENT
Start: 2017-12-27 | End: 2017-12-30 | Stop reason: HOSPADM

## 2017-12-27 RX ORDER — SIMVASTATIN 40 MG
80 TABLET ORAL NIGHTLY
Status: DISCONTINUED | OUTPATIENT
Start: 2017-12-27 | End: 2017-12-30 | Stop reason: HOSPADM

## 2017-12-27 RX ORDER — PANTOPRAZOLE SODIUM 40 MG/1
40 TABLET, DELAYED RELEASE ORAL 2 TIMES DAILY
Status: DISCONTINUED | OUTPATIENT
Start: 2017-12-27 | End: 2017-12-30 | Stop reason: HOSPADM

## 2017-12-27 RX ORDER — HYDROCHLOROTHIAZIDE 25 MG/1
12.5 TABLET ORAL DAILY
Status: DISCONTINUED | OUTPATIENT
Start: 2017-12-27 | End: 2017-12-28

## 2017-12-27 RX ADMIN — LEVOFLOXACIN 750 MG: 5 INJECTION, SOLUTION INTRAVENOUS at 15:58

## 2017-12-27 RX ADMIN — METHYLPREDNISOLONE SODIUM SUCCINATE 60 MG: 125 INJECTION, POWDER, FOR SOLUTION INTRAMUSCULAR; INTRAVENOUS at 22:26

## 2017-12-27 RX ADMIN — METOPROLOL TARTRATE 25 MG: 25 TABLET ORAL at 22:25

## 2017-12-27 RX ADMIN — CEFEPIME HYDROCHLORIDE 1 G: 1 INJECTION, POWDER, FOR SOLUTION INTRAMUSCULAR; INTRAVENOUS at 20:22

## 2017-12-27 RX ADMIN — SODIUM CHLORIDE 250 ML: 9 INJECTION, SOLUTION INTRAVENOUS at 23:45

## 2017-12-27 RX ADMIN — ISOSORBIDE DINITRATE 20 MG: 20 TABLET ORAL at 22:25

## 2017-12-27 RX ADMIN — PREDNISONE 40 MG: 20 TABLET ORAL at 14:27

## 2017-12-27 RX ADMIN — RANOLAZINE 1000 MG: 500 TABLET, FILM COATED, EXTENDED RELEASE ORAL at 22:25

## 2017-12-27 RX ADMIN — IPRATROPIUM BROMIDE AND ALBUTEROL SULFATE 1 AMPULE: .5; 3 SOLUTION RESPIRATORY (INHALATION) at 13:13

## 2017-12-27 RX ADMIN — VANCOMYCIN HYDROCHLORIDE 1000 MG: 1 INJECTION, SOLUTION INTRAVENOUS at 20:30

## 2017-12-27 RX ADMIN — IPRATROPIUM BROMIDE AND ALBUTEROL SULFATE 1 AMPULE: .5; 3 SOLUTION RESPIRATORY (INHALATION) at 19:50

## 2017-12-27 RX ADMIN — ASPIRIN 325 MG: 325 TABLET ORAL at 15:58

## 2017-12-27 RX ADMIN — SIMVASTATIN 80 MG: 40 TABLET, FILM COATED ORAL at 22:25

## 2017-12-27 RX ADMIN — Medication 1 UNITS: at 22:48

## 2017-12-27 NOTE — ED PROVIDER NOTES
infarction); Noncompliance with medication regimen; Pneumonia; Renal failure syndrome; Ringing in the ears; and SOB (shortness of breath) on exertion. SURGICAL HISTORY      has a past surgical history that includes transthoracic echocardiogram (1 10 2011); transthoracic echocardiogram (3 06 2009); Diagnostic Cardiac Cath Lab Procedure (3 09 2009); Diagnostic Cardiac Cath Lab Procedure (1 10 2011); Cardiac surgery; hernia repair (5925,9858); Coronary angioplasty with stent; Colonoscopy; Endoscopy, colon, diagnostic; Upper gastrointestinal endoscopy (Left, 11/21/2017); egd transoral biopsy single/multiple (Left, 11/25/2017); and colon ca scrn not hi rsk ind (Left, 11/25/2017). CURRENT MEDICATIONS       Current Discharge Medication List      CONTINUE these medications which have NOT CHANGED    Details   valsartan-hydrochlorothiazide (DIOVAN-HCT) 80-12.5 MG per tablet take 1 tablet once daily  Qty: 30 tablet, Refills: 11    Associated Diagnoses: Smoker      !! simvastatin (ZOCOR) 80 MG tablet take 1 tablet at bedtime  Qty: 30 tablet, Refills: 11    Associated Diagnoses: Smoker      metoprolol succinate (TOPROL XL) 25 MG extended release tablet take 1 tablet twice a day  Qty: 60 tablet, Refills: 11    Associated Diagnoses: Smoker      isosorbide mononitrate (IMDUR) 60 MG extended release tablet take 1 tablet once daily  Qty: 30 tablet, Refills: 11    Associated Diagnoses: Smoker      prasugrel (EFFIENT) 10 MG TABS take 1 tablet once daily  Qty: 30 tablet, Refills: 11    Associated Diagnoses: Smoker      amLODIPine (NORVASC) 2.5 MG tablet take 1 tablet by mouth once daily  Qty: 30 tablet, Refills: 11    Associated Diagnoses: Smoker      albuterol sulfate HFA (VENTOLIN HFA) 108 (90 Base) MCG/ACT inhaler Inhale 2 puffs into the lungs 4 times daily  Qty: 1 Inhaler, Refills: 7    Associated Diagnoses: Moderate COPD (chronic obstructive pulmonary disease) ([facility] [address]  Tobacco abuse      !! simvastatin (ZOCOR) 80 MG tablet Take that the status of his neg hx is unknown.    family history includes Cancer in his father and mother; Heart Disease in his father and mother. SOCIAL HISTORY      reports that he has been smoking Cigarettes. He started smoking about 41 years ago. He has a 40.00 pack-year smoking history. He has never used smokeless tobacco. He reports that he does not drink alcohol or use drugs. PHYSICAL EXAM     INITIAL VITALS:  height is 5' 2\" (1.575 m) and weight is 121 lb 12.8 oz (55.2 kg). His oral temperature is 97.7 °F (36.5 °C). His blood pressure is 114/64 and his pulse is 88. His respiration is 18 and oxygen saturation is 94%. Physical Exam   Constitutional:  well-developed and well-nourished. HENT: Head: Normocephalic, atraumatic, Bilateral external ears normal, Oropharynx mosit, No oral exudates, Nose normal.   Eyes: PERRL, EOMI, Conjunctiva normal, No discharge. No scleral icterus  Neck: Normal range of motion, No tenderness, Supple  Cardiovascular: Normal rate, regular rhythm, S1 normal and S2 normal.  Exam reveals no gallop. Pulmonary/Chest: Increased effort. Mild accessory muscle usage or stridor. Mild wheezes. has no rales. exhibits no tenderness. Abdominal: Soft. Bowel sounds are normal.  exhibits no distension. There is no tenderness. There is no rebound and no guarding. Extremities: No edema, no tenderness, no cyanosis, no clubbing. Neurological: Alert and oriented ×3, normal motor function, normal sensory function, no focal deficits. GCS 15  Skin: Skin is warm, dry and intact. No rash noted. No erythema.      DIFFERENTIAL DIAGNOSIS:       DIAGNOSTIC RESULTS     EKG: All EKG's are interpreted by the Emergency Department Physician who either signs or Co-signs this chart in the absence of a cardiologist.    RADIOLOGY: non-plain film images(s) such as CT, Ultrasound and MRI are read by the radiologist.  Plain radiographic images are visualized and preliminarily interpreted by the emergency Glom Filt Rate 59 (*)     All other components within normal limits   PROCALCITONIN - Abnormal; Notable for the following:     Procalcitonin 0.72 (*)     All other components within normal limits   POCT GLUCOSE - Abnormal; Notable for the following:     POC Glucose 199 (*)     All other components within normal limits   POCT GLUCOSE - Abnormal; Notable for the following:     POC Glucose 152 (*)     All other components within normal limits   CULTURE BLOOD #1    Narrative:     Source: blood-Adult-suboptimal <5.5oz./set volume       Site: (single bottle)Peripheral;            Current Antibiotics: not stated   CULTURE BLOOD #2    Narrative:     Source: blood-Adult-suboptimal <5.5oz./set volume       Site: (single bottle)Peripheral;            Current Antibiotics: not stated   RAPID INFLUENZA A/B ANTIGENS   RESPIRATORY CULTURE   BRAIN NATRIURETIC PEPTIDE   ANION GAP   OSMOLALITY   CK   CK   ANION GAP   OSMOLALITY   ANION GAP   OSMOLALITY   POCT GLUCOSE       EMERGENCY DEPARTMENT COURSE:   Vitals:    Vitals:    12/28/17 0049 12/28/17 0354 12/28/17 0812 12/28/17 0823   BP: (!) 90/52 100/62 114/64    Pulse: 84 87 79 88   Resp: 28 28 18    Temp:  97.7 °F (36.5 °C) 97.7 °F (36.5 °C)    TempSrc:  Oral Oral    SpO2:  96% 97% 94%   Weight:       Height:             CRITICAL CARE:       CONSULTS:  None    PROCEDURES:  None    FINAL IMPRESSION      1. Pneumonia due to organism    2. COPD exacerbation (HCC)    3. Elevated troponin          DISPOSITION/PLAN   Admitted Patient is history of COPD, presenting with COPD exacerbation. He's had shortness of breath for the past 2-3 days. Patient also reporting chest pain yesterday, no chest pain at this time. Patient's EKG is nonacute, however, patient has troponin leak. Pt was given aspirin. A page was put out to cardiology, awaiting the callback. Patient states that he feels better after DuoNeb treatment, he will be admitted to medicine.   Chest x-ray also shows pneumonia, started

## 2017-12-28 LAB
ANION GAP SERPL CALCULATED.3IONS-SCNC: 10 MEQ/L (ref 8–16)
ANION GAP SERPL CALCULATED.3IONS-SCNC: 12 MEQ/L (ref 8–16)
ANISOCYTOSIS: ABNORMAL
BASOPHILS # BLD: 0.1 %
BASOPHILS ABSOLUTE: 0 THOU/MM3 (ref 0–0.1)
BUN BLDV-MCNC: 40 MG/DL (ref 7–22)
BUN BLDV-MCNC: 42 MG/DL (ref 7–22)
CALCIUM SERPL-MCNC: 9.5 MG/DL (ref 8.5–10.5)
CALCIUM SERPL-MCNC: 9.6 MG/DL (ref 8.5–10.5)
CHLORIDE BLD-SCNC: 95 MEQ/L (ref 98–111)
CHLORIDE BLD-SCNC: 96 MEQ/L (ref 98–111)
CO2: 29 MEQ/L (ref 23–33)
CO2: 29 MEQ/L (ref 23–33)
CREAT SERPL-MCNC: 1.2 MG/DL (ref 0.4–1.2)
CREAT SERPL-MCNC: 1.4 MG/DL (ref 0.4–1.2)
EOSINOPHIL # BLD: 0 %
EOSINOPHILS ABSOLUTE: 0 THOU/MM3 (ref 0–0.4)
FLU A ANTIGEN: NEGATIVE
FLU B ANTIGEN: NEGATIVE
GFR SERPL CREATININE-BSD FRML MDRD: 50 ML/MIN/1.73M2
GFR SERPL CREATININE-BSD FRML MDRD: 59 ML/MIN/1.73M2
GLUCOSE BLD-MCNC: 146 MG/DL (ref 70–108)
GLUCOSE BLD-MCNC: 152 MG/DL (ref 70–108)
GLUCOSE BLD-MCNC: 153 MG/DL (ref 70–108)
GLUCOSE BLD-MCNC: 158 MG/DL (ref 70–108)
GLUCOSE BLD-MCNC: 170 MG/DL (ref 70–108)
GLUCOSE BLD-MCNC: 200 MG/DL (ref 70–108)
HCT VFR BLD CALC: 37.1 % (ref 42–52)
HEMOGLOBIN: 12.4 GM/DL (ref 14–18)
LYMPHOCYTES # BLD: 8.1 %
LYMPHOCYTES ABSOLUTE: 0.6 THOU/MM3 (ref 1–4.8)
MCH RBC QN AUTO: 32.4 PG (ref 27–31)
MCHC RBC AUTO-ENTMCNC: 33.4 GM/DL (ref 33–37)
MCV RBC AUTO: 97 FL (ref 80–94)
MONOCYTES # BLD: 4.2 %
MONOCYTES ABSOLUTE: 0.3 THOU/MM3 (ref 0.4–1.3)
NUCLEATED RED BLOOD CELLS: 0 /100 WBC
OSMOLALITY CALCULATION: 283.2 MOSMOL/KG (ref 275–300)
OSMOLALITY CALCULATION: 285 MOSMOL/KG (ref 275–300)
PDW BLD-RTO: 18 % (ref 11.5–14.5)
PLATELET # BLD: 240 THOU/MM3 (ref 130–400)
PMV BLD AUTO: 7.9 MCM (ref 7.4–10.4)
POTASSIUM SERPL-SCNC: 4.1 MEQ/L (ref 3.5–5.2)
POTASSIUM SERPL-SCNC: 4.1 MEQ/L (ref 3.5–5.2)
PROCALCITONIN: 0.72 NG/ML (ref 0.01–0.09)
RBC # BLD: 3.83 MILL/MM3 (ref 4.7–6.1)
SEG NEUTROPHILS: 87.6 %
SEGMENTED NEUTROPHILS ABSOLUTE COUNT: 6.9 THOU/MM3 (ref 1.8–7.7)
SODIUM BLD-SCNC: 135 MEQ/L (ref 135–145)
SODIUM BLD-SCNC: 136 MEQ/L (ref 135–145)
TOTAL CK: 105 U/L (ref 55–170)
TOTAL CK: 124 U/L (ref 55–170)
TROPONIN T: 0.18 NG/ML
WBC # BLD: 7.9 THOU/MM3 (ref 4.8–10.8)

## 2017-12-28 PROCEDURE — 82948 REAGENT STRIP/BLOOD GLUCOSE: CPT

## 2017-12-28 PROCEDURE — 6370000000 HC RX 637 (ALT 250 FOR IP): Performed by: NURSE PRACTITIONER

## 2017-12-28 PROCEDURE — 6370000000 HC RX 637 (ALT 250 FOR IP): Performed by: INTERNAL MEDICINE

## 2017-12-28 PROCEDURE — 84145 PROCALCITONIN (PCT): CPT

## 2017-12-28 PROCEDURE — 87070 CULTURE OTHR SPECIMN AEROBIC: CPT

## 2017-12-28 PROCEDURE — 82550 ASSAY OF CK (CPK): CPT

## 2017-12-28 PROCEDURE — G8979 MOBILITY GOAL STATUS: HCPCS

## 2017-12-28 PROCEDURE — 1200000003 HC TELEMETRY R&B

## 2017-12-28 PROCEDURE — 80048 BASIC METABOLIC PNL TOTAL CA: CPT

## 2017-12-28 PROCEDURE — G8978 MOBILITY CURRENT STATUS: HCPCS

## 2017-12-28 PROCEDURE — 6360000002 HC RX W HCPCS: Performed by: INTERNAL MEDICINE

## 2017-12-28 PROCEDURE — 99223 1ST HOSP IP/OBS HIGH 75: CPT | Performed by: INTERNAL MEDICINE

## 2017-12-28 PROCEDURE — 87205 SMEAR GRAM STAIN: CPT

## 2017-12-28 PROCEDURE — 94640 AIRWAY INHALATION TREATMENT: CPT

## 2017-12-28 PROCEDURE — 87804 INFLUENZA ASSAY W/OPTIC: CPT

## 2017-12-28 PROCEDURE — 2580000003 HC RX 258: Performed by: INTERNAL MEDICINE

## 2017-12-28 PROCEDURE — 6370000000 HC RX 637 (ALT 250 FOR IP): Performed by: EMERGENCY MEDICINE

## 2017-12-28 PROCEDURE — 36415 COLL VENOUS BLD VENIPUNCTURE: CPT

## 2017-12-28 PROCEDURE — 84484 ASSAY OF TROPONIN QUANT: CPT

## 2017-12-28 PROCEDURE — 97162 PT EVAL MOD COMPLEX 30 MIN: CPT

## 2017-12-28 PROCEDURE — 97110 THERAPEUTIC EXERCISES: CPT

## 2017-12-28 PROCEDURE — 85025 COMPLETE CBC W/AUTO DIFF WBC: CPT

## 2017-12-28 PROCEDURE — APPSS45 APP SPLIT SHARED TIME 31-45 MINUTES: Performed by: NURSE PRACTITIONER

## 2017-12-28 PROCEDURE — 94669 MECHANICAL CHEST WALL OSCILL: CPT

## 2017-12-28 RX ORDER — VANCOMYCIN HYDROCHLORIDE 1 G/200ML
1000 INJECTION, SOLUTION INTRAVENOUS EVERY 24 HOURS
Status: DISCONTINUED | OUTPATIENT
Start: 2017-12-28 | End: 2017-12-29

## 2017-12-28 RX ORDER — METHYLPREDNISOLONE SODIUM SUCCINATE 125 MG/2ML
60 INJECTION, POWDER, LYOPHILIZED, FOR SOLUTION INTRAMUSCULAR; INTRAVENOUS DAILY
Status: DISCONTINUED | OUTPATIENT
Start: 2017-12-29 | End: 2017-12-29

## 2017-12-28 RX ORDER — LEVOFLOXACIN 250 MG/1
250 TABLET ORAL EVERY 24 HOURS
Status: DISCONTINUED | OUTPATIENT
Start: 2017-12-28 | End: 2017-12-29

## 2017-12-28 RX ADMIN — PANTOPRAZOLE SODIUM 40 MG: 40 TABLET, DELAYED RELEASE ORAL at 08:30

## 2017-12-28 RX ADMIN — VANCOMYCIN HYDROCHLORIDE 1000 MG: 1 INJECTION, SOLUTION INTRAVENOUS at 20:07

## 2017-12-28 RX ADMIN — IPRATROPIUM BROMIDE AND ALBUTEROL SULFATE 3 ML: .5; 3 SOLUTION RESPIRATORY (INHALATION) at 13:05

## 2017-12-28 RX ADMIN — PANTOPRAZOLE SODIUM 40 MG: 40 TABLET, DELAYED RELEASE ORAL at 20:23

## 2017-12-28 RX ADMIN — CEFEPIME HYDROCHLORIDE 2 G: 2 INJECTION, POWDER, FOR SOLUTION INTRAVENOUS at 18:43

## 2017-12-28 RX ADMIN — IPRATROPIUM BROMIDE AND ALBUTEROL SULFATE 3 ML: .5; 3 SOLUTION RESPIRATORY (INHALATION) at 17:26

## 2017-12-28 RX ADMIN — INSULIN LISPRO 1 UNITS: 100 INJECTION, SOLUTION INTRAVENOUS; SUBCUTANEOUS at 12:02

## 2017-12-28 RX ADMIN — IPRATROPIUM BROMIDE AND ALBUTEROL SULFATE 3 ML: .5; 3 SOLUTION RESPIRATORY (INHALATION) at 08:54

## 2017-12-28 RX ADMIN — SIMVASTATIN 80 MG: 40 TABLET, FILM COATED ORAL at 20:24

## 2017-12-28 RX ADMIN — INSULIN LISPRO 1 UNITS: 100 INJECTION, SOLUTION INTRAVENOUS; SUBCUTANEOUS at 08:04

## 2017-12-28 RX ADMIN — METOPROLOL TARTRATE 25 MG: 25 TABLET ORAL at 08:30

## 2017-12-28 RX ADMIN — AMLODIPINE BESYLATE 2.5 MG: 2.5 TABLET ORAL at 08:30

## 2017-12-28 RX ADMIN — RANOLAZINE 1000 MG: 500 TABLET, FILM COATED, EXTENDED RELEASE ORAL at 08:30

## 2017-12-28 RX ADMIN — ENOXAPARIN SODIUM 40 MG: 40 INJECTION SUBCUTANEOUS at 08:30

## 2017-12-28 RX ADMIN — Medication 1 UNITS: at 20:34

## 2017-12-28 RX ADMIN — ISOSORBIDE DINITRATE 20 MG: 20 TABLET ORAL at 20:23

## 2017-12-28 RX ADMIN — RANOLAZINE 1000 MG: 500 TABLET, FILM COATED, EXTENDED RELEASE ORAL at 20:24

## 2017-12-28 RX ADMIN — LEVOFLOXACIN 250 MG: 250 TABLET, FILM COATED ORAL at 17:04

## 2017-12-28 RX ADMIN — IPRATROPIUM BROMIDE AND ALBUTEROL SULFATE 3 ML: .5; 3 SOLUTION RESPIRATORY (INHALATION) at 22:24

## 2017-12-28 RX ADMIN — ASPIRIN 325 MG: 325 TABLET ORAL at 08:30

## 2017-12-28 RX ADMIN — ISOSORBIDE DINITRATE 20 MG: 20 TABLET ORAL at 13:49

## 2017-12-28 RX ADMIN — PRASUGREL HYDROCHLORIDE 10 MG: 10 TABLET, FILM COATED ORAL at 08:30

## 2017-12-28 RX ADMIN — METHYLPREDNISOLONE SODIUM SUCCINATE 60 MG: 125 INJECTION, POWDER, FOR SOLUTION INTRAMUSCULAR; INTRAVENOUS at 04:20

## 2017-12-28 RX ADMIN — INSULIN LISPRO 1 UNITS: 100 INJECTION, SOLUTION INTRAVENOUS; SUBCUTANEOUS at 17:04

## 2017-12-28 ASSESSMENT — PAIN SCALES - GENERAL: PAINLEVEL_OUTOF10: 0

## 2017-12-28 NOTE — PROGRESS NOTES
[facility] THERAPY MISSED TREATMENT NOTE  [facility] ICU STEPDOWN TELEMETRY 4K  [id number]/[id number]      Date: 2017  Patient Name: Angelita Jamison        CSN: [id number]   : 1945  (67 y.o.)  Gender: male                REASON FOR MISSED TREATMENT:  OT attempted at this time, although pt currently with PT. Will re attempt as able.

## 2017-12-28 NOTE — PROGRESS NOTES
cells observed. Few large gram positive bacilli. Rare gram negative diplococci. Few gram positive cocci in pairs. 12/27/2017 Blood 1 No Growth-preliminary    12/27/2017 Blood 2 No Growth-preliminary        Ht Readings from Last 1 Encounters:   12/27/17 5' 2\" (1.575 m)        Wt Readings from Last 1 Encounters:   12/27/17 121 lb 12.8 oz (55.2 kg)         Body mass index is 22.28 kg/m². CrCl: 42 mL/min      Assessment/Plan:  Will initiate vancomycin 1000 mg IV every 24 hours. Timing of trough level will be determined based on culture results, renal function, and clinical response. Thank you for the consult. Will continue to follow.   Gage Zhang, PharmD  PGY-1 Resident  12/28/2017 6:02 PM

## 2017-12-28 NOTE — PROGRESS NOTES
demonstrates decreased strength, bed mobility, transfers, balance, activity tolerance and gait indicating the need for skilled PT services. Decision Making: High Complexitybased on patient assessment and decision making process of determining plan of care and establishing reasonable expectations for measurable functional outcomes    REQUIRES PT FOLLOW UP: Yes  Discharge Recommendations: Home with assist PRN, Home with Home health PT    Patient Education:  Patient Education: POC    Equipment Recommendations:  Equipment Needed: No    Safety:  Type of devices: All fall risk precautions in place, Call light within reach, Gait belt, Patient at risk for falls, Left in chair, Nurse notified  Restraints  Initially in place: No    Plan:  Times per week: 5 X GM  Times per day: Daily  Specific instructions for Next Treatment: B LE strengthening, bed mobility, transfer training, gait training, standing dynamic balance tasks  Current Treatment Recommendations: Strengthening, Neuromuscular Re-education, Home Exercise Program, Balance Training, Endurance Training, Functional Mobility Training, Transfer Training, Gait Training    Goals:  Patient goals : To go home. Short term goals  Time Frame for Short term goals: 2 weeks  Short term goal 1: Pt to transfer supine <--> sit S to enable pt to get in/out of bed. Short term goal 2: Pt to transfer sit <--> stand S for increased functional mobility. Short term goal 3: Pt to ambulate >200 feet without AD S for household ambulation. Long term goals  Time Frame for Long term goals : NA due to short length of stay. Evaluation Complexity: Based on the findings of patient history, examination, clinical presentation, and decision making during this evaluation, the evaluation of Anabella Arce  is of medium complexity. PT G-Codes  Functional Limitation: Mobility: Walking and moving around  Mobility: Walking and Moving Around Current Status ():  At least 40 percent but less than 60 percent impaired, limited or restricted  Mobility: Walking and Moving Around Goal Status ():  At least 40 percent but less than 60 percent impaired, limited or restricted     AM-PAC Inpatient Mobility without Stair Climbing Raw Score : 15  AM-PAC Inpatient without Stair Climbing T-Scale Score : 43.03  Mobility Inpatient CMS 0-100% Score: 47.43  Mobility Inpatient without Stair CMS G-Code Modifier : CK

## 2017-12-28 NOTE — H&P
[address] [place] [address]                               HISTORY AND PHYSICAL    PATIENT NAME: Jestine Cowden                   :        1945  MED REC NO:   [id number]                           ROOM:       03  ACCOUNT NO:   [de-identified]                           ADMIT DATE: 2017  PROVIDER:     Adriana Kelley M.D. Seen on behalf of Dr. Sheron Rangel. CHIEF COMPLAINT:  Difficulty breathing. HISTORY OF PRESENT ILLNESS:  This is a 59-year-old male with known history  of COPD, coronary artery disease, has been having ongoing increasing  difficulty breathing since his last discharge following Thanksgiving. The  patient has become more short of breath, has even asked his home nurse to  check his oxygen and see if he needs to be on any supplemental dose since  his oxygen level was low. She recommended that he go to the hospital.   Workup in the ER, the patient was noted to be tachypneic. His respiratory  rate was 33 and he was saturating 89% on room air. Was placed on nasal  oxygen, which did improve to 100% with 2 liters. He denies any chest pain  at the time of examination. No palpitation. No nausea, vomiting, or  diarrhea. He does have occasional cough, but has clear sputum. No fever. No chills. PAST MEDICAL HISTORY:  Significant for history of coronary artery disease  with three cardiac stents. He also had a history of cardia arrest last  visit because of severe anemia. He also had a heart catheterization at  that time. Other conditions include hypertension, hyperlipidemia, anemia,  status post EGD and colonoscopy with no active source of infection noted. He is being placed back on his Effient, history of chronic congestive heart  failure, history of cardiac arrhythmia. No previous blood clots or  bleeding problems.     PAST SURGICAL HISTORY:  EGD, colonoscopy, hernia repair, cardiac disease. 3.  History of hypertension. 4.  History of hyperlipidemia. 5.  History of chronic congestive heart failure secondary to systolic  ejection fraction of 55%. 6.  Deconditioning. 7.  Pneumonia. RECOMMENDATIONS:  1. We will continue on aggressive bronchodilators, broad-spectrum  antibiotics, steroids and consult his pulmonologist.  2.  Consult Cardiology. Monitor cardiac enzymes. Continue with his  aspirin, beta blockers and antiplatelet agents. 3.  GI and DVT prophylaxis to continue. 4.  Discuss code status, which is full resuscitation at this time.         Radha Lopez M.D.    D: 12/27/2017 16:56:12       T: 12/27/2017 18:54:38     [email or web address]  Job#: [id number]     Doc#: [id number]    CC:

## 2017-12-28 NOTE — CONSULTS
interval development of incompletely characterized alveolar abnormalities of the right lower lung consistent with likely alveolar infiltrate superimposed upon pre-existing fibroemphysematous lung parenchyma. Stable appearance of the left   lung parenchyma.     No definite pneumothorax or pleural effusion.     No definite pulmonary vascular congestion.     No convincing acute osseous abnormality.        Impression  RIGHT LOWER LOBE ALVEOLAR ABNORMALITIES CONSISTENT WITH ALVEOLAR PNEUMONIA SUPERIMPOSED UPON FIBROEMPHYSEMATOUS LUNG PARENCHYMA. PROGRESS IMAGING TO RESOLUTION RECOMMENDED. CT Scans  CTA chest from 11/16/2017:  Upper abdomen: Unremarkable. No gross of normality is seen.     Mediastinum and deepika: There are a few small postinflammatory lymph nodes in the mediastinum. No hilar adenopathy is seen.     Bones: Unremarkable. No acute fracture or destructive process is seen.     Lungs: The lungs are moderately fibroemphysematous in appearance. No acute infiltrates or effusions are seen. No pneumothorax. Calcified granuloma left midlung posteriorly. Questionable minimal atelectasis right posterior costophrenic angle.     Vascular: No pulmonary emboli. Aortic arch and great vessels are normal. No aortic dissection is seen. Celiac artery and SMA widely patent. Upper abdominal aorta mildly aneurysmal, 3.1 cm     Impression  1. Fibroemphysematous lungs. Questionable minimal atelectasis right posterior costophrenic sulcus. 2. No pulmonary emboli. 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 Aorta otherwise unremarkable. No dissection is seen.     (See actual reports for details)    Assessment   Acute COPD Exacerbation, mild  Remote Smoker quitting Nov 2017  Recommendations   -check influenza A/B swab  -check procalcitonin  -check sputum cx  -change Solu Medrol to 60 mg IVP daily  -stop Maxipime  -add Levaquin 500 mg PO daily~had 1 dose 12/27  -add Acapella   -wean 02 to keep sats>90%    Thank you for the

## 2017-12-28 NOTE — ED NOTES
Upon first presentation to pt, pt resting on cot, respirations even and labored at this time, lung sounds diminished bilaterally. Called respiratory for a treatment, respiratory states that it might be a bit so if pt goes upstairs before then to call RN upstairs to call respiratory. Pt denies any pain. Updated on POC, waiting for transport upstairs, no complaints. SR up x2, call light in reach, telemetry continued, family at bedside, will continue to monitor.      Cecilia Perez, DAISY  12/27/17 4176

## 2017-12-29 ENCOUNTER — APPOINTMENT (OUTPATIENT)
Dept: NON INVASIVE DIAGNOSTICS | Age: 72
DRG: 190 | End: 2017-12-29
Payer: MEDICARE

## 2017-12-29 LAB
GLUCOSE BLD-MCNC: 160 MG/DL (ref 70–108)
GLUCOSE BLD-MCNC: 199 MG/DL (ref 70–108)
GLUCOSE BLD-MCNC: 217 MG/DL (ref 70–108)

## 2017-12-29 PROCEDURE — 6370000000 HC RX 637 (ALT 250 FOR IP): Performed by: EMERGENCY MEDICINE

## 2017-12-29 PROCEDURE — APPSS30 APP SPLIT SHARED TIME 16-30 MINUTES: Performed by: NURSE PRACTITIONER

## 2017-12-29 PROCEDURE — 3430000000 HC RX DIAGNOSTIC RADIOPHARMACEUTICAL: Performed by: INTERNAL MEDICINE

## 2017-12-29 PROCEDURE — 6360000002 HC RX W HCPCS

## 2017-12-29 PROCEDURE — 93017 CV STRESS TEST TRACING ONLY: CPT | Performed by: INTERNAL MEDICINE

## 2017-12-29 PROCEDURE — 6370000000 HC RX 637 (ALT 250 FOR IP): Performed by: INTERNAL MEDICINE

## 2017-12-29 PROCEDURE — 78452 HT MUSCLE IMAGE SPECT MULT: CPT

## 2017-12-29 PROCEDURE — 1200000003 HC TELEMETRY R&B

## 2017-12-29 PROCEDURE — 99223 1ST HOSP IP/OBS HIGH 75: CPT | Performed by: INTERNAL MEDICINE

## 2017-12-29 PROCEDURE — 94640 AIRWAY INHALATION TREATMENT: CPT

## 2017-12-29 PROCEDURE — A9500 TC99M SESTAMIBI: HCPCS | Performed by: INTERNAL MEDICINE

## 2017-12-29 PROCEDURE — 6360000002 HC RX W HCPCS: Performed by: INTERNAL MEDICINE

## 2017-12-29 PROCEDURE — 6370000000 HC RX 637 (ALT 250 FOR IP): Performed by: NURSE PRACTITIONER

## 2017-12-29 PROCEDURE — 6360000002 HC RX W HCPCS: Performed by: NURSE PRACTITIONER

## 2017-12-29 PROCEDURE — 97116 GAIT TRAINING THERAPY: CPT

## 2017-12-29 PROCEDURE — 82948 REAGENT STRIP/BLOOD GLUCOSE: CPT

## 2017-12-29 PROCEDURE — 99232 SBSQ HOSP IP/OBS MODERATE 35: CPT | Performed by: INTERNAL MEDICINE

## 2017-12-29 PROCEDURE — 2580000003 HC RX 258: Performed by: INTERNAL MEDICINE

## 2017-12-29 PROCEDURE — 97165 OT EVAL LOW COMPLEX 30 MIN: CPT

## 2017-12-29 PROCEDURE — 97110 THERAPEUTIC EXERCISES: CPT

## 2017-12-29 RX ORDER — LEVOFLOXACIN 250 MG/1
250 TABLET ORAL EVERY 24 HOURS
Qty: 5 TABLET | Refills: 0 | Status: SHIPPED | OUTPATIENT
Start: 2017-12-29 | End: 2018-01-03

## 2017-12-29 RX ORDER — PREDNISONE 20 MG/1
20 TABLET ORAL DAILY
Status: DISCONTINUED | OUTPATIENT
Start: 2018-01-05 | End: 2017-12-30 | Stop reason: HOSPADM

## 2017-12-29 RX ORDER — PREDNISONE 10 MG/1
10 TABLET ORAL DAILY
Status: DISCONTINUED | OUTPATIENT
Start: 2018-01-08 | End: 2017-12-30 | Stop reason: HOSPADM

## 2017-12-29 RX ORDER — PREDNISONE 20 MG/1
40 TABLET ORAL DAILY
Status: DISCONTINUED | OUTPATIENT
Start: 2017-12-30 | End: 2017-12-30 | Stop reason: HOSPADM

## 2017-12-29 RX ORDER — LEVOFLOXACIN 500 MG/1
500 TABLET, FILM COATED ORAL EVERY 24 HOURS
Status: DISCONTINUED | OUTPATIENT
Start: 2017-12-30 | End: 2017-12-30 | Stop reason: HOSPADM

## 2017-12-29 RX ADMIN — CEFEPIME HYDROCHLORIDE 2 G: 2 INJECTION, POWDER, FOR SOLUTION INTRAVENOUS at 18:41

## 2017-12-29 RX ADMIN — INSULIN LISPRO 1 UNITS: 100 INJECTION, SOLUTION INTRAVENOUS; SUBCUTANEOUS at 17:36

## 2017-12-29 RX ADMIN — VANCOMYCIN HYDROCHLORIDE 1000 MG: 1 INJECTION, SOLUTION INTRAVENOUS at 18:41

## 2017-12-29 RX ADMIN — IPRATROPIUM BROMIDE AND ALBUTEROL SULFATE 3 ML: .5; 3 SOLUTION RESPIRATORY (INHALATION) at 20:50

## 2017-12-29 RX ADMIN — PANTOPRAZOLE SODIUM 40 MG: 40 TABLET, DELAYED RELEASE ORAL at 09:18

## 2017-12-29 RX ADMIN — IPRATROPIUM BROMIDE AND ALBUTEROL SULFATE 3 ML: .5; 3 SOLUTION RESPIRATORY (INHALATION) at 08:10

## 2017-12-29 RX ADMIN — IPRATROPIUM BROMIDE AND ALBUTEROL SULFATE 3 ML: .5; 3 SOLUTION RESPIRATORY (INHALATION) at 16:15

## 2017-12-29 RX ADMIN — Medication 34.7 MILLICURIE: at 15:05

## 2017-12-29 RX ADMIN — SIMVASTATIN 80 MG: 40 TABLET, FILM COATED ORAL at 20:24

## 2017-12-29 RX ADMIN — Medication 1 UNITS: at 21:56

## 2017-12-29 RX ADMIN — PRASUGREL HYDROCHLORIDE 10 MG: 10 TABLET, FILM COATED ORAL at 09:18

## 2017-12-29 RX ADMIN — RANOLAZINE 1000 MG: 500 TABLET, FILM COATED, EXTENDED RELEASE ORAL at 20:23

## 2017-12-29 RX ADMIN — METHYLPREDNISOLONE SODIUM SUCCINATE 60 MG: 125 INJECTION, POWDER, FOR SOLUTION INTRAMUSCULAR; INTRAVENOUS at 09:19

## 2017-12-29 RX ADMIN — PANTOPRAZOLE SODIUM 40 MG: 40 TABLET, DELAYED RELEASE ORAL at 20:23

## 2017-12-29 RX ADMIN — Medication 10.8 MILLICURIE: at 13:15

## 2017-12-29 RX ADMIN — RANOLAZINE 1000 MG: 500 TABLET, FILM COATED, EXTENDED RELEASE ORAL at 12:42

## 2017-12-29 RX ADMIN — ASPIRIN 325 MG: 325 TABLET ORAL at 09:18

## 2017-12-29 RX ADMIN — ENOXAPARIN SODIUM 40 MG: 40 INJECTION SUBCUTANEOUS at 09:19

## 2017-12-29 RX ADMIN — LEVOFLOXACIN 250 MG: 250 TABLET, FILM COATED ORAL at 17:35

## 2017-12-29 ASSESSMENT — PAIN SCALES - GENERAL: PAINLEVEL_OUTOF10: 0

## 2017-12-29 NOTE — PROGRESS NOTES
Physical Therapy   [address] ICU STEPDOWN TELEMETRY 4K    Time In: 9648  Time Out: 7664  Timed Code Treatment Minutes: 18 Minutes  Minutes: 23          Date: 2017  Patient Name: Dale Lynn,  Gender:  male        MRN: [id number]  : 1945  (67 y.o.)     Referring Practitioner: Troy Alvarez MD  Diagnosis: COPD exacerbation  Additional Pertinent Hx: Dale Lynn is a 67 y.o. male who presents with Complaint of shortness of breath, wheezing for the past day. States that he has history of COPD, and that he has an exacerbation whenever it gets really cold outside. Patient denies any fever or chills. Past Medical History:   Diagnosis Date    Acute coronary syndrome (HCC)     Arrhythmia     Arteriosclerotic heart disease (ASHD)     CHF (congestive heart failure) (HCC)     COPD (chronic obstructive pulmonary disease) (HCC)     Dizziness - light-headed     HX OF:    Emphysema     Esophagitis     Fatigue     HX OF:    History of chest pain     History of tinnitus     History of tobacco abuse     Hyperlipidemia     Hypertension     Irregular heart beat     Lightheadedness     MI (myocardial infarction)     Noncompliance with medication regimen     Noncompliance with medical therapy.  Pneumonia     Renal failure syndrome     Ringing in the ears     SOB (shortness of breath) on exertion      Past Surgical History:   Procedure Laterality Date    CARDIAC SURGERY      2 stents    COLONOSCOPY      CORONARY ANGIOPLASTY WITH STENT PLACEMENT      pt has 3 stents    DIAGNOSTIC CARDIAC CATH LAB PROCEDURE  3 09 2009    Successful primary stenting of mid LAD using drug-eluting stent.  DIAGNOSTIC CARDIAC CATH LAB PROCEDURE  1 10 2011    LV demonstrated normal systolic function, EF 93%. On pullback, no gradient was noted.  No critical lesions noted in all large epicardial vessels so that RCA is dominant vessel w/just very mild diffuse disease, about 20-30% lesions. Left main widely patent w/mild disease distally. Circumflex widely patent w/diffuse disease, also about 20-30%.  ENDOSCOPY, COLON, DIAGNOSTIC      HERNIA REPAIR  2008,2010    DC COLON CA SCRN NOT HI RSK IND Left 11/25/2017    COLONOSCOPY performed by Avery Mansfield MD at [facility] [place] [facility]    DC EGD TRANSORAL BIOPSY SINGLE/MULTIPLE Left 11/25/2017    EGD BIOPSY performed by Avery Mansfield MD at [address] ECHOCARDIOGRAM  1 10 2011    Size was normal. Systolic function was normal. EF estimated in range of 55-65%. No regional wall motion abnormalities. Wall thickness was normal. Doppler - LV diastolic function parameters were normal. This is technically limited study which may impair interpretation.  TRANSTHORACIC ECHOCARDIOGRAM  3 06 2009    This study is technically limited. Distal anterior apical wall hypokinesis. EF 40-45%. Left atrial size w/in normal limits. Trace MR. No evidence of aortic stenosis or aortic insufficiency. Right atrium and right ventricle are of normal contractility. Trace TR. No pericardial effusion.  UPPER GASTROINTESTINAL ENDOSCOPY Left 11/21/2017    EGD DIAGNOSTIC ONLY performed by Ramses Stevens MD at [facility] [place] [facility]       Restrictions/Precautions:  Restrictions/Precautions: General Precautions, Fall Risk                      Subjective:     Subjective: RN approved session. Pt resting in bed at arrival, very agitated that he could not eat d/t upcoming stress test, pt did however agree to amb and perform therex.      Pain:   .  Pain Assessment  Pain Level: 0       Social/Functional:  Lives With: Alone  Type of Home: Apartment  Home Layout: One level  Home Access: Level entry  Home Equipment:  (None)     Objective:  Supine to Sit: Modified independent  Sit to Supine: Modified independent  Scooting: Modified independent    Transfers  Sit to Stand: Stand by assistance  Stand to sit: Stand by assistance

## 2017-12-29 NOTE — PROGRESS NOTES
1. 2   GLUCOSE  110*  158*  146*     Hepatic: No results for input(s): AST, ALT, ALB, BILITOT, ALKPHOS in the last 72 hours. Troponin: No results for input(s): TROPONINI in the last 72 hours. BNP: No results for input(s): BNP in the last 72 hours. Lipids: No results for input(s): CHOL, HDL in the last 72 hours. Invalid input(s): LDLCALCU  INR: No results for input(s): INR in the last 72 hours. Radiology    Objective:   Vitals: /60   Pulse 82   Temp 97.5 °F (36.4 °C) (Oral)   Resp 16   Ht 5' 2\" (1.575 m)   Wt 122 lb 1 oz (55.4 kg)   SpO2 92%   BMI 22.33 kg/m²   HEENT: Head:pupils react  Neck: supple  Lungs: improved air exchange bilat no rhonchi  Heart: regular rate and rhythm   Abdomen: soft BS heard   Extremities: warm no edema  Neurologic:  Alert, oriented X3    Impression:   :   1. COPD exacerbation. 2. Sinus tachycardia probably secondary to above with elevated troponin, cardio consulted. 3.  History of hypertension. 4.  History of hyperlipidemia. 5.  History of chronic congestive heart failure secondary to systolic ejection fraction of 55%. 6.  Deconditioning. 7.  Pneumonia.     Plan:    If stress test unremarkable and ok with consultants discharge home    Cynthia Dill MD

## 2017-12-29 NOTE — PLAN OF CARE
Problem: Falls - Risk of  Goal: Absence of falls  Outcome: Ongoing  Pt has had no falls this time this shift. Bed alarm is on, fall band on, fall sign posted. Hourly rounding on pt. Problem: Risk for Impaired Skin Integrity  Goal: Tissue integrity - skin and mucous membranes  Structural intactness and normal physiological function of skin and  mucous membranes. Outcome: Ongoing  There are no new areas of skin breakdown this shift. Pt can reposition self in bed, pillow support given. Hourly rounding on pt. Problem: Breathing Pattern - Ineffective:  Goal: Ability to achieve and maintain a regular respiratory rate will improve  Ability to achieve and maintain a regular respiratory rate will improve   Outcome: Ongoing  Pt is tachypneic at times, RR has been regular at 16 this shift. Problem: Discharge Planning:  Goal: Discharged to appropriate level of care  Discharged to appropriate level of care   Outcome: Ongoing  Pt plans to be discharged home when medically stable. Problem: Airway Clearance - Ineffective:  Goal: Clear lung sounds  Clear lung sounds   Outcome: Ongoing  Pt lung sounds diminished throughout. Problem: Fluid Volume - Deficit:  Goal: Achieves intake and output within specified parameters  Achieves intake and output within specified parameters   Pt voiding throughout shift. Problem: Gas Exchange - Impaired:  Goal: Levels of oxygenation will improve  Levels of oxygenation will improve   Outcome: Ongoing  Pt O2 sats 93% on room air. Problem: Tobacco Use:  Goal: Will participate in inpatient tobacco-use cessation counseling  Will participate in inpatient tobacco-use cessation counseling   Outcome: Ongoing  Educated pt on importance of tobacco cessation to prevent exacerbations. Problem: DISCHARGE BARRIERS  Goal: Patient's continuum of care needs are met  Outcome: Ongoing  Pt continuum of care needs currently being met. Comments: Care plan reviewed with patient.   Patient

## 2017-12-29 NOTE — PROGRESS NOTES
[facility] STEPDOWN TELEMETRY 4K  EVALUATION    Time:  Time In: 3938  Time Out: 1215  Timed Code Treatment Minutes: 0 Minutes  Minutes: 20          Date: 2017  Patient Name: Wallace Carvalho,   Gender: male      MRN: [id number]  : 1945  (67 y.o.)  Referring Practitioner: Dr. Kyree Lopez  Diagnosis: COPD exacerbation  Additional Pertinent Hx:  67 y.o. male who presents with Complaint of shortness of breath, wheezing for the past day. States that he has history of COPD, and that he has an exacerbation whenever it gets really cold outside. Patient denies any fever or chills. Restrictions/Precautions:  Restrictions/Precautions: General Precautions, Fall Risk         Past Medical History:   Diagnosis Date    Acute coronary syndrome ([address]     Arrhythmia     Arteriosclerotic heart disease (ASHD)     CHF (congestive heart failure) (HCC)     COPD (chronic obstructive pulmonary disease) (HCC)     Dizziness - light-headed     HX OF:    Emphysema     Esophagitis     Fatigue     HX OF:    History of chest pain     History of tinnitus     History of tobacco abuse     Hyperlipidemia     Hypertension     Irregular heart beat     Lightheadedness     MI (myocardial infarction)     Noncompliance with medication regimen     Noncompliance with medical therapy.  Pneumonia     Renal failure syndrome     Ringing in the ears     SOB (shortness of breath) on exertion      Past Surgical History:   Procedure Laterality Date    CARDIAC SURGERY      2 stents    COLONOSCOPY      CORONARY ANGIOPLASTY WITH STENT PLACEMENT      pt has 3 stents    DIAGNOSTIC CARDIAC CATH LAB PROCEDURE  3 2009    Successful primary stenting of mid LAD using drug-eluting stent.  DIAGNOSTIC CARDIAC CATH LAB PROCEDURE  1 10 2011    LV demonstrated normal systolic function, EF 08%. On pullback, no gradient was noted.  No critical lesions noted in all large

## 2017-12-29 NOTE — CARE COORDINATION
12/29/17, 12:21 PM  Patient is current with [facility]. SW informed them of possible discharge today or this weekend, spoke to Oksana Liz  Discharge plan discussed by  and . Discharge plan reviewed with patient/ family. Patient/ family verbalize understanding of discharge plan and are in agreement with plan. Understanding was demonstrated using the teach back method.      Services After Discharge  Services At/After Discharge: Skilled Therapy, Nursing Services

## 2017-12-30 VITALS
BODY MASS INDEX: 22.49 KG/M2 | SYSTOLIC BLOOD PRESSURE: 121 MMHG | RESPIRATION RATE: 20 BRPM | DIASTOLIC BLOOD PRESSURE: 63 MMHG | WEIGHT: 122.2 LBS | HEIGHT: 62 IN | TEMPERATURE: 98.1 F | OXYGEN SATURATION: 94 % | HEART RATE: 84 BPM

## 2017-12-30 LAB
GLUCOSE BLD-MCNC: 124 MG/DL (ref 70–108)
GLUCOSE BLD-MCNC: 211 MG/DL (ref 70–108)
GRAM STAIN RESULT: NORMAL
RESPIRATORY CULTURE: NORMAL

## 2017-12-30 PROCEDURE — 94640 AIRWAY INHALATION TREATMENT: CPT

## 2017-12-30 PROCEDURE — 82948 REAGENT STRIP/BLOOD GLUCOSE: CPT

## 2017-12-30 PROCEDURE — 6370000000 HC RX 637 (ALT 250 FOR IP): Performed by: INTERNAL MEDICINE

## 2017-12-30 PROCEDURE — 6360000002 HC RX W HCPCS: Performed by: INTERNAL MEDICINE

## 2017-12-30 PROCEDURE — 6370000000 HC RX 637 (ALT 250 FOR IP): Performed by: EMERGENCY MEDICINE

## 2017-12-30 PROCEDURE — 99232 SBSQ HOSP IP/OBS MODERATE 35: CPT | Performed by: INTERNAL MEDICINE

## 2017-12-30 RX ORDER — PREDNISONE 10 MG/1
TABLET ORAL
Qty: 30 TABLET | Refills: 0 | Status: SHIPPED | OUTPATIENT
Start: 2017-12-30 | End: 2018-01-09

## 2017-12-30 RX ADMIN — ENOXAPARIN SODIUM 40 MG: 40 INJECTION SUBCUTANEOUS at 09:08

## 2017-12-30 RX ADMIN — PANTOPRAZOLE SODIUM 40 MG: 40 TABLET, DELAYED RELEASE ORAL at 09:06

## 2017-12-30 RX ADMIN — PREDNISONE 40 MG: 20 TABLET ORAL at 09:06

## 2017-12-30 RX ADMIN — ASPIRIN 325 MG: 325 TABLET ORAL at 09:06

## 2017-12-30 RX ADMIN — PRASUGREL HYDROCHLORIDE 10 MG: 10 TABLET, FILM COATED ORAL at 09:06

## 2017-12-30 RX ADMIN — IPRATROPIUM BROMIDE AND ALBUTEROL SULFATE 3 ML: .5; 3 SOLUTION RESPIRATORY (INHALATION) at 08:24

## 2017-12-30 RX ADMIN — RANOLAZINE 1000 MG: 500 TABLET, FILM COATED, EXTENDED RELEASE ORAL at 09:06

## 2017-12-30 NOTE — PROGRESS NOTES
ALB, BILITOT, ALKPHOS, LIPASE in the last 72 hours. Invalid input(s): AMYLASE  TROP  Lab Results   Component Value Date    TROPONINT 0.179 12/28/2017    TROPONINT 0.193 12/27/2017    TROPONINT 0.229 12/27/2017     BNP  Lab Results   Component Value Date    PROBNP 206.4 12/27/2017    PROBNP 702.3 11/16/2017    PROBNP 211.5 11/13/2017     D-Dimer  Lab Results   Component Value Date    DDIMER 1720.00 11/16/2017     Lactic Acid  No results for input(s): LACTA in the last 72 hours. INR  No results for input(s): INR, PROTIME in the last 72 hours. PTT  No results for input(s): APTT in the last 72 hours. Glucose  Recent Labs      12/29/17   1712  12/29/17 2017 12/30/17   0750   POCGLU  199*  217*  124*     UA No results for input(s): SPECGRAV, PHUR, COLORU, CLARITYU, MUCUS, PROTEINU, BLOODU, RBCUA, WBCUA, BACTERIA, NITRU, GLUCOSEU, BILIRUBINUR, UROBILINOGEN, KETUA, LABCAST, LABCASTTY, AMORPHOS in the last 72 hours. Invalid input(s): CRYSTALS. PFT's:             Echo   11/17/2017:  Technically difficult examination.   Normal left ventricle size and systolic function. Ejection fraction was   estimated at 55%.   Unable to determine wall motion abnormalities due to poor image quality.   Valves were not well visualized. Stress test:  Patient Name  Sosa Ritter  Gender               Male      MR #          [id number]         Race                                                       Ethnicity      Account #     [de-identified]         Room Number          0006      Accession     [id number]         Date of study        12/29/2017   Number  Conclusions      Summary   There are no resting or stress-induced perfusion defects to suggest   ischemia or infarction. Preserved LVEF. Recommendation   Clinical correlation is recommended. Medical management.       Signatures      ----------------------------------------------------------------   Electronically signed by Ellen Lopes MD (Interpreting

## 2017-12-30 NOTE — PLAN OF CARE
Problem: Falls - Risk of  Goal: Absence of falls  Outcome: Ongoing  Absent of falls this shift. Oriented to surroundings. Up with help. Problem: Risk for Impaired Skin Integrity  Goal: Tissue integrity - skin and mucous membranes  Structural intactness and normal physiological function of skin and  mucous membranes. Outcome: Ongoing  Mucous membranes pink & moist. No apparent skin issues. Problem: Discharge Planning:  Goal: Discharged to appropriate level of care  Discharged to appropriate level of care   Outcome: Ongoing  Pt plans to be discharged home with wife. Problem: Airway Clearance - Ineffective:  Goal: Clear lung sounds  Clear lung sounds   Outcome: Ongoing  Lungs are diminished in bases. Pt using acapella. On RA. Problem: Fluid Volume - Deficit:  Goal: Achieves intake and output within specified parameters  Achieves intake and output within specified parameters   Outcome: Ongoing  Pt urinating & drinking adequately. Problem: Gas Exchange - Impaired:  Goal: Levels of oxygenation will improve  Levels of oxygenation will improve   Outcome: Ongoing  Pt sating in 90's on RA    Problem: DISCHARGE BARRIERS  Goal: Patient's continuum of care needs are met  Outcome: Completed Date Met: 12/29/17      Comments: Care plan reviewed with patient. Patient verbalize understanding of the plan of care and contribute to goal setting.

## 2017-12-30 NOTE — PLAN OF CARE
Problem: Impaired respiratory status  Goal: Clear lung sounds  Outcome: Ongoing  Breath sounds are diminished

## 2017-12-30 NOTE — FLOWSHEET NOTE
12/29/17 2136   Encounter Summary   Services provided to: Patient   Referral/Consult From: Rounding   Support System Unknown   Continue Visiting Yes  (12/29:  Patient Sleeping)   Complexity of Encounter Low   Length of Encounter 15 minutes   Spiritual/Gnosticist   Type Spiritual support     --Patient was sleeping. This  prayed silently and left a Spiritual Care card on his table.

## 2018-01-02 LAB
BLOOD CULTURE, ROUTINE: NORMAL
BLOOD CULTURE, ROUTINE: NORMAL

## 2018-02-20 ENCOUNTER — TELEPHONE (OUTPATIENT)
Dept: CARDIOLOGY CLINIC | Age: 73
End: 2018-02-20

## 2018-02-21 RX ORDER — ROSUVASTATIN CALCIUM 40 MG/1
40 TABLET, COATED ORAL EVERY EVENING
COMMUNITY
End: 2018-07-11

## 2018-02-22 ENCOUNTER — OFFICE VISIT (OUTPATIENT)
Dept: CARDIOLOGY CLINIC | Age: 73
End: 2018-02-22
Payer: MEDICARE

## 2018-02-22 VITALS
SYSTOLIC BLOOD PRESSURE: 132 MMHG | HEIGHT: 62 IN | BODY MASS INDEX: 24 KG/M2 | DIASTOLIC BLOOD PRESSURE: 62 MMHG | WEIGHT: 130.4 LBS | HEART RATE: 90 BPM

## 2018-02-22 DIAGNOSIS — I10 ESSENTIAL HYPERTENSION: ICD-10-CM

## 2018-02-22 DIAGNOSIS — E78.00 PURE HYPERCHOLESTEROLEMIA: ICD-10-CM

## 2018-02-22 DIAGNOSIS — I25.10 CORONARY ARTERY DISEASE INVOLVING NATIVE CORONARY ARTERY OF NATIVE HEART WITHOUT ANGINA PECTORIS: Primary | ICD-10-CM

## 2018-02-22 DIAGNOSIS — Z95.820 S/P ANGIOPLASTY WITH STENT: ICD-10-CM

## 2018-02-22 PROBLEM — I20.0 UNSTABLE ANGINA (HCC): Status: RESOLVED | Noted: 2017-11-16 | Resolved: 2018-02-22

## 2018-02-22 PROCEDURE — 3017F COLORECTAL CA SCREEN DOC REV: CPT | Performed by: INTERNAL MEDICINE

## 2018-02-22 PROCEDURE — 4004F PT TOBACCO SCREEN RCVD TLK: CPT | Performed by: INTERNAL MEDICINE

## 2018-02-22 PROCEDURE — 1123F ACP DISCUSS/DSCN MKR DOCD: CPT | Performed by: INTERNAL MEDICINE

## 2018-02-22 PROCEDURE — G8420 CALC BMI NORM PARAMETERS: HCPCS | Performed by: INTERNAL MEDICINE

## 2018-02-22 PROCEDURE — G8598 ASA/ANTIPLAT THER USED: HCPCS | Performed by: INTERNAL MEDICINE

## 2018-02-22 PROCEDURE — G8427 DOCREV CUR MEDS BY ELIG CLIN: HCPCS | Performed by: INTERNAL MEDICINE

## 2018-02-22 PROCEDURE — 99215 OFFICE O/P EST HI 40 MIN: CPT | Performed by: INTERNAL MEDICINE

## 2018-02-22 PROCEDURE — 4040F PNEUMOC VAC/ADMIN/RCVD: CPT | Performed by: INTERNAL MEDICINE

## 2018-02-22 PROCEDURE — G8484 FLU IMMUNIZE NO ADMIN: HCPCS | Performed by: INTERNAL MEDICINE

## 2018-02-22 RX ORDER — METOPROLOL TARTRATE 37.5 MG/1
37.5 TABLET, FILM COATED ORAL 2 TIMES DAILY
Qty: 180 TABLET | Refills: 6 | Status: SHIPPED | OUTPATIENT
Start: 2018-02-22 | End: 2019-08-26 | Stop reason: ALTCHOICE

## 2018-02-22 NOTE — PROGRESS NOTES
Chief Complaint   Patient presents with    Check-Up     former [facility] patient    Shortness of Breath    Congestive Heart Failure   Patient here for an office visit - former [facility] patient    Post hospital DC follow up after being treated for GI bleeding , anemia , transfuison, acs post cardiac arrest - inf STEMI and RCA mod lesion and med RX  Came today for F/u    Stable post D/C  Gi work up done and already back on effient    Patient denies cp    Patient complains of sob, dizziness, peripheral edema and palpitations       Reviewed the records  Cath Nov 2017 post cardiac arrest and inf stemi in setting of GI bleed  emergant cath done mod RCA lesion med RX  FFR considered - later nuc stress test done dec 2017 neg  Med RX recommended By dr. 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[address]    Shortness of breath    History of tinnitus    Light headed    Fatigue    History of tobacco abuse    Noncompliance with medication regimen    Ringing in the ears    Lightheadedness    Irregular heart beat    Acute coronary syndrome ([address]    Hyperlipidemia    Esophagitis    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 acidosis    Cardiac arrest ([address]    ST elevation myocardial infarction (STEMI) ([address]    Gastroduodenitis  Elevated troponin    Coronary artery disease involving native coronary artery of native heart without angina pectoris       Past Surgical History:   Procedure Laterality Date    CARDIAC SURGERY      2 stents    COLONOSCOPY      CORONARY ANGIOPLASTY WITH STENT PLACEMENT      pt has 3 stents    DIAGNOSTIC CARDIAC CATH LAB PROCEDURE  3 09 2009    Successful primary stenting of mid LAD using drug-eluting stent.  DIAGNOSTIC CARDIAC CATH LAB PROCEDURE  1 10 2011    LV demonstrated normal systolic function, EF 69%. On pullback, no gradient was noted. No critical lesions noted in all large epicardial vessels so that RCA is dominant vessel w/just very mild diffuse disease, about 20-30% lesions. Left main widely patent w/mild disease distally. Circumflex widely patent w/diffuse disease, also about 20-30%.  ENDOSCOPY, COLON, DIAGNOSTIC      HERNIA REPAIR  2008,2010    MT COLON CA SCRN NOT HI RSK IND Left 11/25/2017    COLONOSCOPY performed by Christian Menjivar MD at [facility]    MT EGD TRANSORAL BIOPSY SINGLE/MULTIPLE Left 11/25/2017    EGD BIOPSY performed by Christian Menjivar MD at [address] ECHOCARDIOGRAM  1 10 2011    Size was normal. Systolic function was normal. EF estimated in range of 55-65%. No regional wall motion abnormalities. Wall thickness was normal. Doppler - LV diastolic function parameters were normal. This is technically limited study which may impair interpretation.  TRANSTHORACIC ECHOCARDIOGRAM  3 06 2009    This study is technically limited. Distal anterior apical wall hypokinesis. EF 40-45%. Left atrial size w/in normal limits. Trace MR. No evidence of aortic stenosis or aortic insufficiency. Right atrium and right ventricle are of normal contractility. Trace TR. No pericardial effusion.      UPPER GASTROINTESTINAL ENDOSCOPY Left 11/21/2017    EGD DIAGNOSTIC ONLY performed by Ailyn Hammond MD at [facility]       Allergies   Allergen Reactions    Xanax [Alprazolam] Other (See Comments)     Altered mental status    Pneumococcal Vaccines Nausea And Vomiting     Reports vomiting x 1 day after vaccine    Codeine Hives     Upset stomach    Dilaudid [Hydromorphone] Hives     Upset stomach    Morphine Nausea And Vomiting        Family History   Problem Relation Age of Onset    Heart Disease Mother     Cancer Mother     Heart Disease Father     Cancer Father     Prostate Cancer Neg Hx         Social History     Social History    Marital status:      Spouse name: N/A    Number of children: 3    Years of education: N/A     Occupational History    Not on file.      Social History Main Topics    Smoking status: Current Every Day Smoker     Packs/day: 1.00     Years: 40.00     Types: Cigarettes     Start date: 5/31/1976    Smokeless tobacco: Never Used    Alcohol use No    Drug use: No    Sexual activity: Not on file     Other Topics Concern    Not on file     Social History Narrative    No narrative on file       Current Outpatient Prescriptions   Medication Sig Dispense Refill    metoprolol tartrate 37.5 MG TABS Take 37.5 mg by mouth 2 times daily 180 tablet 6    rosuvastatin (CRESTOR) 40 MG tablet Take 40 mg by mouth every evening      aspirin 81 MG tablet Take 1 tablet by mouth daily      prasugrel (EFFIENT) 10 MG TABS take 1 tablet once daily 30 tablet 11    amLODIPine (NORVASC) 2.5 MG tablet take 1 tablet by mouth once daily 30 tablet 11    albuterol sulfate HFA (VENTOLIN HFA) 108 (90 Base) MCG/ACT inhaler Inhale 2 puffs into the lungs 4 times daily 1 Inhaler 7    isosorbide dinitrate (ISORDIL) 20 MG tablet Take 1 tablet by mouth 3 times daily 90 tablet 3    ranolazine (RANEXA) 1000 MG extended release tablet Take 1 tablet by mouth 2 times daily 60 tablet 3    ipratropium-albuterol (DUONEB) 0.5-2.5 (3) MG/3ML SOLN nebulizer solution Inhale 3 mLs into the lungs 4 times daily 360 mL 2    pantoprazole (PROTONIX) 40 MG tablet Take 1

## 2018-02-27 ENCOUNTER — TELEPHONE (OUTPATIENT)
Dept: CARDIOLOGY CLINIC | Age: 73
End: 2018-02-27

## 2018-03-05 RX ORDER — ATORVASTATIN CALCIUM 40 MG/1
40 TABLET, FILM COATED ORAL DAILY
COMMUNITY
End: 2018-03-05 | Stop reason: SDUPTHER

## 2018-03-05 RX ORDER — ATORVASTATIN CALCIUM 40 MG/1
40 TABLET, FILM COATED ORAL DAILY
Qty: 90 TABLET | Refills: 3 | Status: SHIPPED | OUTPATIENT
Start: 2018-03-05 | End: 2019-03-13 | Stop reason: SDUPTHER

## 2018-04-29 ENCOUNTER — APPOINTMENT (OUTPATIENT)
Dept: GENERAL RADIOLOGY | Age: 73
End: 2018-04-29
Payer: MEDICARE

## 2018-04-29 ENCOUNTER — HOSPITAL ENCOUNTER (EMERGENCY)
Age: 73
Discharge: HOME | End: 2018-04-29
Payer: MEDICARE

## 2018-04-29 VITALS
OXYGEN SATURATION: 100 % | TEMPERATURE: 97.4 F | RESPIRATION RATE: 16 BRPM | BODY MASS INDEX: 23.78 KG/M2 | SYSTOLIC BLOOD PRESSURE: 131 MMHG | HEART RATE: 58 BPM | DIASTOLIC BLOOD PRESSURE: 74 MMHG | WEIGHT: 130 LBS

## 2018-04-29 DIAGNOSIS — S82.831A OTHER CLOSED FRACTURE OF DISTAL END OF RIGHT FIBULA, INITIAL ENCOUNTER: Primary | ICD-10-CM

## 2018-04-29 PROCEDURE — 6370000000 HC RX 637 (ALT 250 FOR IP): Performed by: EMERGENCY MEDICINE

## 2018-04-29 PROCEDURE — 73610 X-RAY EXAM OF ANKLE: CPT

## 2018-04-29 PROCEDURE — 73620 X-RAY EXAM OF FOOT: CPT

## 2018-04-29 PROCEDURE — 99283 EMERGENCY DEPT VISIT LOW MDM: CPT

## 2018-04-29 RX ORDER — IBUPROFEN 600 MG/1
600 TABLET ORAL EVERY 6 HOURS PRN
Qty: 30 TABLET | Refills: 0 | Status: SHIPPED | OUTPATIENT
Start: 2018-04-29 | End: 2019-08-26

## 2018-04-29 RX ORDER — IBUPROFEN 200 MG
600 TABLET ORAL ONCE
Status: COMPLETED | OUTPATIENT
Start: 2018-04-29 | End: 2018-04-29

## 2018-04-29 RX ADMIN — IBUPROFEN 600 MG: 200 TABLET, FILM COATED ORAL at 06:46

## 2018-04-29 ASSESSMENT — ENCOUNTER SYMPTOMS
WHEEZING: 1
BACK PAIN: 0
CHEST TIGHTNESS: 0
SHORTNESS OF BREATH: 0
COUGH: 1
ABDOMINAL PAIN: 0

## 2018-04-29 ASSESSMENT — PAIN SCALES - GENERAL
PAINLEVEL_OUTOF10: 10
PAINLEVEL_OUTOF10: 10
PAINLEVEL_OUTOF10: 6

## 2018-04-29 ASSESSMENT — PAIN DESCRIPTION - ORIENTATION
ORIENTATION: RIGHT
ORIENTATION: RIGHT

## 2018-04-29 ASSESSMENT — PAIN DESCRIPTION - PAIN TYPE
TYPE: ACUTE PAIN
TYPE: ACUTE PAIN

## 2018-04-29 ASSESSMENT — PAIN DESCRIPTION - DESCRIPTORS: DESCRIPTORS: ACHING

## 2018-04-29 ASSESSMENT — PAIN DESCRIPTION - FREQUENCY: FREQUENCY: CONTINUOUS

## 2018-04-29 ASSESSMENT — PAIN DESCRIPTION - LOCATION
LOCATION: ANKLE
LOCATION: ANKLE

## 2018-06-21 DIAGNOSIS — F17.200 SMOKER: ICD-10-CM

## 2018-06-22 DIAGNOSIS — F17.200 SMOKER: ICD-10-CM

## 2018-06-25 ENCOUNTER — OFFICE VISIT (OUTPATIENT)
Dept: CARDIOLOGY CLINIC | Age: 73
End: 2018-06-25
Payer: MEDICARE

## 2018-06-25 VITALS
HEIGHT: 62 IN | HEART RATE: 64 BPM | BODY MASS INDEX: 23.41 KG/M2 | SYSTOLIC BLOOD PRESSURE: 122 MMHG | WEIGHT: 127.2 LBS | DIASTOLIC BLOOD PRESSURE: 80 MMHG

## 2018-06-25 DIAGNOSIS — Z95.5 HISTORY OF PLACEMENT OF STENT IN LAD CORONARY ARTERY: ICD-10-CM

## 2018-06-25 DIAGNOSIS — I10 ESSENTIAL HYPERTENSION: ICD-10-CM

## 2018-06-25 DIAGNOSIS — I25.10 CORONARY ARTERY DISEASE INVOLVING NATIVE CORONARY ARTERY OF NATIVE HEART WITHOUT ANGINA PECTORIS: Primary | ICD-10-CM

## 2018-06-25 DIAGNOSIS — E78.00 PURE HYPERCHOLESTEROLEMIA: ICD-10-CM

## 2018-06-25 DIAGNOSIS — Z95.820 S/P ANGIOPLASTY WITH STENT: ICD-10-CM

## 2018-06-25 PROCEDURE — 4040F PNEUMOC VAC/ADMIN/RCVD: CPT | Performed by: INTERNAL MEDICINE

## 2018-06-25 PROCEDURE — G8427 DOCREV CUR MEDS BY ELIG CLIN: HCPCS | Performed by: INTERNAL MEDICINE

## 2018-06-25 PROCEDURE — G8598 ASA/ANTIPLAT THER USED: HCPCS | Performed by: INTERNAL MEDICINE

## 2018-06-25 PROCEDURE — 99214 OFFICE O/P EST MOD 30 MIN: CPT | Performed by: INTERNAL MEDICINE

## 2018-06-25 PROCEDURE — 4004F PT TOBACCO SCREEN RCVD TLK: CPT | Performed by: INTERNAL MEDICINE

## 2018-06-25 PROCEDURE — 1123F ACP DISCUSS/DSCN MKR DOCD: CPT | Performed by: INTERNAL MEDICINE

## 2018-06-25 PROCEDURE — 3017F COLORECTAL CA SCREEN DOC REV: CPT | Performed by: INTERNAL MEDICINE

## 2018-06-25 PROCEDURE — G8420 CALC BMI NORM PARAMETERS: HCPCS | Performed by: INTERNAL MEDICINE

## 2018-06-25 RX ORDER — PRASUGREL 10 MG/1
10 TABLET, FILM COATED ORAL DAILY
Qty: 30 TABLET | Refills: 6 | Status: SHIPPED | OUTPATIENT
Start: 2018-06-25 | End: 2018-07-13 | Stop reason: SDUPTHER

## 2018-06-25 RX ORDER — PRASUGREL 10 MG/1
TABLET, FILM COATED ORAL
Qty: 30 TABLET | Refills: 0 | Status: SHIPPED | OUTPATIENT
Start: 2018-06-25 | End: 2019-02-01 | Stop reason: SDUPTHER

## 2018-06-25 RX ORDER — PRASUGREL 10 MG/1
TABLET, FILM COATED ORAL
Qty: 30 TABLET | Refills: 0 | Status: SHIPPED | OUTPATIENT
Start: 2018-06-25 | End: 2018-06-25 | Stop reason: ALTCHOICE

## 2018-07-11 ENCOUNTER — HOSPITAL ENCOUNTER (EMERGENCY)
Age: 73
Discharge: HOME | End: 2018-07-12
Attending: EMERGENCY MEDICINE
Payer: MEDICARE

## 2018-07-11 DIAGNOSIS — J44.1 COPD EXACERBATION (HCC): Primary | ICD-10-CM

## 2018-07-11 LAB
EKG ATRIAL RATE: 56 BPM
EKG P AXIS: 70 DEGREES
EKG P-R INTERVAL: 152 MS
EKG Q-T INTERVAL: 414 MS
EKG QRS DURATION: 74 MS
EKG QTC CALCULATION (BAZETT): 399 MS
EKG R AXIS: 75 DEGREES
EKG T AXIS: 77 DEGREES
EKG VENTRICULAR RATE: 56 BPM

## 2018-07-11 PROCEDURE — 99285 EMERGENCY DEPT VISIT HI MDM: CPT

## 2018-07-11 PROCEDURE — 93005 ELECTROCARDIOGRAM TRACING: CPT | Performed by: EMERGENCY MEDICINE

## 2018-07-11 ASSESSMENT — PAIN DESCRIPTION - DESCRIPTORS: DESCRIPTORS: PRESSURE

## 2018-07-11 ASSESSMENT — PAIN DESCRIPTION - ORIENTATION: ORIENTATION: LEFT;MID

## 2018-07-11 ASSESSMENT — PAIN SCALES - GENERAL: PAINLEVEL_OUTOF10: 7

## 2018-07-11 ASSESSMENT — PAIN DESCRIPTION - FREQUENCY: FREQUENCY: INTERMITTENT

## 2018-07-11 ASSESSMENT — PAIN DESCRIPTION - LOCATION: LOCATION: CHEST

## 2018-07-12 ENCOUNTER — APPOINTMENT (OUTPATIENT)
Dept: CT IMAGING | Age: 73
End: 2018-07-12
Payer: MEDICARE

## 2018-07-12 ENCOUNTER — APPOINTMENT (OUTPATIENT)
Dept: GENERAL RADIOLOGY | Age: 73
End: 2018-07-12
Payer: MEDICARE

## 2018-07-12 VITALS
SYSTOLIC BLOOD PRESSURE: 159 MMHG | HEART RATE: 59 BPM | WEIGHT: 130 LBS | DIASTOLIC BLOOD PRESSURE: 67 MMHG | BODY MASS INDEX: 23.78 KG/M2 | RESPIRATION RATE: 18 BRPM | TEMPERATURE: 97.9 F | OXYGEN SATURATION: 95 %

## 2018-07-12 LAB
ALBUMIN SERPL-MCNC: 3.9 G/DL (ref 3.5–5.1)
ALP BLD-CCNC: 65 U/L (ref 38–126)
ALT SERPL-CCNC: 13 U/L (ref 11–66)
AMPHETAMINE+METHAMPHETAMINE URINE SCREEN: NEGATIVE
ANION GAP SERPL CALCULATED.3IONS-SCNC: 10 MEQ/L (ref 8–16)
AST SERPL-CCNC: 16 U/L (ref 5–40)
BACTERIA: ABNORMAL /HPF
BARBITURATE QUANTITATIVE URINE: POSITIVE
BASOPHILS # BLD: 0.4 %
BASOPHILS ABSOLUTE: 0 THOU/MM3 (ref 0–0.1)
BENZODIAZEPINE QUANTITATIVE URINE: NEGATIVE
BILIRUB SERPL-MCNC: 0.2 MG/DL (ref 0.3–1.2)
BILIRUBIN DIRECT: < 0.2 MG/DL (ref 0–0.3)
BILIRUBIN URINE: NEGATIVE
BLOOD, URINE: NEGATIVE
BUN BLDV-MCNC: 21 MG/DL (ref 7–22)
CALCIUM SERPL-MCNC: 9.5 MG/DL (ref 8.5–10.5)
CANNABINOID QUANTITATIVE URINE: NEGATIVE
CASTS 2: ABNORMAL /LPF
CASTS UA: ABNORMAL /LPF
CHARACTER, URINE: CLEAR
CHLORIDE BLD-SCNC: 102 MEQ/L (ref 98–111)
CO2: 26 MEQ/L (ref 23–33)
COCAINE METABOLITE QUANTITATIVE URINE: NEGATIVE
COLOR: ABNORMAL
CREAT SERPL-MCNC: 1.3 MG/DL (ref 0.4–1.2)
CRYSTALS, UA: ABNORMAL
EOSINOPHIL # BLD: 2.3 %
EOSINOPHILS ABSOLUTE: 0.2 THOU/MM3 (ref 0–0.4)
EPITHELIAL CELLS, UA: ABNORMAL /HPF
ERYTHROCYTE [DISTWIDTH] IN BLOOD BY AUTOMATED COUNT: 13.2 % (ref 11.5–14.5)
ERYTHROCYTE [DISTWIDTH] IN BLOOD BY AUTOMATED COUNT: 50.6 FL (ref 35–45)
ETHYL ALCOHOL, SERUM: < 0.01 %
GFR SERPL CREATININE-BSD FRML MDRD: 54 ML/MIN/1.73M2
GLUCOSE BLD-MCNC: 111 MG/DL (ref 70–108)
GLUCOSE URINE: NEGATIVE MG/DL
HCT VFR BLD CALC: 38.1 % (ref 42–52)
HEMOGLOBIN: 12.6 GM/DL (ref 14–18)
IMMATURE GRANS (ABS): 0.01 THOU/MM3 (ref 0–0.07)
IMMATURE GRANULOCYTES: 0.1 %
KETONES, URINE: ABNORMAL
LEUKOCYTE ESTERASE, URINE: ABNORMAL
LIPASE: 27.4 U/L (ref 5.6–51.3)
LYMPHOCYTES # BLD: 39.1 %
LYMPHOCYTES ABSOLUTE: 2.7 THOU/MM3 (ref 1–4.8)
MAGNESIUM: 2.1 MG/DL (ref 1.6–2.4)
MCH RBC QN AUTO: 34.3 PG (ref 26–33)
MCHC RBC AUTO-ENTMCNC: 33.1 GM/DL (ref 32.2–35.5)
MCV RBC AUTO: 103.8 FL (ref 80–94)
MISCELLANEOUS 2: ABNORMAL
MONOCYTES # BLD: 12.1 %
MONOCYTES ABSOLUTE: 0.8 THOU/MM3 (ref 0.4–1.3)
NITRITE, URINE: NEGATIVE
NUCLEATED RED BLOOD CELLS: 0 /100 WBC
OPIATES, URINE: NEGATIVE
OSMOLALITY CALCULATION: 279.3 MOSMOL/KG (ref 275–300)
OXYCODONE: NEGATIVE
PH UA: 5.5
PHENCYCLIDINE QUANTITATIVE URINE: NEGATIVE
PLATELET # BLD: 183 THOU/MM3 (ref 130–400)
PMV BLD AUTO: 10.4 FL (ref 9.4–12.4)
POTASSIUM SERPL-SCNC: 4.4 MEQ/L (ref 3.5–5.2)
PRO-BNP: 174.8 PG/ML (ref 0–900)
PROTEIN UA: NEGATIVE
RBC # BLD: 3.67 MILL/MM3 (ref 4.7–6.1)
RBC URINE: ABNORMAL /HPF
RENAL EPITHELIAL, UA: ABNORMAL
SEG NEUTROPHILS: 46 %
SEGMENTED NEUTROPHILS ABSOLUTE COUNT: 3.2 THOU/MM3 (ref 1.8–7.7)
SODIUM BLD-SCNC: 138 MEQ/L (ref 135–145)
SPECIFIC GRAVITY, URINE: 1.02 (ref 1–1.03)
TOTAL PROTEIN: 6.4 G/DL (ref 6.1–8)
TROPONIN T: < 0.01 NG/ML
TROPONIN T: < 0.01 NG/ML
TSH SERPL DL<=0.05 MIU/L-ACNC: 3.46 UIU/ML (ref 0.4–4.2)
UROBILINOGEN, URINE: 0.2 EU/DL
WBC # BLD: 7 THOU/MM3 (ref 4.8–10.8)
WBC UA: ABNORMAL /HPF
YEAST: ABNORMAL

## 2018-07-12 PROCEDURE — 84484 ASSAY OF TROPONIN QUANT: CPT

## 2018-07-12 PROCEDURE — 6370000000 HC RX 637 (ALT 250 FOR IP): Performed by: EMERGENCY MEDICINE

## 2018-07-12 PROCEDURE — 83735 ASSAY OF MAGNESIUM: CPT

## 2018-07-12 PROCEDURE — 93010 ELECTROCARDIOGRAM REPORT: CPT | Performed by: INTERNAL MEDICINE

## 2018-07-12 PROCEDURE — 83690 ASSAY OF LIPASE: CPT

## 2018-07-12 PROCEDURE — 94640 AIRWAY INHALATION TREATMENT: CPT

## 2018-07-12 PROCEDURE — 6360000004 HC RX CONTRAST MEDICATION: Performed by: EMERGENCY MEDICINE

## 2018-07-12 PROCEDURE — 85025 COMPLETE CBC W/AUTO DIFF WBC: CPT

## 2018-07-12 PROCEDURE — 96374 THER/PROPH/DIAG INJ IV PUSH: CPT

## 2018-07-12 PROCEDURE — 81001 URINALYSIS AUTO W/SCOPE: CPT

## 2018-07-12 PROCEDURE — 84443 ASSAY THYROID STIM HORMONE: CPT

## 2018-07-12 PROCEDURE — 80053 COMPREHEN METABOLIC PANEL: CPT

## 2018-07-12 PROCEDURE — 87086 URINE CULTURE/COLONY COUNT: CPT

## 2018-07-12 PROCEDURE — 71046 X-RAY EXAM CHEST 2 VIEWS: CPT

## 2018-07-12 PROCEDURE — 6360000002 HC RX W HCPCS: Performed by: EMERGENCY MEDICINE

## 2018-07-12 PROCEDURE — 71275 CT ANGIOGRAPHY CHEST: CPT

## 2018-07-12 PROCEDURE — 96375 TX/PRO/DX INJ NEW DRUG ADDON: CPT

## 2018-07-12 PROCEDURE — 83880 ASSAY OF NATRIURETIC PEPTIDE: CPT

## 2018-07-12 PROCEDURE — 36415 COLL VENOUS BLD VENIPUNCTURE: CPT

## 2018-07-12 PROCEDURE — 82248 BILIRUBIN DIRECT: CPT

## 2018-07-12 PROCEDURE — 80307 DRUG TEST PRSMV CHEM ANLYZR: CPT

## 2018-07-12 PROCEDURE — G0480 DRUG TEST DEF 1-7 CLASSES: HCPCS

## 2018-07-12 RX ORDER — METHYLPREDNISOLONE SODIUM SUCCINATE 125 MG/2ML
80 INJECTION, POWDER, LYOPHILIZED, FOR SOLUTION INTRAMUSCULAR; INTRAVENOUS ONCE
Status: COMPLETED | OUTPATIENT
Start: 2018-07-12 | End: 2018-07-12

## 2018-07-12 RX ORDER — IPRATROPIUM BROMIDE AND ALBUTEROL SULFATE 2.5; .5 MG/3ML; MG/3ML
1 SOLUTION RESPIRATORY (INHALATION) ONCE
Status: COMPLETED | OUTPATIENT
Start: 2018-07-12 | End: 2018-07-12

## 2018-07-12 RX ORDER — PANTOPRAZOLE SODIUM 40 MG/1
40 TABLET, DELAYED RELEASE ORAL ONCE
Status: COMPLETED | OUTPATIENT
Start: 2018-07-12 | End: 2018-07-12

## 2018-07-12 RX ORDER — SUCRALFATE 1 G/1
1 TABLET ORAL ONCE
Status: COMPLETED | OUTPATIENT
Start: 2018-07-12 | End: 2018-07-12

## 2018-07-12 RX ORDER — ALBUTEROL SULFATE 2.5 MG/3ML
2.5 SOLUTION RESPIRATORY (INHALATION) EVERY 6 HOURS PRN
Qty: 120 EACH | Refills: 3 | Status: SHIPPED | OUTPATIENT
Start: 2018-07-12 | End: 2019-01-28 | Stop reason: SDUPTHER

## 2018-07-12 RX ORDER — ONDANSETRON 4 MG/1
4 TABLET, ORALLY DISINTEGRATING ORAL ONCE
Status: COMPLETED | OUTPATIENT
Start: 2018-07-12 | End: 2018-07-12

## 2018-07-12 RX ORDER — KETOROLAC TROMETHAMINE 30 MG/ML
15 INJECTION, SOLUTION INTRAMUSCULAR; INTRAVENOUS ONCE
Status: COMPLETED | OUTPATIENT
Start: 2018-07-12 | End: 2018-07-12

## 2018-07-12 RX ADMIN — METHYLPREDNISOLONE SODIUM SUCCINATE 80 MG: 125 INJECTION, POWDER, FOR SOLUTION INTRAMUSCULAR; INTRAVENOUS at 00:38

## 2018-07-12 RX ADMIN — PANTOPRAZOLE SODIUM 40 MG: 40 TABLET, DELAYED RELEASE ORAL at 03:06

## 2018-07-12 RX ADMIN — ONDANSETRON 4 MG: 4 TABLET, ORALLY DISINTEGRATING ORAL at 03:06

## 2018-07-12 RX ADMIN — SUCRALFATE 1 G: 1 TABLET ORAL at 03:06

## 2018-07-12 RX ADMIN — IOPAMIDOL 80 ML: 755 INJECTION, SOLUTION INTRAVENOUS at 04:11

## 2018-07-12 RX ADMIN — LIDOCAINE HYDROCHLORIDE: 20 SOLUTION ORAL; TOPICAL at 03:06

## 2018-07-12 RX ADMIN — IPRATROPIUM BROMIDE AND ALBUTEROL SULFATE 1 AMPULE: .5; 3 SOLUTION RESPIRATORY (INHALATION) at 00:42

## 2018-07-12 RX ADMIN — KETOROLAC TROMETHAMINE 15 MG: 30 INJECTION, SOLUTION INTRAMUSCULAR at 03:56

## 2018-07-12 ASSESSMENT — ENCOUNTER SYMPTOMS
SHORTNESS OF BREATH: 1
VOMITING: 0
ABDOMINAL DISTENTION: 0
CHOKING: 0
EYE REDNESS: 0
CONSTIPATION: 0
EYE PAIN: 0
SORE THROAT: 0
ABDOMINAL PAIN: 0
CHEST TIGHTNESS: 0
TROUBLE SWALLOWING: 0
RHINORRHEA: 0
BACK PAIN: 0
PHOTOPHOBIA: 0
SINUS PRESSURE: 0
EYE DISCHARGE: 0
BLOOD IN STOOL: 0
NAUSEA: 0
DIARRHEA: 0
COUGH: 0
EYE ITCHING: 0
VOICE CHANGE: 0
WHEEZING: 1

## 2018-07-12 ASSESSMENT — PAIN SCALES - GENERAL
PAINLEVEL_OUTOF10: 6
PAINLEVEL_OUTOF10: 5

## 2018-07-12 NOTE — ED NOTES
Pt states that the Toradol helped a lot with his chest pain. Pt is rating his pain a \"5/10\" currently.      Taryn Jordan RN  07/12/18 2836

## 2018-07-12 NOTE — ED PROVIDER NOTES
[facility]  eMERGENCY dEPARTMENT eNCOUnter          CHIEF COMPLAINT       Chief Complaint   Patient presents with    Chest Pain       Nurses Notes reviewed and I agree except as noted in the HPI. HISTORY OF PRESENT ILLNESS    Harshad Moran is a 67 y.o. male who presents to the Emergency Department for the evaluation of left sided chest pain. The patient states that his pain started just before 10:00 PM last night. The patient states that he had no relief from taking his evening medications. The patient admits a chronic cough, and a splitting headache. The patient denies any fever or any other signs or symptoms at this time. The patient states that he has not taken any breathing treatments at home. The patient used an albuterol at home once. The patient admits smoking. He has not been on any steroids recently. The patient denies any falls or trauma. The patient admits having 2 cardiac stents placed by his cardiologist, chest pain. The patient denies any history of diabetes mellitus. The patient admits extensive daily sun exposure. The HPI was provided by the patient. REVIEW OF SYSTEMS     Review of Systems   Constitutional: Negative for activity change, appetite change, chills, diaphoresis, fatigue, fever and unexpected weight change. HENT: Negative for congestion, ear discharge, ear pain, hearing loss, rhinorrhea, sinus pressure, sore throat, trouble swallowing and voice change. Eyes: Negative for photophobia, pain, discharge, redness, itching and visual disturbance. Respiratory: Positive for shortness of breath and wheezing. Negative for cough, choking and chest tightness. Cardiovascular: Positive for chest pain. Negative for palpitations and leg swelling. Gastrointestinal: Negative for abdominal distention, abdominal pain, blood in stool, constipation, diarrhea, nausea and vomiting. Endocrine: Negative for polydipsia, polyphagia and polyuria.    Genitourinary: Negative for decreased urine volume, difficulty urinating, dysuria, enuresis, frequency, hematuria and urgency. Musculoskeletal: Negative for arthralgias, back pain, gait problem, joint swelling, myalgias, neck pain and neck stiffness. Skin: Negative for pallor and rash. Allergic/Immunologic: Negative for environmental allergies and immunocompromised state. Neurological: Negative for dizziness, tremors, seizures, syncope, facial asymmetry, weakness, light-headedness, numbness and headaches. Hematological: Negative for adenopathy. Does not bruise/bleed easily. Psychiatric/Behavioral: Negative for agitation, confusion, hallucinations and suicidal ideas. The patient is not nervous/anxious. PAST MEDICAL HISTORY    has a past medical history of Acute coronary syndrome ([facility] Arrhythmia; Arteriosclerotic heart disease (ASHD); CHF (congestive heart failure) ([facility] COPD (chronic obstructive pulmonary disease) ([facility] Dizziness - light-headed; Emphysema; Esophagitis; Fatigue; History of chest pain; History of tinnitus; History of tobacco abuse; Hyperlipidemia; Hypertension; Irregular heart beat; Lightheadedness; MI (myocardial infarction); Noncompliance with medication regimen; Pneumonia; Renal failure syndrome; Ringing in the ears; and SOB (shortness of breath) on exertion. SURGICAL HISTORY      has a past surgical history that includes transthoracic echocardiogram (1 10 2011); transthoracic echocardiogram (3 06 2009); Diagnostic Cardiac Cath Lab Procedure (3 09 2009); Diagnostic Cardiac Cath Lab Procedure (1 10 2011); Cardiac surgery; hernia repair (5880,8577); Coronary angioplasty with stent; Colonoscopy; Endoscopy, colon, diagnostic; Upper gastrointestinal endoscopy (Left, 11/21/2017); pr egd transoral biopsy single/multiple (Left, 11/25/2017); and pr colon ca scrn not hi rsk ind (Left, 11/25/2017).     CURRENT MEDICATIONS       Discharge Medication List as of 7/12/2018  5:07 AM      CONTINUE these no cervical adenopathy. Neurological: He is alert and oriented to person, place, and time. He has normal reflexes. No cranial nerve deficit. He exhibits normal muscle tone. Coordination normal.   Skin: Skin is warm and dry. No rash noted. He is not diaphoretic. Psychiatric: He has a normal mood and affect. His behavior is normal. Judgment and thought content normal.   Nursing note and vitals reviewed. DIFFERENTIAL DIAGNOSIS:   Differential diagnoses were discussed extensively with the patient and family including but no limited to bronchitis, pneumonia, COPD     DIAGNOSTIC RESULTS     EKG: All EKG's are interpreted by the Emergency Department Physician who either signs or Co-signs this chart in the absence of a cardiologist.  EKG interpreted by Cathie Weinstein DO:    EKG read and interpreted by myself with comparison to 12/27/2017 gives impression of sinus bradycardia with heart rate of 56; interval 152; QRS 74;QTc 399; axis 70 75 77. Septal infarct, age undetermined, No STEMI    RADIOLOGY: non-plain film images(s) such as CT, Ultrasound and MRI are read by the radiologist.    CTA CHEST [address]   Final Result      No acute pulmonary embolism. No acute pneumonia. Bilateral lower lobe and lingular atelectasis and/or scarring. Old granulomatous disease. Thickening of the wall of the proximal stomach, see discussion above and correlate clinically. **This report has been created using voice recognition software. It may contain minor errors which are inherent in voice recognition technology. **      Final report electronically signed by Dr. Madison Rivera on 7/12/2018 4:47 AM      XR CHEST STANDARD (2 VW)   Final Result      No acute cardiopulmonary disease. **This report has been created using voice recognition software. It may contain minor errors which are inherent in voice recognition technology. **      Final report electronically signed by Dr. Madison Rivera on 7/12/2018 2:13 AM LABS:   Labs Reviewed   CBC WITH AUTO DIFFERENTIAL - Abnormal; Notable for the following:        Result Value    RBC 3.67 (*)     Hemoglobin 12.6 (*)     Hematocrit 38.1 (*)     .8 (*)     MCH 34.3 (*)     RDW-SD 50.6 (*)     All other components within normal limits   BASIC METABOLIC PANEL - Abnormal; Notable for the following:     Glucose 111 (*)     CREATININE 1.3 (*)     All other components within normal limits   HEPATIC FUNCTION PANEL - Abnormal; Notable for the following: Total Bilirubin 0.2 (*)     All other components within normal limits   GLOMERULAR FILTRATION RATE, ESTIMATED - Abnormal; Notable for the following:     Est, Glom Filt Rate 54 (*)     All other components within normal limits   URINE WITH REFLEXED MICRO - Abnormal; Notable for the following:     Ketones, Urine TRACE (*)     Leukocyte Esterase, Urine SMALL (*)     Color, UA DK YELLOW (*)     All other components within normal limits   URINE CULTURE, REFLEXED    Narrative:     Source: urine, clean catch       Site:           Current Antibiotics: none   BRAIN NATRIURETIC PEPTIDE   LIPASE   TROPONIN   MAGNESIUM   TSH WITHOUT REFLEX   ETHANOL   URINE DRUG SCREEN   ANION GAP   OSMOLALITY   TROPONIN       EMERGENCY DEPARTMENT COURSE:   Vitals:    Vitals:    07/11/18 2347 07/12/18 0040 07/12/18 0210 07/12/18 0424   BP: 129/71 134/66  (!) 159/67   Pulse: 58 56 54 59   Resp: 24 20 18 18   Temp: 97.9 °F (36.6 °C)      TempSrc: Oral      SpO2: 99% 98% 95% 95%   Weight: 130 lb (59 kg)        12:31 AM: The patient was seen and evaluated. Appropriate labs were ordered and reviewed. The patient was seen and evaluated in a timely manner for chest pain. His vital signs were stable. During the physical exam I noted diffuse wheezing in all lung fields with grunting. I ordered appropriate labs, and an x-ray of the chest. When the patient continued to have chest pain and shortness of breath I ordered a CTA of the chest to rule out a PE. Laboratory and imaging results were reviewed and discussed with the patient. Within the department, the patient was treated with Toradol, zofran, protonix, GI cocktail, Duoneb, and solumedrol. The patient responded well to treatment, and I noted his condition to remain stable. I decided that the patient could be discharged home with instructions to follow up with his PCP as written below. I also instructed him to follow up with a cardiologist as scheduled. I wrote him a prescription for albuterol which will be taken as directed. I explained my proposed course of discharge to the patient and his family at bedside, and they verbalized understanding and agreement with my proposed plan. All their questions were addressed at bedside. The patient will return to the emergency department for any new or worsening symptoms. Patient has what appears to be COPD. Patient is instructed to follow-up with his primary care physician in the next 1-2 days. Patient has been provided with a nebulizer he is instructed to use it as prescribed. He has been given medications for this he is instructed to take this as prescribed. Patient is also instructed to follow-up with the chest pain clinic [facility] heart specialist on 7/13/2018 at 10 AM his appointment has been scheduled.   Patient is instructed to follow-up as directed take all medications as prescribed and return to the nearest emergency room immediately for any new or worsening complaints    CRITICAL CARE:   None    CONSULTS:  None    PROCEDURES:  None    FINAL IMPRESSION      1. COPD exacerbation [facility])          DISPOSITION/PLAN   Discharge    PATIENT REFERRED TO:  [facility] [address] [place] [address]  Go in 1 day  Keep scheduled appointment at Toni Mishra MD  [address] [place] [address]  [phone]    Call in 2 days        DISCHARGE MEDICATIONS:  Discharge Medication List as of 7/12/2018 5: 07 AM      START taking these medications    Details   albuterol (PROVENTIL) (2.5 MG/3ML) 0.083% nebulizer solution Take 3 mLs by nebulization every 6 hours as needed for Wheezing, Disp-120 each, R-3Print             (Please note that portions of this note were completed with a voice recognition program.  Efforts were made to edit the dictations but occasionally words are mis-transcribed.)    Scribe:  Raimundo Quintana 7/12/18 12:31 AM Scribing for and in the presence of Harish Dahl DO. Scribe: Raimundo Quintana 7/12/18 12:31 AM    Provider:  I personally performed the services described in the documentation, reviewed and edited the documentation which was dictated to the scribe in my presence, and it accurately records my words and actions.     Harish Dahl DO 7/12/18 6:19 AM      Harish Dahl DO  07/12/18 8380

## 2018-07-12 NOTE — ED NOTES
Pt used urinal at bedside. Urine sample collected and sent to lab.        Erin Kate RN  07/12/18 4430

## 2018-07-13 ENCOUNTER — OFFICE VISIT (OUTPATIENT)
Dept: CARDIOLOGY CLINIC | Age: 73
End: 2018-07-13
Payer: MEDICARE

## 2018-07-13 VITALS
DIASTOLIC BLOOD PRESSURE: 72 MMHG | HEIGHT: 62 IN | SYSTOLIC BLOOD PRESSURE: 128 MMHG | BODY MASS INDEX: 23.42 KG/M2 | HEART RATE: 64 BPM | WEIGHT: 127.25 LBS

## 2018-07-13 DIAGNOSIS — E78.00 PURE HYPERCHOLESTEROLEMIA: ICD-10-CM

## 2018-07-13 DIAGNOSIS — R06.02 SOB (SHORTNESS OF BREATH) ON EXERTION: ICD-10-CM

## 2018-07-13 DIAGNOSIS — I25.10 CORONARY ARTERY DISEASE INVOLVING NATIVE CORONARY ARTERY OF NATIVE HEART WITHOUT ANGINA PECTORIS: Primary | ICD-10-CM

## 2018-07-13 DIAGNOSIS — I10 ESSENTIAL HYPERTENSION: ICD-10-CM

## 2018-07-13 DIAGNOSIS — Z87.898 HISTORY OF CHEST PAIN: ICD-10-CM

## 2018-07-13 DIAGNOSIS — Z95.820 S/P ANGIOPLASTY WITH STENT: ICD-10-CM

## 2018-07-13 DIAGNOSIS — J44.9 CHRONIC OBSTRUCTIVE PULMONARY DISEASE, UNSPECIFIED COPD TYPE (HCC): ICD-10-CM

## 2018-07-13 PROCEDURE — 1101F PT FALLS ASSESS-DOCD LE1/YR: CPT | Performed by: INTERNAL MEDICINE

## 2018-07-13 PROCEDURE — G8420 CALC BMI NORM PARAMETERS: HCPCS | Performed by: INTERNAL MEDICINE

## 2018-07-13 PROCEDURE — 3023F SPIROM DOC REV: CPT | Performed by: INTERNAL MEDICINE

## 2018-07-13 PROCEDURE — 3017F COLORECTAL CA SCREEN DOC REV: CPT | Performed by: INTERNAL MEDICINE

## 2018-07-13 PROCEDURE — 99214 OFFICE O/P EST MOD 30 MIN: CPT | Performed by: INTERNAL MEDICINE

## 2018-07-13 PROCEDURE — G8926 SPIRO NO PERF OR DOC: HCPCS | Performed by: INTERNAL MEDICINE

## 2018-07-13 PROCEDURE — 4004F PT TOBACCO SCREEN RCVD TLK: CPT | Performed by: INTERNAL MEDICINE

## 2018-07-13 PROCEDURE — G8598 ASA/ANTIPLAT THER USED: HCPCS | Performed by: INTERNAL MEDICINE

## 2018-07-13 PROCEDURE — 4040F PNEUMOC VAC/ADMIN/RCVD: CPT | Performed by: INTERNAL MEDICINE

## 2018-07-13 PROCEDURE — G8427 DOCREV CUR MEDS BY ELIG CLIN: HCPCS | Performed by: INTERNAL MEDICINE

## 2018-07-13 PROCEDURE — 1123F ACP DISCUSS/DSCN MKR DOCD: CPT | Performed by: INTERNAL MEDICINE

## 2018-07-13 NOTE — PROGRESS NOTES
hypotension    Acute pulmonary edema (HCC)    Metabolic acidosis    Cardiac arrest (HCC)    ST elevation myocardial infarction (STEMI) (HCC)    Gastroduodenitis    Coronary artery disease involving native coronary artery of native heart without angina pectoris    COPD (chronic obstructive pulmonary disease) (HCC)    SOB (shortness of breath) on exertion    History of chest pain       Past Surgical History:   Procedure Laterality Date    CARDIAC SURGERY      2 stents    COLONOSCOPY      CORONARY ANGIOPLASTY WITH STENT PLACEMENT      pt has 3 stents    DIAGNOSTIC CARDIAC CATH LAB PROCEDURE  3 09 2009    Successful primary stenting of mid LAD using drug-eluting stent.  DIAGNOSTIC CARDIAC CATH LAB PROCEDURE  1 10 2011    LV demonstrated normal systolic function, EF 35%. On pullback, no gradient was noted. No critical lesions noted in all large epicardial vessels so that RCA is dominant vessel w/just very mild diffuse disease, about 20-30% lesions. Left main widely patent w/mild disease distally. Circumflex widely patent w/diffuse disease, also about 20-30%.  ENDOSCOPY, COLON, DIAGNOSTIC      HERNIA REPAIR  2008,2010    [place] COLON CA SCRN NOT HI RSK IND Left 11/25/2017    COLONOSCOPY performed by Kourtney Mock MD at [address] End[facility]    [place] EGD TRANSORAL BIOPSY SINGLE/MULTIPLE Left 11/25/2017    EGD BIOPSY performed by Kourtney Mock MD at [address] [facility] ECHOCARDIOGRAM  1 10 2011    Size was normal. Systolic function was normal. EF estimated in range of 55-65%. No regional wall motion abnormalities. Wall thickness was normal. Doppler - LV diastolic function parameters were normal. This is technically limited study which may impair interpretation.  TRANSTHORACIC ECHOCARDIOGRAM  3 06 2009    This study is technically limited. Distal anterior apical wall hypokinesis. EF 40-45%. Left atrial size w/in normal limits. Trace MR. No evidence of aortic stenosis or aortic insufficiency. by mouth daily 90 tablet 3    metoprolol tartrate 37.5 MG TABS Take 37.5 mg by mouth 2 times daily 180 tablet 6    aspirin 81 MG tablet Take 1 tablet by mouth daily      amLODIPine (NORVASC) 2.5 MG tablet take 1 tablet by mouth once daily 30 tablet 11    albuterol sulfate HFA (VENTOLIN HFA) 108 (90 Base) MCG/ACT inhaler Inhale 2 puffs into the lungs 4 times daily 1 Inhaler 7    isosorbide dinitrate (ISORDIL) 20 MG tablet Take 1 tablet by mouth 3 times daily 90 tablet 3    ranolazine (RANEXA) 1000 MG extended release tablet Take 1 tablet by mouth 2 times daily 60 tablet 3    ipratropium-albuterol (DUONEB) 0.5-2.5 (3) MG/3ML SOLN nebulizer solution Inhale 3 mLs into the lungs 4 times daily 360 mL 2    pantoprazole (PROTONIX) 40 MG tablet Take 1 tablet by mouth 2 times daily 60 tablet 1     No current facility-administered medications for this visit. Review of Systems -     General ROS: negative  Psychological ROS: negative  Hematological and Lymphatic ROS: No history of blood clots or bleeding disorder. Respiratory ROS: no cough, shortness of breath, or wheezing  Cardiovascular ROS: no chest pain or dyspnea on exertion  Gastrointestinal ROS: negative  Genito-Urinary ROS: no dysuria, trouble voiding, or hematuria  Musculoskeletal ROS: negative  Neurological ROS: no TIA or stroke symptoms  Dermatological ROS: negative      Blood pressure 128/72, pulse 64, height 5' 2\" (1.575 m), weight 127 lb 4 oz (57.7 kg).         Physical Examination:    General appearance - alert, well appearing, and in no distress  Mental status - alert, oriented to person, place, and time  Neck - supple, no significant adenopathy, no JVD, or carotid bruits  Chest - clear to auscultation, no wheezes, rales or rhonchi, symmetric air entry  Heart - normal rate, regular rhythm, normal S1, S2, no murmurs, rubs, clicks or gallops  Abdomen - soft, nontender, nondistended, no masses or organomegaly  Neurological - alert, oriented, normal

## 2018-07-14 LAB — URINE CULTURE REFLEX: NORMAL

## 2018-07-25 ENCOUNTER — HOSPITAL ENCOUNTER (OUTPATIENT)
Dept: NON INVASIVE DIAGNOSTICS | Age: 73
Discharge: HOME | End: 2018-07-25
Payer: MEDICARE

## 2018-07-25 VITALS — BODY MASS INDEX: 23 KG/M2 | HEIGHT: 62 IN | WEIGHT: 125 LBS

## 2018-07-25 DIAGNOSIS — Z95.820 S/P ANGIOPLASTY WITH STENT: ICD-10-CM

## 2018-07-25 DIAGNOSIS — J44.9 CHRONIC OBSTRUCTIVE PULMONARY DISEASE, UNSPECIFIED COPD TYPE (HCC): ICD-10-CM

## 2018-07-25 DIAGNOSIS — I25.10 CORONARY ARTERY DISEASE INVOLVING NATIVE CORONARY ARTERY OF NATIVE HEART WITHOUT ANGINA PECTORIS: ICD-10-CM

## 2018-07-25 DIAGNOSIS — Z87.898 HISTORY OF CHEST PAIN: ICD-10-CM

## 2018-07-25 DIAGNOSIS — E78.00 PURE HYPERCHOLESTEROLEMIA: ICD-10-CM

## 2018-07-25 DIAGNOSIS — R06.02 SOB (SHORTNESS OF BREATH) ON EXERTION: ICD-10-CM

## 2018-07-25 DIAGNOSIS — I10 ESSENTIAL HYPERTENSION: ICD-10-CM

## 2018-07-25 PROCEDURE — 93017 CV STRESS TEST TRACING ONLY: CPT | Performed by: INTERNAL MEDICINE

## 2018-07-25 PROCEDURE — A9500 TC99M SESTAMIBI: HCPCS | Performed by: INTERNAL MEDICINE

## 2018-07-25 PROCEDURE — 6360000002 HC RX W HCPCS

## 2018-07-25 PROCEDURE — 78452 HT MUSCLE IMAGE SPECT MULT: CPT

## 2018-07-25 PROCEDURE — 3430000000 HC RX DIAGNOSTIC RADIOPHARMACEUTICAL: Performed by: INTERNAL MEDICINE

## 2018-07-25 RX ADMIN — Medication 35 MILLICURIE: at 12:30

## 2018-07-25 RX ADMIN — Medication 10 MILLICURIE: at 11:16

## 2018-12-06 RX ORDER — RANOLAZINE 1000 MG/1
1000 TABLET, EXTENDED RELEASE ORAL 2 TIMES DAILY
Qty: 60 TABLET | Refills: 3 | Status: SHIPPED | OUTPATIENT
Start: 2018-12-06 | End: 2019-04-09 | Stop reason: SDUPTHER

## 2018-12-31 ENCOUNTER — OFFICE VISIT (OUTPATIENT)
Dept: CARDIOLOGY CLINIC | Age: 73
End: 2018-12-31
Payer: MEDICARE

## 2018-12-31 VITALS
DIASTOLIC BLOOD PRESSURE: 73 MMHG | HEIGHT: 62 IN | SYSTOLIC BLOOD PRESSURE: 122 MMHG | WEIGHT: 130.13 LBS | BODY MASS INDEX: 23.95 KG/M2 | HEART RATE: 70 BPM

## 2018-12-31 DIAGNOSIS — Z95.5 HISTORY OF PLACEMENT OF STENT IN LAD CORONARY ARTERY: ICD-10-CM

## 2018-12-31 DIAGNOSIS — Z95.820 S/P ANGIOPLASTY WITH STENT: ICD-10-CM

## 2018-12-31 DIAGNOSIS — E78.00 PURE HYPERCHOLESTEROLEMIA: ICD-10-CM

## 2018-12-31 DIAGNOSIS — I10 ESSENTIAL HYPERTENSION: ICD-10-CM

## 2018-12-31 DIAGNOSIS — I25.119 CORONARY ARTERY DISEASE INVOLVING NATIVE CORONARY ARTERY OF NATIVE HEART WITH ANGINA PECTORIS (HCC): ICD-10-CM

## 2018-12-31 DIAGNOSIS — I25.10 CORONARY ARTERY DISEASE INVOLVING NATIVE CORONARY ARTERY OF NATIVE HEART WITHOUT ANGINA PECTORIS: Primary | ICD-10-CM

## 2018-12-31 PROCEDURE — 99214 OFFICE O/P EST MOD 30 MIN: CPT | Performed by: INTERNAL MEDICINE

## 2018-12-31 RX ORDER — ISOSORBIDE MONONITRATE 30 MG/1
30 TABLET, EXTENDED RELEASE ORAL DAILY
Qty: 30 TABLET | Refills: 11 | Status: SHIPPED | OUTPATIENT
Start: 2018-12-31 | End: 2019-08-26 | Stop reason: SDUPTHER

## 2018-12-31 NOTE — PROGRESS NOTES
Chief Complaint   Patient presents with    Check-Up    Coronary Artery Disease   Patient here for an office visit - former Amanda Mohr patient   hx of hospital DC follow up after being treated for GI bleeding , anemia , transfuison, acs post cardiac arrest - inf STEMI and RCA mod lesion and med RX        Pt here for 6 mo check up     Pt not taking isosorbide and amlodipine was taking but did  Not get refills ,  pt did state could not afford all meds too costly. so stopped some     Pt was offered pt assistance     NO cp, edema or dizziness    No palpitations  Sob on exertion    Off norvasc 2.5 and isordil 20 tid for 2 month and no cp  Hence stop Norasc  Start imdur 30      Reviewed the records  Cath Nov 2017 post cardiac arrest and inf stemi in setting of GI bleed  emergant cath done mod RCA lesion med RX  FFR considered - later nuc stress test done dec 2017 neg  Med RX recommended By dr. 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[address]    Shortness of breath    History of tinnitus    Light headed    Fatigue    History of tobacco abuse    Noncompliance with medication regimen    Ringing in the ears    Lightheadedness    Irregular heart beat    Acute coronary syndrome ([address]    Hyperlipidemia    Esophagitis    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

## 2019-01-03 ENCOUNTER — APPOINTMENT (OUTPATIENT)
Dept: GENERAL RADIOLOGY | Age: 74
End: 2019-01-03
Payer: MEDICARE

## 2019-01-03 ENCOUNTER — APPOINTMENT (OUTPATIENT)
Dept: CT IMAGING | Age: 74
End: 2019-01-03
Payer: MEDICARE

## 2019-01-03 ENCOUNTER — HOSPITAL ENCOUNTER (EMERGENCY)
Age: 74
Discharge: HOME | End: 2019-01-03
Payer: MEDICARE

## 2019-01-03 VITALS
DIASTOLIC BLOOD PRESSURE: 94 MMHG | WEIGHT: 130 LBS | BODY MASS INDEX: 23.78 KG/M2 | HEART RATE: 56 BPM | RESPIRATION RATE: 20 BRPM | OXYGEN SATURATION: 100 % | SYSTOLIC BLOOD PRESSURE: 148 MMHG | TEMPERATURE: 97.4 F

## 2019-01-03 DIAGNOSIS — K52.9 GASTROENTERITIS: Primary | ICD-10-CM

## 2019-01-03 DIAGNOSIS — J40 BRONCHITIS: ICD-10-CM

## 2019-01-03 LAB
ALBUMIN SERPL-MCNC: 3.8 G/DL (ref 3.5–5.1)
ALP BLD-CCNC: 85 U/L (ref 38–126)
ALT SERPL-CCNC: 20 U/L (ref 11–66)
ANION GAP SERPL CALCULATED.3IONS-SCNC: 11 MEQ/L (ref 8–16)
AST SERPL-CCNC: 25 U/L (ref 5–40)
BASOPHILS # BLD: 0.2 %
BASOPHILS ABSOLUTE: 0 THOU/MM3 (ref 0–0.1)
BILIRUB SERPL-MCNC: 0.3 MG/DL (ref 0.3–1.2)
BILIRUBIN DIRECT: < 0.2 MG/DL (ref 0–0.3)
BUN BLDV-MCNC: 19 MG/DL (ref 7–22)
CALCIUM SERPL-MCNC: 9 MG/DL (ref 8.5–10.5)
CHLORIDE BLD-SCNC: 100 MEQ/L (ref 98–111)
CO2: 25 MEQ/L (ref 23–33)
CREAT SERPL-MCNC: 1.1 MG/DL (ref 0.4–1.2)
EKG ATRIAL RATE: 54 BPM
EKG P AXIS: 79 DEGREES
EKG P-R INTERVAL: 134 MS
EKG Q-T INTERVAL: 422 MS
EKG QRS DURATION: 76 MS
EKG QTC CALCULATION (BAZETT): 400 MS
EKG R AXIS: 63 DEGREES
EKG T AXIS: 57 DEGREES
EKG VENTRICULAR RATE: 54 BPM
EOSINOPHIL # BLD: 2.1 %
EOSINOPHILS ABSOLUTE: 0.1 THOU/MM3 (ref 0–0.4)
ERYTHROCYTE [DISTWIDTH] IN BLOOD BY AUTOMATED COUNT: 12.6 % (ref 11.5–14.5)
ERYTHROCYTE [DISTWIDTH] IN BLOOD BY AUTOMATED COUNT: 47.5 FL (ref 35–45)
FLU A ANTIGEN: NEGATIVE
FLU B ANTIGEN: NEGATIVE
GFR SERPL CREATININE-BSD FRML MDRD: 65 ML/MIN/1.73M2
GLUCOSE BLD-MCNC: 98 MG/DL (ref 70–108)
HCT VFR BLD CALC: 42.2 % (ref 42–52)
HEMOGLOBIN: 13.9 GM/DL (ref 14–18)
IMMATURE GRANS (ABS): 0.02 THOU/MM3 (ref 0–0.07)
IMMATURE GRANULOCYTES: 0.4 %
LIPASE: 28.3 U/L (ref 5.6–51.3)
LYMPHOCYTES # BLD: 39.7 %
LYMPHOCYTES ABSOLUTE: 2.1 THOU/MM3 (ref 1–4.8)
MCH RBC QN AUTO: 33.7 PG (ref 26–33)
MCHC RBC AUTO-ENTMCNC: 32.9 GM/DL (ref 32.2–35.5)
MCV RBC AUTO: 102.4 FL (ref 80–94)
MONOCYTES # BLD: 10 %
MONOCYTES ABSOLUTE: 0.5 THOU/MM3 (ref 0.4–1.3)
NUCLEATED RED BLOOD CELLS: 0 /100 WBC
OSMOLALITY CALCULATION: 274.2 MOSMOL/KG (ref 275–300)
PLATELET # BLD: 174 THOU/MM3 (ref 130–400)
PMV BLD AUTO: 9.7 FL (ref 9.4–12.4)
POTASSIUM SERPL-SCNC: 4.9 MEQ/L (ref 3.5–5.2)
PRO-BNP: 92 PG/ML (ref 0–900)
PROCALCITONIN: 0.05 NG/ML (ref 0.01–0.09)
RBC # BLD: 4.12 MILL/MM3 (ref 4.7–6.1)
SEG NEUTROPHILS: 47.6 %
SEGMENTED NEUTROPHILS ABSOLUTE COUNT: 2.5 THOU/MM3 (ref 1.8–7.7)
SODIUM BLD-SCNC: 136 MEQ/L (ref 135–145)
TOTAL PROTEIN: 6.5 G/DL (ref 6.1–8)
TROPONIN T: < 0.01 NG/ML
WBC # BLD: 5.3 THOU/MM3 (ref 4.8–10.8)

## 2019-01-03 PROCEDURE — 96366 THER/PROPH/DIAG IV INF ADDON: CPT

## 2019-01-03 PROCEDURE — 82248 BILIRUBIN DIRECT: CPT

## 2019-01-03 PROCEDURE — 93005 ELECTROCARDIOGRAM TRACING: CPT | Performed by: PHYSICIAN ASSISTANT

## 2019-01-03 PROCEDURE — 80053 COMPREHEN METABOLIC PANEL: CPT

## 2019-01-03 PROCEDURE — 99284 EMERGENCY DEPT VISIT MOD MDM: CPT

## 2019-01-03 PROCEDURE — 94640 AIRWAY INHALATION TREATMENT: CPT

## 2019-01-03 PROCEDURE — 36415 COLL VENOUS BLD VENIPUNCTURE: CPT

## 2019-01-03 PROCEDURE — 71045 X-RAY EXAM CHEST 1 VIEW: CPT

## 2019-01-03 PROCEDURE — 87040 BLOOD CULTURE FOR BACTERIA: CPT

## 2019-01-03 PROCEDURE — 93010 ELECTROCARDIOGRAM REPORT: CPT | Performed by: INTERNAL MEDICINE

## 2019-01-03 PROCEDURE — 6370000000 HC RX 637 (ALT 250 FOR IP): Performed by: PHYSICIAN ASSISTANT

## 2019-01-03 PROCEDURE — 85025 COMPLETE CBC W/AUTO DIFF WBC: CPT

## 2019-01-03 PROCEDURE — 6360000002 HC RX W HCPCS: Performed by: PHYSICIAN ASSISTANT

## 2019-01-03 PROCEDURE — 84484 ASSAY OF TROPONIN QUANT: CPT

## 2019-01-03 PROCEDURE — 83880 ASSAY OF NATRIURETIC PEPTIDE: CPT

## 2019-01-03 PROCEDURE — 83690 ASSAY OF LIPASE: CPT

## 2019-01-03 PROCEDURE — 96375 TX/PRO/DX INJ NEW DRUG ADDON: CPT

## 2019-01-03 PROCEDURE — 96365 THER/PROPH/DIAG IV INF INIT: CPT

## 2019-01-03 PROCEDURE — 2580000003 HC RX 258: Performed by: PHYSICIAN ASSISTANT

## 2019-01-03 PROCEDURE — 84145 PROCALCITONIN (PCT): CPT

## 2019-01-03 PROCEDURE — 87804 INFLUENZA ASSAY W/OPTIC: CPT

## 2019-01-03 RX ORDER — ONDANSETRON 2 MG/ML
4 INJECTION INTRAMUSCULAR; INTRAVENOUS ONCE
Status: COMPLETED | OUTPATIENT
Start: 2019-01-03 | End: 2019-01-03

## 2019-01-03 RX ORDER — DICYCLOMINE HYDROCHLORIDE 10 MG/1
10 CAPSULE ORAL EVERY 6 HOURS PRN
Qty: 20 CAPSULE | Refills: 0 | Status: SHIPPED | OUTPATIENT
Start: 2019-01-03 | End: 2019-02-05

## 2019-01-03 RX ORDER — IPRATROPIUM BROMIDE AND ALBUTEROL SULFATE 2.5; .5 MG/3ML; MG/3ML
1 SOLUTION RESPIRATORY (INHALATION) ONCE
Status: COMPLETED | OUTPATIENT
Start: 2019-01-03 | End: 2019-01-03

## 2019-01-03 RX ORDER — 0.9 % SODIUM CHLORIDE 0.9 %
1000 INTRAVENOUS SOLUTION INTRAVENOUS ONCE
Status: COMPLETED | OUTPATIENT
Start: 2019-01-03 | End: 2019-01-03

## 2019-01-03 RX ORDER — METHYLPREDNISOLONE SODIUM SUCCINATE 125 MG/2ML
125 INJECTION, POWDER, LYOPHILIZED, FOR SOLUTION INTRAMUSCULAR; INTRAVENOUS ONCE
Status: COMPLETED | OUTPATIENT
Start: 2019-01-03 | End: 2019-01-03

## 2019-01-03 RX ORDER — ONDANSETRON 4 MG/1
4 TABLET, ORALLY DISINTEGRATING ORAL EVERY 8 HOURS PRN
Qty: 20 TABLET | Refills: 0 | Status: SHIPPED | OUTPATIENT
Start: 2019-01-03 | End: 2019-02-05

## 2019-01-03 RX ORDER — DICYCLOMINE HYDROCHLORIDE 10 MG/1
10 CAPSULE ORAL ONCE
Status: COMPLETED | OUTPATIENT
Start: 2019-01-03 | End: 2019-01-03

## 2019-01-03 RX ORDER — PREDNISONE 20 MG/1
20 TABLET ORAL 2 TIMES DAILY
Qty: 10 TABLET | Refills: 0 | Status: SHIPPED | OUTPATIENT
Start: 2019-01-03 | End: 2019-01-08

## 2019-01-03 RX ADMIN — DICYCLOMINE HYDROCHLORIDE 10 MG: 10 CAPSULE ORAL at 13:14

## 2019-01-03 RX ADMIN — METHYLPREDNISOLONE SODIUM SUCCINATE 125 MG: 125 INJECTION, POWDER, FOR SOLUTION INTRAMUSCULAR; INTRAVENOUS at 13:15

## 2019-01-03 RX ADMIN — SODIUM CHLORIDE 1000 ML: 9 INJECTION, SOLUTION INTRAVENOUS at 13:15

## 2019-01-03 RX ADMIN — IPRATROPIUM BROMIDE AND ALBUTEROL SULFATE 1 AMPULE: .5; 3 SOLUTION RESPIRATORY (INHALATION) at 13:25

## 2019-01-03 RX ADMIN — ONDANSETRON 4 MG: 2 INJECTION INTRAMUSCULAR; INTRAVENOUS at 13:15

## 2019-01-03 ASSESSMENT — ENCOUNTER SYMPTOMS
BLOOD IN STOOL: 0
NAUSEA: 1
COLOR CHANGE: 0
COUGH: 1
DIARRHEA: 1
PHOTOPHOBIA: 0
SORE THROAT: 0
SINUS PAIN: 0
EYE REDNESS: 0
SINUS PRESSURE: 0
RHINORRHEA: 0
BACK PAIN: 0
ABDOMINAL PAIN: 1
EYE DISCHARGE: 0
CONSTIPATION: 0
SHORTNESS OF BREATH: 1
VOMITING: 1
WHEEZING: 0

## 2019-01-03 ASSESSMENT — PAIN SCALES - GENERAL: PAINLEVEL_OUTOF10: 8

## 2019-01-03 ASSESSMENT — PAIN DESCRIPTION - LOCATION: LOCATION: HEAD

## 2019-01-09 LAB
BLOOD CULTURE, ROUTINE: NORMAL
BLOOD CULTURE, ROUTINE: NORMAL

## 2019-01-28 ENCOUNTER — OFFICE VISIT (OUTPATIENT)
Dept: PULMONOLOGY | Age: 74
End: 2019-01-28
Payer: MEDICARE

## 2019-01-28 VITALS
HEART RATE: 56 BPM | OXYGEN SATURATION: 97 % | HEIGHT: 62 IN | SYSTOLIC BLOOD PRESSURE: 124 MMHG | TEMPERATURE: 97.8 F | DIASTOLIC BLOOD PRESSURE: 58 MMHG | BODY MASS INDEX: 23.45 KG/M2 | WEIGHT: 127.4 LBS

## 2019-01-28 DIAGNOSIS — J98.6 ELEVATED HEMIDIAPHRAGM: ICD-10-CM

## 2019-01-28 DIAGNOSIS — R06.02 SOB (SHORTNESS OF BREATH): ICD-10-CM

## 2019-01-28 DIAGNOSIS — Z72.0 TOBACCO ABUSE: ICD-10-CM

## 2019-01-28 DIAGNOSIS — J44.9 MODERATE COPD (CHRONIC OBSTRUCTIVE PULMONARY DISEASE) (HCC): ICD-10-CM

## 2019-01-28 DIAGNOSIS — Z87.891 PERSONAL HISTORY OF TOBACCO USE: ICD-10-CM

## 2019-01-28 DIAGNOSIS — J43.2 CENTRILOBULAR EMPHYSEMA (HCC): ICD-10-CM

## 2019-01-28 DIAGNOSIS — J43.2 CENTRILOBULAR EMPHYSEMA (HCC): Primary | ICD-10-CM

## 2019-01-28 PROCEDURE — G8420 CALC BMI NORM PARAMETERS: HCPCS | Performed by: NURSE PRACTITIONER

## 2019-01-28 PROCEDURE — G0296 VISIT TO DETERM LDCT ELIG: HCPCS | Performed by: NURSE PRACTITIONER

## 2019-01-28 PROCEDURE — 99214 OFFICE O/P EST MOD 30 MIN: CPT | Performed by: NURSE PRACTITIONER

## 2019-01-28 PROCEDURE — 1101F PT FALLS ASSESS-DOCD LE1/YR: CPT | Performed by: NURSE PRACTITIONER

## 2019-01-28 PROCEDURE — 4040F PNEUMOC VAC/ADMIN/RCVD: CPT | Performed by: NURSE PRACTITIONER

## 2019-01-28 PROCEDURE — G8926 SPIRO NO PERF OR DOC: HCPCS | Performed by: NURSE PRACTITIONER

## 2019-01-28 PROCEDURE — 1123F ACP DISCUSS/DSCN MKR DOCD: CPT | Performed by: NURSE PRACTITIONER

## 2019-01-28 PROCEDURE — G8427 DOCREV CUR MEDS BY ELIG CLIN: HCPCS | Performed by: NURSE PRACTITIONER

## 2019-01-28 PROCEDURE — 4004F PT TOBACCO SCREEN RCVD TLK: CPT | Performed by: NURSE PRACTITIONER

## 2019-01-28 PROCEDURE — G8484 FLU IMMUNIZE NO ADMIN: HCPCS | Performed by: NURSE PRACTITIONER

## 2019-01-28 PROCEDURE — 3023F SPIROM DOC REV: CPT | Performed by: NURSE PRACTITIONER

## 2019-01-28 PROCEDURE — G8598 ASA/ANTIPLAT THER USED: HCPCS | Performed by: NURSE PRACTITIONER

## 2019-01-28 PROCEDURE — 3017F COLORECTAL CA SCREEN DOC REV: CPT | Performed by: NURSE PRACTITIONER

## 2019-01-28 RX ORDER — ALBUTEROL SULFATE 90 UG/1
2 AEROSOL, METERED RESPIRATORY (INHALATION) EVERY 6 HOURS PRN
Qty: 1 INHALER | Refills: 11 | Status: SHIPPED | OUTPATIENT
Start: 2019-01-28 | End: 2019-07-29 | Stop reason: DRUGHIGH

## 2019-01-28 RX ORDER — VARENICLINE TARTRATE 1 MG/1
1 TABLET, FILM COATED ORAL 2 TIMES DAILY
Qty: 60 TABLET | Refills: 11 | Status: SHIPPED | OUTPATIENT
Start: 2019-01-28 | End: 2019-07-29

## 2019-01-28 RX ORDER — ALBUTEROL SULFATE 2.5 MG/3ML
2.5 SOLUTION RESPIRATORY (INHALATION) EVERY 6 HOURS PRN
Qty: 360 ML | Refills: 11 | Status: SHIPPED | OUTPATIENT
Start: 2019-01-28 | End: 2019-07-29 | Stop reason: DRUGHIGH

## 2019-01-28 RX ORDER — VARENICLINE TARTRATE 25 MG
KIT ORAL
Qty: 53 TABLET | Refills: 0 | Status: SHIPPED | OUTPATIENT
Start: 2019-01-28 | End: 2019-07-29 | Stop reason: ALTCHOICE

## 2019-02-01 DIAGNOSIS — F17.200 SMOKER: ICD-10-CM

## 2019-02-01 RX ORDER — PRASUGREL 10 MG/1
TABLET, FILM COATED ORAL
Qty: 90 TABLET | Refills: 3 | Status: SHIPPED | OUTPATIENT
Start: 2019-02-01 | End: 2019-08-26 | Stop reason: SDUPTHER

## 2019-02-05 ENCOUNTER — TELEPHONE (OUTPATIENT)
Dept: CARDIOLOGY CLINIC | Age: 74
End: 2019-02-05

## 2019-03-13 DIAGNOSIS — E78.00 PURE HYPERCHOLESTEROLEMIA: Primary | ICD-10-CM

## 2019-03-14 RX ORDER — ATORVASTATIN CALCIUM 40 MG/1
40 TABLET, FILM COATED ORAL DAILY
Qty: 90 TABLET | Refills: 1 | Status: SHIPPED | OUTPATIENT
Start: 2019-03-14 | End: 2019-08-26 | Stop reason: SDUPTHER

## 2019-03-26 LAB
CHOLESTEROL/HDL RATIO: 2.3
CHOLESTEROL: 136 MG/DL
HDLC SERPL-MCNC: 59 MG/DL
LDL CHOLESTEROL CALCULATED: 63 MG/DL
LDL/HDL RATIO: 1.1
TRIGL SERPL-MCNC: 72 MG/DL
VLDLC SERPL CALC-MCNC: 14 MG/DL

## 2019-04-09 RX ORDER — RANOLAZINE 1000 MG/1
1000 TABLET, EXTENDED RELEASE ORAL 2 TIMES DAILY
Qty: 60 TABLET | Refills: 4 | Status: SHIPPED | OUTPATIENT
Start: 2019-04-09 | End: 2019-08-26 | Stop reason: SDUPTHER

## 2019-04-18 ENCOUNTER — TELEPHONE (OUTPATIENT)
Dept: CARDIOLOGY CLINIC | Age: 74
End: 2019-04-18

## 2019-04-18 NOTE — TELEPHONE ENCOUNTER
Bam Stack from Dr. Pilo Delgadillo office called has patient in office, patient received letter that Ranexa is no longer covered. Bam Stack will fax letter to our office. Chucho Skinner with patient assistance .

## 2019-04-22 ENCOUNTER — TELEPHONE (OUTPATIENT)
Dept: PHARMACY | Age: 74
End: 2019-04-22

## 2019-04-22 NOTE — TELEPHONE ENCOUNTER
Tried to call Naren Thacker to go over patient assistance for his Ranexa and voicemail has not been set up yet.       Taylor Enrique Medication Coordinator   [facility] Patient Assistance Program   (z) [phone] (N) [phone]

## 2019-07-22 ENCOUNTER — HOSPITAL ENCOUNTER (OUTPATIENT)
Dept: PULMONOLOGY | Age: 74
Discharge: HOME | End: 2019-07-22
Payer: MEDICARE

## 2019-07-22 DIAGNOSIS — Z72.0 TOBACCO ABUSE: ICD-10-CM

## 2019-07-22 DIAGNOSIS — J43.2 CENTRILOBULAR EMPHYSEMA (HCC): ICD-10-CM

## 2019-07-22 PROCEDURE — 2709999900 HC NON-CHARGEABLE SUPPLY

## 2019-07-22 PROCEDURE — 94060 EVALUATION OF WHEEZING: CPT

## 2019-07-29 ENCOUNTER — OFFICE VISIT (OUTPATIENT)
Dept: PULMONOLOGY | Age: 74
End: 2019-07-29
Payer: MEDICARE

## 2019-07-29 VITALS
OXYGEN SATURATION: 96 % | HEIGHT: 62 IN | SYSTOLIC BLOOD PRESSURE: 110 MMHG | DIASTOLIC BLOOD PRESSURE: 64 MMHG | TEMPERATURE: 98.6 F | BODY MASS INDEX: 21.83 KG/M2 | WEIGHT: 118.6 LBS | HEART RATE: 57 BPM

## 2019-07-29 DIAGNOSIS — F17.218 CIGARETTE NICOTINE DEPENDENCE WITH OTHER NICOTINE-INDUCED DISORDER: ICD-10-CM

## 2019-07-29 DIAGNOSIS — J44.9 STAGE 3 SEVERE COPD BY GOLD CLASSIFICATION (HCC): Primary | ICD-10-CM

## 2019-07-29 DIAGNOSIS — J43.2 CENTRILOBULAR EMPHYSEMA (HCC): ICD-10-CM

## 2019-07-29 PROCEDURE — 4040F PNEUMOC VAC/ADMIN/RCVD: CPT | Performed by: NURSE PRACTITIONER

## 2019-07-29 PROCEDURE — G8926 SPIRO NO PERF OR DOC: HCPCS | Performed by: NURSE PRACTITIONER

## 2019-07-29 PROCEDURE — G8420 CALC BMI NORM PARAMETERS: HCPCS | Performed by: NURSE PRACTITIONER

## 2019-07-29 PROCEDURE — G8598 ASA/ANTIPLAT THER USED: HCPCS | Performed by: NURSE PRACTITIONER

## 2019-07-29 PROCEDURE — 4004F PT TOBACCO SCREEN RCVD TLK: CPT | Performed by: NURSE PRACTITIONER

## 2019-07-29 PROCEDURE — G8427 DOCREV CUR MEDS BY ELIG CLIN: HCPCS | Performed by: NURSE PRACTITIONER

## 2019-07-29 PROCEDURE — 99214 OFFICE O/P EST MOD 30 MIN: CPT | Performed by: NURSE PRACTITIONER

## 2019-07-29 PROCEDURE — 3017F COLORECTAL CA SCREEN DOC REV: CPT | Performed by: NURSE PRACTITIONER

## 2019-07-29 PROCEDURE — 1123F ACP DISCUSS/DSCN MKR DOCD: CPT | Performed by: NURSE PRACTITIONER

## 2019-07-29 PROCEDURE — 3023F SPIROM DOC REV: CPT | Performed by: NURSE PRACTITIONER

## 2019-07-29 RX ORDER — ALBUTEROL SULFATE 2.5 MG/3ML
2.5 SOLUTION RESPIRATORY (INHALATION) EVERY 6 HOURS PRN
Qty: 360 ML | Refills: 11 | Status: SHIPPED | OUTPATIENT
Start: 2019-07-29 | End: 2019-08-02 | Stop reason: SDUPTHER

## 2019-07-29 RX ORDER — ALBUTEROL SULFATE 90 UG/1
2 AEROSOL, METERED RESPIRATORY (INHALATION) EVERY 6 HOURS PRN
Qty: 1 INHALER | Refills: 11 | Status: SHIPPED | OUTPATIENT
Start: 2019-07-29 | End: 2019-08-02 | Stop reason: SDUPTHER

## 2019-07-29 NOTE — PROGRESS NOTES
 Irregular heart beat     Lightheadedness     MI (myocardial infarction) (HCC)     Night sweats     Noncompliance with medication regimen     Noncompliance with medical therapy.  Pneumonia     Renal failure syndrome     Ringing in the ears     SOB (shortness of breath) on exertion     Weight loss      SURGICAL HISTORY:  Past Surgical History:   Procedure Laterality Date    CARDIAC SURGERY      2 stents    COLONOSCOPY      CORONARY ANGIOPLASTY WITH STENT PLACEMENT      pt has 3 stents    DIAGNOSTIC CARDIAC CATH LAB PROCEDURE  3 09 2009    Successful primary stenting of mid LAD using drug-eluting stent.  DIAGNOSTIC CARDIAC CATH LAB PROCEDURE  1 10 2011    LV demonstrated normal systolic function, EF 09%. On pullback, no gradient was noted. No critical lesions noted in all large epicardial vessels so that RCA is dominant vessel w/just very mild diffuse disease, about 20-30% lesions. Left main widely patent w/mild disease distally. Circumflex widely patent w/diffuse disease, also about 20-30%.  ENDOSCOPY, COLON, DIAGNOSTIC      HERNIA REPAIR  2008,2010    [place] COLON CA SCRN NOT HI RSK IND Left 11/25/2017    COLONOSCOPY performed by Devaughn Marsh MD at [address] [facility]    ND EGD TRANSORAL BIOPSY SINGLE/MULTIPLE Left 11/25/2017    EGD BIOPSY performed by Devaughn Marsh MD at [address] [employer] ECHOCARDIOGRAM  1 10 2011    Size was normal. Systolic function was normal. EF estimated in range of 55-65%. No regional wall motion abnormalities. Wall thickness was normal. Doppler - LV diastolic function parameters were normal. This is technically limited study which may impair interpretation.  TRANSTHORACIC ECHOCARDIOGRAM  3 06 2009    This study is technically limited. Distal anterior apical wall hypokinesis. EF 40-45%. Left atrial size w/in normal limits. Trace MR. No evidence of aortic stenosis or aortic insufficiency. Right atrium and right ventricle are of normal contractility. edema.  Pulmonary/Chest: Normal effort with very diminished but clear breath sounds. No stridor. No respiratory distress. Increased AP diameter. Abdominal: Soft. No distension or tenderness to palpation. Musculoskeletal: Moves all extremities. Lymphadenopathy:  No cervical adenopathy. Neurological: Patient is alert and oriented to person, place, and time. No focal deficits. Skin: Warm and dry. No clubbing or cyanosis. Test results   Lung Nodule Screening     [] Qualifies    [] Does not qualify   [x] Declined    [] Completed             Assessment      Diagnosis Orders   1. Stage 3 severe COPD by GOLD classification ([facility])  albuterol sulfate HFA (VENTOLIN HFA) 108 (90 Base) MCG/ACT inhaler   2. Centrilobular emphysema ([facility])  albuterol (PROVENTIL) (2.5 MG/3ML) 0.083% nebulizer solution   3. Cigarette nicotine dependence with other nicotine-induced disorder           Plan   Continue Spiriva and Advair with CLYDE PRN. Re-educated on taking Spiriva daily and Ventolin PRN. Refills given. We reviewed his PFT and decline in lung function. He defers any additional CT Lung screening: will not treat Cancer regardless. Has no desire to quit smoking. Failed Chantix and NRT of patch. To follow up with Dr. Anibal Baez on Prevnar 13. Had PNA 23 in 2012. Will see [facility] back in: 1 year. He will call if needed sooner.     Electronically signed by SHARON Cárdenas CNP on 7/29/2019 at 1:56 PM'

## 2019-08-02 ENCOUNTER — TELEPHONE (OUTPATIENT)
Dept: PULMONOLOGY | Age: 74
End: 2019-08-02

## 2019-08-02 DIAGNOSIS — J43.2 CENTRILOBULAR EMPHYSEMA (HCC): ICD-10-CM

## 2019-08-02 DIAGNOSIS — J44.9 STAGE 3 SEVERE COPD BY GOLD CLASSIFICATION (HCC): ICD-10-CM

## 2019-08-02 RX ORDER — ALBUTEROL SULFATE 2.5 MG/3ML
2.5 SOLUTION RESPIRATORY (INHALATION) EVERY 6 HOURS PRN
Qty: 360 ML | Refills: 11 | Status: ON HOLD | OUTPATIENT
Start: 2019-08-02 | End: 2020-06-07 | Stop reason: HOSPADM

## 2019-08-02 RX ORDER — ALBUTEROL SULFATE 90 UG/1
2 AEROSOL, METERED RESPIRATORY (INHALATION) EVERY 6 HOURS PRN
Qty: 1 INHALER | Refills: 11 | Status: ON HOLD | OUTPATIENT
Start: 2019-08-02 | End: 2020-06-07 | Stop reason: SDUPTHER

## 2019-08-02 NOTE — TELEPHONE ENCOUNTER
Pt was here on 7/29 and the 4 scripts that were written did not go thru to the pharmacy. Can you please resend.

## 2019-08-26 ENCOUNTER — OFFICE VISIT (OUTPATIENT)
Dept: CARDIOLOGY CLINIC | Age: 74
End: 2019-08-26
Payer: MEDICARE

## 2019-08-26 VITALS
WEIGHT: 118.6 LBS | HEART RATE: 58 BPM | DIASTOLIC BLOOD PRESSURE: 78 MMHG | BODY MASS INDEX: 21.83 KG/M2 | HEIGHT: 62 IN | SYSTOLIC BLOOD PRESSURE: 110 MMHG

## 2019-08-26 DIAGNOSIS — Z87.891 HISTORY OF TOBACCO ABUSE: ICD-10-CM

## 2019-08-26 DIAGNOSIS — R06.02 SOB (SHORTNESS OF BREATH) ON EXERTION: ICD-10-CM

## 2019-08-26 DIAGNOSIS — I25.10 CORONARY ARTERY DISEASE INVOLVING NATIVE CORONARY ARTERY OF NATIVE HEART WITHOUT ANGINA PECTORIS: Primary | ICD-10-CM

## 2019-08-26 DIAGNOSIS — J44.9 MODERATE COPD (CHRONIC OBSTRUCTIVE PULMONARY DISEASE) (HCC): ICD-10-CM

## 2019-08-26 DIAGNOSIS — Z95.820 S/P ANGIOPLASTY WITH STENT: ICD-10-CM

## 2019-08-26 DIAGNOSIS — Z95.5 HISTORY OF PLACEMENT OF STENT IN LAD CORONARY ARTERY: ICD-10-CM

## 2019-08-26 DIAGNOSIS — I10 ESSENTIAL HYPERTENSION: ICD-10-CM

## 2019-08-26 DIAGNOSIS — E78.00 PURE HYPERCHOLESTEROLEMIA: ICD-10-CM

## 2019-08-26 PROCEDURE — 3017F COLORECTAL CA SCREEN DOC REV: CPT | Performed by: INTERNAL MEDICINE

## 2019-08-26 PROCEDURE — G8427 DOCREV CUR MEDS BY ELIG CLIN: HCPCS | Performed by: INTERNAL MEDICINE

## 2019-08-26 PROCEDURE — 4004F PT TOBACCO SCREEN RCVD TLK: CPT | Performed by: INTERNAL MEDICINE

## 2019-08-26 PROCEDURE — 99214 OFFICE O/P EST MOD 30 MIN: CPT | Performed by: INTERNAL MEDICINE

## 2019-08-26 PROCEDURE — G8926 SPIRO NO PERF OR DOC: HCPCS | Performed by: INTERNAL MEDICINE

## 2019-08-26 PROCEDURE — G8598 ASA/ANTIPLAT THER USED: HCPCS | Performed by: INTERNAL MEDICINE

## 2019-08-26 PROCEDURE — 4040F PNEUMOC VAC/ADMIN/RCVD: CPT | Performed by: INTERNAL MEDICINE

## 2019-08-26 PROCEDURE — 3023F SPIROM DOC REV: CPT | Performed by: INTERNAL MEDICINE

## 2019-08-26 PROCEDURE — G8420 CALC BMI NORM PARAMETERS: HCPCS | Performed by: INTERNAL MEDICINE

## 2019-08-26 PROCEDURE — 1123F ACP DISCUSS/DSCN MKR DOCD: CPT | Performed by: INTERNAL MEDICINE

## 2019-08-26 RX ORDER — ATORVASTATIN CALCIUM 40 MG/1
40 TABLET, FILM COATED ORAL DAILY
Qty: 90 TABLET | Refills: 4 | Status: SHIPPED | OUTPATIENT
Start: 2019-08-26 | End: 2020-09-21 | Stop reason: SDUPTHER

## 2019-08-26 RX ORDER — ISOSORBIDE MONONITRATE 30 MG/1
30 TABLET, EXTENDED RELEASE ORAL DAILY
Qty: 90 TABLET | Refills: 4 | Status: SHIPPED | OUTPATIENT
Start: 2019-08-26 | End: 2020-09-21 | Stop reason: SDUPTHER

## 2019-08-26 RX ORDER — RANOLAZINE 1000 MG/1
1000 TABLET, EXTENDED RELEASE ORAL 2 TIMES DAILY
Qty: 180 TABLET | Refills: 4 | Status: SHIPPED | OUTPATIENT
Start: 2019-08-26 | End: 2020-05-08 | Stop reason: SDUPTHER

## 2019-08-26 RX ORDER — PRASUGREL 10 MG/1
TABLET, FILM COATED ORAL
Qty: 90 TABLET | Refills: 4 | Status: SHIPPED | OUTPATIENT
Start: 2019-08-26 | End: 2020-09-01

## 2019-08-26 NOTE — PROGRESS NOTES
on exertion    History of chest pain       Past Surgical History:   Procedure Laterality Date    CARDIAC SURGERY      2 stents    COLONOSCOPY      CORONARY ANGIOPLASTY WITH STENT PLACEMENT      pt has 3 stents    DIAGNOSTIC CARDIAC CATH LAB PROCEDURE  3 09 2009    Successful primary stenting of mid LAD using drug-eluting stent.  DIAGNOSTIC CARDIAC CATH LAB PROCEDURE  1 10 2011    LV demonstrated normal systolic function, EF 66%. On pullback, no gradient was noted. No critical lesions noted in all large epicardial vessels so that RCA is dominant vessel w/just very mild diffuse disease, about 20-30% lesions. Left main widely patent w/mild disease distally. Circumflex widely patent w/diffuse disease, also about 20-30%.  ENDOSCOPY, COLON, DIAGNOSTIC      HERNIA REPAIR  2008,2010    MS COLON CA SCRN NOT HI RSK IND Left 11/25/2017    COLONOSCOPY performed by Milan Steen MD at [facility]    MS EGD TRANSORAL BIOPSY SINGLE/MULTIPLE Left 11/25/2017    EGD BIOPSY performed by Milan Steen MD at [address] ECHOCARDIOGRAM  1 10 2011    Size was normal. Systolic function was normal. EF estimated in range of 55-65%. No regional wall motion abnormalities. Wall thickness was normal. Doppler - LV diastolic function parameters were normal. This is technically limited study which may impair interpretation.  TRANSTHORACIC ECHOCARDIOGRAM  3 06 2009    This study is technically limited. Distal anterior apical wall hypokinesis. EF 40-45%. Left atrial size w/in normal limits. Trace MR. No evidence of aortic stenosis or aortic insufficiency. Right atrium and right ventricle are of normal contractility. Trace TR. No pericardial effusion.      UPPER GASTROINTESTINAL ENDOSCOPY Left 11/21/2017    EGD DIAGNOSTIC ONLY performed by Jackson Weinstein MD at [facility]       Allergies   Allergen Reactions    Xanax [Alprazolam] Other (See Comments)     Altered mental status    Pneumococcal Vaccines Nausea And Vomiting     Reports vomiting x 1 day after vaccine    Codeine Hives     Upset stomach    Dilaudid [Hydromorphone] Hives     Upset stomach    Morphine Nausea And Vomiting        Family History   Problem Relation Age of Onset    Heart Disease Mother     Cancer Mother     Heart Disease Father     Cancer Father     Cancer Sister     Prostate Cancer Neg Hx     Colon Cancer Neg Hx     Colon Polyps Neg Hx         Social History     Socioeconomic History    Marital status:      Spouse name: Not on file    Number of children: 3    Years of education: Not on file    Highest education level: Not on file   Occupational History    Not on file   Social Needs    Financial resource strain: Not on file    Food insecurity:     Worry: Not on file     Inability: Not on file    Transportation needs:     Medical: Not on file     Non-medical: Not on file   Tobacco Use    Smoking status: Current Every Day Smoker     Packs/day: 1.00     Years: 40.00     Pack years: 40.00     Types: Cigarettes     Start date: 5/31/1976    Smokeless tobacco: Never Used   Substance and Sexual Activity    Alcohol use: No     Alcohol/week: 0.0 standard drinks    Drug use: No    Sexual activity: Not on file   Lifestyle    Physical activity:     Days per week: Not on file     Minutes per session: Not on file    Stress: Not on file   Relationships    Social connections:     Talks on phone: Not on file     Gets together: Not on file     Attends [employer] service: Not on file     Active member of club or organization: Not on file     Attends meetings of clubs or organizations: Not on file     Relationship status: Not on file    Intimate partner violence:     Fear of current or ex partner: Not on file     Emotionally abused: Not on file     Physically abused: Not on file     Forced sexual activity: Not on file   Other Topics Concern    Not on file   Social History Narrative    Not on file       Current Component Value Date    TRIG 72 03/25/2019    HDL 59.0 03/25/2019    LDLCALC 63 03/25/2019    LABVLDL 14 03/25/2019     TSH:    Lab Results   Component Value Date    TSH 3.460 07/12/2018     Cath Nov 2017 post cardiac arrest and inf stemi  emergant cath done mod RCA lesion med RX  FFR considered - later nuc stress test done dec 2017 neg  Med RX recommended By dr. Christiano Vincent who followed did cath and later did stress- which was negative - med RX continued  CORONARY ANGIOGRAM:  LEFT MAIN:  The left main is patent and free of any significant disease.     LAD:  The LAD has a large stent that is patent without any significant ISR. The LAD is diffusely diseased at the apical segment. There is good myocardial blush. There are diagonal branches that are patent without any significant  obstructive disease.     LEFT CIRCUMFLEX:  The left circumflex is large without any obstructive  disease. It gives rise to a large OM branch that bifurcates distally and  Has mild luminal irregularities. The AV groove circumflex is patent.     RCA:  We used a 6-Scottish guiding catheter to engage the RCA given the  ECG findings in the patient. This demonstrated patent vessel. There was mid 50% stenosis followed by a distal  60% to 70% stenosis in the RCA. Dominant vessel. There was flow all the  way to the distal vessels, including to the PLV and to PDA branches.     LV:  The LV systolic pressure was 379, the LVDP was approximately 20, there  is no LV to AO systolic gradient. The LV gram performed demonstrates an EF  of 55% without any significant wall motion abnormalities. No significant MR. There aortic valve appears to be tricuspid and there does not appear to be   An ascending aortic aneurysm or dissection. Echo nov 2017 EF 55%      Assessment       Diagnosis Orders   1. Coronary artery disease involving native coronary artery of native heart without angina pectoris     2. Essential hypertension     3.  Pure

## 2019-09-27 ENCOUNTER — TELEPHONE (OUTPATIENT)
Dept: CARDIOLOGY CLINIC | Age: 74
End: 2019-09-27

## 2019-10-10 ENCOUNTER — APPOINTMENT (OUTPATIENT)
Dept: MRI IMAGING | Age: 74
DRG: 149 | End: 2019-10-10
Payer: MEDICARE

## 2019-10-10 ENCOUNTER — APPOINTMENT (OUTPATIENT)
Dept: CT IMAGING | Age: 74
DRG: 149 | End: 2019-10-10
Payer: MEDICARE

## 2019-10-10 ENCOUNTER — HOSPITAL ENCOUNTER (INPATIENT)
Age: 74
LOS: 1 days | Discharge: HOME | DRG: 149 | End: 2019-10-11
Attending: FAMILY MEDICINE | Admitting: INTERNAL MEDICINE
Payer: MEDICARE

## 2019-10-10 DIAGNOSIS — R42 ACUTE ONSET OF SEVERE VERTIGO: Primary | ICD-10-CM

## 2019-10-10 DIAGNOSIS — R42 VERTIGO: ICD-10-CM

## 2019-10-10 DIAGNOSIS — R27.0 ATAXIA: ICD-10-CM

## 2019-10-10 LAB
ALBUMIN SERPL-MCNC: 4 G/DL (ref 3.5–5.1)
ALP BLD-CCNC: 78 U/L (ref 38–126)
ALT SERPL-CCNC: 20 U/L (ref 11–66)
AMMONIA: 26 UMOL/L (ref 11–60)
AMPHETAMINE+METHAMPHETAMINE URINE SCREEN: NEGATIVE
ANION GAP SERPL CALCULATED.3IONS-SCNC: 12 MEQ/L (ref 8–16)
APTT: 31.8 SECONDS (ref 22–38)
AST SERPL-CCNC: 20 U/L (ref 5–40)
BARBITURATE QUANTITATIVE URINE: NEGATIVE
BASOPHILS # BLD: 0.4 %
BASOPHILS ABSOLUTE: 0 THOU/MM3 (ref 0–0.1)
BENZODIAZEPINE QUANTITATIVE URINE: NEGATIVE
BILIRUB SERPL-MCNC: 0.3 MG/DL (ref 0.3–1.2)
BILIRUBIN DIRECT: < 0.2 MG/DL (ref 0–0.3)
BUN BLDV-MCNC: 17 MG/DL (ref 7–22)
CALCIUM SERPL-MCNC: 9.6 MG/DL (ref 8.5–10.5)
CANNABINOID QUANTITATIVE URINE: NEGATIVE
CHLORIDE BLD-SCNC: 97 MEQ/L (ref 98–111)
CO2: 28 MEQ/L (ref 23–33)
COCAINE METABOLITE QUANTITATIVE URINE: NEGATIVE
CREAT SERPL-MCNC: 1.2 MG/DL (ref 0.4–1.2)
EKG ATRIAL RATE: 57 BPM
EKG P AXIS: 78 DEGREES
EKG P-R INTERVAL: 162 MS
EKG Q-T INTERVAL: 414 MS
EKG QRS DURATION: 84 MS
EKG QTC CALCULATION (BAZETT): 402 MS
EKG R AXIS: 63 DEGREES
EKG T AXIS: 68 DEGREES
EKG VENTRICULAR RATE: 57 BPM
EOSINOPHIL # BLD: 2.1 %
EOSINOPHILS ABSOLUTE: 0.1 THOU/MM3 (ref 0–0.4)
ERYTHROCYTE [DISTWIDTH] IN BLOOD BY AUTOMATED COUNT: 13 % (ref 11.5–14.5)
ERYTHROCYTE [DISTWIDTH] IN BLOOD BY AUTOMATED COUNT: 48.1 FL (ref 35–45)
ETHYL ALCOHOL, SERUM: < 0.01 %
GFR SERPL CREATININE-BSD FRML MDRD: 59 ML/MIN/1.73M2
GLUCOSE BLD-MCNC: 101 MG/DL (ref 70–108)
HCT VFR BLD CALC: 39 % (ref 42–52)
HEMOGLOBIN: 13.1 GM/DL (ref 14–18)
IMMATURE GRANS (ABS): 0.03 THOU/MM3 (ref 0–0.07)
IMMATURE GRANULOCYTES: 0.4 %
INR BLD: 1 (ref 0.85–1.13)
LIPASE: 21.5 U/L (ref 5.6–51.3)
LYMPHOCYTES # BLD: 38 %
LYMPHOCYTES ABSOLUTE: 2.6 THOU/MM3 (ref 1–4.8)
MCH RBC QN AUTO: 33.9 PG (ref 26–33)
MCHC RBC AUTO-ENTMCNC: 33.6 GM/DL (ref 32.2–35.5)
MCV RBC AUTO: 100.8 FL (ref 80–94)
MONOCYTES # BLD: 9.3 %
MONOCYTES ABSOLUTE: 0.6 THOU/MM3 (ref 0.4–1.3)
NUCLEATED RED BLOOD CELLS: 0 /100 WBC
OPIATES, URINE: NEGATIVE
OSMOLALITY CALCULATION: 275.5 MOSMOL/KG (ref 275–300)
OXYCODONE: NEGATIVE
PHENCYCLIDINE QUANTITATIVE URINE: NEGATIVE
PLATELET # BLD: 218 THOU/MM3 (ref 130–400)
PMV BLD AUTO: 9.2 FL (ref 9.4–12.4)
POTASSIUM SERPL-SCNC: 4.7 MEQ/L (ref 3.5–5.2)
RBC # BLD: 3.87 MILL/MM3 (ref 4.7–6.1)
SEG NEUTROPHILS: 49.8 %
SEGMENTED NEUTROPHILS ABSOLUTE COUNT: 3.4 THOU/MM3 (ref 1.8–7.7)
SODIUM BLD-SCNC: 137 MEQ/L (ref 135–145)
TOTAL PROTEIN: 6.9 G/DL (ref 6.1–8)
TROPONIN T: < 0.01 NG/ML
WBC # BLD: 6.8 THOU/MM3 (ref 4.8–10.8)

## 2019-10-10 PROCEDURE — 85730 THROMBOPLASTIN TIME PARTIAL: CPT

## 2019-10-10 PROCEDURE — 6360000004 HC RX CONTRAST MEDICATION: Performed by: FAMILY MEDICINE

## 2019-10-10 PROCEDURE — 96375 TX/PRO/DX INJ NEW DRUG ADDON: CPT

## 2019-10-10 PROCEDURE — 96366 THER/PROPH/DIAG IV INF ADDON: CPT

## 2019-10-10 PROCEDURE — 2060000000 HC ICU INTERMEDIATE R&B

## 2019-10-10 PROCEDURE — 93010 ELECTROCARDIOGRAM REPORT: CPT | Performed by: NUCLEAR MEDICINE

## 2019-10-10 PROCEDURE — 6360000002 HC RX W HCPCS: Performed by: FAMILY MEDICINE

## 2019-10-10 PROCEDURE — 70496 CT ANGIOGRAPHY HEAD: CPT

## 2019-10-10 PROCEDURE — 80307 DRUG TEST PRSMV CHEM ANLYZR: CPT

## 2019-10-10 PROCEDURE — 70498 CT ANGIOGRAPHY NECK: CPT

## 2019-10-10 PROCEDURE — 6360000002 HC RX W HCPCS: Performed by: INTERNAL MEDICINE

## 2019-10-10 PROCEDURE — 2580000003 HC RX 258: Performed by: INTERNAL MEDICINE

## 2019-10-10 PROCEDURE — G0480 DRUG TEST DEF 1-7 CLASSES: HCPCS

## 2019-10-10 PROCEDURE — 1200000003 HC TELEMETRY R&B

## 2019-10-10 PROCEDURE — 85610 PROTHROMBIN TIME: CPT

## 2019-10-10 PROCEDURE — 6360000002 HC RX W HCPCS

## 2019-10-10 PROCEDURE — 70551 MRI BRAIN STEM W/O DYE: CPT

## 2019-10-10 PROCEDURE — 36415 COLL VENOUS BLD VENIPUNCTURE: CPT

## 2019-10-10 PROCEDURE — 94640 AIRWAY INHALATION TREATMENT: CPT

## 2019-10-10 PROCEDURE — 85025 COMPLETE CBC W/AUTO DIFF WBC: CPT

## 2019-10-10 PROCEDURE — 80053 COMPREHEN METABOLIC PANEL: CPT

## 2019-10-10 PROCEDURE — 99285 EMERGENCY DEPT VISIT HI MDM: CPT

## 2019-10-10 PROCEDURE — 82248 BILIRUBIN DIRECT: CPT

## 2019-10-10 PROCEDURE — 84484 ASSAY OF TROPONIN QUANT: CPT

## 2019-10-10 PROCEDURE — 83690 ASSAY OF LIPASE: CPT

## 2019-10-10 PROCEDURE — 2709999900 HC NON-CHARGEABLE SUPPLY

## 2019-10-10 PROCEDURE — 6370000000 HC RX 637 (ALT 250 FOR IP): Performed by: PSYCHIATRY & NEUROLOGY

## 2019-10-10 PROCEDURE — 70450 CT HEAD/BRAIN W/O DYE: CPT

## 2019-10-10 PROCEDURE — 6370000000 HC RX 637 (ALT 250 FOR IP): Performed by: INTERNAL MEDICINE

## 2019-10-10 PROCEDURE — 82140 ASSAY OF AMMONIA: CPT

## 2019-10-10 PROCEDURE — 96365 THER/PROPH/DIAG IV INF INIT: CPT

## 2019-10-10 PROCEDURE — 99223 1ST HOSP IP/OBS HIGH 75: CPT | Performed by: PSYCHIATRY & NEUROLOGY

## 2019-10-10 PROCEDURE — 93005 ELECTROCARDIOGRAM TRACING: CPT | Performed by: FAMILY MEDICINE

## 2019-10-10 RX ORDER — PANTOPRAZOLE SODIUM 40 MG/1
40 TABLET, DELAYED RELEASE ORAL
Status: DISCONTINUED | OUTPATIENT
Start: 2019-10-11 | End: 2019-10-11 | Stop reason: HOSPADM

## 2019-10-10 RX ORDER — MECLIZINE HYDROCHLORIDE CHEWABLE TABLETS 25 MG/1
25 TABLET, CHEWABLE ORAL ONCE
Status: COMPLETED | OUTPATIENT
Start: 2019-10-10 | End: 2019-10-10

## 2019-10-10 RX ORDER — METOCLOPRAMIDE HYDROCHLORIDE 5 MG/ML
10 INJECTION INTRAMUSCULAR; INTRAVENOUS ONCE
Status: COMPLETED | OUTPATIENT
Start: 2019-10-10 | End: 2019-10-10

## 2019-10-10 RX ORDER — ASPIRIN 81 MG/1
81 TABLET ORAL DAILY
Status: DISCONTINUED | OUTPATIENT
Start: 2019-10-10 | End: 2019-10-11 | Stop reason: HOSPADM

## 2019-10-10 RX ORDER — PANTOPRAZOLE SODIUM 20 MG/1
20 TABLET, DELAYED RELEASE ORAL DAILY
Status: DISCONTINUED | OUTPATIENT
Start: 2019-10-10 | End: 2019-10-10 | Stop reason: CLARIF

## 2019-10-10 RX ORDER — ALBUTEROL SULFATE 90 UG/1
2 AEROSOL, METERED RESPIRATORY (INHALATION) EVERY 6 HOURS PRN
Status: DISCONTINUED | OUTPATIENT
Start: 2019-10-10 | End: 2019-10-11 | Stop reason: HOSPADM

## 2019-10-10 RX ORDER — ATORVASTATIN CALCIUM 40 MG/1
40 TABLET, FILM COATED ORAL DAILY
Status: DISCONTINUED | OUTPATIENT
Start: 2019-10-10 | End: 2019-10-11 | Stop reason: HOSPADM

## 2019-10-10 RX ORDER — ISOSORBIDE MONONITRATE 30 MG/1
30 TABLET, EXTENDED RELEASE ORAL DAILY
Status: DISCONTINUED | OUTPATIENT
Start: 2019-10-10 | End: 2019-10-11 | Stop reason: HOSPADM

## 2019-10-10 RX ORDER — ONDANSETRON 2 MG/ML
INJECTION INTRAMUSCULAR; INTRAVENOUS
Status: COMPLETED
Start: 2019-10-10 | End: 2019-10-10

## 2019-10-10 RX ORDER — SODIUM CHLORIDE 0.9 % (FLUSH) 0.9 %
10 SYRINGE (ML) INJECTION EVERY 12 HOURS SCHEDULED
Status: DISCONTINUED | OUTPATIENT
Start: 2019-10-10 | End: 2019-10-11 | Stop reason: HOSPADM

## 2019-10-10 RX ORDER — ONDANSETRON 2 MG/ML
4 INJECTION INTRAMUSCULAR; INTRAVENOUS ONCE
Status: COMPLETED | OUTPATIENT
Start: 2019-10-10 | End: 2019-10-10

## 2019-10-10 RX ORDER — METOCLOPRAMIDE HYDROCHLORIDE 5 MG/ML
INJECTION INTRAMUSCULAR; INTRAVENOUS
Status: COMPLETED
Start: 2019-10-10 | End: 2019-10-10

## 2019-10-10 RX ORDER — SODIUM CHLORIDE 9 MG/ML
INJECTION, SOLUTION INTRAVENOUS CONTINUOUS
Status: DISCONTINUED | OUTPATIENT
Start: 2019-10-10 | End: 2019-10-11 | Stop reason: HOSPADM

## 2019-10-10 RX ORDER — LORAZEPAM 2 MG/ML
0.5 INJECTION INTRAMUSCULAR ONCE
Status: DISCONTINUED | OUTPATIENT
Start: 2019-10-10 | End: 2019-10-10

## 2019-10-10 RX ORDER — ONDANSETRON 2 MG/ML
4 INJECTION INTRAMUSCULAR; INTRAVENOUS ONCE
Status: DISCONTINUED | OUTPATIENT
Start: 2019-10-10 | End: 2019-10-10

## 2019-10-10 RX ORDER — ACETAMINOPHEN 325 MG/1
650 TABLET ORAL EVERY 4 HOURS PRN
Status: DISCONTINUED | OUTPATIENT
Start: 2019-10-10 | End: 2019-10-11 | Stop reason: HOSPADM

## 2019-10-10 RX ORDER — RANOLAZINE 500 MG/1
1000 TABLET, EXTENDED RELEASE ORAL 2 TIMES DAILY
Status: DISCONTINUED | OUTPATIENT
Start: 2019-10-10 | End: 2019-10-11 | Stop reason: HOSPADM

## 2019-10-10 RX ORDER — MAGNESIUM SULFATE IN WATER 40 MG/ML
2 INJECTION, SOLUTION INTRAVENOUS ONCE
Status: COMPLETED | OUTPATIENT
Start: 2019-10-10 | End: 2019-10-10

## 2019-10-10 RX ORDER — LABETALOL 20 MG/4 ML (5 MG/ML) INTRAVENOUS SYRINGE
Status: DISCONTINUED
Start: 2019-10-10 | End: 2019-10-10 | Stop reason: WASHOUT

## 2019-10-10 RX ORDER — POLYETHYLENE GLYCOL 3350 17 G/17G
17 POWDER, FOR SOLUTION ORAL DAILY
Status: DISCONTINUED | OUTPATIENT
Start: 2019-10-10 | End: 2019-10-11 | Stop reason: HOSPADM

## 2019-10-10 RX ORDER — CLOPIDOGREL 300 MG/1
300 TABLET, FILM COATED ORAL ONCE
Status: DISCONTINUED | OUTPATIENT
Start: 2019-10-10 | End: 2019-10-10

## 2019-10-10 RX ORDER — PRASUGREL 10 MG/1
10 TABLET, FILM COATED ORAL DAILY
Status: DISCONTINUED | OUTPATIENT
Start: 2019-10-10 | End: 2019-10-11 | Stop reason: HOSPADM

## 2019-10-10 RX ORDER — SODIUM CHLORIDE 0.9 % (FLUSH) 0.9 %
10 SYRINGE (ML) INJECTION PRN
Status: DISCONTINUED | OUTPATIENT
Start: 2019-10-10 | End: 2019-10-11 | Stop reason: HOSPADM

## 2019-10-10 RX ORDER — ALBUTEROL SULFATE 2.5 MG/3ML
2.5 SOLUTION RESPIRATORY (INHALATION) EVERY 6 HOURS PRN
Status: DISCONTINUED | OUTPATIENT
Start: 2019-10-10 | End: 2019-10-11 | Stop reason: HOSPADM

## 2019-10-10 RX ADMIN — MAGNESIUM SULFATE HEPTAHYDRATE 2 G: 40 INJECTION, SOLUTION INTRAVENOUS at 16:08

## 2019-10-10 RX ADMIN — MECLIZINE HCL 25 MG: 25 TABLET, CHEWABLE ORAL at 14:33

## 2019-10-10 RX ADMIN — ONDANSETRON 4 MG: 2 INJECTION INTRAMUSCULAR; INTRAVENOUS at 13:39

## 2019-10-10 RX ADMIN — SODIUM CHLORIDE: 9 INJECTION, SOLUTION INTRAVENOUS at 20:19

## 2019-10-10 RX ADMIN — IOPAMIDOL 80 ML: 755 INJECTION, SOLUTION INTRAVENOUS at 14:12

## 2019-10-10 RX ADMIN — RANOLAZINE 1000 MG: 500 TABLET, EXTENDED RELEASE ORAL at 21:52

## 2019-10-10 RX ADMIN — METOCLOPRAMIDE HYDROCHLORIDE 10 MG: 5 INJECTION INTRAMUSCULAR; INTRAVENOUS at 14:23

## 2019-10-10 RX ADMIN — Medication 2 PUFF: at 22:04

## 2019-10-10 RX ADMIN — ENOXAPARIN SODIUM 40 MG: 40 INJECTION SUBCUTANEOUS at 21:52

## 2019-10-10 RX ADMIN — Medication 10 ML: at 20:20

## 2019-10-10 RX ADMIN — METOCLOPRAMIDE 10 MG: 5 INJECTION, SOLUTION INTRAMUSCULAR; INTRAVENOUS at 14:23

## 2019-10-10 ASSESSMENT — PAIN SCALES - GENERAL
PAINLEVEL_OUTOF10: 8
PAINLEVEL_OUTOF10: 0
PAINLEVEL_OUTOF10: 2
PAINLEVEL_OUTOF10: 0
PAINLEVEL_OUTOF10: 0

## 2019-10-10 ASSESSMENT — PAIN DESCRIPTION - PAIN TYPE
TYPE: ACUTE PAIN
TYPE: ACUTE PAIN
TYPE: OTHER (COMMENT)

## 2019-10-10 ASSESSMENT — PAIN DESCRIPTION - FREQUENCY
FREQUENCY: CONTINUOUS
FREQUENCY: CONTINUOUS

## 2019-10-10 ASSESSMENT — PAIN DESCRIPTION - LOCATION
LOCATION: OTHER (COMMENT)
LOCATION: HEAD;ABDOMEN
LOCATION: ABDOMEN

## 2019-10-10 ASSESSMENT — PAIN DESCRIPTION - ORIENTATION
ORIENTATION: LEFT;UPPER
ORIENTATION: LEFT;UPPER

## 2019-10-10 ASSESSMENT — ENCOUNTER SYMPTOMS
ABDOMINAL PAIN: 0
SHORTNESS OF BREATH: 0

## 2019-10-11 VITALS
RESPIRATION RATE: 16 BRPM | BODY MASS INDEX: 22.52 KG/M2 | WEIGHT: 122.4 LBS | OXYGEN SATURATION: 93 % | DIASTOLIC BLOOD PRESSURE: 61 MMHG | HEIGHT: 62 IN | SYSTOLIC BLOOD PRESSURE: 127 MMHG | TEMPERATURE: 97.9 F | HEART RATE: 63 BPM

## 2019-10-11 LAB
ANION GAP SERPL CALCULATED.3IONS-SCNC: 9 MEQ/L (ref 8–16)
BASOPHILS # BLD: 0.5 %
BASOPHILS ABSOLUTE: 0 THOU/MM3 (ref 0–0.1)
BUN BLDV-MCNC: 15 MG/DL (ref 7–22)
CALCIUM SERPL-MCNC: 9.3 MG/DL (ref 8.5–10.5)
CHLORIDE BLD-SCNC: 102 MEQ/L (ref 98–111)
CO2: 27 MEQ/L (ref 23–33)
CREAT SERPL-MCNC: 1.3 MG/DL (ref 0.4–1.2)
EOSINOPHIL # BLD: 1.7 %
EOSINOPHILS ABSOLUTE: 0.1 THOU/MM3 (ref 0–0.4)
ERYTHROCYTE [DISTWIDTH] IN BLOOD BY AUTOMATED COUNT: 12.8 % (ref 11.5–14.5)
ERYTHROCYTE [DISTWIDTH] IN BLOOD BY AUTOMATED COUNT: 47.8 FL (ref 35–45)
GFR SERPL CREATININE-BSD FRML MDRD: 54 ML/MIN/1.73M2
GLUCOSE BLD-MCNC: 107 MG/DL (ref 70–108)
HCT VFR BLD CALC: 37.4 % (ref 42–52)
HEMOGLOBIN: 12.4 GM/DL (ref 14–18)
IMMATURE GRANS (ABS): 0.04 THOU/MM3 (ref 0–0.07)
IMMATURE GRANULOCYTES: 0.6 %
LYMPHOCYTES # BLD: 35 %
LYMPHOCYTES ABSOLUTE: 2.3 THOU/MM3 (ref 1–4.8)
MCH RBC QN AUTO: 33.6 PG (ref 26–33)
MCHC RBC AUTO-ENTMCNC: 33.2 GM/DL (ref 32.2–35.5)
MCV RBC AUTO: 101.4 FL (ref 80–94)
MONOCYTES # BLD: 11.5 %
MONOCYTES ABSOLUTE: 0.7 THOU/MM3 (ref 0.4–1.3)
NUCLEATED RED BLOOD CELLS: 0 /100 WBC
PLATELET # BLD: 190 THOU/MM3 (ref 130–400)
PMV BLD AUTO: 9 FL (ref 9.4–12.4)
POTASSIUM SERPL-SCNC: 5.1 MEQ/L (ref 3.5–5.2)
RBC # BLD: 3.69 MILL/MM3 (ref 4.7–6.1)
SEG NEUTROPHILS: 50.7 %
SEGMENTED NEUTROPHILS ABSOLUTE COUNT: 3.3 THOU/MM3 (ref 1.8–7.7)
SODIUM BLD-SCNC: 138 MEQ/L (ref 135–145)
WBC # BLD: 6.5 THOU/MM3 (ref 4.8–10.8)

## 2019-10-11 PROCEDURE — 36415 COLL VENOUS BLD VENIPUNCTURE: CPT

## 2019-10-11 PROCEDURE — 94640 AIRWAY INHALATION TREATMENT: CPT

## 2019-10-11 PROCEDURE — 85025 COMPLETE CBC W/AUTO DIFF WBC: CPT

## 2019-10-11 PROCEDURE — 80048 BASIC METABOLIC PNL TOTAL CA: CPT

## 2019-10-11 PROCEDURE — 6370000000 HC RX 637 (ALT 250 FOR IP): Performed by: INTERNAL MEDICINE

## 2019-10-11 PROCEDURE — G0008 ADMIN INFLUENZA VIRUS VAC: HCPCS | Performed by: INTERNAL MEDICINE

## 2019-10-11 PROCEDURE — 6360000002 HC RX W HCPCS: Performed by: INTERNAL MEDICINE

## 2019-10-11 PROCEDURE — 2709999900 HC NON-CHARGEABLE SUPPLY

## 2019-10-11 PROCEDURE — 97162 PT EVAL MOD COMPLEX 30 MIN: CPT

## 2019-10-11 PROCEDURE — 97116 GAIT TRAINING THERAPY: CPT

## 2019-10-11 PROCEDURE — 90686 IIV4 VACC NO PRSV 0.5 ML IM: CPT | Performed by: INTERNAL MEDICINE

## 2019-10-11 RX ORDER — PANTOPRAZOLE SODIUM 40 MG/1
40 TABLET, DELAYED RELEASE ORAL
Qty: 30 TABLET | Refills: 3 | Status: SHIPPED | OUTPATIENT
Start: 2019-10-11 | End: 2020-09-21 | Stop reason: SDUPTHER

## 2019-10-11 RX ADMIN — PRASUGREL 10 MG: 10 TABLET, FILM COATED ORAL at 09:23

## 2019-10-11 RX ADMIN — TIOTROPIUM BROMIDE 18 MCG: 18 CAPSULE ORAL; RESPIRATORY (INHALATION) at 09:11

## 2019-10-11 RX ADMIN — Medication 2 PUFF: at 09:11

## 2019-10-11 RX ADMIN — ATORVASTATIN CALCIUM 40 MG: 40 TABLET, FILM COATED ORAL at 09:23

## 2019-10-11 RX ADMIN — ASPIRIN 81 MG: 81 TABLET ORAL at 09:23

## 2019-10-11 RX ADMIN — METOPROLOL TARTRATE 25 MG: 25 TABLET ORAL at 09:23

## 2019-10-11 RX ADMIN — PANTOPRAZOLE SODIUM 40 MG: 40 TABLET, DELAYED RELEASE ORAL at 09:23

## 2019-10-11 RX ADMIN — ISOSORBIDE MONONITRATE 30 MG: 30 TABLET ORAL at 09:23

## 2019-10-11 RX ADMIN — RANOLAZINE 1000 MG: 500 TABLET, EXTENDED RELEASE ORAL at 09:23

## 2019-10-11 RX ADMIN — INFLUENZA A VIRUS A/BRISBANE/02/2018 IVR-190 (H1N1) ANTIGEN (PROPIOLACTONE INACTIVATED), INFLUENZA A VIRUS A/KANSAS/14/2017 X-327 (H3N2) ANTIGEN (PROPIOLACTONE INACTIVATED), INFLUENZA B VIRUS B/MARYLAND/15/2016 ANTIGEN (PROPIOLACTONE INACTIVATED), INFLUENZA B VIRUS B/PHUKET/3073/2013 BVR-1B ANTIGEN (PROPIOLACTONE INACTIVATED) 0.5 ML: 15; 15; 15; 15 INJECTION, SUSPENSION INTRAMUSCULAR at 09:24

## 2019-10-12 ENCOUNTER — CARE COORDINATION (OUTPATIENT)
Dept: CASE MANAGEMENT | Age: 74
End: 2019-10-12

## 2019-10-12 DIAGNOSIS — R42 VERTIGO: Primary | ICD-10-CM

## 2019-10-12 PROCEDURE — 1111F DSCHRG MED/CURRENT MED MERGE: CPT | Performed by: INTERNAL MEDICINE

## 2019-11-04 ENCOUNTER — TELEPHONE (OUTPATIENT)
Dept: PULMONOLOGY | Age: 74
End: 2019-11-04

## 2020-03-20 NOTE — TELEPHONE ENCOUNTER
Suezanne Homans called requesting a refill on the following medications:  Requested Prescriptions     Pending Prescriptions Disp Refills    fluticasone-umeclidin-vilant (TRELEGY ELLIPTA) 100-62.5-25 MCG/INH AEPB 30 each 11     Sig: Inhale 1 puff into the lungs daily     Pharmacy verified:  [facility]  . pv      Date of last visit:  7/29/2019  Date of next visit (if applicable): 8/61/6347

## 2020-03-23 RX ORDER — FLUTICASONE FUROATE, UMECLIDINIUM BROMIDE AND VILANTEROL TRIFENATATE 100; 62.5; 25 UG/1; UG/1; UG/1
1 POWDER RESPIRATORY (INHALATION) DAILY
Qty: 30 EACH | Refills: 5 | Status: ON HOLD | OUTPATIENT
Start: 2020-03-23 | End: 2020-06-07 | Stop reason: SDUPTHER

## 2020-05-05 RX ORDER — ATORVASTATIN CALCIUM 40 MG/1
40 TABLET, FILM COATED ORAL DAILY
Qty: 90 TABLET | Refills: 0 | OUTPATIENT
Start: 2020-05-05

## 2020-05-05 RX ORDER — RANOLAZINE 1000 MG/1
1000 TABLET, EXTENDED RELEASE ORAL 2 TIMES DAILY
Qty: 180 TABLET | Refills: 0 | OUTPATIENT
Start: 2020-05-05

## 2020-05-08 RX ORDER — RANOLAZINE 1000 MG/1
1000 TABLET, EXTENDED RELEASE ORAL 2 TIMES DAILY
Qty: 180 TABLET | Refills: 0 | OUTPATIENT
Start: 2020-05-08 | End: 2020-07-29 | Stop reason: SDUPTHER

## 2020-06-05 ENCOUNTER — HOSPITAL ENCOUNTER (INPATIENT)
Age: 75
LOS: 2 days | Discharge: HOME | DRG: 193 | End: 2020-06-07
Attending: EMERGENCY MEDICINE | Admitting: INTERNAL MEDICINE
Payer: MEDICARE

## 2020-06-05 ENCOUNTER — APPOINTMENT (OUTPATIENT)
Dept: CT IMAGING | Age: 75
DRG: 193 | End: 2020-06-05
Payer: MEDICARE

## 2020-06-05 ENCOUNTER — APPOINTMENT (OUTPATIENT)
Dept: GENERAL RADIOLOGY | Age: 75
DRG: 193 | End: 2020-06-05
Payer: MEDICARE

## 2020-06-05 PROBLEM — R06.02 SOB (SHORTNESS OF BREATH): Status: ACTIVE | Noted: 2020-06-05

## 2020-06-05 LAB
ALBUMIN SERPL-MCNC: 3.8 G/DL (ref 3.5–5.1)
ALLEN TEST: POSITIVE
ALLEN TEST: POSITIVE
ALP BLD-CCNC: 94 U/L (ref 38–126)
ALT SERPL-CCNC: 21 U/L (ref 11–66)
ANION GAP SERPL CALCULATED.3IONS-SCNC: 14 MEQ/L (ref 8–16)
AST SERPL-CCNC: 22 U/L (ref 5–40)
BASE EXCESS (CALCULATED): -0.3 MMOL/L (ref -2.5–2.5)
BASE EXCESS (CALCULATED): -0.6 MMOL/L (ref -2.5–2.5)
BASOPHILS # BLD: 0.5 %
BASOPHILS ABSOLUTE: 0 THOU/MM3 (ref 0–0.1)
BILIRUB SERPL-MCNC: 0.6 MG/DL (ref 0.3–1.2)
BUN BLDV-MCNC: 22 MG/DL (ref 7–22)
C-REACTIVE PROTEIN: 39.36 MG/DL (ref 0–1)
CALCIUM SERPL-MCNC: 9.8 MG/DL (ref 8.5–10.5)
CHLORIDE BLD-SCNC: 98 MEQ/L (ref 98–111)
CO2: 23 MEQ/L (ref 23–33)
COLLECTED BY:: 4648
COLLECTED BY:: ABNORMAL
CREAT SERPL-MCNC: 1.1 MG/DL (ref 0.4–1.2)
D-DIMER QUANTITATIVE: 2180 NG/ML FEU (ref 0–500)
DEVICE: ABNORMAL
DEVICE: ABNORMAL
EKG ATRIAL RATE: 101 BPM
EKG ATRIAL RATE: 113 BPM
EKG P AXIS: 83 DEGREES
EKG P AXIS: 83 DEGREES
EKG P-R INTERVAL: 138 MS
EKG P-R INTERVAL: 138 MS
EKG Q-T INTERVAL: 290 MS
EKG Q-T INTERVAL: 318 MS
EKG QRS DURATION: 76 MS
EKG QRS DURATION: 78 MS
EKG QTC CALCULATION (BAZETT): 397 MS
EKG QTC CALCULATION (BAZETT): 412 MS
EKG R AXIS: 62 DEGREES
EKG R AXIS: 67 DEGREES
EKG T AXIS: 65 DEGREES
EKG T AXIS: 72 DEGREES
EKG VENTRICULAR RATE: 101 BPM
EKG VENTRICULAR RATE: 113 BPM
EOSINOPHIL # BLD: 0 %
EOSINOPHILS ABSOLUTE: 0 THOU/MM3 (ref 0–0.4)
ERYTHROCYTE [DISTWIDTH] IN BLOOD BY AUTOMATED COUNT: 13.3 % (ref 11.5–14.5)
ERYTHROCYTE [DISTWIDTH] IN BLOOD BY AUTOMATED COUNT: 49.1 FL (ref 35–45)
FERRITIN: 204 NG/ML (ref 22–322)
FIBRINOGEN: 873 MG/100ML (ref 155–475)
FLU A ANTIGEN: NEGATIVE
FLU B ANTIGEN: NEGATIVE
GFR SERPL CREATININE-BSD FRML MDRD: 65 ML/MIN/1.73M2
GLUCOSE BLD-MCNC: 115 MG/DL (ref 70–108)
HCO3: 26 MMOL/L (ref 23–28)
HCO3: 26 MMOL/L (ref 23–28)
HCT VFR BLD CALC: 45.3 % (ref 42–52)
HEMOGLOBIN: 15.4 GM/DL (ref 14–18)
IFIO2: 4
IFIO2: 40
IMMATURE GRANS (ABS): 0.02 THOU/MM3 (ref 0–0.07)
IMMATURE GRANULOCYTES: 0.3 %
LACTIC ACID: 1.6 MMOL/L (ref 0.5–2.2)
LD: 189 U/L (ref 100–190)
LYMPHOCYTES # BLD: 9.1 %
LYMPHOCYTES ABSOLUTE: 0.6 THOU/MM3 (ref 1–4.8)
MCH RBC QN AUTO: 33.8 PG (ref 26–33)
MCHC RBC AUTO-ENTMCNC: 34 GM/DL (ref 32.2–35.5)
MCV RBC AUTO: 99.3 FL (ref 80–94)
MONOCYTES # BLD: 12.3 %
MONOCYTES ABSOLUTE: 0.8 THOU/MM3 (ref 0.4–1.3)
NUCLEATED RED BLOOD CELLS: 0 /100 WBC
O2 SATURATION: 96 %
O2 SATURATION: 99 %
OSMOLALITY CALCULATION: 274.3 MOSMOL/KG (ref 275–300)
PCO2: 45 MMHG (ref 35–45)
PCO2: 48 MMHG (ref 35–45)
PH BLOOD GAS: 7.34 (ref 7.35–7.45)
PH BLOOD GAS: 7.36 (ref 7.35–7.45)
PLATELET # BLD: 164 THOU/MM3 (ref 130–400)
PLATELET ESTIMATE: ADEQUATE
PMV BLD AUTO: 10.1 FL (ref 9.4–12.4)
PO2: 146 MMHG (ref 71–104)
PO2: 91 MMHG (ref 71–104)
POTASSIUM REFLEX MAGNESIUM: 4.5 MEQ/L (ref 3.5–5.2)
PROCALCITONIN: 0.82 NG/ML (ref 0.01–0.09)
RBC # BLD: 4.56 MILL/MM3 (ref 4.7–6.1)
SARS-COV-2, NAAT: NOT DETECTED
SCAN OF BLOOD SMEAR: NORMAL
SEG NEUTROPHILS: 77.8 %
SEGMENTED NEUTROPHILS ABSOLUTE COUNT: 5 THOU/MM3 (ref 1.8–7.7)
SET PEEP: 6 MMHG
SET PRESS SUPP: 8 CMH2O
SODIUM BLD-SCNC: 135 MEQ/L (ref 135–145)
SOURCE, BLOOD GAS: ABNORMAL
SOURCE, BLOOD GAS: ABNORMAL
TOTAL PROTEIN: 7.6 G/DL (ref 6.1–8)
TROPONIN T: < 0.01 NG/ML
WBC # BLD: 6.4 THOU/MM3 (ref 4.8–10.8)

## 2020-06-05 PROCEDURE — 85385 FIBRINOGEN ANTIGEN: CPT

## 2020-06-05 PROCEDURE — 2700000000 HC OXYGEN THERAPY PER DAY

## 2020-06-05 PROCEDURE — 85379 FIBRIN DEGRADATION QUANT: CPT

## 2020-06-05 PROCEDURE — 6360000004 HC RX CONTRAST MEDICATION: Performed by: INTERNAL MEDICINE

## 2020-06-05 PROCEDURE — 87040 BLOOD CULTURE FOR BACTERIA: CPT

## 2020-06-05 PROCEDURE — 83605 ASSAY OF LACTIC ACID: CPT

## 2020-06-05 PROCEDURE — 6360000002 HC RX W HCPCS: Performed by: INTERNAL MEDICINE

## 2020-06-05 PROCEDURE — 84145 PROCALCITONIN (PCT): CPT

## 2020-06-05 PROCEDURE — 6370000000 HC RX 637 (ALT 250 FOR IP): Performed by: EMERGENCY MEDICINE

## 2020-06-05 PROCEDURE — 6360000002 HC RX W HCPCS: Performed by: EMERGENCY MEDICINE

## 2020-06-05 PROCEDURE — 93005 ELECTROCARDIOGRAM TRACING: CPT | Performed by: EMERGENCY MEDICINE

## 2020-06-05 PROCEDURE — 87804 INFLUENZA ASSAY W/OPTIC: CPT

## 2020-06-05 PROCEDURE — 94640 AIRWAY INHALATION TREATMENT: CPT

## 2020-06-05 PROCEDURE — 80053 COMPREHEN METABOLIC PANEL: CPT

## 2020-06-05 PROCEDURE — 36600 WITHDRAWAL OF ARTERIAL BLOOD: CPT

## 2020-06-05 PROCEDURE — 85025 COMPLETE CBC W/AUTO DIFF WBC: CPT

## 2020-06-05 PROCEDURE — 36415 COLL VENOUS BLD VENIPUNCTURE: CPT

## 2020-06-05 PROCEDURE — 93005 ELECTROCARDIOGRAM TRACING: CPT | Performed by: INTERNAL MEDICINE

## 2020-06-05 PROCEDURE — 96365 THER/PROPH/DIAG IV INF INIT: CPT

## 2020-06-05 PROCEDURE — 84484 ASSAY OF TROPONIN QUANT: CPT

## 2020-06-05 PROCEDURE — 2060000000 HC ICU INTERMEDIATE R&B

## 2020-06-05 PROCEDURE — 99285 EMERGENCY DEPT VISIT HI MDM: CPT

## 2020-06-05 PROCEDURE — U0002 COVID-19 LAB TEST NON-CDC: HCPCS

## 2020-06-05 PROCEDURE — 2580000003 HC RX 258: Performed by: EMERGENCY MEDICINE

## 2020-06-05 PROCEDURE — 6370000000 HC RX 637 (ALT 250 FOR IP): Performed by: INTERNAL MEDICINE

## 2020-06-05 PROCEDURE — 94660 CPAP INITIATION&MGMT: CPT

## 2020-06-05 PROCEDURE — 82728 ASSAY OF FERRITIN: CPT

## 2020-06-05 PROCEDURE — 99223 1ST HOSP IP/OBS HIGH 75: CPT | Performed by: INTERNAL MEDICINE

## 2020-06-05 PROCEDURE — 71275 CT ANGIOGRAPHY CHEST: CPT

## 2020-06-05 PROCEDURE — 86140 C-REACTIVE PROTEIN: CPT

## 2020-06-05 PROCEDURE — 83615 LACTATE (LD) (LDH) ENZYME: CPT

## 2020-06-05 PROCEDURE — 82803 BLOOD GASES ANY COMBINATION: CPT

## 2020-06-05 PROCEDURE — 71045 X-RAY EXAM CHEST 1 VIEW: CPT

## 2020-06-05 PROCEDURE — 94761 N-INVAS EAR/PLS OXIMETRY MLT: CPT

## 2020-06-05 RX ORDER — MAGNESIUM SULFATE IN WATER 40 MG/ML
2 INJECTION, SOLUTION INTRAVENOUS ONCE
Status: COMPLETED | OUTPATIENT
Start: 2020-06-05 | End: 2020-06-05

## 2020-06-05 RX ORDER — IPRATROPIUM BROMIDE AND ALBUTEROL SULFATE 2.5; .5 MG/3ML; MG/3ML
1 SOLUTION RESPIRATORY (INHALATION) EVERY 4 HOURS PRN
Status: DISCONTINUED | OUTPATIENT
Start: 2020-06-05 | End: 2020-06-05

## 2020-06-05 RX ORDER — ACETAMINOPHEN 325 MG/1
650 TABLET ORAL EVERY 4 HOURS PRN
Status: DISCONTINUED | OUTPATIENT
Start: 2020-06-05 | End: 2020-06-07 | Stop reason: HOSPADM

## 2020-06-05 RX ORDER — PANTOPRAZOLE SODIUM 40 MG/1
40 TABLET, DELAYED RELEASE ORAL
Status: DISCONTINUED | OUTPATIENT
Start: 2020-06-06 | End: 2020-06-07 | Stop reason: HOSPADM

## 2020-06-05 RX ORDER — ISOSORBIDE MONONITRATE 30 MG/1
30 TABLET, EXTENDED RELEASE ORAL DAILY
Status: DISCONTINUED | OUTPATIENT
Start: 2020-06-05 | End: 2020-06-07 | Stop reason: HOSPADM

## 2020-06-05 RX ORDER — ALBUTEROL SULFATE 90 UG/1
6 AEROSOL, METERED RESPIRATORY (INHALATION) ONCE
Status: COMPLETED | OUTPATIENT
Start: 2020-06-05 | End: 2020-06-05

## 2020-06-05 RX ORDER — ASPIRIN 81 MG/1
81 TABLET ORAL DAILY
Status: DISCONTINUED | OUTPATIENT
Start: 2020-06-05 | End: 2020-06-07 | Stop reason: HOSPADM

## 2020-06-05 RX ORDER — IPRATROPIUM BROMIDE AND ALBUTEROL SULFATE 2.5; .5 MG/3ML; MG/3ML
1 SOLUTION RESPIRATORY (INHALATION) 4 TIMES DAILY
Status: DISCONTINUED | OUTPATIENT
Start: 2020-06-05 | End: 2020-06-07 | Stop reason: HOSPADM

## 2020-06-05 RX ORDER — PRASUGREL 10 MG/1
10 TABLET, FILM COATED ORAL DAILY
Status: DISCONTINUED | OUTPATIENT
Start: 2020-06-05 | End: 2020-06-07 | Stop reason: HOSPADM

## 2020-06-05 RX ORDER — AZITHROMYCIN 250 MG/1
250 TABLET, FILM COATED ORAL DAILY
Status: DISCONTINUED | OUTPATIENT
Start: 2020-06-06 | End: 2020-06-07 | Stop reason: HOSPADM

## 2020-06-05 RX ORDER — RANOLAZINE 500 MG/1
1000 TABLET, EXTENDED RELEASE ORAL 2 TIMES DAILY
Status: DISCONTINUED | OUTPATIENT
Start: 2020-06-05 | End: 2020-06-07 | Stop reason: HOSPADM

## 2020-06-05 RX ORDER — ATORVASTATIN CALCIUM 40 MG/1
40 TABLET, FILM COATED ORAL DAILY
Status: DISCONTINUED | OUTPATIENT
Start: 2020-06-05 | End: 2020-06-07 | Stop reason: HOSPADM

## 2020-06-05 RX ORDER — METHYLPREDNISOLONE SODIUM SUCCINATE 40 MG/ML
40 INJECTION, POWDER, LYOPHILIZED, FOR SOLUTION INTRAMUSCULAR; INTRAVENOUS EVERY 12 HOURS
Status: DISCONTINUED | OUTPATIENT
Start: 2020-06-05 | End: 2020-06-07

## 2020-06-05 RX ORDER — POLYETHYLENE GLYCOL 3350 17 G/17G
17 POWDER, FOR SOLUTION ORAL DAILY
Status: DISCONTINUED | OUTPATIENT
Start: 2020-06-05 | End: 2020-06-07 | Stop reason: HOSPADM

## 2020-06-05 RX ADMIN — ACETAMINOPHEN 650 MG: 325 TABLET ORAL at 15:48

## 2020-06-05 RX ADMIN — IPRATROPIUM BROMIDE AND ALBUTEROL SULFATE 1 AMPULE: .5; 3 SOLUTION RESPIRATORY (INHALATION) at 22:02

## 2020-06-05 RX ADMIN — CEFTRIAXONE SODIUM 1 G: 1 INJECTION, POWDER, FOR SOLUTION INTRAMUSCULAR; INTRAVENOUS at 10:43

## 2020-06-05 RX ADMIN — IPRATROPIUM BROMIDE AND ALBUTEROL SULFATE 1 AMPULE: .5; 3 SOLUTION RESPIRATORY (INHALATION) at 12:45

## 2020-06-05 RX ADMIN — MAGNESIUM SULFATE HEPTAHYDRATE 2 G: 40 INJECTION, SOLUTION INTRAVENOUS at 09:36

## 2020-06-05 RX ADMIN — ENOXAPARIN SODIUM 40 MG: 40 INJECTION SUBCUTANEOUS at 15:48

## 2020-06-05 RX ADMIN — ALBUTEROL SULFATE 6 PUFF: 90 AEROSOL, METERED RESPIRATORY (INHALATION) at 09:32

## 2020-06-05 RX ADMIN — METOPROLOL TARTRATE 25 MG: 25 TABLET, FILM COATED ORAL at 15:48

## 2020-06-05 RX ADMIN — ISOSORBIDE MONONITRATE 30 MG: 30 TABLET ORAL at 15:48

## 2020-06-05 RX ADMIN — METOPROLOL TARTRATE 25 MG: 25 TABLET, FILM COATED ORAL at 20:48

## 2020-06-05 RX ADMIN — IOPAMIDOL 80 ML: 755 INJECTION, SOLUTION INTRAVENOUS at 21:49

## 2020-06-05 RX ADMIN — IPRATROPIUM BROMIDE AND ALBUTEROL SULFATE 1 AMPULE: .5; 3 SOLUTION RESPIRATORY (INHALATION) at 18:16

## 2020-06-05 RX ADMIN — ATORVASTATIN CALCIUM 40 MG: 40 TABLET, FILM COATED ORAL at 15:48

## 2020-06-05 RX ADMIN — PRASUGREL 10 MG: 10 TABLET, FILM COATED ORAL at 15:48

## 2020-06-05 RX ADMIN — AZITHROMYCIN MONOHYDRATE 500 MG: 500 INJECTION, POWDER, LYOPHILIZED, FOR SOLUTION INTRAVENOUS at 11:42

## 2020-06-05 RX ADMIN — RANOLAZINE 1000 MG: 500 TABLET, FILM COATED, EXTENDED RELEASE ORAL at 20:48

## 2020-06-05 RX ADMIN — ASPIRIN 81 MG: 81 TABLET ORAL at 15:48

## 2020-06-05 RX ADMIN — RANOLAZINE 1000 MG: 500 TABLET, FILM COATED, EXTENDED RELEASE ORAL at 15:48

## 2020-06-05 RX ADMIN — METHYLPREDNISOLONE SODIUM SUCCINATE 40 MG: 40 INJECTION, POWDER, FOR SOLUTION INTRAMUSCULAR; INTRAVENOUS at 15:48

## 2020-06-05 ASSESSMENT — ENCOUNTER SYMPTOMS
CHEST TIGHTNESS: 0
VOICE CHANGE: 0
SHORTNESS OF BREATH: 1
NAUSEA: 0
BLOOD IN STOOL: 0
COUGH: 1
BACK PAIN: 0
ABDOMINAL PAIN: 0
TROUBLE SWALLOWING: 0
VOMITING: 0
DIARRHEA: 0

## 2020-06-05 ASSESSMENT — PAIN SCALES - GENERAL
PAINLEVEL_OUTOF10: 0

## 2020-06-05 NOTE — CONSULTS
[facility]      Patient - Esther Juarez   MRN -  [id number]   [facility]   - 1945      Date of Admission -  2020  8:41 AM  Date of evaluation -  2020  Room - --JAY Jimenez MD Primary Care Physician - Asuncion Fan MD     Problem List      Active Hospital Problems    Diagnosis Date Noted    SOB (shortness of breath) [R06.02] 2020     Reason for Consult    For management of COPD and exacerbation. Respiratory distress. The consult was called on STAT basis by RN taking care of patient. HPI   History Obtained From: Patient and electronic medical record. Esther Juarez is a 75 y.o. male  was initially admitted under Dr. Rossana Rodriguez service. Pulmonary medicine was consulted for further management of COPD/exacerbation. The patient is a 75 y.o. male with past medical hx of chronic tobacco abuse, severe COPD presented with worsening of shortness of breath for the last 5 days. His current illness started as cough, cold and sputum  production which is initially white in color and later changed to greenish yellow. He noticed worsening of shortness of breath with decline in functional status. For the last 3 days he not able to attend his regular activities at home. He start using her rescue inhalers/nebulizations more frequently at home with no significant improvement in his short ness of breath or cough. Due to worsening of her above symptoms he presented to the Emergency room at [facility]. He was evaluated in the Emergency room by ER physician and admitted under hospitalist service for further evaluation. He is currently following with Ms. Mariusz Lomeli. He was seen by Ms. Mariusz Lomeli on 2019 for the last time. He is currently on treatment with following inhalers/Nebs:  -Advair Diskus 250/50mcg DPI, 1 inhalation BID. -Spiriva 18mcg/Cap DPI.  1Cap via ranolazine  1,000 mg Oral BID    [START ON 6/6/2020] cefTRIAXone (ROCEPHIN) IV  1 g Intravenous Q24H    ipratropium-albuterol  1 ampule Inhalation 4x daily    enoxaparin  40 mg Subcutaneous Q24H    [START ON 6/6/2020] azithromycin  250 mg Oral Daily     ipratropium-albuterol, acetaminophen  IV Drips/Infusions    Home Medications  Medications Prior to Admission: ranolazine (RANEXA) 1000 MG extended release tablet, Take 1 tablet by mouth 2 times daily  fluticasone-umeclidin-vilant (TRELEGY ELLIPTA) 100-62.5-25 MCG/INH AEPB, Inhale 1 puff into the lungs daily  metoprolol tartrate (LOPRESSOR) 25 MG tablet, Take 1 tablet by mouth 2 times daily  pantoprazole (PROTONIX) 40 MG tablet, Take 1 tablet by mouth every morning (before breakfast)  prasugrel (EFFIENT) 10 MG TABS, take 1 tablet once daily  isosorbide mononitrate (IMDUR) 30 MG extended release tablet, Take 1 tablet by mouth daily  atorvastatin (LIPITOR) 40 MG tablet, Take 1 tablet by mouth daily  albuterol (PROVENTIL) (2.5 MG/3ML) 0.083% nebulizer solution, Take 3 mLs by nebulization every 6 hours as needed for Wheezing  aspirin 81 MG tablet, Take 1 tablet by mouth daily  albuterol sulfate HFA (VENTOLIN HFA) 108 (90 Base) MCG/ACT inhaler, Inhale 2 puffs into the lungs every 6 hours as needed for Wheezing or Shortness of Breath  [DISCONTINUED] polyethylene glycol (GLYCOLAX) powder, Dispense 238 Gram Bottle. Use as Directed  Diet    DIET CARDIAC; Allergies    Xanax [alprazolam]; Pneumococcal vaccines;  Codeine; Dilaudid [hydromorphone]; and Morphine  Family History          Problem Relation Age of Onset    Heart Disease Mother     Cancer Mother     Heart Disease Father     Cancer Father     Cancer Sister     Prostate Cancer Neg Hx     Colon Cancer Neg Hx     Colon Polyps Neg Hx      Sleep History    Never diagnosed with sleep apnea in the past    Social History     Social History     Socioeconomic History    Marital status:      Spouse name: Not Negative. Psychiatric/Behavioral: Negative. Skin: No itching. Vitals     height is 5' 2\" (1.575 m) and weight is 124 lb 8 oz (56.5 kg). His oral temperature is 98.5 °F (36.9 °C). His blood pressure is 144/79 (abnormal) and his pulse is 106. His respiration is 32 (abnormal) and oxygen saturation is 100%. Body mass index is 22.77 kg/m². SUPPLEMENTAL O2: O2 Flow Rate (L/min): 4 L/min       I/O    No intake or output data in the 24 hours ending 06/05/20 1307  No intake/output data recorded. Patient Vitals for the past 96 hrs (Last 3 readings):   Weight   06/05/20 1142 124 lb 8 oz (56.5 kg)       Exam   General Appearance: moderately built, moderately nourished in mild distress on O2 via nasal cannula at 4Lpm  HEENT: Normal, Head is normocephalic, atraumatic. Oropharynx is clear and moist.  No oral thrush. PERRL  Neck - Supple, No JVD present. No tracheal deviation. Lungs - Bilateral air entry present. Bilateral good breath sounds heard. Bilateral diffuse expiratory wheezes. No rales. Cardiovascular - Tachycardia. Regular rhythm normal rate without murmur, gallop or rub. Abdomen - Soft, nontender, nondistended, no masses or organomegaly  Neurologic - Awake, alert, oriented. There are no focal motor or sensory deficits  Extremities - No cyanosis, clubbing or edema. Musculoskeletal: Normal range of motion. Patient exhibits no tenderness. Lymphadenopathy:  No cervical adenopathy. Psychiatric: Patient  has a normal mood and affect. Skin - No bruising or bleeding.     Labs  - Old records and notes have been reviewed in [facility]   ABG  Lab Results   Component Value Date    PH 7.40 11/20/2017    PO2 81 11/20/2017    PCO2 50 11/20/2017    HCO3 31 11/20/2017    O2SAT 96 11/20/2017     Lab Results   Component Value Date    IFIO2 3 11/20/2017    MODE CPAP/PS 11/18/2017    SETTIDVOL 500 11/18/2017    SETPEEP 5.0 11/18/2017     CBC  Recent Labs     06/05/20  0926   WBC 6.4   RBC 4.56*   HGB 15.4   HCT 45.3 MCV 99.3*   MCH 33.8*   MCHC 34.0      MPV 10.1      BMP  Recent Labs     06/05/20  0926      K 4.5   CL 98   CO2 23   BUN 22   CREATININE 1.1   GLUCOSE 115*   CALCIUM 9.8     LFT  Recent Labs     06/05/20  0926   AST 22   ALT 21   BILITOT 0.6   ALKPHOS 94     TROP  Lab Results   Component Value Date    TROPONINT < 0.010 06/05/2020    TROPONINT < 0.010 10/10/2019    TROPONINT < 0.010 01/03/2019     BNP  No results for input(s): BNP in the last 72 hours. Lactic Acid  Recent Labs     06/05/20 0926   LACTA 1.6     INR  No results for input(s): INR, PROTIME in the last 72 hours. PTT  No results for input(s): APTT in the last 72 hours. Glucose  No results for input(s): POCGLU in the last 72 hours. UA No results for input(s): SPECGRAV, PHUR, COLORU, CLARITYU, MUCUS, PROTEINU, BLOODU, RBCUA, WBCUA, BACTERIA, NITRU, GLUCOSEU, BILIRUBINUR, UROBILINOGEN, KETUA, LABCAST, LABCASTTY, AMORPHOS in the last 72 hours. Invalid input(s): CRYSTALS. PFTs             Sleep studies   None in [facility]. Cultures    Rapid swab for influenza A and B: Negative  Blood cultures X 2 sets were : Pending  Respiratory cultures: Sputum cultures: Pending      EKG     Echocardiogram   11/17/2017   Narrative & Impression      Transthoracic Echocardiography Report (TTE)  Conclusions      Summary      Technically difficult examination. Normal left ventricle size and systolic function. Ejection fraction was   estimated at 55%. Unable to determine wall motion abnormalities due to poor image quality. Valves were not well visualized. Signature      ----------------------------------------------------------------   Electronically signed by Luis Ramírez MD (Interpreting   physician) on 11/17/2017 at 06:26 PM   ----------------------------------------------------------------      Radiology    CXR  Jun 5, 2020   PROCEDURE: XR CHEST PORTABLE   1. Lungs hyperinflated and fibroemphysematous in appearance.  Cardiac silhouette small in size. Mild fibrotic stranding both lung bases. 2. Mild superimposed interstitial pneumonitis right lung base. No effusion       CT Scans  (See actual reports for details)  Jul 12, 2018   PROCEDURE: CTA CHEST W WO CONTRAST   No acute pulmonary embolism. No acute pneumonia. Bilateral lower lobe and lingular atelectasis and/or scarring. Old granulomatous disease. Thickening of the wall of the proximal stomach, see discussion above and correlate clinically. Assessment   -Acute hypercapnic respiratory failure due to COPD exacerbation Vs Pulmonary embolism.  -Elevated serum D-dimer to R/o pulmonary embolism.   -Acute exacerbation of COPD due to right lower lobe pneumonia. -Right lower lobe pneumonia due to CAP Vs atypical pneumonia.  -Hx of tobacco abuse. He quit smoking 1 week back    Plan   -Will start patient on Solumedrol to 40mg IVBID  -Will send sputum cultures.  -Will send D- Dimer from serum.  -Will start patient on BiPAP with 14/6cm H20.  -Will do ABG in 3hours on above BiPAP settings.  -Send Nasopharyngeal swab for influenza A&B. -CTA of chest to evaluate for pulmonary embolism- stat  -Continue current antibiotics as ordered by primary service. I. E Rocephin and Azithromycin.  -Continue Duonebs 3ml via nebs Q4h while awake. -Needs home O2 eval prior to discharge. - Patient educated to quit smoking as soon as possible. Patient verbalizes understanding of adverse consequences of tobacco smoking  -Titrate Oxygen to keep Spo2 >90%. -Deep Venous Thrombosis Prophylaxis: lovenox. \"Thank you for asking us to see this patient\"     Case discussed with Registered nurse taking care of patient. Jeremiah Byrd Sanderseducated about my impression and plan. He verbalizes understanding. Questions and concerns addressed.     Electronically signed by   Kvng Briceno MD on 6/5/2020 at 1:07 PM

## 2020-06-05 NOTE — ED PROVIDER NOTES
[address] [facility] [address] [address] EMERGENCY DEPT    EMERGENCY MEDICINE     Pt Name: Terese Carr  MRN: [id number]  [place] 1945  Date of evaluation: 6/5/2020  Provider: Nisa Dockery DO, [place] COMPLAINT       Chief Complaint   Patient presents with    Shortness of Breath    Cough       HISTORY OF PRESENT ILLNESS    Terese Carr is a 75 y.o. male who presents to the emergency department from home with complaints of body aches, chills, cough productive yellowish greenish sputum and worsening shortness of breath for the last 4 to 5 days. No chest pain or chest tightness. No lower extremity pain or swelling. No sick or infectious contacts. No COVID confirmed contacts. Denies any loss of smell or taste      Triage notes and Nursing notes were reviewed by myself. Any discrepancies are addressed above. PAST MEDICAL HISTORY     Past Medical History:   Diagnosis Date    Acute coronary syndrome (HCC)     Arrhythmia     Arteriosclerotic heart disease (ASHD)     CHF (congestive heart failure) (HCC)     Chills     COPD (chronic obstructive pulmonary disease) (HCC)     Dizziness - light-headed     HX OF:    Dysphagia     Emphysema     Esophagitis     Fatigue     HX OF:    Fever     GERD (gastroesophageal reflux disease)     History of chest pain     History of tinnitus     History of tobacco abuse     Hyperlipidemia     Hypertension     Irregular heart beat     Lightheadedness     MI (myocardial infarction) (HCC)     Night sweats     Noncompliance with medication regimen     Noncompliance with medical therapy.     Pneumonia     Renal failure syndrome     Ringing in the ears     SOB (shortness of breath) on exertion     Weight loss        SURGICAL HISTORY       Past Surgical History:   Procedure Laterality Date    CARDIAC SURGERY      2 stents    COLONOSCOPY      CORONARY ANGIOPLASTY WITH STENT PLACEMENT      pt has 3 stents    DIAGNOSTIC CARDIAC CATH LAB PROCEDURE  3 09 2009 Relationships    Social connections     Talks on phone: None     Gets together: None     Attends L[employer] day service: None     Active member of club or organization: None     Attends meetings of clubs or organizations: None     Relationship status: None    Intimate partner violence     Fear of current or ex partner: None     Emotionally abused: None     Physically abused: None     Forced sexual activity: None   Other Topics Concern    None   Social History Narrative    None       REVIEW OF SYSTEMS     Review of Systems   Constitutional: Positive for chills and fever. Negative for diaphoresis. HENT: Negative for trouble swallowing and voice change. Eyes: Negative for visual disturbance. Respiratory: Positive for cough and shortness of breath. Negative for chest tightness. Cardiovascular: Negative for chest pain and leg swelling. Gastrointestinal: Negative for abdominal pain, blood in stool, diarrhea, nausea and vomiting. Genitourinary: Negative for dysuria, frequency and hematuria. Musculoskeletal: Negative for back pain and neck pain. Skin: Negative for rash and wound. Neurological: Negative for speech difficulty, weakness, numbness and headaches. Psychiatric/Behavioral: Negative for confusion. Except as noted above the remainder of the review of systems was reviewed and is. PHYSICAL EXAM    (up to 7 for level 4, 8 or more for level 5)     ED Triage Vitals [06/05/20 0850]   BP Temp Temp Source Pulse Resp SpO2 Height Weight   (!) 180/89 98.5 °F (36.9 °C) Oral 118 30 97 % -- --       Physical Exam  Vitals signs and nursing note reviewed. Constitutional:       General: He is not in acute distress. Appearance: He is well-developed. He is not ill-appearing, toxic-appearing or diaphoretic. HENT:      Head: Normocephalic and atraumatic. Eyes:      General: No scleral icterus. Conjunctiva/sclera: Conjunctivae normal.      Right eye: Right conjunctiva is not injected.       Left within normal range or not returned as of this dictation. Please note, any cultures that may have been sent were not resulted at the time of this patient visit. EMERGENCY DEPARTMENT COURSE andMedical Decision Making:     MDM/  ED Course as of Jun 05 1102 Fri Jun 05, 2020   1031 Reassessed, breathing improved, he is no longer tripoding, still mildly tachypneic but speaking full sentences. [AB]   2834 Case discussed with Dr. Panfilo Ochoa, on-call for Dr. Juan Jose Arnett who accepts the patient for admission, comfortable with the patient presentation and admission. Will treat for pneumonia. COVID testing has been done, is negative at this time.    [AB]      ED Course User Index  [AB] Antwan Dorsey DO         ED Medications administered this visit:    Medications   cefTRIAXone (ROCEPHIN) 1 g IVPB in 50 mL D5W minibag (1 g Intravenous New Bag 6/5/20 1043)   azithromycin (ZITHROMAX) 500 mg in D5W 250ml addavial (has no administration in time range)   magnesium sulfate 2 g in 50 mL IVPB premix (0 g Intravenous Stopped 6/5/20 1044)   albuterol sulfate  (90 Base) MCG/ACT inhaler 6 puff (6 puffs Inhalation Given 6/5/20 0932)         Procedures: (None if blank)       CLINICAL       1. Pneumonia due to organism    2.  Dyspnea, unspecified type          DISPOSITION/PLAN   DISPOSITION Admitted 06/05/2020 10:45:55 AM      PATIENT REFERRED TO:  Jamil Jamison MD  [facility], [address] [place] [address]  [phone]            DISCHARGE MEDICATIONS:  New Prescriptions    No medications on file              (Please note that portions of this note were completed with a voice recognition program.  Efforts were made to edit the dictations but occasionallywords are mis-transcribed.)      Dayron Riddle DO,FACEP (electronically signed)  Attending Physician, Emergency [address], DO  06/05/20 1102

## 2020-06-05 NOTE — ED NOTES
Bed: 006A  Expected date: 6/5/20  Expected time: 8:35 AM  Means of arrival: [facility]  Comments:     Anne Wise RN  06/05/20 6629

## 2020-06-05 NOTE — H&P
[address] [id number]                              HISTORY AND PHYSICAL    PATIENT NAME: Morenita Obando                   :        1945  MED REC NO:   [id number]                           ROOM:       0004  ACCOUNT NO:   [de-identified]                           ADMIT DATE: 2020  PROVIDER:     Baron Levine M.D. Seen for Dr. Juan Jose Arnett. CHIEF COMPLAINT:  Increasing difficulty breathing. HISTORY OF PRESENT ILLNESS:  This is a 39-year-old male with known  history of COPD, coronary artery disease, has been having increasing  difficulty breathing for the last few days. He did have some chills,  body aches, productive sputum that was yellow in color with increasing  shortness of breath for five days. His COVID test was negative. In the  ER, he denied having any chest pain or chest tightness. The patient was  placed on 4 liters of oxygen and sent to the floor. I was called later  that the patient was still tachypneic and tachycardic and hence we  requested an ABG and also have his pulmonologist consulted. ABG showed  a pH of 7.34, pCO2 was 48, pO2 was 91, and bicarb was 26 on 4 liters of  nasal cannula. He was placed on BiPAP, but the patient says that the  mask is quite uncomfortable and hence we have requested Respiratory to  change his face mask. He denies any dizziness. No diarrhea. PAST MEDICAL HISTORY:  Significant for history of coronary artery  disease with previous cardiac stents, history of cardiac arrest  following STEMI that occurred with significant anemia, hypertension,  hyperlipidemia, anemia, status post EGD and colonoscopy, chronic  congestive heart failure. No previous bleeding problems. No seizures. No strokes. No cancer. PAST SURGICAL HISTORY:  He had cardiac stents placed, hernia repair,  colonoscopy, EGD. ALLERGIES:  Listed were XANAX, CODEINE, DILAUDID, MORPHINE.     FAMILY Resume his cardiac medications including his antiplatelet agents,  beta blockers, nitrates, and statins. 4.  GI prophylaxis with Protonix to continue. DVT prophylaxis with  Lovenox. 6.  Followup with hemoglobin and hematocrit closely as the patient has  previous history of significant anemia. 7.  Discussed code status, wishes full resuscitation.         Robert Perea M.D.    D: 06/05/2020 15:32:58       T: 06/05/2020 17:34:00     [email or web address]  Job#: [id number]     Doc#: [id number]    CC:

## 2020-06-05 NOTE — PROGRESS NOTES
The patient is here for a flare-up of his COPD. During conversation it was determined he did not want intubation if it were going to be long term. He definitely did not want CPR under any circumstance. - His daughter was present as a witness. This staff suggested we complete an AD in order to have control over health care to his children. He was accepting of this decision so the docs were completed during this visit. - Patient explained how bad his health has gotten and although his PCP wanted to do this doc before he was not ready. He shared about being the caregiver for his wife (5 yr foster with cancer) and since her death, there is nothing more to live for. - we discussed options of follow-up and she did not want to be referred to a  at this time. He understands he death situation but cant change what is happening. He also pointed out he has a son in [facility]  An does not want any contact with him at this time.     * See ACP Note

## 2020-06-06 LAB
ALBUMIN SERPL-MCNC: 3.3 G/DL (ref 3.5–5.1)
ALP BLD-CCNC: 74 U/L (ref 38–126)
ALT SERPL-CCNC: 16 U/L (ref 11–66)
ANION GAP SERPL CALCULATED.3IONS-SCNC: 8 MEQ/L (ref 8–16)
ANISOCYTOSIS: PRESENT
AST SERPL-CCNC: 16 U/L (ref 5–40)
BASOPHILS # BLD: 0.6 %
BASOPHILS ABSOLUTE: 0 THOU/MM3 (ref 0–0.1)
BILIRUB SERPL-MCNC: 0.5 MG/DL (ref 0.3–1.2)
BUN BLDV-MCNC: 28 MG/DL (ref 7–22)
CALCIUM SERPL-MCNC: 9.5 MG/DL (ref 8.5–10.5)
CHLORIDE BLD-SCNC: 97 MEQ/L (ref 98–111)
CO2: 27 MEQ/L (ref 23–33)
CREAT SERPL-MCNC: 1.1 MG/DL (ref 0.4–1.2)
EOSINOPHIL # BLD: 0 %
EOSINOPHILS ABSOLUTE: 0 THOU/MM3 (ref 0–0.4)
ERYTHROCYTE [DISTWIDTH] IN BLOOD BY AUTOMATED COUNT: 13.3 % (ref 11.5–14.5)
ERYTHROCYTE [DISTWIDTH] IN BLOOD BY AUTOMATED COUNT: 49.3 FL (ref 35–45)
FIBRINOGEN: 810 MG/100ML (ref 155–475)
GFR SERPL CREATININE-BSD FRML MDRD: 65 ML/MIN/1.73M2
GLUCOSE BLD-MCNC: 146 MG/DL (ref 70–108)
HCT VFR BLD CALC: 40.1 % (ref 42–52)
HEMOGLOBIN: 13.6 GM/DL (ref 14–18)
IMMATURE GRANS (ABS): 0.03 THOU/MM3 (ref 0–0.07)
IMMATURE GRANULOCYTES: 0.6 %
LV EF: 53 %
LVEF MODALITY: NORMAL
LYMPHOCYTES # BLD: 10.7 %
LYMPHOCYTES ABSOLUTE: 0.6 THOU/MM3 (ref 1–4.8)
MCH RBC QN AUTO: 33.8 PG (ref 26–33)
MCHC RBC AUTO-ENTMCNC: 33.9 GM/DL (ref 32.2–35.5)
MCV RBC AUTO: 99.8 FL (ref 80–94)
MONOCYTES # BLD: 4.6 %
MONOCYTES ABSOLUTE: 0.2 THOU/MM3 (ref 0.4–1.3)
NUCLEATED RED BLOOD CELLS: 0 /100 WBC
PLATELET # BLD: 160 THOU/MM3 (ref 130–400)
PLATELET ESTIMATE: ADEQUATE
PMV BLD AUTO: 10.3 FL (ref 9.4–12.4)
POTASSIUM REFLEX MAGNESIUM: 5.3 MEQ/L (ref 3.5–5.2)
POTASSIUM SERPL-SCNC: 5 MEQ/L (ref 3.5–5.2)
RBC # BLD: 4.02 MILL/MM3 (ref 4.7–6.1)
SCAN OF BLOOD SMEAR: NORMAL
SEG NEUTROPHILS: 83.5 %
SEGMENTED NEUTROPHILS ABSOLUTE COUNT: 4.5 THOU/MM3 (ref 1.8–7.7)
SODIUM BLD-SCNC: 132 MEQ/L (ref 135–145)
TOTAL PROTEIN: 6.3 G/DL (ref 6.1–8)
WBC # BLD: 5.4 THOU/MM3 (ref 4.8–10.8)

## 2020-06-06 PROCEDURE — 6360000002 HC RX W HCPCS: Performed by: INTERNAL MEDICINE

## 2020-06-06 PROCEDURE — 85385 FIBRINOGEN ANTIGEN: CPT

## 2020-06-06 PROCEDURE — 2580000003 HC RX 258: Performed by: INTERNAL MEDICINE

## 2020-06-06 PROCEDURE — 99233 SBSQ HOSP IP/OBS HIGH 50: CPT | Performed by: INTERNAL MEDICINE

## 2020-06-06 PROCEDURE — 2060000000 HC ICU INTERMEDIATE R&B

## 2020-06-06 PROCEDURE — 80053 COMPREHEN METABOLIC PANEL: CPT

## 2020-06-06 PROCEDURE — 93306 TTE W/DOPPLER COMPLETE: CPT

## 2020-06-06 PROCEDURE — 2700000000 HC OXYGEN THERAPY PER DAY

## 2020-06-06 PROCEDURE — 94761 N-INVAS EAR/PLS OXIMETRY MLT: CPT

## 2020-06-06 PROCEDURE — 85025 COMPLETE CBC W/AUTO DIFF WBC: CPT

## 2020-06-06 PROCEDURE — 6370000000 HC RX 637 (ALT 250 FOR IP): Performed by: INTERNAL MEDICINE

## 2020-06-06 PROCEDURE — 94660 CPAP INITIATION&MGMT: CPT

## 2020-06-06 PROCEDURE — 36415 COLL VENOUS BLD VENIPUNCTURE: CPT

## 2020-06-06 PROCEDURE — 84132 ASSAY OF SERUM POTASSIUM: CPT

## 2020-06-06 PROCEDURE — 94640 AIRWAY INHALATION TREATMENT: CPT

## 2020-06-06 RX ADMIN — ISOSORBIDE MONONITRATE 30 MG: 30 TABLET ORAL at 10:22

## 2020-06-06 RX ADMIN — IPRATROPIUM BROMIDE AND ALBUTEROL SULFATE 1 AMPULE: .5; 3 SOLUTION RESPIRATORY (INHALATION) at 20:47

## 2020-06-06 RX ADMIN — METOPROLOL TARTRATE 25 MG: 25 TABLET, FILM COATED ORAL at 10:22

## 2020-06-06 RX ADMIN — ATORVASTATIN CALCIUM 40 MG: 40 TABLET, FILM COATED ORAL at 10:22

## 2020-06-06 RX ADMIN — RANOLAZINE 1000 MG: 500 TABLET, FILM COATED, EXTENDED RELEASE ORAL at 10:22

## 2020-06-06 RX ADMIN — RANOLAZINE 1000 MG: 500 TABLET, FILM COATED, EXTENDED RELEASE ORAL at 19:29

## 2020-06-06 RX ADMIN — ENOXAPARIN SODIUM 40 MG: 40 INJECTION SUBCUTANEOUS at 12:40

## 2020-06-06 RX ADMIN — IPRATROPIUM BROMIDE AND ALBUTEROL SULFATE 1 AMPULE: .5; 3 SOLUTION RESPIRATORY (INHALATION) at 15:05

## 2020-06-06 RX ADMIN — METHYLPREDNISOLONE SODIUM SUCCINATE 40 MG: 40 INJECTION, POWDER, FOR SOLUTION INTRAMUSCULAR; INTRAVENOUS at 12:40

## 2020-06-06 RX ADMIN — METHYLPREDNISOLONE SODIUM SUCCINATE 40 MG: 40 INJECTION, POWDER, FOR SOLUTION INTRAMUSCULAR; INTRAVENOUS at 03:18

## 2020-06-06 RX ADMIN — PANTOPRAZOLE SODIUM 40 MG: 40 TABLET, DELAYED RELEASE ORAL at 06:41

## 2020-06-06 RX ADMIN — IPRATROPIUM BROMIDE AND ALBUTEROL SULFATE 1 AMPULE: .5; 3 SOLUTION RESPIRATORY (INHALATION) at 07:28

## 2020-06-06 RX ADMIN — IPRATROPIUM BROMIDE AND ALBUTEROL SULFATE 1 AMPULE: .5; 3 SOLUTION RESPIRATORY (INHALATION) at 11:29

## 2020-06-06 RX ADMIN — METOPROLOL TARTRATE 25 MG: 25 TABLET, FILM COATED ORAL at 19:29

## 2020-06-06 RX ADMIN — ASPIRIN 81 MG: 81 TABLET ORAL at 10:22

## 2020-06-06 RX ADMIN — PRASUGREL 10 MG: 10 TABLET, FILM COATED ORAL at 10:22

## 2020-06-06 RX ADMIN — AZITHROMYCIN DIHYDRATE 250 MG: 250 TABLET, FILM COATED ORAL at 10:22

## 2020-06-06 RX ADMIN — CEFTRIAXONE SODIUM 1 G: 1 INJECTION, POWDER, FOR SOLUTION INTRAMUSCULAR; INTRAVENOUS at 10:32

## 2020-06-06 ASSESSMENT — PAIN SCALES - GENERAL
PAINLEVEL_OUTOF10: 0

## 2020-06-06 NOTE — PLAN OF CARE
Problem: Falls - Risk of:  Goal: Will remain free from falls  Description: Will remain free from falls  6/5/2020 2306 by Bernice Clements RN  Outcome: Met This Shift  Note: Patient remains free from falls this shift. Fall precautions in place with bed exit alarmed. Fall sign posted and fall armband in place. Nonskid footwear used with transferring. Educated patient to use call light when in need of staff assistance with transferring, ambulating, and other activities of daily living. Patient appropriately uses call light this shift. Problem: Falls - Risk of:  Goal: Absence of physical injury  Description: Absence of physical injury  6/5/2020 2306 by Bernice Clements RN  Outcome: Met This Shift  Note: Hourly rounding in place to anticipate patient needs, such as assistance with pain, toileting, or repositioning, in order to decrease risk for injuries such as falls. Problem: SAFETY  Goal: Free from accidental physical injury  6/5/2020 2306 by Bernice Clements RN  Outcome: Met This Shift     Problem: DAILY CARE  Goal: Daily care needs are met  6/5/2020 2306 by Bernice Clements RN  Outcome: Met This Shift     Problem: Discharge Planning:  Goal: Discharged to appropriate level of care  Description: Discharged to appropriate level of care  Outcome: Met This Shift  Note: Patient is from home alone and would like to discharge there once medically stable. Problem: Pain:  Goal: Pain level will decrease  Description: Pain level will decrease  Outcome: Met This Shift  Note: Patient does not report pain this shift. Stated pain goal is no pain. Problem: Skin Integrity/Risk  Goal: No skin breakdown during hospitalization  Outcome: Met This Shift  Note: Patient's skin remains intact this shift with no new signs of impaired skin integrity noted. Patient able to turn and reposition self. Special mattress in place to decrease pressure on high risk areas.       Problem: Airway Clearance - Ineffective:  Goal: Ability to

## 2020-06-06 NOTE — PROGRESS NOTES
[facility]      Patient - Hawk Grayson   MRN -  [id number]   [facility] # - [de-identified]   - 1945      Date of Admission -  2020  8:41 AM  Date of evaluation -  2020  Room - --JAY Van MD Primary Care Physician - Macarena Verduzco MD     Problem List      Active Hospital Problems    Diagnosis Date Noted    SOB (shortness of breath) [R06.02] 2020     Reason for Consult    For management of COPD and hypercapnia  HPI   History Obtained From: Patient and electronic medical record. Hawk Grayson is a 75 y.o. male  was initially admitted under Dr. Taryn Neri service. Pulmonary medicine was consulted for further management of COPD/exacerbation. The patient is a 75 y.o. male with past medical hx of chronic tobacco abuse, severe COPD presented with worsening of shortness of breath for the last 5 days. His current illness started as cough, cold and sputum  production which is initially white in color and later changed to greenish yellow. He noticed worsening of shortness of breath with decline in functional status. For the last 3 days he not able to attend his regular activities at home. He start using her rescue inhalers/nebulizations more frequently at home with no significant improvement in his short ness of breath or cough. Due to worsening of her above symptoms he presented to the Emergency room at [facility]. He was evaluated in the Emergency room by ER physician and admitted under hospitalist service for further evaluation. He is currently following with Ms. Sadia Ronquillo. He was seen by Ms. Sadia Ronquillo on 2019 for the last time. He is currently on treatment with following inhalers/Nebs:  -Advair Diskus 250/50mcg DPI, 1 inhalation BID. -Spiriva 18mcg/Cap DPI. 1Cap via inhalation daily.   -Albuterol 2.5mg nebs Q6h prn (the nebulizer).   -Albuterol HFA 2 puffs Q6h performed by Darling Guzman MD at [facility]    MA EGD TRANSORAL BIOPSY SINGLE/MULTIPLE Left 11/25/2017    EGD BIOPSY performed by Darling Guzman MD at [address] [facility] ECHOCARDIOGRAM  1 10 2011    Size was normal. Systolic function was normal. EF estimated in range of 55-65%. No regional wall motion abnormalities. Wall thickness was normal. Doppler - LV diastolic function parameters were normal. This is technically limited study which may impair interpretation.  TRANSTHORACIC ECHOCARDIOGRAM  3 06 2009    This study is technically limited. Distal anterior apical wall hypokinesis. EF 40-45%. Left atrial size w/in normal limits. Trace MR. No evidence of aortic stenosis or aortic insufficiency. Right atrium and right ventricle are of normal contractility. Trace TR. No pericardial effusion.      UPPER GASTROINTESTINAL ENDOSCOPY Left 11/21/2017    EGD DIAGNOSTIC ONLY performed by Alejo Mario MD at [facility]     Meds    Current Medications    aspirin  81 mg Oral Daily    atorvastatin  40 mg Oral Daily    isosorbide mononitrate  30 mg Oral Daily    metoprolol tartrate  25 mg Oral BID    pantoprazole  40 mg Oral QAM AC    polyethylene glycol  17 g Oral Daily    prasugrel  10 mg Oral Daily    ranolazine  1,000 mg Oral BID    cefTRIAXone (ROCEPHIN) IV  1 g Intravenous Q24H    ipratropium-albuterol  1 ampule Inhalation 4x daily    enoxaparin  40 mg Subcutaneous Q24H    azithromycin  250 mg Oral Daily    methylPREDNISolone  40 mg Intravenous Q12H     acetaminophen  IV Drips/Infusions    Home Medications  Medications Prior to Admission: ranolazine (RANEXA) 1000 MG extended release tablet, Take 1 tablet by mouth 2 times daily  fluticasone-umeclidin-vilant (TRELEGY ELLIPTA) 100-62.5-25 MCG/INH AEPB, Inhale 1 puff into the lungs daily  metoprolol tartrate (LOPRESSOR) 25 MG tablet, Take 1 tablet by mouth 2 times daily  pantoprazole (PROTONIX) 40 MG tablet, Take 1 tablet by mouth every Physical activity     Days per week: Not on file     Minutes per session: Not on file    Stress: Not on file   Relationships    Social connections     Talks on phone: Not on file     Gets together: Not on file     Attends Yarsanism service: Not on file     Active member of club or organization: Not on file     Attends meetings of clubs or organizations: Not on file     Relationship status: Not on file    Intimate partner violence     Fear of current or ex partner: Not on file     Emotionally abused: Not on file     Physically abused: Not on file     Forced sexual activity: Not on file   Other Topics Concern    Not on file   Social History Narrative    Not on file   . Vitals     height is 5' 2\" (1.575 m) and weight is 122 lb 8 oz (55.6 kg). His oral temperature is 97.5 °F (36.4 °C). His blood pressure is 134/65 and his pulse is 68. His respiration is 22 and oxygen saturation is 95%. Body mass index is 22.41 kg/m². SUPPLEMENTAL O2: O2 Flow Rate (L/min): 4 L/min       I/O        Intake/Output Summary (Last 24 hours) at 6/6/2020 1155  Last data filed at 6/6/2020 0316  Gross per 24 hour   Intake 240 ml   Output 150 ml   Net 90 ml     I/O last 3 completed shifts: In: 240 [P.O.:240]  Out: 150 [Urine:150]   Patient Vitals for the past 96 hrs (Last 3 readings):   Weight   06/06/20 0316 122 lb 8 oz (55.6 kg)   06/05/20 1142 124 lb 8 oz (56.5 kg)       Exam   Constitutional: Patient appears in no distress. on 3LPM via nasal cannula. HENT:    Head: Normocephalic and atraumatic. Right Ear: External ear normal.   Left Ear: External ear normal.   Mouth/Throat: Oropharynx is clear and moist.  No oral thrush. Eyes: Pupils are equal, round, and reactive to light. Neck: Neck supple. Cardiovascular: Normal rate, regular rhythm, normal heart sounds. No murmur heard. Pulmonary/Chest: Effort normal and good breath sounds. Diminished at bases. No stridor. No respiratory distress. Bilateral expiratory wheezes.  No

## 2020-06-07 VITALS
DIASTOLIC BLOOD PRESSURE: 78 MMHG | OXYGEN SATURATION: 98 % | SYSTOLIC BLOOD PRESSURE: 167 MMHG | RESPIRATION RATE: 18 BRPM | HEART RATE: 59 BPM | BODY MASS INDEX: 23.08 KG/M2 | TEMPERATURE: 97.4 F | HEIGHT: 62 IN | WEIGHT: 125.4 LBS

## 2020-06-07 LAB
ALBUMIN SERPL-MCNC: 3.4 G/DL (ref 3.5–5.1)
ALP BLD-CCNC: 83 U/L (ref 38–126)
ALT SERPL-CCNC: 32 U/L (ref 11–66)
ANION GAP SERPL CALCULATED.3IONS-SCNC: 10 MEQ/L (ref 8–16)
ANISOCYTOSIS: PRESENT
AST SERPL-CCNC: 35 U/L (ref 5–40)
BASOPHILS # BLD: 0.4 %
BASOPHILS ABSOLUTE: 0 THOU/MM3 (ref 0–0.1)
BILIRUB SERPL-MCNC: 0.3 MG/DL (ref 0.3–1.2)
BUN BLDV-MCNC: 37 MG/DL (ref 7–22)
CALCIUM SERPL-MCNC: 9.6 MG/DL (ref 8.5–10.5)
CHLORIDE BLD-SCNC: 101 MEQ/L (ref 98–111)
CO2: 25 MEQ/L (ref 23–33)
CREAT SERPL-MCNC: 1 MG/DL (ref 0.4–1.2)
EOSINOPHIL # BLD: 0 %
EOSINOPHILS ABSOLUTE: 0 THOU/MM3 (ref 0–0.4)
ERYTHROCYTE [DISTWIDTH] IN BLOOD BY AUTOMATED COUNT: 13.2 % (ref 11.5–14.5)
ERYTHROCYTE [DISTWIDTH] IN BLOOD BY AUTOMATED COUNT: 48.3 FL (ref 35–45)
FIBRINOGEN: 661 MG/100ML (ref 155–475)
GFR SERPL CREATININE-BSD FRML MDRD: 73 ML/MIN/1.73M2
GLUCOSE BLD-MCNC: 147 MG/DL (ref 70–108)
HCT VFR BLD CALC: 39.6 % (ref 42–52)
HEMOGLOBIN: 13.2 GM/DL (ref 14–18)
IMMATURE GRANS (ABS): 0.05 THOU/MM3 (ref 0–0.07)
IMMATURE GRANULOCYTES: 0.6 %
LYMPHOCYTES # BLD: 7.6 %
LYMPHOCYTES ABSOLUTE: 0.6 THOU/MM3 (ref 1–4.8)
MCH RBC QN AUTO: 33.2 PG (ref 26–33)
MCHC RBC AUTO-ENTMCNC: 33.3 GM/DL (ref 32.2–35.5)
MCV RBC AUTO: 99.5 FL (ref 80–94)
MONOCYTES # BLD: 7.1 %
MONOCYTES ABSOLUTE: 0.6 THOU/MM3 (ref 0.4–1.3)
NUCLEATED RED BLOOD CELLS: 0 /100 WBC
PLATELET # BLD: 200 THOU/MM3 (ref 130–400)
PLATELET ESTIMATE: ADEQUATE
PMV BLD AUTO: 10.3 FL (ref 9.4–12.4)
POTASSIUM REFLEX MAGNESIUM: 4.9 MEQ/L (ref 3.5–5.2)
RBC # BLD: 3.98 MILL/MM3 (ref 4.7–6.1)
SCAN OF BLOOD SMEAR: NORMAL
SEG NEUTROPHILS: 84.3 %
SEGMENTED NEUTROPHILS ABSOLUTE COUNT: 6.8 THOU/MM3 (ref 1.8–7.7)
SODIUM BLD-SCNC: 136 MEQ/L (ref 135–145)
TOTAL PROTEIN: 6.4 G/DL (ref 6.1–8)
TOXIC GRANULATION: PRESENT
WBC # BLD: 8.1 THOU/MM3 (ref 4.8–10.8)

## 2020-06-07 PROCEDURE — 99232 SBSQ HOSP IP/OBS MODERATE 35: CPT | Performed by: INTERNAL MEDICINE

## 2020-06-07 PROCEDURE — 2700000000 HC OXYGEN THERAPY PER DAY

## 2020-06-07 PROCEDURE — 94761 N-INVAS EAR/PLS OXIMETRY MLT: CPT

## 2020-06-07 PROCEDURE — 2580000003 HC RX 258: Performed by: INTERNAL MEDICINE

## 2020-06-07 PROCEDURE — 6370000000 HC RX 637 (ALT 250 FOR IP): Performed by: INTERNAL MEDICINE

## 2020-06-07 PROCEDURE — 85385 FIBRINOGEN ANTIGEN: CPT

## 2020-06-07 PROCEDURE — 6360000002 HC RX W HCPCS: Performed by: INTERNAL MEDICINE

## 2020-06-07 PROCEDURE — 36415 COLL VENOUS BLD VENIPUNCTURE: CPT

## 2020-06-07 PROCEDURE — 94640 AIRWAY INHALATION TREATMENT: CPT

## 2020-06-07 PROCEDURE — 80053 COMPREHEN METABOLIC PANEL: CPT

## 2020-06-07 PROCEDURE — 85025 COMPLETE CBC W/AUTO DIFF WBC: CPT

## 2020-06-07 RX ORDER — PREDNISONE 20 MG/1
40 TABLET ORAL DAILY
Status: DISCONTINUED | OUTPATIENT
Start: 2020-06-07 | End: 2020-06-07 | Stop reason: HOSPADM

## 2020-06-07 RX ORDER — FLUTICASONE FUROATE, UMECLIDINIUM BROMIDE AND VILANTEROL TRIFENATATE 100; 62.5; 25 UG/1; UG/1; UG/1
1 POWDER RESPIRATORY (INHALATION) DAILY
Qty: 30 EACH | Refills: 11 | Status: SHIPPED | OUTPATIENT
Start: 2020-06-07 | End: 2021-01-01

## 2020-06-07 RX ORDER — PREDNISONE 20 MG/1
40 TABLET ORAL DAILY
Qty: 8 TABLET | Refills: 0 | Status: SHIPPED | OUTPATIENT
Start: 2020-06-07 | End: 2020-06-11

## 2020-06-07 RX ORDER — ALBUTEROL SULFATE 90 UG/1
2 AEROSOL, METERED RESPIRATORY (INHALATION) EVERY 6 HOURS PRN
Qty: 1 INHALER | Refills: 11 | Status: SHIPPED | OUTPATIENT
Start: 2020-06-07 | End: 2021-01-01

## 2020-06-07 RX ORDER — CEFDINIR 300 MG/1
300 CAPSULE ORAL 2 TIMES DAILY
Qty: 10 CAPSULE | Refills: 0 | Status: SHIPPED | OUTPATIENT
Start: 2020-06-07 | End: 2020-06-12

## 2020-06-07 RX ORDER — POLYETHYLENE GLYCOL 3350 17 G/17G
17 POWDER, FOR SOLUTION ORAL DAILY
Qty: 527 G | Refills: 1 | Status: SHIPPED | OUTPATIENT
Start: 2020-06-08 | End: 2020-07-08

## 2020-06-07 RX ORDER — ALBUTEROL SULFATE 2.5 MG/3ML
2.5 SOLUTION RESPIRATORY (INHALATION) EVERY 6 HOURS PRN
Qty: 120 VIAL | Refills: 11 | Status: SHIPPED | OUTPATIENT
Start: 2020-06-07 | End: 2021-01-01 | Stop reason: SDUPTHER

## 2020-06-07 RX ORDER — AZITHROMYCIN 250 MG/1
250 TABLET, FILM COATED ORAL DAILY
Qty: 3 TABLET | Refills: 0 | Status: SHIPPED | OUTPATIENT
Start: 2020-06-08 | End: 2020-06-11

## 2020-06-07 RX ADMIN — IPRATROPIUM BROMIDE AND ALBUTEROL SULFATE 1 AMPULE: .5; 3 SOLUTION RESPIRATORY (INHALATION) at 15:27

## 2020-06-07 RX ADMIN — IPRATROPIUM BROMIDE AND ALBUTEROL SULFATE 1 AMPULE: .5; 3 SOLUTION RESPIRATORY (INHALATION) at 07:52

## 2020-06-07 RX ADMIN — ASPIRIN 81 MG: 81 TABLET ORAL at 08:08

## 2020-06-07 RX ADMIN — IPRATROPIUM BROMIDE AND ALBUTEROL SULFATE 1 AMPULE: .5; 3 SOLUTION RESPIRATORY (INHALATION) at 11:19

## 2020-06-07 RX ADMIN — METHYLPREDNISOLONE SODIUM SUCCINATE 40 MG: 40 INJECTION, POWDER, FOR SOLUTION INTRAMUSCULAR; INTRAVENOUS at 01:32

## 2020-06-07 RX ADMIN — PANTOPRAZOLE SODIUM 40 MG: 40 TABLET, DELAYED RELEASE ORAL at 06:57

## 2020-06-07 RX ADMIN — PREDNISONE 40 MG: 20 TABLET ORAL at 15:57

## 2020-06-07 RX ADMIN — AZITHROMYCIN DIHYDRATE 250 MG: 250 TABLET, FILM COATED ORAL at 08:08

## 2020-06-07 RX ADMIN — METOPROLOL TARTRATE 25 MG: 25 TABLET, FILM COATED ORAL at 08:08

## 2020-06-07 RX ADMIN — ISOSORBIDE MONONITRATE 30 MG: 30 TABLET ORAL at 08:08

## 2020-06-07 RX ADMIN — ATORVASTATIN CALCIUM 40 MG: 40 TABLET, FILM COATED ORAL at 08:08

## 2020-06-07 RX ADMIN — RANOLAZINE 1000 MG: 500 TABLET, FILM COATED, EXTENDED RELEASE ORAL at 08:08

## 2020-06-07 RX ADMIN — PRASUGREL 10 MG: 10 TABLET, FILM COATED ORAL at 08:08

## 2020-06-07 RX ADMIN — CEFTRIAXONE SODIUM 1 G: 1 INJECTION, POWDER, FOR SOLUTION INTRAMUSCULAR; INTRAVENOUS at 10:59

## 2020-06-07 ASSESSMENT — PAIN SCALES - GENERAL
PAINLEVEL_OUTOF10: 0
PAINLEVEL_OUTOF10: 0

## 2020-06-07 NOTE — PLAN OF CARE
Problem: Falls - Risk of:  Goal: Will remain free from falls  Description: Will remain free from falls  Outcome: Ongoing  Note: Bed in lowest position and locked. Bed alarm activated. Educated on use of call light when in need of assistance- expressed understanding. Visual checks performed and charted. No falls during shift at this time. Armband and falling star in place. Call light within reach. Encouraging patient to get up to chair and ambulate in halls. Problem: Falls - Risk of:  Goal: Absence of physical injury  Description: Absence of physical injury  Outcome: Ongoing     Problem: Airway Clearance - Ineffective:  Goal: Ability to maintain a clear airway will improve  Description: Ability to maintain a clear airway will improve  Outcome: Ongoing  Note: Patient has congested cough. Remains on 2L NC, BiPAP at night. SpO2 > 92%. Problem: SAFETY  Goal: Free from accidental physical injury  Outcome: Ongoing     Problem: DAILY CARE  Goal: Daily care needs are met  Outcome: Ongoing     Problem: Discharge Planning:  Goal: Discharged to appropriate level of care  Description: Discharged to appropriate level of care  Outcome: Ongoing  Note: Patient planning to return home at discharge. Possible discharge tomorrow. Problem: Pain:  Goal: Pain level will decrease  Description: Pain level will decrease  Outcome: Ongoing  Note: Patient able to use 0-10 pain scale. Denies pain at this time. Pain complaints as documented. Agreeable to take PRN pain medications. Pain goal of no pain. Problem: Skin Integrity/Risk  Goal: No skin breakdown during hospitalization  Outcome: Ongoing  Note: No signs of new skin breakdown with each assessment. Skin remains warm, dry, intact. Mucous membranes pink & moist. Patient is able to turn self. Care plan reviewed with patient. Patient verbalized understanding of the plan of care and contributed to goal setting.

## 2020-06-07 NOTE — PROGRESS NOTES
prn.    He is currently not using any home O2. Subjective/Events Past 24 hours/ROS   Afebrile. No chest pain. Feels better. Wanted to go home.  -Rest of the body systems were reviewed. PMHx   Past Medical History      Diagnosis Date    Acute coronary syndrome (HCC)     Arrhythmia     Arteriosclerotic heart disease (ASHD)     CHF (congestive heart failure) (HCC)     Chills     COPD (chronic obstructive pulmonary disease) (HCC)     Dizziness - light-headed     HX OF:    Dysphagia     Emphysema     Esophagitis     Fatigue     HX OF:    Fever     GERD (gastroesophageal reflux disease)     History of chest pain     History of tinnitus     History of tobacco abuse     Hyperlipidemia     Hypertension     Irregular heart beat     Lightheadedness     MI (myocardial infarction) (HCC)     Night sweats     Noncompliance with medication regimen     Noncompliance with medical therapy.  Pneumonia     Renal failure syndrome     Ringing in the ears     SOB (shortness of breath) on exertion     Weight loss       Past Surgical History        Procedure Laterality Date    CARDIAC SURGERY      2 stents    COLONOSCOPY      CORONARY ANGIOPLASTY WITH STENT PLACEMENT      pt has 3 stents    DIAGNOSTIC CARDIAC CATH LAB PROCEDURE  3 09 2009    Successful primary stenting of mid LAD using drug-eluting stent.  DIAGNOSTIC CARDIAC CATH LAB PROCEDURE  1 10 2011    LV demonstrated normal systolic function, EF 00%. On pullback, no gradient was noted. No critical lesions noted in all large epicardial vessels so that RCA is dominant vessel w/just very mild diffuse disease, about 20-30% lesions. Left main widely patent w/mild disease distally. Circumflex widely patent w/diffuse disease, also about 20-30%.      ENDOSCOPY, COLON, DIAGNOSTIC      HERNIA REPAIR  2008,2010    MA COLON CA SCRN NOT HI RSK IND Left 11/25/2017    COLONOSCOPY performed by Donny Collet, MD at [facility]    MA EGD TRANSORAL 06/06/20 0320 06/07/20 0327   WBC 6.4 5.4 8.1   RBC 4.56* 4.02* 3.98*   HGB 15.4 13.6* 13.2*   HCT 45.3 40.1* 39.6*   MCV 99.3* 99.8* 99.5*   MCH 33.8* 33.8* 33.2*   MCHC 34.0 33.9 33.3    160 200   MPV 10.1 10.3 10.3      BMP  Recent Labs     06/05/20  0926 06/06/20 0320 06/06/20  1202 06/07/20 0327    132*  --  136   K 4.5 5.3* 5.0 4.9   CL 98 97*  --  101   CO2 23 27  --  25   BUN 22 28*  --  37*   CREATININE 1.1 1.1  --  1.0   GLUCOSE 115* 146*  --  147*   CALCIUM 9.8 9.5  --  9.6     LFT  Recent Labs     06/05/20 0926 06/06/20 0320 06/07/20 0327   AST 22 16 35   ALT 21 16 32   BILITOT 0.6 0.5 0.3   ALKPHOS 94 74 83     TROP  Lab Results   Component Value Date    TROPONINT < 0.010 06/05/2020    TROPONINT < 0.010 10/10/2019    TROPONINT < 0.010 01/03/2019     BNP  No results for input(s): BNP in the last 72 hours. Lactic Acid  Recent Labs     06/05/20 0926   LACTA 1.6     INR  No results for input(s): INR, PROTIME in the last 72 hours. PTT  No results for input(s): APTT in the last 72 hours. Glucose  No results for input(s): POCGLU in the last 72 hours. UA No results for input(s): SPECGRAV, PHUR, COLORU, CLARITYU, MUCUS, PROTEINU, BLOODU, RBCUA, WBCUA, BACTERIA, NITRU, GLUCOSEU, BILIRUBINUR, UROBILINOGEN, KETUA, LABCAST, LABCASTTY, AMORPHOS in the last 72 hours. Invalid input(s): CRYSTALS. PFTs             Sleep studies   None in [facility]. Cultures    Rapid swab for influenza A and B: Negative  Blood cultures X 2 sets were : Pending  Respiratory cultures: Sputum cultures: Pending      EKG     Echocardiogram   11/17/2017   Narrative & Impression      Transthoracic Echocardiography Report (TTE)  Conclusions      Summary      Technically difficult examination. Normal left ventricle size and systolic function. Ejection fraction was   estimated at 55%. Unable to determine wall motion abnormalities due to poor image quality. Valves were not well visualized.       Signature medication. He verbalizes understanding.  - Start patient back on Albuterol HFA 90mcg/Spray MDI, 2puffs  Q6Hprn. He  was informed about adverse effects of Albuterol HFA. He verbalizes understanding.  -Start patient on Albuterol 2.5mg via nebs Q6h prn in alternation with Albuterol HFA 2 puffs Q6h i.e Patient advised to not to take both inhaler and nebs at a time.  -Patient requires aerosol treatments at home to help maintain a stable respiratory status to help minimize emergency room visits and hospital admissions.  - Patient educated to quit smoking as soon as possible. -Titrate Oxygen to keep Spo2 >90%. -Deep Venous Thrombosis Prophylaxis: lovenox.  -Discussed with  RN taking care of patient regarding patient condition and my management plan. -Follow with my ( Dr. Blake Dumont) clinic at [facility] for pulmonary medicine in 3months with chest CT with IV contrast to be done 2days before clinic visit to follow his lung nodule  - Amie Esquivel Sanderseducated about my impression and plan. He verbalizes understanding. Questions and concerns addressed.     Electronically signed by   Raisa Jesus MD on 6/7/2020 at 2:32 PM

## 2020-06-07 NOTE — PLAN OF CARE
Problem: Impaired respiratory status  Goal: Normal spontaneous ventilation  6/6/2020 2052 by Natalia Guerra RCP  Outcome: Met This Shift  Note: Patient is currently on 2L NC, BiPAP now and then.

## 2020-06-07 NOTE — PLAN OF CARE
Problem: Impaired respiratory status  Goal: Absence of new skin breakdown  6/6/2020 2052 by Francis Freire RCP  Outcome: Met This Shift  Note: No signs of break down at this time.

## 2020-06-07 NOTE — PLAN OF CARE
Problem: Impaired respiratory status  Goal: Clear lung sounds  6/7/2020 0756 by Joshua Broussard RCP  Outcome: Ongoing  Note: Cont aerosol to dec wheezing  6/6/2020 2051 by Magalys Romo RCP  Outcome: Ongoing  Note: Patient is receiving nebulizer treatments to improve aeration.

## 2020-06-08 ENCOUNTER — CARE COORDINATION (OUTPATIENT)
Dept: CASE MANAGEMENT | Age: 75
End: 2020-06-08

## 2020-06-09 ENCOUNTER — CARE COORDINATION (OUTPATIENT)
Dept: CASE MANAGEMENT | Age: 75
End: 2020-06-09

## 2020-06-09 NOTE — DISCHARGE SUMMARY
exertion. FOLLOWUP:  With Pulmonary. Follow up with his family physician. The  patient has been informed to have a followup CAT scan in few months. CONDITION ON DISCHARGE:  Stable. Spent more than 30 minutes discharging the patient that involved  examining the patient, reviewing chart, reconciling his med rec sheet.         Chula Rinaldi M.D.    D: 06/08/2020 10:18:37       T: 06/08/2020 13:59:13     [email or web address]  Job#: [id number]     Doc#: [id number]    CC:

## 2020-06-10 LAB
BLOOD CULTURE, ROUTINE: NORMAL
BLOOD CULTURE, ROUTINE: NORMAL

## 2020-06-15 ENCOUNTER — OFFICE VISIT (OUTPATIENT)
Dept: PULMONOLOGY | Age: 75
End: 2020-06-15
Payer: MEDICARE

## 2020-06-15 VITALS
BODY MASS INDEX: 22.05 KG/M2 | SYSTOLIC BLOOD PRESSURE: 124 MMHG | DIASTOLIC BLOOD PRESSURE: 82 MMHG | HEIGHT: 62 IN | TEMPERATURE: 97.9 F | RESPIRATION RATE: 14 BRPM | OXYGEN SATURATION: 98 % | HEART RATE: 53 BPM | WEIGHT: 119.8 LBS

## 2020-06-15 PROCEDURE — 1123F ACP DISCUSS/DSCN MKR DOCD: CPT | Performed by: NURSE PRACTITIONER

## 2020-06-15 PROCEDURE — 99215 OFFICE O/P EST HI 40 MIN: CPT | Performed by: NURSE PRACTITIONER

## 2020-06-15 PROCEDURE — 94618 PULMONARY STRESS TESTING: CPT | Performed by: NURSE PRACTITIONER

## 2020-06-15 PROCEDURE — G8420 CALC BMI NORM PARAMETERS: HCPCS | Performed by: NURSE PRACTITIONER

## 2020-06-15 PROCEDURE — 4004F PT TOBACCO SCREEN RCVD TLK: CPT | Performed by: NURSE PRACTITIONER

## 2020-06-15 PROCEDURE — 1111F DSCHRG MED/CURRENT MED MERGE: CPT | Performed by: NURSE PRACTITIONER

## 2020-06-15 PROCEDURE — G8926 SPIRO NO PERF OR DOC: HCPCS | Performed by: NURSE PRACTITIONER

## 2020-06-15 PROCEDURE — 4040F PNEUMOC VAC/ADMIN/RCVD: CPT | Performed by: NURSE PRACTITIONER

## 2020-06-15 PROCEDURE — G8427 DOCREV CUR MEDS BY ELIG CLIN: HCPCS | Performed by: NURSE PRACTITIONER

## 2020-06-15 PROCEDURE — 3023F SPIROM DOC REV: CPT | Performed by: NURSE PRACTITIONER

## 2020-06-15 PROCEDURE — 99406 BEHAV CHNG SMOKING 3-10 MIN: CPT | Performed by: NURSE PRACTITIONER

## 2020-06-15 PROCEDURE — 3017F COLORECTAL CA SCREEN DOC REV: CPT | Performed by: NURSE PRACTITIONER

## 2020-06-15 NOTE — PROGRESS NOTES
(congestive heart failure) (HCC)     Chills     COPD (chronic obstructive pulmonary disease) (HCC)     Dizziness - light-headed     HX OF:    Dysphagia     Emphysema     Esophagitis     Fatigue     HX OF:    Fever     GERD (gastroesophageal reflux disease)     History of chest pain     History of tinnitus     History of tobacco abuse     Hyperlipidemia     Hypertension     Irregular heart beat     Lightheadedness     MI (myocardial infarction) (HCC)     Night sweats     Noncompliance with medication regimen     Noncompliance with medical therapy.  Pneumonia     Renal failure syndrome     Ringing in the ears     SOB (shortness of breath) on exertion     Weight loss      SURGICAL HISTORY:  Past Surgical History:   Procedure Laterality Date    CARDIAC SURGERY      2 stents    COLONOSCOPY      CORONARY ANGIOPLASTY WITH STENT PLACEMENT      pt has 3 stents    DIAGNOSTIC CARDIAC CATH LAB PROCEDURE  3 09 2009    Successful primary stenting of mid LAD using drug-eluting stent.  DIAGNOSTIC CARDIAC CATH LAB PROCEDURE  1 10 2011    LV demonstrated normal systolic function, EF 14%. On pullback, no gradient was noted. No critical lesions noted in all large epicardial vessels so that RCA is dominant vessel w/just very mild diffuse disease, about 20-30% lesions. Left main widely patent w/mild disease distally. Circumflex widely patent w/diffuse disease, also about 20-30%.  ENDOSCOPY, COLON, DIAGNOSTIC      HERNIA REPAIR  2008,2010    OR COLON CA SCRN NOT HI RSK IND Left 11/25/2017    COLONOSCOPY performed by Estrella Morton MD at [facility]    OR EGD TRANSORAL BIOPSY SINGLE/MULTIPLE Left 11/25/2017    EGD BIOPSY performed by Estrella Morton MD at [address] ECHOCARDIOGRAM  1 10 2011    Size was normal. Systolic function was normal. EF estimated in range of 55-65%. No regional wall motion abnormalities.  Wall thickness was normal. Doppler - LV diastolic function parameters were normal. This is technically limited study which may impair interpretation.  TRANSTHORACIC ECHOCARDIOGRAM  3 06 2009    This study is technically limited. Distal anterior apical wall hypokinesis. EF 40-45%. Left atrial size w/in normal limits. Trace MR. No evidence of aortic stenosis or aortic insufficiency. Right atrium and right ventricle are of normal contractility. Trace TR. No pericardial effusion.      UPPER GASTROINTESTINAL ENDOSCOPY Left 11/21/2017    EGD DIAGNOSTIC ONLY performed by Philippe Hicks MD at [address] [facility]     SOCIAL HISTORY:  Social History     Tobacco Use    Smoking status: Current Every Day Smoker     Packs/day: 1.00     Years: 40.00     Pack years: 40.00     Types: Cigarettes     Start date: 5/31/1976    Smokeless tobacco: Never Used    Tobacco comment: 4 per day (6/15/20)   Substance Use Topics    Alcohol use: No     Alcohol/week: 0.0 standard drinks    Drug use: No     ALLERGIES:  Allergies   Allergen Reactions    Xanax [Alprazolam] Other (See Comments)     Altered mental status    Pneumococcal Vaccines Nausea And Vomiting     Reports vomiting x 1 day after vaccine    Codeine Hives     Upset stomach    Dilaudid [Hydromorphone] Hives     Upset stomach    Morphine Nausea And Vomiting     FAMILY HISTORY:  Family History   Problem Relation Age of Onset    Heart Disease Mother     Cancer Mother     Heart Disease Father     Cancer Father     Cancer Sister     Prostate Cancer Neg Hx     Colon Cancer Neg Hx     Colon Polyps Neg Hx      CURRENT MEDICATIONS:  Current Outpatient Medications   Medication Sig Dispense Refill    fluticasone-umeclidin-vilant (TRELEGY ELLIPTA) 100-62.5-25 MCG/INH AEPB Inhale 1 puff into the lungs daily 30 each 11    albuterol sulfate HFA (VENTOLIN HFA) 108 (90 Base) MCG/ACT inhaler Inhale 2 puffs into the lungs every 6 hours as needed for Wheezing or Shortness of Breath 1 Inhaler 11    Nebulizers (AIRIAL COMPACT MINI NEBULIZER) The pulmonary arteries are    normal. There is redemonstration of coarse interstitial markings with changes of COPD again noted. There is interval increase of a soft tissue nodule of the posterior right upper lobe abutting the junction of the minor and major fissure measuring 7.9 mm    and previously measured approximately 4.7 mm. Neoplastic changes are not excluded. The current study shows increasing left basilar atelectasis. Minimal left lingular atelectasis changes or subtle scarring appears similar. There is no visualized effusion    or pneumothorax. Right posterior lung base atelectasis changes have increased.       There is stable appearance of the skeleton. Atherosclerotic changes of the aorta are again noted.           Impression   1. Negative exam for pulmonary embolus. 2. Underlying COPD changes. 3. Interval increase of a right posterior upper lobe nodule, changes related to neoplasm cannot be excluded. 4. Bibasilar areas of subsegmental atelectasis suspected. Assessment      Diagnosis Orders   1. Stage 3 severe COPD by GOLD classification ([facility])  6 Minute Walk Test    CT Chest WO Contrast    Spirometry With Bronchodilator    Pulse oximetry, overnight    6 Minute Walk Test    Recent exacerbation with PNA vs atelectasis/bronchitis   2. Centrilobular emphysema ([facility])     3. Lung nodule  CT Chest WO Contrast    Spirometry With Bronchodilator    Pulse oximetry, overnight   4. Cigarette nicotine dependence with other nicotine-induced disorder           Plan           Six Minute Walk Test  Ke[place]han Jose Rafael 1945    Six minute walk test done in my office today by my medical assistant. Cesario's oxygen saturation at rest on room air was 95%. His oxygen saturation remained 95% on room air with exertion. He ambulated a total of 864 feet and tolerated well with mild SOB. Resting heart rate was 57 and 66 upon completion of the walk. He does not qualify for home 02.   Continue Trelegy and CLYDE

## 2020-06-22 ENCOUNTER — APPOINTMENT (OUTPATIENT)
Dept: GENERAL RADIOLOGY | Age: 75
End: 2020-06-22
Payer: MEDICARE

## 2020-06-22 ENCOUNTER — HOSPITAL ENCOUNTER (OUTPATIENT)
Age: 75
Setting detail: OBSERVATION
Discharge: HOME | End: 2020-06-24
Attending: EMERGENCY MEDICINE | Admitting: INTERNAL MEDICINE
Payer: MEDICARE

## 2020-06-22 PROBLEM — J44.1 COPD WITH ACUTE EXACERBATION (HCC): Status: ACTIVE | Noted: 2020-06-22

## 2020-06-22 LAB
ALBUMIN SERPL-MCNC: 3.3 G/DL (ref 3.5–5.1)
ALP BLD-CCNC: 89 U/L (ref 38–126)
ALT SERPL-CCNC: 14 U/L (ref 11–66)
ANION GAP SERPL CALCULATED.3IONS-SCNC: 9 MEQ/L (ref 8–16)
AST SERPL-CCNC: 16 U/L (ref 5–40)
BASOPHILS # BLD: 0.4 %
BASOPHILS ABSOLUTE: 0 THOU/MM3 (ref 0–0.1)
BILIRUB SERPL-MCNC: 0.4 MG/DL (ref 0.3–1.2)
BILIRUBIN DIRECT: < 0.2 MG/DL (ref 0–0.3)
BUN BLDV-MCNC: 12 MG/DL (ref 7–22)
CALCIUM SERPL-MCNC: 9 MG/DL (ref 8.5–10.5)
CHLORIDE BLD-SCNC: 101 MEQ/L (ref 98–111)
CO2: 26 MEQ/L (ref 23–33)
CREAT SERPL-MCNC: 1.2 MG/DL (ref 0.4–1.2)
EKG ATRIAL RATE: 53 BPM
EKG P AXIS: 68 DEGREES
EKG P-R INTERVAL: 144 MS
EKG Q-T INTERVAL: 396 MS
EKG QRS DURATION: 82 MS
EKG QTC CALCULATION (BAZETT): 371 MS
EKG R AXIS: 70 DEGREES
EKG T AXIS: 73 DEGREES
EKG VENTRICULAR RATE: 53 BPM
EOSINOPHIL # BLD: 0.6 %
EOSINOPHILS ABSOLUTE: 0.1 THOU/MM3 (ref 0–0.4)
ERYTHROCYTE [DISTWIDTH] IN BLOOD BY AUTOMATED COUNT: 13.3 % (ref 11.5–14.5)
ERYTHROCYTE [DISTWIDTH] IN BLOOD BY AUTOMATED COUNT: 48.5 FL (ref 35–45)
GFR SERPL CREATININE-BSD FRML MDRD: 59 ML/MIN/1.73M2
GLUCOSE BLD-MCNC: 198 MG/DL (ref 70–108)
GLUCOSE BLD-MCNC: 90 MG/DL (ref 70–108)
HCT VFR BLD CALC: 40.3 % (ref 42–52)
HEMOGLOBIN: 13.3 GM/DL (ref 14–18)
IMMATURE GRANS (ABS): 0.03 THOU/MM3 (ref 0–0.07)
IMMATURE GRANULOCYTES: 0.3 %
LYMPHOCYTES # BLD: 26.8 %
LYMPHOCYTES ABSOLUTE: 2.4 THOU/MM3 (ref 1–4.8)
MAGNESIUM: 1.9 MG/DL (ref 1.6–2.4)
MCH RBC QN AUTO: 32.8 PG (ref 26–33)
MCHC RBC AUTO-ENTMCNC: 33 GM/DL (ref 32.2–35.5)
MCV RBC AUTO: 99.3 FL (ref 80–94)
MONOCYTES # BLD: 9.1 %
MONOCYTES ABSOLUTE: 0.8 THOU/MM3 (ref 0.4–1.3)
NUCLEATED RED BLOOD CELLS: 0 /100 WBC
OSMOLALITY CALCULATION: 271.2 MOSMOL/KG (ref 275–300)
PLATELET # BLD: 237 THOU/MM3 (ref 130–400)
PMV BLD AUTO: 9.4 FL (ref 9.4–12.4)
POTASSIUM SERPL-SCNC: 4.6 MEQ/L (ref 3.5–5.2)
PRO-BNP: 121.1 PG/ML (ref 0–1800)
RBC # BLD: 4.06 MILL/MM3 (ref 4.7–6.1)
SARS-COV-2, NAAT: NOT DETECTED
SEG NEUTROPHILS: 62.8 %
SEGMENTED NEUTROPHILS ABSOLUTE COUNT: 5.6 THOU/MM3 (ref 1.8–7.7)
SODIUM BLD-SCNC: 136 MEQ/L (ref 135–145)
TOTAL PROTEIN: 6.4 G/DL (ref 6.1–8)
TROPONIN T: < 0.01 NG/ML
WBC # BLD: 8.9 THOU/MM3 (ref 4.8–10.8)

## 2020-06-22 PROCEDURE — 2580000003 HC RX 258: Performed by: INTERNAL MEDICINE

## 2020-06-22 PROCEDURE — 71046 X-RAY EXAM CHEST 2 VIEWS: CPT

## 2020-06-22 PROCEDURE — 94640 AIRWAY INHALATION TREATMENT: CPT

## 2020-06-22 PROCEDURE — G0378 HOSPITAL OBSERVATION PER HR: HCPCS

## 2020-06-22 PROCEDURE — 85025 COMPLETE CBC W/AUTO DIFF WBC: CPT

## 2020-06-22 PROCEDURE — 96365 THER/PROPH/DIAG IV INF INIT: CPT

## 2020-06-22 PROCEDURE — 83880 ASSAY OF NATRIURETIC PEPTIDE: CPT

## 2020-06-22 PROCEDURE — 83735 ASSAY OF MAGNESIUM: CPT

## 2020-06-22 PROCEDURE — 2709999900 HC NON-CHARGEABLE SUPPLY

## 2020-06-22 PROCEDURE — 94761 N-INVAS EAR/PLS OXIMETRY MLT: CPT

## 2020-06-22 PROCEDURE — 93010 ELECTROCARDIOGRAM REPORT: CPT | Performed by: INTERNAL MEDICINE

## 2020-06-22 PROCEDURE — 99284 EMERGENCY DEPT VISIT MOD MDM: CPT

## 2020-06-22 PROCEDURE — 93005 ELECTROCARDIOGRAM TRACING: CPT | Performed by: EMERGENCY MEDICINE

## 2020-06-22 PROCEDURE — 84484 ASSAY OF TROPONIN QUANT: CPT

## 2020-06-22 PROCEDURE — 87040 BLOOD CULTURE FOR BACTERIA: CPT

## 2020-06-22 PROCEDURE — 82948 REAGENT STRIP/BLOOD GLUCOSE: CPT

## 2020-06-22 PROCEDURE — 96374 THER/PROPH/DIAG INJ IV PUSH: CPT

## 2020-06-22 PROCEDURE — 80053 COMPREHEN METABOLIC PANEL: CPT

## 2020-06-22 PROCEDURE — 2580000003 HC RX 258: Performed by: EMERGENCY MEDICINE

## 2020-06-22 PROCEDURE — 71045 X-RAY EXAM CHEST 1 VIEW: CPT

## 2020-06-22 PROCEDURE — 96376 TX/PRO/DX INJ SAME DRUG ADON: CPT

## 2020-06-22 PROCEDURE — U0002 COVID-19 LAB TEST NON-CDC: HCPCS

## 2020-06-22 PROCEDURE — 6360000002 HC RX W HCPCS: Performed by: EMERGENCY MEDICINE

## 2020-06-22 PROCEDURE — 96375 TX/PRO/DX INJ NEW DRUG ADDON: CPT

## 2020-06-22 PROCEDURE — 1200000003 HC TELEMETRY R&B

## 2020-06-22 PROCEDURE — 6370000000 HC RX 637 (ALT 250 FOR IP): Performed by: INTERNAL MEDICINE

## 2020-06-22 PROCEDURE — 36415 COLL VENOUS BLD VENIPUNCTURE: CPT

## 2020-06-22 PROCEDURE — 82248 BILIRUBIN DIRECT: CPT

## 2020-06-22 PROCEDURE — 6360000002 HC RX W HCPCS: Performed by: INTERNAL MEDICINE

## 2020-06-22 PROCEDURE — 6370000000 HC RX 637 (ALT 250 FOR IP): Performed by: EMERGENCY MEDICINE

## 2020-06-22 PROCEDURE — 96372 THER/PROPH/DIAG INJ SC/IM: CPT

## 2020-06-22 RX ORDER — SODIUM CHLORIDE 0.9 % (FLUSH) 0.9 %
10 SYRINGE (ML) INJECTION PRN
Status: DISCONTINUED | OUTPATIENT
Start: 2020-06-22 | End: 2020-06-24 | Stop reason: HOSPADM

## 2020-06-22 RX ORDER — SENNA AND DOCUSATE SODIUM 50; 8.6 MG/1; MG/1
1 TABLET, FILM COATED ORAL DAILY PRN
Status: DISCONTINUED | OUTPATIENT
Start: 2020-06-22 | End: 2020-06-24 | Stop reason: HOSPADM

## 2020-06-22 RX ORDER — AMOXICILLIN 250 MG
CAPSULE ORAL PRN
COMMUNITY

## 2020-06-22 RX ORDER — IPRATROPIUM BROMIDE AND ALBUTEROL SULFATE 2.5; .5 MG/3ML; MG/3ML
1 SOLUTION RESPIRATORY (INHALATION) EVERY 6 HOURS
Status: DISCONTINUED | OUTPATIENT
Start: 2020-06-22 | End: 2020-06-22

## 2020-06-22 RX ORDER — ISOSORBIDE MONONITRATE 30 MG/1
30 TABLET, EXTENDED RELEASE ORAL DAILY
Status: DISCONTINUED | OUTPATIENT
Start: 2020-06-23 | End: 2020-06-24 | Stop reason: HOSPADM

## 2020-06-22 RX ORDER — SODIUM CHLORIDE 0.9 % (FLUSH) 0.9 %
10 SYRINGE (ML) INJECTION PRN
Status: DISCONTINUED | OUTPATIENT
Start: 2020-06-22 | End: 2020-06-22 | Stop reason: SDUPTHER

## 2020-06-22 RX ORDER — SODIUM CHLORIDE 0.9 % (FLUSH) 0.9 %
10 SYRINGE (ML) INJECTION EVERY 12 HOURS SCHEDULED
Status: DISCONTINUED | OUTPATIENT
Start: 2020-06-22 | End: 2020-06-24 | Stop reason: HOSPADM

## 2020-06-22 RX ORDER — DEXTROSE MONOHYDRATE 25 G/50ML
12.5 INJECTION, SOLUTION INTRAVENOUS PRN
Status: DISCONTINUED | OUTPATIENT
Start: 2020-06-22 | End: 2020-06-24 | Stop reason: HOSPADM

## 2020-06-22 RX ORDER — METHYLPREDNISOLONE SODIUM SUCCINATE 40 MG/ML
40 INJECTION, POWDER, LYOPHILIZED, FOR SOLUTION INTRAMUSCULAR; INTRAVENOUS EVERY 8 HOURS
Status: DISCONTINUED | OUTPATIENT
Start: 2020-06-22 | End: 2020-06-24 | Stop reason: HOSPADM

## 2020-06-22 RX ORDER — ASPIRIN 81 MG/1
81 TABLET ORAL DAILY
Status: DISCONTINUED | OUTPATIENT
Start: 2020-06-23 | End: 2020-06-24 | Stop reason: HOSPADM

## 2020-06-22 RX ORDER — PROMETHAZINE HYDROCHLORIDE 25 MG/1
12.5 TABLET ORAL EVERY 6 HOURS PRN
Status: DISCONTINUED | OUTPATIENT
Start: 2020-06-22 | End: 2020-06-24 | Stop reason: HOSPADM

## 2020-06-22 RX ORDER — SODIUM CHLORIDE 0.9 % (FLUSH) 0.9 %
10 SYRINGE (ML) INJECTION EVERY 12 HOURS SCHEDULED
Status: DISCONTINUED | OUTPATIENT
Start: 2020-06-22 | End: 2020-06-22 | Stop reason: SDUPTHER

## 2020-06-22 RX ORDER — PRASUGREL 10 MG/1
10 TABLET, FILM COATED ORAL DAILY
Status: DISCONTINUED | OUTPATIENT
Start: 2020-06-23 | End: 2020-06-24 | Stop reason: HOSPADM

## 2020-06-22 RX ORDER — ATORVASTATIN CALCIUM 40 MG/1
40 TABLET, FILM COATED ORAL DAILY
Status: DISCONTINUED | OUTPATIENT
Start: 2020-06-23 | End: 2020-06-24 | Stop reason: HOSPADM

## 2020-06-22 RX ORDER — RANOLAZINE 500 MG/1
1000 TABLET, EXTENDED RELEASE ORAL 2 TIMES DAILY
Status: DISCONTINUED | OUTPATIENT
Start: 2020-06-22 | End: 2020-06-24 | Stop reason: HOSPADM

## 2020-06-22 RX ORDER — METHYLPREDNISOLONE SODIUM SUCCINATE 125 MG/2ML
80 INJECTION, POWDER, LYOPHILIZED, FOR SOLUTION INTRAMUSCULAR; INTRAVENOUS ONCE
Status: COMPLETED | OUTPATIENT
Start: 2020-06-22 | End: 2020-06-22

## 2020-06-22 RX ORDER — ACETAMINOPHEN 325 MG/1
650 TABLET ORAL EVERY 6 HOURS PRN
Status: DISCONTINUED | OUTPATIENT
Start: 2020-06-22 | End: 2020-06-24 | Stop reason: HOSPADM

## 2020-06-22 RX ORDER — SODIUM CHLORIDE 9 MG/ML
INJECTION, SOLUTION INTRAVENOUS CONTINUOUS
Status: DISCONTINUED | OUTPATIENT
Start: 2020-06-22 | End: 2020-06-24 | Stop reason: HOSPADM

## 2020-06-22 RX ORDER — NICOTINE POLACRILEX 4 MG
15 LOZENGE BUCCAL PRN
Status: DISCONTINUED | OUTPATIENT
Start: 2020-06-22 | End: 2020-06-24 | Stop reason: HOSPADM

## 2020-06-22 RX ORDER — ONDANSETRON 2 MG/ML
4 INJECTION INTRAMUSCULAR; INTRAVENOUS EVERY 6 HOURS PRN
Status: DISCONTINUED | OUTPATIENT
Start: 2020-06-22 | End: 2020-06-24 | Stop reason: HOSPADM

## 2020-06-22 RX ORDER — ACETAMINOPHEN 650 MG/1
650 SUPPOSITORY RECTAL EVERY 6 HOURS PRN
Status: DISCONTINUED | OUTPATIENT
Start: 2020-06-22 | End: 2020-06-24 | Stop reason: HOSPADM

## 2020-06-22 RX ORDER — METHYLPREDNISOLONE SODIUM SUCCINATE 125 MG/2ML
80 INJECTION, POWDER, LYOPHILIZED, FOR SOLUTION INTRAMUSCULAR; INTRAVENOUS EVERY 8 HOURS
Status: DISCONTINUED | OUTPATIENT
Start: 2020-06-22 | End: 2020-06-22

## 2020-06-22 RX ORDER — POLYETHYLENE GLYCOL 3350 17 G/17G
17 POWDER, FOR SOLUTION ORAL DAILY
Status: DISCONTINUED | OUTPATIENT
Start: 2020-06-22 | End: 2020-06-24 | Stop reason: HOSPADM

## 2020-06-22 RX ORDER — PANTOPRAZOLE SODIUM 40 MG/1
40 TABLET, DELAYED RELEASE ORAL
Status: DISCONTINUED | OUTPATIENT
Start: 2020-06-23 | End: 2020-06-24 | Stop reason: HOSPADM

## 2020-06-22 RX ORDER — ACETAMINOPHEN 325 MG/1
650 TABLET ORAL EVERY 4 HOURS PRN
Status: DISCONTINUED | OUTPATIENT
Start: 2020-06-22 | End: 2020-06-22 | Stop reason: SDUPTHER

## 2020-06-22 RX ORDER — BUDESONIDE 0.25 MG/2ML
250 INHALANT ORAL 2 TIMES DAILY
Status: DISCONTINUED | OUTPATIENT
Start: 2020-06-22 | End: 2020-06-24 | Stop reason: HOSPADM

## 2020-06-22 RX ORDER — IPRATROPIUM BROMIDE AND ALBUTEROL SULFATE 2.5; .5 MG/3ML; MG/3ML
1 SOLUTION RESPIRATORY (INHALATION) ONCE
Status: COMPLETED | OUTPATIENT
Start: 2020-06-22 | End: 2020-06-22

## 2020-06-22 RX ORDER — IPRATROPIUM BROMIDE AND ALBUTEROL SULFATE 2.5; .5 MG/3ML; MG/3ML
1 SOLUTION RESPIRATORY (INHALATION)
Status: DISCONTINUED | OUTPATIENT
Start: 2020-06-22 | End: 2020-06-24 | Stop reason: HOSPADM

## 2020-06-22 RX ORDER — DEXTROSE MONOHYDRATE 50 MG/ML
100 INJECTION, SOLUTION INTRAVENOUS PRN
Status: DISCONTINUED | OUTPATIENT
Start: 2020-06-22 | End: 2020-06-24 | Stop reason: HOSPADM

## 2020-06-22 RX ADMIN — IPRATROPIUM BROMIDE AND ALBUTEROL SULFATE 1 AMPULE: .5; 3 SOLUTION RESPIRATORY (INHALATION) at 14:05

## 2020-06-22 RX ADMIN — CEFTRIAXONE SODIUM 1 G: 1 INJECTION, POWDER, FOR SOLUTION INTRAMUSCULAR; INTRAVENOUS at 21:00

## 2020-06-22 RX ADMIN — SODIUM CHLORIDE: 9 INJECTION, SOLUTION INTRAVENOUS at 13:52

## 2020-06-22 RX ADMIN — RANOLAZINE 1000 MG: 500 TABLET, FILM COATED, EXTENDED RELEASE ORAL at 21:09

## 2020-06-22 RX ADMIN — BUDESONIDE 250 MCG: 0.25 INHALANT RESPIRATORY (INHALATION) at 20:30

## 2020-06-22 RX ADMIN — IPRATROPIUM BROMIDE AND ALBUTEROL SULFATE 1 AMPULE: .5; 3 SOLUTION RESPIRATORY (INHALATION) at 20:30

## 2020-06-22 RX ADMIN — METHYLPREDNISOLONE SODIUM SUCCINATE 40 MG: 40 INJECTION, POWDER, FOR SOLUTION INTRAMUSCULAR; INTRAVENOUS at 21:09

## 2020-06-22 RX ADMIN — METHYLPREDNISOLONE SODIUM SUCCINATE 80 MG: 125 INJECTION, POWDER, FOR SOLUTION INTRAMUSCULAR; INTRAVENOUS at 13:53

## 2020-06-22 RX ADMIN — ENOXAPARIN SODIUM 40 MG: 40 INJECTION SUBCUTANEOUS at 21:09

## 2020-06-22 RX ADMIN — METOPROLOL TARTRATE 25 MG: 25 TABLET ORAL at 21:09

## 2020-06-22 ASSESSMENT — ENCOUNTER SYMPTOMS
RHINORRHEA: 0
COUGH: 1
SHORTNESS OF BREATH: 1
CONSTIPATION: 0
CHEST TIGHTNESS: 0
TROUBLE SWALLOWING: 0
BACK PAIN: 0
WHEEZING: 1
SORE THROAT: 0
VOICE CHANGE: 0
VOMITING: 0
ABDOMINAL PAIN: 0
NAUSEA: 0
DIARRHEA: 0
SINUS PRESSURE: 0

## 2020-06-22 ASSESSMENT — PAIN SCALES - GENERAL: PAINLEVEL_OUTOF10: 0

## 2020-06-22 NOTE — ED NOTES
Bed: 001A  Expected date: 6/22/20  Expected time: 12:18 PM  Means of arrival: [facility]  Comments:     Chinedu Alanis RN  06/22/20 3616

## 2020-06-22 NOTE — ED NOTES
ED to inpatient nurses report    Chief Complaint   Patient presents with    Shortness of Breath      Present to ED from home  LOC: alert and orientated to name, place, date  Vital signs   Vitals:    06/22/20 1238 06/22/20 1356 06/22/20 1405   BP: (!) 137/97 137/76    Pulse: 54 54    Resp: 20 24    Temp: 98.5 °F (36.9 °C)     TempSrc: Oral     SpO2: 100% 99% 99%   Weight: 128 lb (58.1 kg)     Height: 5' 2\" (1.575 m)        Oxygen Baseline 99    Current needs required 2L n/c Bipap/Cpap No  LDAs:   Peripheral IV 06/22/20 Left Antecubital (Active)   Site Assessment Clean;Dry; Intact 6/22/2020 12:59 PM   Line Status Flushed;Normal saline locked 6/22/2020 12:59 PM     Mobility: Requires assistance * 1  Pending ED orders: none  Present condition: Pt A+Ox4, dyspnea at rest, VSS, no distress noted.     Electronically signed by Hattie King RN on 6/22/2020 at 3:32 PM       Hattie King RN  06/22/20 7954

## 2020-06-22 NOTE — ED NOTES
Pt resting on cot, respirations non-labored, no distress noted. Will continue to monitor.      Chloe Villalta RN  06/22/20 1338

## 2020-06-22 NOTE — H&P
[facility]    History & Physical     Dr. Irma Lay covering for Dr. Gracy Magana    Patient:  Stanley Cuadra  YOB: 1945  Date of Service: 6/22/2020  MRN: [id number]   Acct:  [de-identified]   Primary Care Physician: Jaylan Calzada MD    Chief Complaint:  Shortness of breath, cough productive of yellowish phlegm over the last couple of days    History of Present Illness:   History obtained from the patient. The patient is a 75 y.o. male who is a chronic smoker and has a background history of COPD, CAD, hypertension, dyslipidemia, CHF, a pulmonary nodule had presented to the emergency room with shortness of breath which started over the last couple of days. Patient apparently has been getting short of breath both with activity and at rest associated with wheezing and a cough productive of yellowish phlegm. He has had chest discomfort with coughing and with deep breathing. There has been no rhinorrhea nasal congestion or sore throat and no fever or chills. There has been no palpitations and no ankle edema. He continues to smoke a pack per day of tobacco      Past Medical History:        Diagnosis Date    Acute coronary syndrome (HCC)     Arrhythmia     Arteriosclerotic heart disease (ASHD)     CHF (congestive heart failure) (HCC)     Chills     COPD (chronic obstructive pulmonary disease) (HCC)     Dizziness - light-headed     HX OF:    Dysphagia     Emphysema     Esophagitis     Fatigue     HX OF:    Fever     GERD (gastroesophageal reflux disease)     History of chest pain     History of tinnitus     History of tobacco abuse     Hyperlipidemia     Hypertension     Irregular heart beat     Lightheadedness     MI (myocardial infarction) (HCC)     Night sweats     Noncompliance with medication regimen     Noncompliance with medical therapy.     Pneumonia     Renal failure syndrome     Ringing in the ears     SOB (shortness of breath) on exertion     Weight loss Medication Sig Dispense Refill    fluticasone-umeclidin-vilant (TRELEGY ELLIPTA) 100-62.5-25 MCG/INH AEPB Inhale 1 puff into the lungs daily 30 each 11    albuterol sulfate HFA (VENTOLIN HFA) 108 (90 Base) MCG/ACT inhaler Inhale 2 puffs into the lungs every 6 hours as needed for Wheezing or Shortness of Breath 1 Inhaler 11    albuterol (PROVENTIL) (2.5 MG/3ML) 0.083% nebulizer solution Take 3 mLs by nebulization every 6 hours as needed for Wheezing or Shortness of Breath 120 vial 11    polyethylene glycol (GLYCOLAX) 17 g packet Take 17 g by mouth daily 527 g 1    ranolazine (RANEXA) 1000 MG extended release tablet Take 1 tablet by mouth 2 times daily 180 tablet 0    metoprolol tartrate (LOPRESSOR) 25 MG tablet Take 1 tablet by mouth 2 times daily 60 tablet 3    pantoprazole (PROTONIX) 40 MG tablet Take 1 tablet by mouth every morning (before breakfast) 30 tablet 3    prasugrel (EFFIENT) 10 MG TABS take 1 tablet once daily 90 tablet 4    isosorbide mononitrate (IMDUR) 30 MG extended release tablet Take 1 tablet by mouth daily 90 tablet 4    atorvastatin (LIPITOR) 40 MG tablet Take 1 tablet by mouth daily 90 tablet 4    aspirin 81 MG tablet Take 1 tablet by mouth daily      Nebulizers (AIRIAL COMPACT MINI NEBULIZER) MISC 1 vial by Does not apply route every 6 hours as needed (Shortness of breath or wheezing) Please dispense 1 Nebulizer machine. 120 each 8       Allergies:  Xanax [alprazolam]; Codeine; Dilaudid [hydromorphone]; Morphine; and Pneumococcal vaccines    Social History:    reports that he has been smoking cigarettes. He started smoking about 44 years ago. He has a 40.00 pack-year smoking history. He has never used smokeless tobacco. He reports that he does not drink alcohol or use drugs.     Family History:       Problem Relation Age of Onset    Heart Disease Mother     Cancer Mother     Heart Disease Father     Cancer Father     Cancer Sister     Prostate Cancer Neg Hx     Colon Cancer

## 2020-06-22 NOTE — ED PROVIDER NOTES
electronically signed by Dr. Moreno Osullivan on 6/22/2020 1:23 PM          LABS:   Labs Reviewed   CBC WITH AUTO DIFFERENTIAL - Abnormal; Notable for the following components:       Result Value    RBC 4.06 (*)     Hemoglobin 13.3 (*)     Hematocrit 40.3 (*)     MCV 99.3 (*)     RDW-SD 48.5 (*)     All other components within normal limits   HEPATIC FUNCTION PANEL - Abnormal; Notable for the following components:    Alb 3.3 (*)     All other components within normal limits   OSMOLALITY - Abnormal; Notable for the following components:    Osmolality Calc 271.2 (*)     All other components within normal limits   GLOMERULAR FILTRATION RATE, ESTIMATED - Abnormal; Notable for the following components:    Est, Glom Filt Rate 59 (*)     All other components within normal limits   BRAIN NATRIURETIC PEPTIDE   BASIC METABOLIC PANEL   TROPONIN   MAGNESIUM   COVID-19   ANION GAP     All other unresulted laboratory test above are normal:    Vitals:    Vitals:    06/22/20 1238 06/22/20 1356 06/22/20 1405   BP: (!) 137/97 137/76    Pulse: 54 54    Resp: 20 24    Temp: 98.5 °F (36.9 °C)     TempSrc: Oral     SpO2: 100% 99% 99%   Weight: 128 lb (58.1 kg)     Height: 5' 2\" (1.575 m)         EMERGENCY DEPARTMENT COURSE:    Medications   0.9 % sodium chloride infusion ( Intravenous New Bag 6/22/20 1352)   methylPREDNISolone sodium (SOLU-MEDROL) injection 80 mg (80 mg Intravenous Given 6/22/20 1353)   ipratropium-albuterol (DUONEB) nebulizer solution 1 ampule (1 ampule Inhalation Given 6/22/20 1405)       The pt was seen and evaluated by me. Within the department, I observed the pt's vitalsigns to be within acceptable range except for his pulse of 54, and Resp of 24. Laboratory and Radiological studies were performed, results were reviewed with the patient. Within the department, the pt was treated with Duoneb, and Solu-medrol 80 mg. Patient was reevaluated and she seems to be slightly better.   . I observed the pt's condition to be

## 2020-06-22 NOTE — ACP (ADVANCE CARE PLANNING)
Advance Care Planning     Advance Care Planning Activator (Inpatient)  Conversation Note      Date of ACP Conversation: 6/22/2020    Conversation Conducted with: Patient with Decision Making Capacity    ACP Activator: Guillermina Lazcano    \"Who would you like to name as your primary health care decision-maker? \"               Name: Jose Medrano          Relationship: Daughter            Phone number: [phone]  Radha Cai this person be reached easily? \" Yes  \"Who would you like to name as your back-up decision maker? \"   Name: Gaurav Wolfe         Relationship: Son             Phone number: [phone]  Radha Cai this person be reached easily? \" No    Care Preferences    Ventilation: \"If you were in your present state of health and suddenly became very ill and were unable to breathe on your own, what would your preference be about the use of a ventilator (breathing machine) if it were available to you? \"      Would the patient desire the use of ventilator (breathing machine)?: no    \"If your health worsens and it becomes clear that your chance of recovery is unlikely, what would your preference be about the use of a ventilator (breathing machine) if it were available to you? \"     Would the patient desire the use of ventilator (breathing machine)?: No      Resuscitation  \"CPR works best to restart the heart when there is a sudden event, like a heart attack, in someone who is otherwise healthy. Unfortunately, CPR does not typically restart the heart for people who have serious health conditions or who are very sick. \"    \"In the event your heart stopped as a result of an underlying serious health condition, would you want attempts to be made to restart your heart (answer \"yes\" for attempt to resuscitate) or would you prefer a natural death (answer \"no\" for do not attempt to resuscitate)? \" no       [x] Yes   [] No   Educated Patient / Fayrene Gross regarding differences between Advance Directives and portable DNR orders. Length of ACP Conversation in minutes:      Conversation Outcomes:  [x] ACP discussion completed  [x] Existing advance directive reviewed with patient; no changes to patient's previously recorded wishes  [] New Advance Directive completed  [] Portable Do Not Rescitate prepared for Provider review and signature  [] POLST/POST/MOLST/MOST prepared for Provider review and signature      Follow-up plan:    [] Schedule follow-up conversation to continue planning  [] Referred individual to Provider for additional questions/concerns   [] Advised patient/agent/surrogate to review completed ACP document and update if needed with changes in condition, patient preferences or care setting    [x] This note routed to one or more involved healthcare providers      - This staff had met with the patient previously (6/20) and he explained his PCP told him to come to the ED. He continues to struggle to breathe. Patient explained Annmarie Slade just can't going on like this. It's no way to live. I want to see my grand kids grow up but not if I can't breathe. \"  - He went on to explain, he can't sleep because he wakes up struggling for air. He stated he doesn't use O2 yet but thinks it might be time. He also explained that he can now only eat soft food and has lost a lot of weight. \"Just can't seem to be able to swallow anything. \"  - The Resp Therapist Troy Brothers) provided him treatment during our conversation and shared some personal experiences which helped him understand the need to stop smoking. The patient seemed to feel this was helpful and shared his appreciation.   - The patient asked that I try to [place] Mclean his concerns with his physician (Dr. Ramakrishna Stephens) in hopes he can do something different than they did last week. \"  - If the patient is admitted, a palliative care consult will be requested.

## 2020-06-23 LAB
ALBUMIN SERPL-MCNC: 3.3 G/DL (ref 3.5–5.1)
ALP BLD-CCNC: 83 U/L (ref 38–126)
ALT SERPL-CCNC: 13 U/L (ref 11–66)
ANION GAP SERPL CALCULATED.3IONS-SCNC: 12 MEQ/L (ref 8–16)
AST SERPL-CCNC: 14 U/L (ref 5–40)
BASOPHILS # BLD: 0.1 %
BASOPHILS ABSOLUTE: 0 THOU/MM3 (ref 0–0.1)
BILIRUB SERPL-MCNC: 0.3 MG/DL (ref 0.3–1.2)
BUN BLDV-MCNC: 16 MG/DL (ref 7–22)
CALCIUM SERPL-MCNC: 9.3 MG/DL (ref 8.5–10.5)
CHLORIDE BLD-SCNC: 102 MEQ/L (ref 98–111)
CO2: 23 MEQ/L (ref 23–33)
CREAT SERPL-MCNC: 1 MG/DL (ref 0.4–1.2)
EOSINOPHIL # BLD: 0 %
EOSINOPHILS ABSOLUTE: 0 THOU/MM3 (ref 0–0.4)
ERYTHROCYTE [DISTWIDTH] IN BLOOD BY AUTOMATED COUNT: 13.1 % (ref 11.5–14.5)
ERYTHROCYTE [DISTWIDTH] IN BLOOD BY AUTOMATED COUNT: 47.3 FL (ref 35–45)
GFR SERPL CREATININE-BSD FRML MDRD: 73 ML/MIN/1.73M2
GLUCOSE BLD-MCNC: 141 MG/DL (ref 70–108)
GLUCOSE BLD-MCNC: 141 MG/DL (ref 70–108)
GLUCOSE BLD-MCNC: 158 MG/DL (ref 70–108)
GLUCOSE BLD-MCNC: 175 MG/DL (ref 70–108)
GLUCOSE BLD-MCNC: 181 MG/DL (ref 70–108)
GLUCOSE BLD-MCNC: 253 MG/DL (ref 70–108)
HCT VFR BLD CALC: 38.5 % (ref 42–52)
HEMOGLOBIN: 12.8 GM/DL (ref 14–18)
IMMATURE GRANS (ABS): 0.04 THOU/MM3 (ref 0–0.07)
IMMATURE GRANULOCYTES: 0.5 %
LYMPHOCYTES # BLD: 18 %
LYMPHOCYTES ABSOLUTE: 1.4 THOU/MM3 (ref 1–4.8)
MCH RBC QN AUTO: 32.9 PG (ref 26–33)
MCHC RBC AUTO-ENTMCNC: 33.2 GM/DL (ref 32.2–35.5)
MCV RBC AUTO: 99 FL (ref 80–94)
MONOCYTES # BLD: 1.3 %
MONOCYTES ABSOLUTE: 0.1 THOU/MM3 (ref 0.4–1.3)
NUCLEATED RED BLOOD CELLS: 0 /100 WBC
PLATELET # BLD: 226 THOU/MM3 (ref 130–400)
PMV BLD AUTO: 9.6 FL (ref 9.4–12.4)
POTASSIUM REFLEX MAGNESIUM: 5.2 MEQ/L (ref 3.5–5.2)
RBC # BLD: 3.89 MILL/MM3 (ref 4.7–6.1)
SEG NEUTROPHILS: 80.1 %
SEGMENTED NEUTROPHILS ABSOLUTE COUNT: 6.3 THOU/MM3 (ref 1.8–7.7)
SODIUM BLD-SCNC: 137 MEQ/L (ref 135–145)
TOTAL PROTEIN: 6.2 G/DL (ref 6.1–8)
WBC # BLD: 7.9 THOU/MM3 (ref 4.8–10.8)

## 2020-06-23 PROCEDURE — 6360000002 HC RX W HCPCS: Performed by: INTERNAL MEDICINE

## 2020-06-23 PROCEDURE — 82948 REAGENT STRIP/BLOOD GLUCOSE: CPT

## 2020-06-23 PROCEDURE — 85025 COMPLETE CBC W/AUTO DIFF WBC: CPT

## 2020-06-23 PROCEDURE — G0378 HOSPITAL OBSERVATION PER HR: HCPCS

## 2020-06-23 PROCEDURE — 2580000003 HC RX 258: Performed by: INTERNAL MEDICINE

## 2020-06-23 PROCEDURE — 6370000000 HC RX 637 (ALT 250 FOR IP): Performed by: PHYSICIAN ASSISTANT

## 2020-06-23 PROCEDURE — 6370000000 HC RX 637 (ALT 250 FOR IP): Performed by: INTERNAL MEDICINE

## 2020-06-23 PROCEDURE — 94760 N-INVAS EAR/PLS OXIMETRY 1: CPT

## 2020-06-23 PROCEDURE — 96376 TX/PRO/DX INJ SAME DRUG ADON: CPT

## 2020-06-23 PROCEDURE — 36415 COLL VENOUS BLD VENIPUNCTURE: CPT

## 2020-06-23 PROCEDURE — 1200000003 HC TELEMETRY R&B

## 2020-06-23 PROCEDURE — 96366 THER/PROPH/DIAG IV INF ADDON: CPT

## 2020-06-23 PROCEDURE — 96372 THER/PROPH/DIAG INJ SC/IM: CPT

## 2020-06-23 PROCEDURE — 80053 COMPREHEN METABOLIC PANEL: CPT

## 2020-06-23 PROCEDURE — 6360000002 HC RX W HCPCS: Performed by: EMERGENCY MEDICINE

## 2020-06-23 PROCEDURE — 94640 AIRWAY INHALATION TREATMENT: CPT

## 2020-06-23 RX ADMIN — Medication 10 ML: at 08:58

## 2020-06-23 RX ADMIN — METOPROLOL TARTRATE 25 MG: 25 TABLET ORAL at 21:07

## 2020-06-23 RX ADMIN — ISOSORBIDE MONONITRATE 30 MG: 30 TABLET ORAL at 08:58

## 2020-06-23 RX ADMIN — RANOLAZINE 1000 MG: 500 TABLET, FILM COATED, EXTENDED RELEASE ORAL at 08:58

## 2020-06-23 RX ADMIN — IPRATROPIUM BROMIDE AND ALBUTEROL SULFATE 1 AMPULE: .5; 3 SOLUTION RESPIRATORY (INHALATION) at 17:13

## 2020-06-23 RX ADMIN — INSULIN LISPRO 1 UNITS: 100 INJECTION, SOLUTION INTRAVENOUS; SUBCUTANEOUS at 13:58

## 2020-06-23 RX ADMIN — ASPIRIN 81 MG: 81 TABLET ORAL at 08:58

## 2020-06-23 RX ADMIN — ENOXAPARIN SODIUM 40 MG: 40 INJECTION SUBCUTANEOUS at 21:16

## 2020-06-23 RX ADMIN — BUDESONIDE 250 MCG: 0.25 INHALANT RESPIRATORY (INHALATION) at 20:18

## 2020-06-23 RX ADMIN — Medication 3 MG: at 04:23

## 2020-06-23 RX ADMIN — RANOLAZINE 1000 MG: 500 TABLET, FILM COATED, EXTENDED RELEASE ORAL at 21:07

## 2020-06-23 RX ADMIN — ATORVASTATIN CALCIUM 40 MG: 40 TABLET, FILM COATED ORAL at 08:58

## 2020-06-23 RX ADMIN — INSULIN LISPRO 1 UNITS: 100 INJECTION, SOLUTION INTRAVENOUS; SUBCUTANEOUS at 16:28

## 2020-06-23 RX ADMIN — METHYLPREDNISOLONE SODIUM SUCCINATE 40 MG: 40 INJECTION, POWDER, FOR SOLUTION INTRAMUSCULAR; INTRAVENOUS at 13:53

## 2020-06-23 RX ADMIN — METOPROLOL TARTRATE 25 MG: 25 TABLET ORAL at 08:58

## 2020-06-23 RX ADMIN — BUDESONIDE 250 MCG: 0.25 INHALANT RESPIRATORY (INHALATION) at 10:13

## 2020-06-23 RX ADMIN — METHYLPREDNISOLONE SODIUM SUCCINATE 40 MG: 40 INJECTION, POWDER, FOR SOLUTION INTRAMUSCULAR; INTRAVENOUS at 05:56

## 2020-06-23 RX ADMIN — METHYLPREDNISOLONE SODIUM SUCCINATE 40 MG: 40 INJECTION, POWDER, FOR SOLUTION INTRAMUSCULAR; INTRAVENOUS at 21:07

## 2020-06-23 RX ADMIN — IPRATROPIUM BROMIDE AND ALBUTEROL SULFATE 1 AMPULE: .5; 3 SOLUTION RESPIRATORY (INHALATION) at 13:06

## 2020-06-23 RX ADMIN — PANTOPRAZOLE SODIUM 40 MG: 40 TABLET, DELAYED RELEASE ORAL at 05:56

## 2020-06-23 RX ADMIN — IPRATROPIUM BROMIDE AND ALBUTEROL SULFATE 1 AMPULE: .5; 3 SOLUTION RESPIRATORY (INHALATION) at 10:12

## 2020-06-23 RX ADMIN — PRASUGREL 10 MG: 10 TABLET, FILM COATED ORAL at 08:58

## 2020-06-23 RX ADMIN — CEFTRIAXONE SODIUM 1 G: 1 INJECTION, POWDER, FOR SOLUTION INTRAMUSCULAR; INTRAVENOUS at 17:14

## 2020-06-23 RX ADMIN — IPRATROPIUM BROMIDE AND ALBUTEROL SULFATE 1 AMPULE: .5; 3 SOLUTION RESPIRATORY (INHALATION) at 20:18

## 2020-06-23 NOTE — PLAN OF CARE
Problem: RESPIRATORY  Goal: Clear lung sounds  Outcome: Ongoing   Continue with Duoneb and Pulmicort to achieve clear lung sounds

## 2020-06-23 NOTE — PROGRESS NOTES
06/23/20  0616   WBC 7.9   HGB 12.8*   HCT 38.5*   MCV 99.0*        BMP:   Recent Labs     06/23/20  0616      K 5.2      CO2 23   BUN 16   CREATININE 1.0   CALCIUM 9.3   GLUCOSE 141*     PT/INR: No results for input(s): PROTIME, INR in the last 72 hours. APTT: No results for input(s): APTT in the last 72 hours. Lipids:   Recent Labs     06/22/20  1307 06/23/20  0616   ALKPHOS 89 83   ALT 14 13   AST 16 14   BILITOT 0.4 0.3   BILIDIR <0.2  --    LABALBU 3.3* 3.3*     Troponin: No results for input(s): TROPONINI in the last 72 hours. Imaging:  Xr Chest Portable    Result Date: 6/22/2020  PROCEDURE: XR CHEST PORTABLE CLINICAL INFORMATION: cough SOB. COMPARISON: June 5, 2020 TECHNIQUE: Portable. FINDINGS: Elevated left hemidiaphragm. Coarsened reticular markings in the lung bases. No discrete lobar consolidation. Costophrenic recesses are preserved. No cardiomegaly. Atherosclerotic changes aortic arch. No acute osseous findings. Fibrotic emphysematous changes throughout the lungs with coarsened reticular markings in the lung bases. No discrete lobar consolidation. **This report has been created using voice recognition software. It may contain minor errors which are inherent in voice recognition technology. ** Final report electronically signed by Dr. Gabriel Tse on 6/22/2020 1:23 PM      EKG:      Diet: DIET CARDIAC;        Data:   Scheduled Meds: Scheduled Meds:   enoxaparin  40 mg Subcutaneous Nightly    insulin lispro  0-6 Units Subcutaneous TID     insulin lispro  0-3 Units Subcutaneous Nightly    methylPREDNISolone  40 mg Intravenous Q8H    aspirin  81 mg Oral Daily    atorvastatin  40 mg Oral Daily    isosorbide mononitrate  30 mg Oral Daily    metoprolol tartrate  25 mg Oral BID    pantoprazole  40 mg Oral QAM AC    polyethylene glycol  17 g Oral Daily    prasugrel  10 mg Oral Daily    ranolazine  1,000 mg Oral BID    sodium chloride flush  10 mL Intravenous 2 times Electronically signed by Taylor Duran MD on 6/23/2020 at 3:38 PM  Over 35 mins spent on this evaluation

## 2020-06-24 VITALS
BODY MASS INDEX: 21.23 KG/M2 | HEIGHT: 62 IN | RESPIRATION RATE: 18 BRPM | HEART RATE: 68 BPM | TEMPERATURE: 97.8 F | OXYGEN SATURATION: 98 % | SYSTOLIC BLOOD PRESSURE: 108 MMHG | DIASTOLIC BLOOD PRESSURE: 63 MMHG | WEIGHT: 115.4 LBS

## 2020-06-24 LAB
GLUCOSE BLD-MCNC: 116 MG/DL (ref 70–108)
GLUCOSE BLD-MCNC: 130 MG/DL (ref 70–108)
GLUCOSE BLD-MCNC: 153 MG/DL (ref 70–108)

## 2020-06-24 PROCEDURE — 94760 N-INVAS EAR/PLS OXIMETRY 1: CPT

## 2020-06-24 PROCEDURE — G0378 HOSPITAL OBSERVATION PER HR: HCPCS

## 2020-06-24 PROCEDURE — 6370000000 HC RX 637 (ALT 250 FOR IP): Performed by: INTERNAL MEDICINE

## 2020-06-24 PROCEDURE — 96376 TX/PRO/DX INJ SAME DRUG ADON: CPT

## 2020-06-24 PROCEDURE — 94640 AIRWAY INHALATION TREATMENT: CPT

## 2020-06-24 PROCEDURE — 6360000002 HC RX W HCPCS: Performed by: INTERNAL MEDICINE

## 2020-06-24 PROCEDURE — 82948 REAGENT STRIP/BLOOD GLUCOSE: CPT

## 2020-06-24 RX ORDER — LEVOFLOXACIN 750 MG/1
375 TABLET ORAL DAILY
Qty: 10 TABLET | Refills: 0 | Status: SHIPPED | OUTPATIENT
Start: 2020-06-24 | End: 2020-07-04

## 2020-06-24 RX ADMIN — IPRATROPIUM BROMIDE AND ALBUTEROL SULFATE 1 AMPULE: .5; 3 SOLUTION RESPIRATORY (INHALATION) at 08:33

## 2020-06-24 RX ADMIN — POLYETHYLENE GLYCOL 3350 17 G: 17 POWDER, FOR SOLUTION ORAL at 08:21

## 2020-06-24 RX ADMIN — RANOLAZINE 1000 MG: 500 TABLET, FILM COATED, EXTENDED RELEASE ORAL at 08:21

## 2020-06-24 RX ADMIN — ASPIRIN 81 MG: 81 TABLET ORAL at 08:21

## 2020-06-24 RX ADMIN — PANTOPRAZOLE SODIUM 40 MG: 40 TABLET, DELAYED RELEASE ORAL at 06:13

## 2020-06-24 RX ADMIN — IPRATROPIUM BROMIDE AND ALBUTEROL SULFATE 1 AMPULE: .5; 3 SOLUTION RESPIRATORY (INHALATION) at 12:14

## 2020-06-24 RX ADMIN — ISOSORBIDE MONONITRATE 30 MG: 30 TABLET ORAL at 08:21

## 2020-06-24 RX ADMIN — ATORVASTATIN CALCIUM 40 MG: 40 TABLET, FILM COATED ORAL at 08:21

## 2020-06-24 RX ADMIN — METHYLPREDNISOLONE SODIUM SUCCINATE 40 MG: 40 INJECTION, POWDER, FOR SOLUTION INTRAMUSCULAR; INTRAVENOUS at 06:13

## 2020-06-24 RX ADMIN — METOPROLOL TARTRATE 25 MG: 25 TABLET ORAL at 08:21

## 2020-06-24 RX ADMIN — METHYLPREDNISOLONE SODIUM SUCCINATE 40 MG: 40 INJECTION, POWDER, FOR SOLUTION INTRAMUSCULAR; INTRAVENOUS at 14:17

## 2020-06-24 RX ADMIN — PRASUGREL 10 MG: 10 TABLET, FILM COATED ORAL at 08:21

## 2020-06-24 RX ADMIN — IPRATROPIUM BROMIDE AND ALBUTEROL SULFATE 1 AMPULE: .5; 3 SOLUTION RESPIRATORY (INHALATION) at 15:43

## 2020-06-24 RX ADMIN — BUDESONIDE 250 MCG: 0.25 INHALANT RESPIRATORY (INHALATION) at 08:34

## 2020-06-24 NOTE — PLAN OF CARE
Problem: Falls - Risk of:  Goal: Will remain free from falls  Description: Will remain free from falls  6/24/2020 1046 by Ariadne Rain RN  Outcome: Ongoing  Note: Call light within reach. Side rails up x2. Bed alarm on. Non skid slippers available. Problem: Discharge Planning:  Goal: Participates in care planning  Description: Participates in care planning  6/24/2020 1046 by Ariadne Rain RN  Outcome: Ongoing  Note: Patient plans to be discharged to home when medically stable. Problem: Cardiac Output - Decreased:  Goal: Hemodynamic stability will improve  Description: Hemodynamic stability will improve  6/24/2020 1046 by Ariadne Rain RN  Outcome: Ongoing  Note:   Vitals:    06/24/20 0835   BP:    Pulse:    Resp:    Temp:    SpO2: 97%          Problem: Gas Exchange - Impaired:  Goal: Levels of oxygenation will improve  Description: Levels of oxygenation will improve  6/24/2020 1046 by Ariadne Rain RN  Outcome: Ongoing  Note: Patient admitted for COPD. Receiving iv steroids and antibiotics     Care plan reviewed with patient. Patient verbalize understanding of the plan of care and contribute to goal setting.

## 2020-06-24 NOTE — PLAN OF CARE
Problem: Falls - Risk of:  Goal: Will remain free from falls  Description: Will remain free from falls  6/24/2020 0104 by Ivy Wilson RN  Outcome: Ongoing  Note: Call light within reach. Side rails up x2. Bed alarm on. Non skid slippers available. Problem: Falls - Risk of:  Goal: Absence of physical injury  Description: Absence of physical injury  6/24/2020 0104 by Ivy Wilson RN  Outcome: Ongoing  Note: There has not been any episodes of physical injury at this time in the shift. Problem: Discharge Planning:  Goal: Participates in care planning  Description: Participates in care planning  6/24/2020 0104 by Ivy Wilson RN  Outcome: Ongoing  Note: Patient plans to be discharged to home alone when medically stable. Problem: Cardiac Output - Decreased:  Goal: Hemodynamic stability will improve  Description: Hemodynamic stability will improve  6/24/2020 0104 by Ivy Wilson RN  Outcome: Ongoing  Note:   Vitals:    06/23/20 2345   BP: 109/61   Pulse: 62   Resp: 18   Temp: 97.9 °F (36.6 °C)   SpO2: 95%          Problem: Gas Exchange - Impaired:  Goal: Levels of oxygenation will improve  Description: Levels of oxygenation will improve  6/24/2020 0104 by Ivy Wilson RN  Outcome: Ongoing  Note: Patient oxygen level greater than 90 on RA. Will continue to reassess respiratory status. Problem: Pain:  Goal: Pain level will decrease  Description: Pain level will decrease  6/24/2020 0104 by Ivy Wilson RN  Outcome: Ongoing  Note: Patient free from pain this shift. Pain rated on 0-10 pain rating scale. Will continue to reassess. Problem: Skin Integrity - Impaired:  Goal: Absence of new skin breakdown  Description: Absence of new skin breakdown  6/24/2020 0104 by Ivy Wilson RN  Outcome: Ongoing  Note: No evidence of any new skin breakdown at this time in the shift.         Problem: Tissue Perfusion - Cardiopulmonary, Altered:  Goal: Absence of angina  Description: Absence of angina  6/24/2020 0104 by Tia Cruz RN  Outcome: Ongoing  Note: Denies CP   Care plan reviewed with patient. Patient verbalize understanding of the plan of care and contribute to goal setting.

## 2020-06-24 NOTE — PLAN OF CARE
Problem: Falls - Risk of:  Goal: Will remain free from falls  Description: Will remain free from falls  6/24/2020 0102 by Amy Brown RN  Outcome: Ongoing  Note: Call light within reach. Side rails up x2. Bed alarm on. Non skid slippers available. Problem: Falls - Risk of:  Goal: Absence of physical injury  Description: Absence of physical injury  Outcome: Ongoing  Note: There has not been any episodes of physical injury at this time in the shift. Problem: Discharge Planning:  Goal: Participates in care planning  Description: Participates in care planning  6/24/2020 0102 by Amy Brown RN  Outcome: Ongoing  Note: Patient plans to be discharged to home alone when medically stable. Problem: Cardiac Output - Decreased:  Goal: Hemodynamic stability will improve  Description: Hemodynamic stability will improve  6/24/2020 0102 by Amy Brown RN  Outcome: Ongoing  Note:   Vitals:    06/23/20 2345   BP: 109/61   Pulse: 62   Resp: 18   Temp: 97.9 °F (36.6 °C)   SpO2: 95%          Problem: Gas Exchange - Impaired:  Goal: Levels of oxygenation will improve  Description: Levels of oxygenation will improve  6/24/2020 0102 by Amy Brown RN  Outcome: Ongoing  Note: Patient oxygen level greater than 90 on room air. Will continue to reassess respiratory status. Problem: Pain:  Goal: Pain level will decrease  Description: Pain level will decrease  Outcome: Ongoing  Note: Patient free from pain this shift. Pain rated on 0-10 pain rating scale. Will continue to reassess. Problem: Skin Integrity - Impaired:  Goal: Absence of new skin breakdown  Description: Absence of new skin breakdown  Outcome: Ongoing  Note: No evidence of any new skin breakdown at this time in the shift. Problem: Tissue Perfusion - Cardiopulmonary, Altered:  Goal: Absence of angina  Description: Absence of angina  6/24/2020 0102 by Amy Brown RN  Outcome: Ongoing  Note: Denies CP   Care plan reviewed with patient.

## 2020-06-24 NOTE — DISCHARGE SUMMARY
Dr. 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[address]    History of tinnitus    Fatigue    History of tobacco abuse    Noncompliance with medication regimen    Ringing in the ears    Irregular heart beat    Acute coronary syndrome ([address]    Hyperlipidemia    Esophagitis    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[address]    S/P angioplasty with stent    COPD exacerbation ([address]    CAP (community acquired pneumonia)    Pneumonia due to organism    Smoker    History of placement of stent in LAD coronary artery    Renal failure syndrome    Acute hypercapnic respiratory failure ([address]    Postprocedural hypotension    Acute pulmonary edema (HCC)    Metabolic acidosis    Cardiac arrest ([address]    ST elevation myocardial infarction (STEMI) ([address]    Gastroduodenitis    Coronary artery disease involving native coronary artery of native heart without angina pectoris    COPD (chronic obstructive pulmonary disease) (HCC)    SOB (shortness of breath) on exertion    History of chest pain    Vertigo    SOB (shortness of breath)    COPD with acute exacerbation ([address]       Discharge Medications:       Latricia Mckenzie   Home Medication Instructions [facility]:[id number]    Printed on:06/24/20 1535   Medication Information                      albuterol (PROVENTIL) (2.5 MG/3ML) 0.083% nebulizer solution  Take 3 mLs by nebulization every 6 hours as needed for Wheezing or Shortness of Breath             albuterol sulfate HFA (VENTOLIN HFA) 108 (90 Base) MCG/ACT inhaler  Inhale 2 puffs into the lungs every 6 hours as needed for Wheezing or Shortness of Breath             aspirin 81 MG tablet  Take 1 tablet by mouth daily             atorvastatin (LIPITOR) 40 MG tablet  Take 1 tablet by mouth daily             fluticasone-umeclidin-vilant (TRELEGY ELLIPTA) 100-62.5-25 MCG/INH AEPB  Inhale 1 puff into the lungs daily             isosorbide mononitrate (IMDUR) 30 MG extended release tablet  Take 1 tablet by mouth daily             levoFLOXacin (LEVAQUIN) 750 MG tablet  Take 0.5 tablets by mouth daily for 10 days             metoprolol tartrate (LOPRESSOR) 25 MG tablet  Take 1 tablet by mouth 2 times daily             Nebulizers (AIRIAL COMPACT MINI NEBULIZER) MISC  1 vial by Does not apply route every 6 hours as needed (Shortness of breath or wheezing) Please dispense 1 Nebulizer machine.              pantoprazole (PROTONIX) 40 MG tablet  Take 1 tablet by mouth every morning (before breakfast)             polyethylene glycol (GLYCOLAX) 17 g packet  Take 17 g by mouth daily             prasugrel (EFFIENT) 10 MG TABS  take 1 tablet once daily             ranolazine (RANEXA) 1000 MG extended release tablet  Take 1 tablet by mouth 2 times daily             senna-docusate (PERICOLACE) 8.6-50 MG per tablet  Take by mouth as needed for Constipation (1-2 tabs daily as needed for constipation.)                Scheduled Meds: Scheduled Meds:   enoxaparin  40 mg Subcutaneous Nightly    insulin lispro  0-6 Units Subcutaneous TID WC    insulin lispro  0-3 Units Subcutaneous Nightly    methylPREDNISolone  40 mg Intravenous Q8H    aspirin  81 mg Oral Daily    atorvastatin  40 mg Oral Daily    isosorbide mononitrate  30 mg Oral Daily    metoprolol tartrate  25 mg Oral BID    pantoprazole  40 mg Oral QAM AC    polyethylene glycol  17 g Oral Daily    prasugrel  10 mg Oral Daily    ranolazine  1,000 mg Oral BID    sodium chloride flush  10 mL Intravenous 2 times per day    ipratropium-albuterol  1 ampule Inhalation Q4H WA    cefTRIAXone (ROCEPHIN) AMYLASE  Lab Results   Component Value Date    LIPASE 21.5 10/10/2019     Lab Results   Component Value Date    CHOL 136 03/25/2019    CHOL 73 11/17/2017    TRIG 72 03/25/2019    HDL 59.0 03/25/2019    LDLCALC 63 03/25/2019     Recent Labs     06/23/20 2013 06/24/20  0613 06/24/20  1108   POCGLU 158* 130* 116*     No results for input(s): CKTOTAL, CKMB, TROPONINI in the last 72 hours. No results found for: LABA1C  Lab Results   Component Value Date    INR 1.00 10/10/2019     No results found for: PHART, PO2ART, NWM7STE, AYD5ZRW, [facility] Course: clinical course has improved, placed on atb and broncho treatment. He feels much better and willing to go home. He is ambulating without difficulty and will now change to observation admission. Vitals: /63   Pulse 68   Temp 97.8 °F (36.6 °C) (Oral)   Resp 18   Ht 5' 2\" (1.575 m)   Wt 115 lb 6.4 oz (52.3 kg)   SpO2 98%   BMI 21.11 kg/m²   Physical Exam:   General appearance - alert, and in no distress  Eyes - pupils equal and reactive, extraocular eye movements intact  Mouth - mucous membranes moist, pharynx normal without lesions  Chest -diminished kaela  Heart - normal rate, regular rhythm, normal S1, S2,   Abdomen - soft, nontender, nondistended, no masses or organomegaly, pos bs. Neurological - alert, oriented, normal speech, no focal findings or movement disorder noted  Musculoskeletal - no joint tenderness, deformity or swelling  Extremities - peripheral pulses normal, no pedal edema, no clubbing or cyanosis  Skin - normal coloration and turgor, no rashes, no suspicious skin lesions noted          Disposition: home    Condition: Stable        Jamil Jamison MD     Copy: Primary Care Physician: Jamil Jamison MD  Internal Medicine  Over 30 mins spent on this discharge.

## 2020-06-25 ENCOUNTER — CARE COORDINATION (OUTPATIENT)
Dept: CASE MANAGEMENT | Age: 75
End: 2020-06-25

## 2020-06-26 ENCOUNTER — CARE COORDINATION (OUTPATIENT)
Dept: CASE MANAGEMENT | Age: 75
End: 2020-06-26

## 2020-06-26 NOTE — PROGRESS NOTES
CLINICAL PHARMACY NOTE: MEDS TO [address] Select Patient?: Yes  Total # of Prescriptions Filled: 1   The following medications were delivered to the patient:  Levofloxacin 750mg  Total # of Interventions Completed: 2  Time Spent (min): 30    Additional Documentation:

## 2020-06-28 LAB — BLOOD CULTURE, ROUTINE: NORMAL

## 2020-07-29 RX ORDER — RANOLAZINE 1000 MG/1
1000 TABLET, EXTENDED RELEASE ORAL 2 TIMES DAILY
Qty: 180 TABLET | Refills: 0 | Status: SHIPPED | OUTPATIENT
Start: 2020-07-29 | End: 2020-10-26

## 2020-09-01 RX ORDER — PRASUGREL 10 MG/1
TABLET, FILM COATED ORAL
Qty: 90 TABLET | Refills: 4 | Status: SHIPPED | OUTPATIENT
Start: 2020-09-01

## 2020-09-03 ENCOUNTER — TELEPHONE (OUTPATIENT)
Dept: PULMONOLOGY | Age: 75
End: 2020-09-03

## 2020-09-10 ENCOUNTER — HOSPITAL ENCOUNTER (OUTPATIENT)
Dept: PULMONOLOGY | Age: 75
End: 2020-09-10
Payer: MEDICARE

## 2020-09-10 ENCOUNTER — HOSPITAL ENCOUNTER (OUTPATIENT)
Dept: RESPIRATORY THERAPY | Age: 75
Discharge: HOME | End: 2020-09-10
Payer: MEDICARE

## 2020-09-10 ENCOUNTER — HOSPITAL ENCOUNTER (OUTPATIENT)
Dept: CT IMAGING | Age: 75
Discharge: HOME | End: 2020-09-10
Payer: MEDICARE

## 2020-09-10 PROCEDURE — 71250 CT THORAX DX C-: CPT

## 2020-09-17 ENCOUNTER — OFFICE VISIT (OUTPATIENT)
Dept: PULMONOLOGY | Age: 75
End: 2020-09-17
Payer: MEDICARE

## 2020-09-17 VITALS
WEIGHT: 117 LBS | TEMPERATURE: 96.5 F | OXYGEN SATURATION: 99 % | BODY MASS INDEX: 21.53 KG/M2 | DIASTOLIC BLOOD PRESSURE: 84 MMHG | HEART RATE: 97 BPM | SYSTOLIC BLOOD PRESSURE: 144 MMHG | HEIGHT: 62 IN

## 2020-09-17 PROCEDURE — 4040F PNEUMOC VAC/ADMIN/RCVD: CPT | Performed by: NURSE PRACTITIONER

## 2020-09-17 PROCEDURE — G0296 VISIT TO DETERM LDCT ELIG: HCPCS | Performed by: NURSE PRACTITIONER

## 2020-09-17 PROCEDURE — 99214 OFFICE O/P EST MOD 30 MIN: CPT | Performed by: NURSE PRACTITIONER

## 2020-09-17 PROCEDURE — G8420 CALC BMI NORM PARAMETERS: HCPCS | Performed by: NURSE PRACTITIONER

## 2020-09-17 PROCEDURE — 99406 BEHAV CHNG SMOKING 3-10 MIN: CPT | Performed by: NURSE PRACTITIONER

## 2020-09-17 PROCEDURE — 1123F ACP DISCUSS/DSCN MKR DOCD: CPT | Performed by: NURSE PRACTITIONER

## 2020-09-17 PROCEDURE — 3017F COLORECTAL CA SCREEN DOC REV: CPT | Performed by: NURSE PRACTITIONER

## 2020-09-17 PROCEDURE — G8427 DOCREV CUR MEDS BY ELIG CLIN: HCPCS | Performed by: NURSE PRACTITIONER

## 2020-09-17 PROCEDURE — G8926 SPIRO NO PERF OR DOC: HCPCS | Performed by: NURSE PRACTITIONER

## 2020-09-17 PROCEDURE — 3023F SPIROM DOC REV: CPT | Performed by: NURSE PRACTITIONER

## 2020-09-17 PROCEDURE — 4004F PT TOBACCO SCREEN RCVD TLK: CPT | Performed by: NURSE PRACTITIONER

## 2020-09-17 NOTE — PROGRESS NOTES
[facility]     Patient: Angie Salinas, 75 y.o.   : 1945  2020   Pt of Dr. Ron Escamilla   Patient presents with    COPD     Follow up last office  visit  was 6/15/20 with Mindy Patel is here for 3 month follow up for severe COPD and follow up Chest CT of lung nodule. Last seen 6/15/20 after being hospitalized with COPD exacerbation with possible PNA and found to have increased size in lung nodule on CT imaging (Previously refused CT Lung screenings with no desire to quit smoking). Prior to that, had not had an exacerbation since 2017. Recent hospitalization scared him, needing BiPAP with new desire to quit smoking and comply with follow up treatment/imaging. 40 pack year smoking history down from 1 PPD to 4 per day last visit. Failed Chantix and Nicotine patch in the past.  Last PFT showed decline in FEV1 of 56 (67 PD) to 43, FEV/FVC 73 to 64. A1A MM. Has chronic elevation of left hemidiaphragm. Follows up today with repeat Chest CT. Due to not having C0VID screening, he did not complete Spirometry or overnight pulsox. He was hospitalized again after seen in  for repeat exacerbation. (-) C0VID. Presents today back to baseline status. Did not qualify for home 02 when last seen. Continues on Trelegy, no increased CLYDE use. Still smoking 4-6 cigarettes per day. CT of chest shows decrease in size of RUL nodule from 8 mm to 3 mm with unchanged scarring and moderate centrilobular emphysema.       MMRC Score: 1    Progress History:   Since last visit any new medical issues? No  New ER or hospitlal visits? No  Any new or changes in medicines? No  Using inhalers? Yes   Are they helpful?  Yes   Past Medical hx   PMH:  Past Medical History:   Diagnosis Date    Acute coronary syndrome (HCC)     Arrhythmia     Arteriosclerotic heart disease (ASHD)     CHF (congestive heart failure) (HCC)     Chills     COPD (chronic obstructive pulmonary disease) (HCC)     Dizziness - light-headed     HX OF:    Dysphagia     Emphysema     Esophagitis     Fatigue     HX OF:    Fever     GERD (gastroesophageal reflux disease)     History of chest pain     History of tinnitus     History of tobacco abuse     Hyperlipidemia     Hypertension     Irregular heart beat     Lightheadedness     MI (myocardial infarction) (formerly [facility])     Night sweats     Noncompliance with medication regimen     Noncompliance with medical therapy.  Pneumonia     Renal failure syndrome     Ringing in the ears     SOB (shortness of breath) on exertion     Weight loss      SURGICAL HISTORY:  Past Surgical History:   Procedure Laterality Date    CARDIAC SURGERY      2 stents    COLONOSCOPY      CORONARY ANGIOPLASTY WITH STENT PLACEMENT      pt has 3 stents    DIAGNOSTIC CARDIAC CATH LAB PROCEDURE  3 09 2009    Successful primary stenting of mid LAD using drug-eluting stent.  DIAGNOSTIC CARDIAC CATH LAB PROCEDURE  1 10 2011    LV demonstrated normal systolic function, EF 10%. On pullback, no gradient was noted. No critical lesions noted in all large epicardial vessels so that RCA is dominant vessel w/just very mild diffuse disease, about 20-30% lesions. Left main widely patent w/mild disease distally. Circumflex widely patent w/diffuse disease, also about 20-30%.  ENDOSCOPY, COLON, DIAGNOSTIC      HERNIA REPAIR  2008,2010    OH COLON CA SCRN NOT HI RSK IND Left 11/25/2017    COLONOSCOPY performed by Shayla Diamond MD at [facility]    OH EGD TRANSORAL BIOPSY SINGLE/MULTIPLE Left 11/25/2017    EGD BIOPSY performed by Shayla Diamond MD at [facility] ECHOCARDIOGRAM  1 10 2011    Size was normal. Systolic function was normal. EF estimated in range of 55-65%. No regional wall motion abnormalities.  Wall thickness was normal. Doppler - LV diastolic function parameters were normal. This is technically limited study which may impair interpretation.  TRANSTHORACIC ECHOCARDIOGRAM  3 06 2009    This study is technically limited. Distal anterior apical wall hypokinesis. EF 40-45%. Left atrial size w/in normal limits. Trace MR. No evidence of aortic stenosis or aortic insufficiency. Right atrium and right ventricle are of normal contractility. Trace TR. No pericardial effusion.  UPPER GASTROINTESTINAL ENDOSCOPY Left 11/21/2017    EGD DIAGNOSTIC ONLY performed by Edgardo Melendez MD at [facility]     SOCIAL HISTORY:  Social History     Tobacco Use    Smoking status: Current Every Day Smoker     Packs/day: 1.00     Years: 40.00     Pack years: 40.00     Types: Cigarettes     Start date: 5/31/1976    Smokeless tobacco: Never Used    Tobacco comment: 4 per day (6/15/20)   Substance Use Topics    Alcohol use: No     Alcohol/week: 0.0 standard drinks    Drug use: No     ALLERGIES:  Allergies   Allergen Reactions    Xanax [Alprazolam] Other (See Comments)     Altered mental status    Codeine Hives, Nausea And Vomiting and Other (See Comments)     Skin becomes erythematous and sweaty.  Dilaudid [Hydromorphone] Hives, Nausea And Vomiting and Other (See Comments)     Skin becomes erythematous and sweaty.  Morphine Nausea And Vomiting and Rash     Skin becomes erythematous and sweaty.     Pneumococcal Vaccines Nausea And Vomiting     Reports vomiting x 1 day after vaccine     FAMILY HISTORY:  Family History   Problem Relation Age of Onset    Heart Disease Mother     Cancer Mother     Heart Disease Father     Cancer Father     Cancer Sister     Prostate Cancer Neg Hx     Colon Cancer Neg Hx     Colon Polyps Neg Hx      CURRENT MEDICATIONS:  Current Outpatient Medications   Medication Sig Dispense Refill    prasugrel (EFFIENT) 10 MG TABS take 1 tablet by mouth once daily 90 tablet 4    ranolazine (RANEXA) 1000 MG extended release tablet Take 1 tablet by mouth 2 times daily 180 tablet 0    senna-docusate (PERICOLACE) 8.6-50 MG per tablet Take by mouth as needed for Constipation (1-2 tabs daily as needed for constipation.)       fluticasone-umeclidin-vilant (TRELEGY ELLIPTA) 100-62.5-25 MCG/INH AEPB Inhale 1 puff into the lungs daily 30 each 11    albuterol sulfate HFA (VENTOLIN HFA) 108 (90 Base) MCG/ACT inhaler Inhale 2 puffs into the lungs every 6 hours as needed for Wheezing or Shortness of Breath 1 Inhaler 11    Nebulizers (sones COMPACT MINI NEBULIZER) MISC 1 vial by Does not apply route every 6 hours as needed (Shortness of breath or wheezing) Please dispense 1 Nebulizer machine. 120 each 8    albuterol (PROVENTIL) (2.5 MG/3ML) 0.083% nebulizer solution Take 3 mLs by nebulization every 6 hours as needed for Wheezing or Shortness of Breath 120 vial 11    metoprolol tartrate (LOPRESSOR) 25 MG tablet Take 1 tablet by mouth 2 times daily 60 tablet 3    pantoprazole (PROTONIX) 40 MG tablet Take 1 tablet by mouth every morning (before breakfast) 30 tablet 3    isosorbide mononitrate (IMDUR) 30 MG extended release tablet Take 1 tablet by mouth daily 90 tablet 4    atorvastatin (LIPITOR) 40 MG tablet Take 1 tablet by mouth daily 90 tablet 4    aspirin 81 MG tablet Take 1 tablet by mouth daily       No current facility-administered medications for this visit. Sajan MOREJON   General/Constitutional: No recent loss of weight or appetite changes. No fever or chills. HENT: Negative. Eyes: Negative. Upper respiratory tract: No nasal stuffiness or post nasal drip. Lower respiratory tract/ lungs: + smokers cough/sputum production. No hemoptysis. Cardiovascular: No palpitations or chest pain. Gastrointestinal: No nausea or vomiting. Neurological: No focal neurologiacal weakness. Extremities: No edema. Musculoskeletal: No complaints. Genitourinary: No complaints. Hematological: Negative. Psychiatric/Behavioral: Negative. Skin: No itching.      Physical exam   BP (!) 144/84 (Site: Left Upper Arm, Position: Sitting, Cuff Size: Large Adult)   Pulse 97   Temp 96.5 °F (35.8 °C) (Tympanic)   Ht 5' 2\" (1.575 m)   Wt 117 lb (53.1 kg)   SpO2 99% Comment: room air  BMI 21.40 kg/m²      Constitutional: Patient appears thin built and moderately nourished in no acute distress. Head: Normocephalic and atraumatic. Mouth/Throat: Oropharynx is clear and moist. No oral thrush. Eyes: Conjunctivae are normal. Pupils are equal, round, and reactive to light. No scleral icterus. Neck: Neck supple. No JVD or tracheal deviation present. Cardiovascular: Regular rate, regular rhythm, S1 and S2 with no murmur. No peripheral edema. Pulmonary/Chest: Normal effort with diminished but clear breath sounds. Occasional wheeze, no rales or rhonchi. Increased AP diameter. No stridor. No respiratory distress. Abdominal: Soft. Bowel sounds audible. No distension or tenderness to palpation. Musculoskeletal: Moves all extremities. Lymphadenopathy: No cervical adenopathy. Neurological: Patient is alert and oriented to person, place, and time. No focal deficits. Skin: Skin is warm and dry. No clubbing, no cyanosis. Test results   Lung Nodule Screening     [x] Qualifies    []Does not qualify   [] Declined    [x] Completed    9/10/20       COMPARISON: CT scan 06/05/2020.         TECHNIQUE: 5 mm noncontrast axial images were obtained through the chest. Sagittal and coronal reconstructions were obtained.         All CT scans at this facility use dose modulation, iterative reconstruction, and/or weight-based dosing when appropriate to reduce radiation dose to as low as reasonably achievable.         FINDINGS:    The central airways patent.         The heart is normal in size. There are coronary artery calcifications.         Atherosclerosis is seen in the thoracic aorta.  There is no aneurysmal dilatation.         There are no pathologically enlarged lymph nodes in the mediastinum, hilar or axillary regions.      discussed the risks and benefits of various tobacco cessation strategies, including \"cold turkey,\" nicotine replacement (nicotine patch, gum, etc.), Wellbutrin and Chantix and combination therapy if needed. Continues to defer at this time. Will see Alena Lindsay back in: 1 year but will call if needed sooner. SHARON Tucker, ACNP-C  9/17/2020     Low Dose CT (LDCT) Lung Screening criteria met   Age 55-77   Pack year smoking >30   Still smoking or less than 15 year since quit   No sign or symptoms of lung cancer   > 11 months since last LDCT     Risks and benefits of lung cancer screening with LDCT scans discussed:    Significance of positive screen - False-positive LDCT results often occur. 95% of all positive results do not lead to a diagnosis of cancer. Usually further imaging can resolve most false-positive results; however, some patients may require invasive procedures. Over diagnosis risk - 10% to 12% of screen-detected lung cancer cases are over diagnosed--that is, the cancer would not have been detected in the patient's lifetime without the screening. Need for follow up screens annually to continue lung cancer screening effectiveness     Risks associated with radiation from annual LDCT- Radiation exposure is about the same as for a mammogram, which is about 1/3 of the annual background radiation exposure from everyday life. Starting screening at age 54 is not likely to increase cancer risk from radiation exposure. Patients with comorbidities resulting in life expectancy of < 10 years, or that would preclude treatment of an abnormality identified on CT, should not be screened due to lack of benefit.     To obtain maximal benefit from this screening, smoking cessation and long-term abstinence from smoking is critical

## 2020-09-21 ENCOUNTER — OFFICE VISIT (OUTPATIENT)
Dept: CARDIOLOGY CLINIC | Age: 75
End: 2020-09-21
Payer: MEDICARE

## 2020-09-21 VITALS
WEIGHT: 119.38 LBS | DIASTOLIC BLOOD PRESSURE: 70 MMHG | BODY MASS INDEX: 21.97 KG/M2 | HEART RATE: 78 BPM | SYSTOLIC BLOOD PRESSURE: 138 MMHG | HEIGHT: 62 IN

## 2020-09-21 PROCEDURE — G8420 CALC BMI NORM PARAMETERS: HCPCS | Performed by: INTERNAL MEDICINE

## 2020-09-21 PROCEDURE — 4004F PT TOBACCO SCREEN RCVD TLK: CPT | Performed by: INTERNAL MEDICINE

## 2020-09-21 PROCEDURE — 1123F ACP DISCUSS/DSCN MKR DOCD: CPT | Performed by: INTERNAL MEDICINE

## 2020-09-21 PROCEDURE — G8427 DOCREV CUR MEDS BY ELIG CLIN: HCPCS | Performed by: INTERNAL MEDICINE

## 2020-09-21 PROCEDURE — 3017F COLORECTAL CA SCREEN DOC REV: CPT | Performed by: INTERNAL MEDICINE

## 2020-09-21 PROCEDURE — 99214 OFFICE O/P EST MOD 30 MIN: CPT | Performed by: INTERNAL MEDICINE

## 2020-09-21 PROCEDURE — 4040F PNEUMOC VAC/ADMIN/RCVD: CPT | Performed by: INTERNAL MEDICINE

## 2020-09-21 RX ORDER — ISOSORBIDE MONONITRATE 30 MG/1
30 TABLET, EXTENDED RELEASE ORAL DAILY
Qty: 90 TABLET | Refills: 3 | Status: SHIPPED | OUTPATIENT
Start: 2020-09-21 | End: 2021-01-01 | Stop reason: SDUPTHER

## 2020-09-21 RX ORDER — ATORVASTATIN CALCIUM 40 MG/1
40 TABLET, FILM COATED ORAL DAILY
Qty: 90 TABLET | Refills: 3 | Status: ON HOLD | OUTPATIENT
Start: 2020-09-21 | End: 2021-01-01

## 2020-09-21 RX ORDER — PANTOPRAZOLE SODIUM 40 MG/1
40 TABLET, DELAYED RELEASE ORAL
Qty: 90 TABLET | Refills: 3 | Status: SHIPPED | OUTPATIENT
Start: 2020-09-21

## 2020-09-21 NOTE — PROGRESS NOTES
pain    Vertigo    SOB (shortness of breath)    COPD with acute exacerbation ([address]       Past Surgical History:   Procedure Laterality Date    CARDIAC SURGERY      2 stents    COLONOSCOPY      CORONARY ANGIOPLASTY WITH STENT PLACEMENT      pt has 3 stents    DIAGNOSTIC CARDIAC CATH LAB PROCEDURE  3 09 2009    Successful primary stenting of mid LAD using drug-eluting stent.  DIAGNOSTIC CARDIAC CATH LAB PROCEDURE  1 10 2011    LV demonstrated normal systolic function, EF 98%. On pullback, no gradient was noted. No critical lesions noted in all large epicardial vessels so that RCA is dominant vessel w/just very mild diffuse disease, about 20-30% lesions. Left main widely patent w/mild disease distally. Circumflex widely patent w/diffuse disease, also about 20-30%.  ENDOSCOPY, COLON, DIAGNOSTIC      HERNIA REPAIR  2008,2010    [place] COLON CA SCRN NOT HI RSK IND Left 11/25/2017    COLONOSCOPY performed by Lizeth Moreno MD at [facility]    [place] EGD TRANSORAL BIOPSY SINGLE/MULTIPLE Left 11/25/2017    EGD BIOPSY performed by Lizeth Moreno MD at [address] ECHOCARDIOGRAM  1 10 2011    Size was normal. Systolic function was normal. EF estimated in range of 55-65%. No regional wall motion abnormalities. Wall thickness was normal. Doppler - LV diastolic function parameters were normal. This is technically limited study which may impair interpretation.  TRANSTHORACIC ECHOCARDIOGRAM  3 06 2009    This study is technically limited. Distal anterior apical wall hypokinesis. EF 40-45%. Left atrial size w/in normal limits. Trace MR. No evidence of aortic stenosis or aortic insufficiency. Right atrium and right ventricle are of normal contractility. Trace TR. No pericardial effusion.      UPPER GASTROINTESTINAL ENDOSCOPY Left 11/21/2017    EGD DIAGNOSTIC ONLY performed by Dajuan Pineda MD at [facility]       Allergies   Allergen Reactions    Xanax [Alprazolam] Other (See Comments) partner: Not on file     Emotionally abused: Not on file     Physically abused: Not on file     Forced sexual activity: Not on file   Other Topics Concern    Not on file   Social History Narrative    Not on file       Current Outpatient Medications   Medication Sig Dispense Refill    isosorbide mononitrate (IMDUR) 30 MG extended release tablet Take 1 tablet by mouth daily 90 tablet 3    pantoprazole (PROTONIX) 40 MG tablet Take 1 tablet by mouth every morning (before breakfast) 90 tablet 3    atorvastatin (LIPITOR) 40 MG tablet Take 1 tablet by mouth daily 90 tablet 3    metoprolol tartrate (LOPRESSOR) 25 MG tablet Take 1 tablet by mouth 2 times daily 180 tablet 3    prasugrel (EFFIENT) 10 MG TABS take 1 tablet by mouth once daily 90 tablet 4    ranolazine (RANEXA) 1000 MG extended release tablet Take 1 tablet by mouth 2 times daily 180 tablet 0    senna-docusate (PERICOLACE) 8.6-50 MG per tablet Take by mouth as needed for Constipation (1-2 tabs daily as needed for constipation.)       fluticasone-umeclidin-vilant (TRELEGY ELLIPTA) 100-62.5-25 MCG/INH AEPB Inhale 1 puff into the lungs daily 30 each 11    albuterol sulfate HFA (VENTOLIN HFA) 108 (90 Base) MCG/ACT inhaler Inhale 2 puffs into the lungs every 6 hours as needed for Wheezing or Shortness of Breath 1 Inhaler 11    Nebulizers (AIRIAL COMPACT MINI NEBULIZER) MISC 1 vial by Does not apply route every 6 hours as needed (Shortness of breath or wheezing) Please dispense 1 Nebulizer machine. 120 each 8    albuterol (PROVENTIL) (2.5 MG/3ML) 0.083% nebulizer solution Take 3 mLs by nebulization every 6 hours as needed for Wheezing or Shortness of Breath 120 vial 11    aspirin 81 MG tablet Take 1 tablet by mouth daily       No current facility-administered medications for this visit.         Review of Systems -     General ROS: negative  Psychological ROS: negative  Hematological and Lymphatic ROS: No history of blood clots or bleeding disorder. Respiratory ROS: no cough, shortness of breath, or wheezing  Cardiovascular ROS: no chest pain or dyspnea on exertion  Gastrointestinal ROS: negative  Genito-Urinary ROS: no dysuria, trouble voiding, or hematuria  Musculoskeletal ROS: negative  Neurological ROS: no TIA or stroke symptoms  Dermatological ROS: negative      Blood pressure (!) 142/80, pulse 78, height 5' 2\" (1.575 m), weight 119 lb 6 oz (54.1 kg). Physical Examination:    General appearance - alert, well appearing, and in no distress  Mental status - alert, oriented to person, place, and time  Neck - supple, no significant adenopathy, no JVD, or carotid bruits  Chest - clear to auscultation, no wheezes, rales or rhonchi, symmetric air entry  Heart - normal rate, regular rhythm, normal S1, S2, no murmurs, rubs, clicks or gallops  Abdomen - soft, nontender, nondistended, no masses or organomegaly  Neurological - alert, oriented, normal speech, no focal findings or movement disorder noted  Musculoskeletal - no joint tenderness, deformity or swelling  Extremities - peripheral pulses normal, no pedal edema, no clubbing or cyanosis  Skin - normal coloration and turgor, no rashes, no suspicious skin lesions noted    Lab  No results for input(s): CKTOTAL, CKMB, CKMBINDEX, TROPONINI in the last 72 hours.   CBC:   Lab Results   Component Value Date    WBC 7.9 06/23/2020    RBC 3.89 06/23/2020    RBC 3.93 08/31/2011    HGB 12.8 06/23/2020    HCT 38.5 06/23/2020    MCV 99.0 06/23/2020    MCH 32.9 06/23/2020    MCHC 33.2 06/23/2020    RDW 18.0 12/28/2017     06/23/2020    MPV 9.6 06/23/2020     BMP:    Lab Results   Component Value Date     06/23/2020    K 5.2 06/23/2020     06/23/2020    CO2 23 06/23/2020    BUN 16 06/23/2020    LABALBU 3.3 06/23/2020    LABALBU 4.0 08/31/2011    CREATININE 1.0 06/23/2020    CALCIUM 9.3 06/23/2020    LABGLOM 73 06/23/2020    GLUCOSE 141 06/23/2020    GLUCOSE 154 08/31/2011     Hepatic Function Panel:    Lab Results   Component Value Date    ALKPHOS 83 06/23/2020    ALT 13 06/23/2020    AST 14 06/23/2020    PROT 6.2 06/23/2020    BILITOT 0.3 06/23/2020    BILIDIR <0.2 06/22/2020    LABALBU 3.3 06/23/2020    LABALBU 4.0 08/31/2011     Magnesium:    Lab Results   Component Value Date    MG 1.9 06/22/2020     Warfarin PT/INR:  No components found for: PTPATWAR, PTINRWAR  HgBA1c:  No results found for: LABA1C  FLP:    Lab Results   Component Value Date    TRIG 72 03/25/2019    HDL 59.0 03/25/2019    LDLCALC 63 03/25/2019    LABVLDL 14 03/25/2019     TSH:    Lab Results   Component Value Date    TSH 3.460 07/12/2018     Cath Nov 2017 post cardiac arrest and inf stemi  emergant cath done mod RCA lesion med RX  FFR considered - later nuc stress test done dec 2017 neg  Med RX recommended By dr. Donato Land who followed did cath and later did stress- which was negative - med RX continued  CORONARY ANGIOGRAM:  LEFT MAIN:  The left main is patent and free of any significant disease.     LAD:  The LAD has a large stent that is patent without any significant ISR. The LAD is diffusely diseased at the apical segment. There is good myocardial blush. There are diagonal branches that are patent without any significant  obstructive disease.     LEFT CIRCUMFLEX:  The left circumflex is large without any obstructive  disease. It gives rise to a large OM branch that bifurcates distally and  Has mild luminal irregularities. The AV groove circumflex is patent.     RCA:  We used a 6-Amharic guiding catheter to engage the RCA given the  ECG findings in the patient. This demonstrated patent vessel. There was mid 50% stenosis followed by a distal  60% to 70% stenosis in the RCA. Dominant vessel. There was flow all the  way to the distal vessels, including to the PLV and to PDA branches.     LV:  The LV systolic pressure was 607, the LVDP was approximately 20, there  is no LV to AO systolic gradient.   The LV gram performed demonstrates an EF  of 55% without any significant wall motion abnormalities. No significant MR. There aortic valve appears to be tricuspid and there does not appear to be   An ascending aortic aneurysm or dissection. Echo nov 2017 EF 55%      Assessment       Diagnosis Orders   1. Coronary artery disease involving native coronary artery of native heart without angina pectoris     2. Pure hypercholesterolemia     3. Essential hypertension     4. S/P angioplasty with stent     5. History of placement of stent in LAD coronary artery     6. SOB (shortness of breath) on exertion     7. History of tobacco abuse           Previous admission DX  S/p Cath 2017- Patent LAD lesion, mod RCA lesion  H/o Coronary artery disease/stent in  LAD  patient's stent appears to have been placed in 2013 into the LAD  Cardiac arrest with inferior ST elevation. Nov 2017   Dyspnea  Intermittent Atypical Chest Pain  Troponin elevation - likely demand in the setting of COPD but  Known moderate to severe RCA disease  Hx of Cardiac Arrest/Inferior STEMI in the setting of GI bleed  Preserved EF      Plan     The current meds and labs reviewed    Continue the current treatment and with constant vigilance to changes in symptoms and also any potential side effects. Return for care or seek medical attention immediately if symptoms got worse and/or develop new symptoms. Coronary artery disease, seems to be stable. Denies angina or change in breathing pattern  Cont  lopreresor to 25 po bid  Hx of LAD stent 2013  Cath 2017 60 to 70% RCA lesion and nuc stress neg  Med RX  Cont asa and effient  Cont statins  lexiscan nuc neg - nagetive 2018  Cont  imdur 30    Copd   Advised to have aggressive RX    Hypertension, on medical treatment. Seems to be under good control. Patient is compliant with medical treatment. Hyperlipidemia: on statins, followed periodically. Patient need periodic lipid and liver profile.     Reviewed the record from recent admission      D/w the pat the plan of care    Lipid panel and liver function test before next appointment      Smoking: discussed with the patient the importance of smoke cessation especially with the risk of CAD. 3 min advised. Discussed use, benefit, and side effects of prescribed medications. All patient questions answered. Pt voiced understanding. Instructed to continue current medications, diet and exercise. Continue risk factor modification and medical management. Patient agreed with treatment plan. Follow up as directed.       Pat want 1 yr F/u    RTC in 13 months      Tahmina Boles

## 2020-10-26 RX ORDER — RANOLAZINE 1000 MG/1
TABLET, EXTENDED RELEASE ORAL
Qty: 180 TABLET | Refills: 3 | Status: SHIPPED | OUTPATIENT
Start: 2020-10-26

## 2020-11-03 PROBLEM — J44.9 COPD (CHRONIC OBSTRUCTIVE PULMONARY DISEASE) (HCC): Status: RESOLVED | Noted: 2018-07-13 | Resolved: 2020-01-01

## 2021-01-01 ENCOUNTER — APPOINTMENT (OUTPATIENT)
Dept: MRI IMAGING | Age: 76
DRG: 064 | End: 2021-01-01
Payer: MEDICARE

## 2021-01-01 ENCOUNTER — APPOINTMENT (OUTPATIENT)
Dept: CT IMAGING | Age: 76
DRG: 064 | End: 2021-01-01
Payer: MEDICARE

## 2021-01-01 ENCOUNTER — HOSPITAL ENCOUNTER (INPATIENT)
Age: 76
LOS: 4 days | Discharge: HOSPICE | DRG: 064 | End: 2021-10-29
Attending: FAMILY MEDICINE | Admitting: INTERNAL MEDICINE
Payer: MEDICARE

## 2021-01-01 ENCOUNTER — APPOINTMENT (OUTPATIENT)
Dept: GENERAL RADIOLOGY | Age: 76
DRG: 064 | End: 2021-01-01
Payer: MEDICARE

## 2021-01-01 ENCOUNTER — HOSPITAL ENCOUNTER (INPATIENT)
Age: 76
LOS: 2 days | DRG: 951 | End: 2021-10-31
Attending: INTERNAL MEDICINE | Admitting: INTERNAL MEDICINE
Payer: COMMERCIAL

## 2021-01-01 VITALS
BODY MASS INDEX: 22.31 KG/M2 | SYSTOLIC BLOOD PRESSURE: 113 MMHG | RESPIRATION RATE: 28 BRPM | OXYGEN SATURATION: 90 % | WEIGHT: 121.25 LBS | HEIGHT: 62 IN | DIASTOLIC BLOOD PRESSURE: 62 MMHG | TEMPERATURE: 98.6 F | HEART RATE: 93 BPM

## 2021-01-01 VITALS
SYSTOLIC BLOOD PRESSURE: 100 MMHG | DIASTOLIC BLOOD PRESSURE: 56 MMHG | TEMPERATURE: 100 F | RESPIRATION RATE: 28 BRPM | HEART RATE: 125 BPM | OXYGEN SATURATION: 68 %

## 2021-01-01 DIAGNOSIS — I63.9 CEREBROVASCULAR ACCIDENT (CVA), UNSPECIFIED MECHANISM (HCC): Primary | ICD-10-CM

## 2021-01-01 LAB
ABSOLUTE BASO #: 0.1 X10E9/L (ref 0–0.2)
ABSOLUTE EOS #: 0.2 X10E9/L (ref 0–0.4)
ABSOLUTE LYMPH #: 3.6 X10E9/L (ref 1–3.5)
ABSOLUTE MONO #: 0.9 X10E9/L (ref 0–0.9)
ABSOLUTE NEUT #: 5.3 X10E9/L (ref 1.5–6.6)
ALBUMIN SERPL-MCNC: 4.1 G/DL (ref 3.2–5.3)
ALBUMIN SERPL-MCNC: 4.5 G/DL (ref 3.5–5.1)
ALK PHOSPHATASE: 83 U/L (ref 39–130)
ALLEN TEST: POSITIVE
ALP BLD-CCNC: 105 U/L (ref 38–126)
ALT SERPL-CCNC: 12 U/L (ref 0–40)
ALT SERPL-CCNC: 12 U/L (ref 11–66)
ANION GAP SERPL CALCULATED.3IONS-SCNC: 11 MEQ/L (ref 8–16)
ANION GAP SERPL CALCULATED.3IONS-SCNC: 14 MEQ/L (ref 8–16)
ANION GAP SERPL CALCULATED.3IONS-SCNC: 16 MEQ/L (ref 8–16)
APTT: 24.5 SECONDS (ref 22–38)
AST SERPL-CCNC: 17 U/L (ref 0–41)
AST SERPL-CCNC: 18 U/L (ref 5–40)
AVERAGE GLUCOSE: 123 MG/DL (ref 70–126)
BASE EXCESS (CALCULATED): 0 MMOL/L (ref -2.5–2.5)
BASOPHILS # BLD: 0.3 %
BASOPHILS # BLD: 0.3 %
BASOPHILS ABSOLUTE: 0 THOU/MM3 (ref 0–0.1)
BASOPHILS ABSOLUTE: 0 THOU/MM3 (ref 0–0.1)
BASOPHILS RELATIVE PERCENT: 0.9 %
BILIRUB SERPL-MCNC: 0.4 MG/DL (ref 0.3–1.2)
BILIRUB SERPL-MCNC: 0.7 MG/DL (ref 0.3–1.2)
BILIRUBIN DIRECT: 0.1 MG/DL (ref 0–0.4)
BUN BLDV-MCNC: 15 MG/DL (ref 7–22)
BUN BLDV-MCNC: 19 MG/DL (ref 7–22)
BUN BLDV-MCNC: 26 MG/DL (ref 7–22)
CALCIUM SERPL-MCNC: 8.9 MG/DL (ref 8.5–10.5)
CALCIUM SERPL-MCNC: 9 MG/DL (ref 8.5–10.5)
CALCIUM SERPL-MCNC: 9.9 MG/DL (ref 8.5–10.5)
CHLORIDE BLD-SCNC: 105 MEQ/L (ref 98–111)
CHLORIDE BLD-SCNC: 105 MEQ/L (ref 98–111)
CHLORIDE BLD-SCNC: 97 MEQ/L (ref 98–111)
CHOLESTEROL, TOTAL: 154 MG/DL (ref 100–199)
CO2: 19 MEQ/L (ref 23–33)
CO2: 22 MEQ/L (ref 23–33)
CO2: 24 MEQ/L (ref 23–33)
COLLECTED BY:: ABNORMAL
CREAT SERPL-MCNC: 0.8 MG/DL (ref 0.4–1.2)
CREAT SERPL-MCNC: 0.9 MG/DL (ref 0.4–1.2)
CREAT SERPL-MCNC: 1.2 MG/DL (ref 0.4–1.2)
DEVICE: ABNORMAL
EKG ATRIAL RATE: 91 BPM
EKG P AXIS: 93 DEGREES
EKG P-R INTERVAL: 144 MS
EKG Q-T INTERVAL: 344 MS
EKG QRS DURATION: 84 MS
EKG QTC CALCULATION (BAZETT): 423 MS
EKG R AXIS: 86 DEGREES
EKG T AXIS: 85 DEGREES
EKG VENTRICULAR RATE: 91 BPM
EOSINOPHIL # BLD: 0.5 %
EOSINOPHIL # BLD: 2 %
EOSINOPHILS ABSOLUTE: 0.1 THOU/MM3 (ref 0–0.4)
EOSINOPHILS ABSOLUTE: 0.2 THOU/MM3 (ref 0–0.4)
EOSINOPHILS RELATIVE PERCENT: 2 %
ERYTHROCYTE [DISTWIDTH] IN BLOOD BY AUTOMATED COUNT: 13.1 % (ref 11.5–14.5)
ERYTHROCYTE [DISTWIDTH] IN BLOOD BY AUTOMATED COUNT: 13.3 % (ref 11.5–14.5)
ERYTHROCYTE [DISTWIDTH] IN BLOOD BY AUTOMATED COUNT: 13.4 % (ref 11.5–14.5)
ERYTHROCYTE [DISTWIDTH] IN BLOOD BY AUTOMATED COUNT: 50.5 FL (ref 35–45)
ERYTHROCYTE [DISTWIDTH] IN BLOOD BY AUTOMATED COUNT: 51.2 FL (ref 35–45)
ERYTHROCYTE [DISTWIDTH] IN BLOOD BY AUTOMATED COUNT: 52.2 FL (ref 35–45)
GFR SERPL CREATININE-BSD FRML MDRD: 59 ML/MIN/1.73M2
GFR SERPL CREATININE-BSD FRML MDRD: 82 ML/MIN/1.73M2
GFR SERPL CREATININE-BSD FRML MDRD: > 90 ML/MIN/1.73M2
GLUCOSE BLD-MCNC: 104 MG/DL (ref 70–108)
GLUCOSE BLD-MCNC: 105 MG/DL (ref 70–108)
GLUCOSE BLD-MCNC: 161 MG/DL (ref 70–108)
GLUCOSE BLD-MCNC: 179 MG/DL (ref 70–108)
GLUCOSE BLD-MCNC: 79 MG/DL (ref 70–108)
HBA1C MFR BLD: 6.1 % (ref 4.4–6.4)
HCO3: 23 MMOL/L (ref 23–28)
HCT VFR BLD CALC: 42.6 % (ref 42–52)
HCT VFR BLD CALC: 44 % (ref 42–52)
HCT VFR BLD CALC: 46.3 % (ref 42–52)
HCT VFR BLD CALC: 46.4 % (ref 39–49)
HDLC SERPL-MCNC: 47 MG/DL
HEMOGLOBIN: 13.8 GM/DL (ref 14–18)
HEMOGLOBIN: 14.6 GM/DL (ref 14–18)
HEMOGLOBIN: 15 GM/DL (ref 14–18)
HEMOGLOBIN: 15.8 G/DL (ref 13–17)
IMMATURE GRANS (ABS): 0.03 THOU/MM3 (ref 0–0.07)
IMMATURE GRANS (ABS): 0.03 THOU/MM3 (ref 0–0.07)
IMMATURE GRANULOCYTES: 0.3 %
IMMATURE GRANULOCYTES: 0.3 %
INR BLD: 1 (ref 0.8–1.1)
INR BLD: 1.05 (ref 0.85–1.13)
LDL CHOLESTEROL CALCULATED: 88 MG/DL
LV EF: 60 %
LVEF MODALITY: NORMAL
LYMPHOCYTE %: 35.9 %
LYMPHOCYTES # BLD: 14.2 %
LYMPHOCYTES # BLD: 14.8 %
LYMPHOCYTES ABSOLUTE: 1.3 THOU/MM3 (ref 1–4.8)
LYMPHOCYTES ABSOLUTE: 1.5 THOU/MM3 (ref 1–4.8)
MCH RBC QN AUTO: 33.8 PG (ref 26–33)
MCH RBC QN AUTO: 34.2 PG (ref 26–33)
MCH RBC QN AUTO: 34.4 PG (ref 26–33)
MCH RBC QN AUTO: 34.6 PG (ref 27–34)
MCHC RBC AUTO-ENTMCNC: 32.4 GM/DL (ref 32.2–35.5)
MCHC RBC AUTO-ENTMCNC: 32.4 GM/DL (ref 32.2–35.5)
MCHC RBC AUTO-ENTMCNC: 33.2 GM/DL (ref 32.2–35.5)
MCHC RBC AUTO-ENTMCNC: 34.1 G/DL (ref 32–36)
MCV RBC AUTO: 101 FL (ref 80–100)
MCV RBC AUTO: 103 FL (ref 80–94)
MCV RBC AUTO: 104.3 FL (ref 80–94)
MCV RBC AUTO: 106.2 FL (ref 80–94)
MONOCYTES # BLD: 7 %
MONOCYTES # BLD: 7.7 %
MONOCYTES # BLD: 8.8 %
MONOCYTES ABSOLUTE: 0.7 THOU/MM3 (ref 0.4–1.3)
MONOCYTES ABSOLUTE: 0.7 THOU/MM3 (ref 0.4–1.3)
NEUTROPHILS RELATIVE PERCENT: 52.4 %
NUCLEATED RED BLOOD CELLS: 0 /100 WBC
NUCLEATED RED BLOOD CELLS: 0 /100 WBC
O2 SATURATION: 91 %
OSMOLALITY CALCULATION: 284.5 MOSMOL/KG (ref 275–300)
PCO2: 33 MMHG (ref 35–45)
PDW BLD-RTO: 14 % (ref 11.5–15)
PH BLOOD GAS: 7.45 (ref 7.35–7.45)
PLATELET # BLD: 154 THOU/MM3 (ref 130–400)
PLATELET # BLD: 180 THOU/MM3 (ref 130–400)
PLATELET # BLD: 196 THOU/MM3 (ref 130–400)
PLATELETS: 200 X10E9/L (ref 150–450)
PMV BLD AUTO: 10 FL (ref 9.4–12.4)
PMV BLD AUTO: 10.5 FL (ref 9.4–12.4)
PMV BLD AUTO: 8.2 FL (ref 7–12)
PMV BLD AUTO: 9.7 FL (ref 9.4–12.4)
PO2: 56 MMHG (ref 71–104)
POC CREATININE WHOLE BLOOD: 1.2 MG/DL (ref 0.5–1.2)
POTASSIUM REFLEX MAGNESIUM: 4.3 MEQ/L (ref 3.5–5.2)
POTASSIUM REFLEX MAGNESIUM: 4.4 MEQ/L (ref 3.5–5.2)
POTASSIUM SERPL-SCNC: 4 MEQ/L (ref 3.5–5.2)
PROTHROMBIN TIME: 11.4 SEC (ref 9.8–13.2)
PTT: 27 SEC (ref 26–37)
RBC # BLD: 4.01 MILL/MM3 (ref 4.7–6.1)
RBC # BLD: 4.27 MILL/MM3 (ref 4.7–6.1)
RBC # BLD: 4.44 MILL/MM3 (ref 4.7–6.1)
RBC: 4.58 X10E12/L (ref 4.1–5.7)
SEG NEUTROPHILS: 74.9 %
SEG NEUTROPHILS: 77.7 %
SEGMENTED NEUTROPHILS ABSOLUTE COUNT: 6.7 THOU/MM3 (ref 1.8–7.7)
SEGMENTED NEUTROPHILS ABSOLUTE COUNT: 8 THOU/MM3 (ref 1.8–7.7)
SODIUM BLD-SCNC: 135 MEQ/L (ref 135–145)
SODIUM BLD-SCNC: 138 MEQ/L (ref 135–145)
SODIUM BLD-SCNC: 140 MEQ/L (ref 135–145)
SOURCE, BLOOD GAS: ABNORMAL
TOTAL PROTEIN: 7 G/DL (ref 6–8)
TOTAL PROTEIN: 7.1 G/DL (ref 6.1–8)
TRIGL SERPL-MCNC: 96 MG/DL (ref 0–199)
TROPONIN T: 0.01 NG/ML
TROPONIN T: 0.02 NG/ML
TROPONIN T: 0.02 NG/ML
WBC # BLD: 10.3 THOU/MM3 (ref 4.8–10.8)
WBC # BLD: 9 THOU/MM3 (ref 4.8–10.8)
WBC # BLD: 9.1 THOU/MM3 (ref 4.8–10.8)
WBC: 10.1 X10E9/L (ref 4–11)

## 2021-01-01 PROCEDURE — 2500000003 HC RX 250 WO HCPCS: Performed by: REGISTERED NURSE

## 2021-01-01 PROCEDURE — 6370000000 HC RX 637 (ALT 250 FOR IP): Performed by: REGISTERED NURSE

## 2021-01-01 PROCEDURE — 6360000002 HC RX W HCPCS: Performed by: REGISTERED NURSE

## 2021-01-01 PROCEDURE — 6370000000 HC RX 637 (ALT 250 FOR IP): Performed by: NURSE PRACTITIONER

## 2021-01-01 PROCEDURE — 82948 REAGENT STRIP/BLOOD GLUCOSE: CPT

## 2021-01-01 PROCEDURE — 74230 X-RAY XM SWLNG FUNCJ C+: CPT

## 2021-01-01 PROCEDURE — 94640 AIRWAY INHALATION TREATMENT: CPT

## 2021-01-01 PROCEDURE — 6360000002 HC RX W HCPCS: Performed by: INTERNAL MEDICINE

## 2021-01-01 PROCEDURE — 85025 COMPLETE CBC W/AUTO DIFF WBC: CPT

## 2021-01-01 PROCEDURE — 94760 N-INVAS EAR/PLS OXIMETRY 1: CPT

## 2021-01-01 PROCEDURE — 1200000003 HC TELEMETRY R&B

## 2021-01-01 PROCEDURE — 2580000003 HC RX 258: Performed by: REGISTERED NURSE

## 2021-01-01 PROCEDURE — 99285 EMERGENCY DEPT VISIT HI MDM: CPT

## 2021-01-01 PROCEDURE — 85027 COMPLETE CBC AUTOMATED: CPT

## 2021-01-01 PROCEDURE — 36600 WITHDRAWAL OF ARTERIAL BLOOD: CPT

## 2021-01-01 PROCEDURE — 85610 PROTHROMBIN TIME: CPT

## 2021-01-01 PROCEDURE — 97530 THERAPEUTIC ACTIVITIES: CPT

## 2021-01-01 PROCEDURE — 2500000003 HC RX 250 WO HCPCS: Performed by: NURSE PRACTITIONER

## 2021-01-01 PROCEDURE — 1200000000 HC SEMI PRIVATE

## 2021-01-01 PROCEDURE — 2060000000 HC ICU INTERMEDIATE R&B

## 2021-01-01 PROCEDURE — 84484 ASSAY OF TROPONIN QUANT: CPT

## 2021-01-01 PROCEDURE — 85730 THROMBOPLASTIN TIME PARTIAL: CPT

## 2021-01-01 PROCEDURE — 93306 TTE W/DOPPLER COMPLETE: CPT

## 2021-01-01 PROCEDURE — C9113 INJ PANTOPRAZOLE SODIUM, VIA: HCPCS | Performed by: REGISTERED NURSE

## 2021-01-01 PROCEDURE — 97166 OT EVAL MOD COMPLEX 45 MIN: CPT

## 2021-01-01 PROCEDURE — 6360000004 HC RX CONTRAST MEDICATION: Performed by: FAMILY MEDICINE

## 2021-01-01 PROCEDURE — 82565 ASSAY OF CREATININE: CPT

## 2021-01-01 PROCEDURE — 74018 RADEX ABDOMEN 1 VIEW: CPT

## 2021-01-01 PROCEDURE — 92523 SPEECH SOUND LANG COMPREHEN: CPT

## 2021-01-01 PROCEDURE — 2500000003 HC RX 250 WO HCPCS: Performed by: INTERNAL MEDICINE

## 2021-01-01 PROCEDURE — 80053 COMPREHEN METABOLIC PANEL: CPT

## 2021-01-01 PROCEDURE — 6370000000 HC RX 637 (ALT 250 FOR IP): Performed by: INTERNAL MEDICINE

## 2021-01-01 PROCEDURE — 6360000002 HC RX W HCPCS: Performed by: NURSE PRACTITIONER

## 2021-01-01 PROCEDURE — 93005 ELECTROCARDIOGRAM TRACING: CPT | Performed by: FAMILY MEDICINE

## 2021-01-01 PROCEDURE — 97110 THERAPEUTIC EXERCISES: CPT

## 2021-01-01 PROCEDURE — 80061 LIPID PANEL: CPT

## 2021-01-01 PROCEDURE — 99223 1ST HOSP IP/OBS HIGH 75: CPT | Performed by: NURSE PRACTITIONER

## 2021-01-01 PROCEDURE — 82803 BLOOD GASES ANY COMBINATION: CPT

## 2021-01-01 PROCEDURE — 97116 GAIT TRAINING THERAPY: CPT

## 2021-01-01 PROCEDURE — 70450 CT HEAD/BRAIN W/O DYE: CPT

## 2021-01-01 PROCEDURE — 70498 CT ANGIOGRAPHY NECK: CPT

## 2021-01-01 PROCEDURE — 71045 X-RAY EXAM CHEST 1 VIEW: CPT

## 2021-01-01 PROCEDURE — 70551 MRI BRAIN STEM W/O DYE: CPT

## 2021-01-01 PROCEDURE — 80048 BASIC METABOLIC PNL TOTAL CA: CPT

## 2021-01-01 PROCEDURE — 36415 COLL VENOUS BLD VENIPUNCTURE: CPT

## 2021-01-01 PROCEDURE — 92611 MOTION FLUOROSCOPY/SWALLOW: CPT

## 2021-01-01 PROCEDURE — 92610 EVALUATE SWALLOWING FUNCTION: CPT

## 2021-01-01 PROCEDURE — 70496 CT ANGIOGRAPHY HEAD: CPT

## 2021-01-01 PROCEDURE — 99232 SBSQ HOSP IP/OBS MODERATE 35: CPT | Performed by: SOCIAL WORKER

## 2021-01-01 PROCEDURE — 97162 PT EVAL MOD COMPLEX 30 MIN: CPT

## 2021-01-01 PROCEDURE — 83036 HEMOGLOBIN GLYCOSYLATED A1C: CPT

## 2021-01-01 RX ORDER — ALBUTEROL SULFATE 2.5 MG/3ML
2.5 SOLUTION RESPIRATORY (INHALATION) EVERY 6 HOURS PRN
Status: DISCONTINUED | OUTPATIENT
Start: 2021-01-01 | End: 2021-01-01 | Stop reason: HOSPADM

## 2021-01-01 RX ORDER — ACETAMINOPHEN 650 MG/1
650 SUPPOSITORY RECTAL EVERY 6 HOURS PRN
Status: DISCONTINUED | OUTPATIENT
Start: 2021-01-01 | End: 2021-01-01 | Stop reason: HOSPADM

## 2021-01-01 RX ORDER — LORAZEPAM 2 MG/ML
1 INJECTION INTRAMUSCULAR
Status: DISCONTINUED | OUTPATIENT
Start: 2021-01-01 | End: 2021-01-01 | Stop reason: HOSPADM

## 2021-01-01 RX ORDER — LORAZEPAM 2 MG/ML
2 INJECTION INTRAMUSCULAR ONCE
Status: COMPLETED | OUTPATIENT
Start: 2021-01-01 | End: 2021-01-01

## 2021-01-01 RX ORDER — BUDESONIDE AND FORMOTEROL FUMARATE DIHYDRATE 160; 4.5 UG/1; UG/1
2 AEROSOL RESPIRATORY (INHALATION) 2 TIMES DAILY
Status: DISCONTINUED | OUTPATIENT
Start: 2021-01-01 | End: 2021-01-01

## 2021-01-01 RX ORDER — ISOSORBIDE MONONITRATE 30 MG/1
30 TABLET, EXTENDED RELEASE ORAL DAILY
Status: DISCONTINUED | OUTPATIENT
Start: 2021-01-01 | End: 2021-01-01 | Stop reason: HOSPADM

## 2021-01-01 RX ORDER — ACETAMINOPHEN, ASPIRIN AND CAFFEINE 250; 250; 65 MG/1; MG/1; MG/1
1 TABLET, FILM COATED ORAL EVERY 6 HOURS PRN
Status: DISCONTINUED | OUTPATIENT
Start: 2021-01-01 | End: 2021-01-01 | Stop reason: HOSPADM

## 2021-01-01 RX ORDER — SODIUM CHLORIDE 0.9 % (FLUSH) 0.9 %
5-40 SYRINGE (ML) INJECTION EVERY 12 HOURS SCHEDULED
Status: DISCONTINUED | OUTPATIENT
Start: 2021-01-01 | End: 2021-01-01 | Stop reason: HOSPADM

## 2021-01-01 RX ORDER — ACETAMINOPHEN 325 MG/1
650 TABLET ORAL EVERY 6 HOURS PRN
Status: DISCONTINUED | OUTPATIENT
Start: 2021-01-01 | End: 2021-01-01 | Stop reason: HOSPADM

## 2021-01-01 RX ORDER — PROPOFOL 10 MG/ML
5-50 INJECTION, EMULSION INTRAVENOUS
Status: DISCONTINUED | OUTPATIENT
Start: 2021-01-01 | End: 2021-01-01

## 2021-01-01 RX ORDER — PANTOPRAZOLE SODIUM 40 MG/1
40 TABLET, DELAYED RELEASE ORAL
Status: DISCONTINUED | OUTPATIENT
Start: 2021-01-01 | End: 2021-01-01

## 2021-01-01 RX ORDER — SODIUM CHLORIDE 9 MG/ML
INJECTION, SOLUTION INTRAVENOUS CONTINUOUS
Status: DISCONTINUED | OUTPATIENT
Start: 2021-01-01 | End: 2021-01-01

## 2021-01-01 RX ORDER — PANTOPRAZOLE SODIUM 40 MG/10ML
40 INJECTION, POWDER, LYOPHILIZED, FOR SOLUTION INTRAVENOUS DAILY
Status: DISCONTINUED | OUTPATIENT
Start: 2021-01-01 | End: 2021-01-01 | Stop reason: HOSPADM

## 2021-01-01 RX ORDER — ISOSORBIDE MONONITRATE 30 MG/1
30 TABLET, EXTENDED RELEASE ORAL DAILY
Qty: 90 TABLET | Refills: 0 | Status: SHIPPED | OUTPATIENT
Start: 2021-01-01

## 2021-01-01 RX ORDER — SODIUM CHLORIDE 0.9 % (FLUSH) 0.9 %
5-40 SYRINGE (ML) INJECTION PRN
Status: DISCONTINUED | OUTPATIENT
Start: 2021-01-01 | End: 2021-01-01 | Stop reason: HOSPADM

## 2021-01-01 RX ORDER — AMLODIPINE BESYLATE 5 MG/1
5 TABLET ORAL DAILY
Status: DISCONTINUED | OUTPATIENT
Start: 2021-01-01 | End: 2021-01-01 | Stop reason: HOSPADM

## 2021-01-01 RX ORDER — ONDANSETRON 2 MG/ML
4 INJECTION INTRAMUSCULAR; INTRAVENOUS EVERY 6 HOURS PRN
Status: DISCONTINUED | OUTPATIENT
Start: 2021-01-01 | End: 2021-01-01 | Stop reason: HOSPADM

## 2021-01-01 RX ORDER — CLOPIDOGREL BISULFATE 75 MG/1
75 TABLET ORAL DAILY
Status: DISCONTINUED | OUTPATIENT
Start: 2021-01-01 | End: 2021-01-01 | Stop reason: HOSPADM

## 2021-01-01 RX ORDER — RANOLAZINE 500 MG/1
1000 TABLET, EXTENDED RELEASE ORAL 2 TIMES DAILY
Status: DISCONTINUED | OUTPATIENT
Start: 2021-01-01 | End: 2021-01-01 | Stop reason: HOSPADM

## 2021-01-01 RX ORDER — PROMETHAZINE HYDROCHLORIDE 25 MG/1
12.5 TABLET ORAL EVERY 6 HOURS PRN
Status: DISCONTINUED | OUTPATIENT
Start: 2021-01-01 | End: 2021-01-01 | Stop reason: HOSPADM

## 2021-01-01 RX ORDER — KETAMINE HCL IN NACL, ISO-OSM 100MG/10ML
100 SYRINGE (ML) INJECTION ONCE
Status: DISCONTINUED | OUTPATIENT
Start: 2021-01-01 | End: 2021-01-01 | Stop reason: HOSPADM

## 2021-01-01 RX ORDER — GLYCOPYRROLATE 1 MG/5 ML
0.2 SYRINGE (ML) INTRAVENOUS EVERY 4 HOURS PRN
Status: CANCELLED | OUTPATIENT
Start: 2021-01-01

## 2021-01-01 RX ORDER — ACETAMINOPHEN 325 MG/1
650 TABLET ORAL EVERY 6 HOURS PRN
Status: CANCELLED | OUTPATIENT
Start: 2021-01-01

## 2021-01-01 RX ORDER — GLYCOPYRROLATE 1 MG/1
2 TABLET ORAL 3 TIMES DAILY
Status: DISCONTINUED | OUTPATIENT
Start: 2021-01-01 | End: 2021-01-01 | Stop reason: HOSPADM

## 2021-01-01 RX ORDER — GLYCOPYRROLATE 1 MG/5 ML
0.2 SYRINGE (ML) INTRAVENOUS EVERY 4 HOURS PRN
Status: DISCONTINUED | OUTPATIENT
Start: 2021-01-01 | End: 2021-01-01 | Stop reason: HOSPADM

## 2021-01-01 RX ORDER — GLYCOPYRROLATE 0.2 MG/ML
0.2 INJECTION INTRAMUSCULAR; INTRAVENOUS EVERY 4 HOURS
Status: DISCONTINUED | OUTPATIENT
Start: 2021-01-01 | End: 2021-01-01

## 2021-01-01 RX ORDER — ACETAMINOPHEN 650 MG/1
650 SUPPOSITORY RECTAL EVERY 6 HOURS PRN
Status: CANCELLED | OUTPATIENT
Start: 2021-01-01

## 2021-01-01 RX ORDER — SODIUM CHLORIDE 9 MG/ML
25 INJECTION, SOLUTION INTRAVENOUS PRN
Status: DISCONTINUED | OUTPATIENT
Start: 2021-01-01 | End: 2021-01-01 | Stop reason: HOSPADM

## 2021-01-01 RX ORDER — GLYCOPYRROLATE 1 MG/1
2 TABLET ORAL 3 TIMES DAILY
Status: CANCELLED | OUTPATIENT
Start: 2021-01-01

## 2021-01-01 RX ORDER — PRASUGREL 10 MG/1
10 TABLET, FILM COATED ORAL DAILY
Status: DISCONTINUED | OUTPATIENT
Start: 2021-01-01 | End: 2021-01-01

## 2021-01-01 RX ORDER — PYRIDOSTIGMINE BROMIDE 60 MG/1
60 TABLET ORAL EVERY 6 HOURS SCHEDULED
Status: DISCONTINUED | OUTPATIENT
Start: 2021-01-01 | End: 2021-01-01 | Stop reason: HOSPADM

## 2021-01-01 RX ORDER — ASPIRIN 300 MG/1
300 SUPPOSITORY RECTAL DAILY
Status: DISCONTINUED | OUTPATIENT
Start: 2021-01-01 | End: 2021-01-01

## 2021-01-01 RX ORDER — NICOTINE 21 MG/24HR
1 PATCH, TRANSDERMAL 24 HOURS TRANSDERMAL DAILY
Status: DISCONTINUED | OUTPATIENT
Start: 2021-01-01 | End: 2021-01-01 | Stop reason: HOSPADM

## 2021-01-01 RX ORDER — ALBUTEROL SULFATE 2.5 MG/3ML
2.5 SOLUTION RESPIRATORY (INHALATION) EVERY 6 HOURS PRN
Qty: 120 VIAL | Refills: 11 | Status: SHIPPED | OUTPATIENT
Start: 2021-01-01 | End: 2022-07-07

## 2021-01-01 RX ORDER — LORAZEPAM 2 MG/ML
1 INJECTION INTRAMUSCULAR
Status: CANCELLED | OUTPATIENT
Start: 2021-01-01

## 2021-01-01 RX ORDER — ATORVASTATIN CALCIUM 40 MG/1
40 TABLET, FILM COATED ORAL DAILY
Status: DISCONTINUED | OUTPATIENT
Start: 2021-01-01 | End: 2021-01-01 | Stop reason: HOSPADM

## 2021-01-01 RX ORDER — ASPIRIN 81 MG/1
324 TABLET, CHEWABLE ORAL DAILY
Status: DISCONTINUED | OUTPATIENT
Start: 2021-01-01 | End: 2021-01-01 | Stop reason: HOSPADM

## 2021-01-01 RX ORDER — FENTANYL CITRATE 50 UG/ML
50 INJECTION, SOLUTION INTRAMUSCULAR; INTRAVENOUS ONCE
Status: COMPLETED | OUTPATIENT
Start: 2021-01-01 | End: 2021-01-01

## 2021-01-01 RX ADMIN — ONDANSETRON 4 MG: 2 INJECTION INTRAMUSCULAR; INTRAVENOUS at 06:20

## 2021-01-01 RX ADMIN — Medication 75 MCG/HR: at 08:47

## 2021-01-01 RX ADMIN — ACETAMINOPHEN 650 MG: 325 TABLET ORAL at 04:45

## 2021-01-01 RX ADMIN — SODIUM CHLORIDE: 9 INJECTION, SOLUTION INTRAVENOUS at 11:26

## 2021-01-01 RX ADMIN — SODIUM CHLORIDE, PRESERVATIVE FREE 10 ML: 5 INJECTION INTRAVENOUS at 20:02

## 2021-01-01 RX ADMIN — BARIUM SULFATE 100 ML: 0.81 POWDER, FOR SUSPENSION ORAL at 09:05

## 2021-01-01 RX ADMIN — CLOPIDOGREL BISULFATE 75 MG: 75 TABLET ORAL at 09:05

## 2021-01-01 RX ADMIN — ENOXAPARIN SODIUM 40 MG: 40 INJECTION SUBCUTANEOUS at 20:41

## 2021-01-01 RX ADMIN — ACETAMINOPHEN 650 MG: 650 SUPPOSITORY RECTAL at 00:23

## 2021-01-01 RX ADMIN — LORAZEPAM 1 MG: 2 INJECTION INTRAMUSCULAR; INTRAVENOUS at 00:28

## 2021-01-01 RX ADMIN — TIOTROPIUM BROMIDE INHALATION SPRAY 2 PUFF: 3.12 SPRAY, METERED RESPIRATORY (INHALATION) at 09:59

## 2021-01-01 RX ADMIN — Medication 0.2 MG: at 08:36

## 2021-01-01 RX ADMIN — Medication 10 MCG/HR: at 17:04

## 2021-01-01 RX ADMIN — ATORVASTATIN CALCIUM 40 MG: 40 TABLET, FILM COATED ORAL at 20:39

## 2021-01-01 RX ADMIN — FENTANYL CITRATE 50 MCG: 50 INJECTION INTRAMUSCULAR; INTRAVENOUS at 16:49

## 2021-01-01 RX ADMIN — BUDESONIDE AND FORMOTEROL FUMARATE DIHYDRATE 2 PUFF: 160; 4.5 AEROSOL RESPIRATORY (INHALATION) at 18:29

## 2021-01-01 RX ADMIN — Medication 75 MCG/HR: at 22:32

## 2021-01-01 RX ADMIN — PANTOPRAZOLE SODIUM 40 MG: 40 INJECTION, POWDER, FOR SOLUTION INTRAVENOUS at 20:38

## 2021-01-01 RX ADMIN — ONDANSETRON 4 MG: 2 INJECTION INTRAMUSCULAR; INTRAVENOUS at 09:15

## 2021-01-01 RX ADMIN — Medication 20 MCG/HR: at 00:15

## 2021-01-01 RX ADMIN — Medication 30 MCG/HR: at 04:19

## 2021-01-01 RX ADMIN — SODIUM CHLORIDE, PRESERVATIVE FREE 10 ML: 5 INJECTION INTRAVENOUS at 20:44

## 2021-01-01 RX ADMIN — Medication 0.2 MG: at 18:11

## 2021-01-01 RX ADMIN — IOPAMIDOL 80 ML: 755 INJECTION, SOLUTION INTRAVENOUS at 10:53

## 2021-01-01 RX ADMIN — AMLODIPINE BESYLATE 5 MG: 5 TABLET ORAL at 20:38

## 2021-01-01 RX ADMIN — SODIUM CHLORIDE, PRESERVATIVE FREE 10 ML: 5 INJECTION INTRAVENOUS at 09:06

## 2021-01-01 RX ADMIN — LORAZEPAM 1 MG: 2 INJECTION INTRAMUSCULAR; INTRAVENOUS at 09:48

## 2021-01-01 RX ADMIN — LORAZEPAM 2 MG: 2 INJECTION INTRAMUSCULAR; INTRAVENOUS at 16:50

## 2021-01-01 RX ADMIN — Medication 75 MCG/HR: at 15:37

## 2021-01-01 RX ADMIN — LORAZEPAM 1 MG: 2 INJECTION INTRAMUSCULAR; INTRAVENOUS at 23:43

## 2021-01-01 RX ADMIN — BARIUM SULFATE 20 ML: 400 PASTE ORAL at 09:04

## 2021-01-01 RX ADMIN — ACETAMINOPHEN 650 MG: 325 TABLET ORAL at 09:14

## 2021-01-01 RX ADMIN — ASPIRIN 300 MG: 300 SUPPOSITORY RECTAL at 17:54

## 2021-01-01 RX ADMIN — BARIUM SULFATE 140 ML: 980 POWDER, FOR SUSPENSION ORAL at 09:04

## 2021-01-01 RX ADMIN — ASPIRIN 81 MG CHEWABLE TABLET 324 MG: 81 TABLET CHEWABLE at 11:17

## 2021-01-01 RX ADMIN — TIOTROPIUM BROMIDE INHALATION SPRAY 2 PUFF: 3.12 SPRAY, METERED RESPIRATORY (INHALATION) at 07:57

## 2021-01-01 RX ADMIN — BUDESONIDE AND FORMOTEROL FUMARATE DIHYDRATE 2 PUFF: 160; 4.5 AEROSOL RESPIRATORY (INHALATION) at 07:51

## 2021-01-01 RX ADMIN — AMLODIPINE BESYLATE 5 MG: 5 TABLET ORAL at 09:05

## 2021-01-01 RX ADMIN — Medication 0.2 MG: at 22:39

## 2021-01-01 RX ADMIN — BUDESONIDE AND FORMOTEROL FUMARATE DIHYDRATE 2 PUFF: 160; 4.5 AEROSOL RESPIRATORY (INHALATION) at 09:59

## 2021-01-01 RX ADMIN — BARIUM SULFATE 10 ML: 400 SUSPENSION ORAL at 09:04

## 2021-01-01 RX ADMIN — Medication 75 MCG/HR: at 05:12

## 2021-01-01 RX ADMIN — Medication 0.2 MG: at 13:47

## 2021-01-01 RX ADMIN — PANTOPRAZOLE SODIUM 40 MG: 40 INJECTION, POWDER, FOR SOLUTION INTRAVENOUS at 09:06

## 2021-01-01 RX ADMIN — SODIUM CHLORIDE, PRESERVATIVE FREE 10 ML: 5 INJECTION INTRAVENOUS at 20:58

## 2021-01-01 RX ADMIN — LORAZEPAM 1 MG: 2 INJECTION INTRAMUSCULAR; INTRAVENOUS at 12:24

## 2021-01-01 RX ADMIN — Medication 40 MCG/HR: at 12:33

## 2021-01-01 RX ADMIN — BUDESONIDE AND FORMOTEROL FUMARATE DIHYDRATE 2 PUFF: 160; 4.5 AEROSOL RESPIRATORY (INHALATION) at 16:41

## 2021-01-01 RX ADMIN — CLOPIDOGREL BISULFATE 75 MG: 75 TABLET ORAL at 11:20

## 2021-01-01 RX ADMIN — Medication 50 MCG/HR: at 23:34

## 2021-01-01 RX ADMIN — SODIUM CHLORIDE: 9 INJECTION, SOLUTION INTRAVENOUS at 03:19

## 2021-01-01 RX ADMIN — ACETAMINOPHEN 650 MG: 325 TABLET ORAL at 17:48

## 2021-01-01 RX ADMIN — RANOLAZINE 1000 MG: 500 TABLET, EXTENDED RELEASE ORAL at 09:05

## 2021-01-01 RX ADMIN — LORAZEPAM 1 MG: 2 INJECTION INTRAMUSCULAR; INTRAVENOUS at 15:11

## 2021-01-01 RX ADMIN — ENOXAPARIN SODIUM 40 MG: 40 INJECTION SUBCUTANEOUS at 20:02

## 2021-01-01 RX ADMIN — SODIUM CHLORIDE: 9 INJECTION, SOLUTION INTRAVENOUS at 20:58

## 2021-01-01 RX ADMIN — Medication 0.2 MG: at 03:00

## 2021-01-01 RX ADMIN — ASPIRIN 300 MG: 300 SUPPOSITORY RECTAL at 10:55

## 2021-01-01 RX ADMIN — LORAZEPAM 1 MG: 2 INJECTION INTRAMUSCULAR; INTRAVENOUS at 17:00

## 2021-01-01 RX ADMIN — SODIUM CHLORIDE: 9 INJECTION, SOLUTION INTRAVENOUS at 15:51

## 2021-01-01 RX ADMIN — ASPIRIN 81 MG CHEWABLE TABLET 324 MG: 81 TABLET CHEWABLE at 09:05

## 2021-01-01 RX ADMIN — ATORVASTATIN CALCIUM 40 MG: 40 TABLET, FILM COATED ORAL at 20:58

## 2021-01-01 RX ADMIN — ENOXAPARIN SODIUM 40 MG: 40 INJECTION SUBCUTANEOUS at 20:58

## 2021-01-01 RX ADMIN — LORAZEPAM 1 MG: 2 INJECTION INTRAMUSCULAR; INTRAVENOUS at 13:47

## 2021-01-01 RX ADMIN — Medication 0.2 MG: at 08:40

## 2021-01-01 ASSESSMENT — PAIN - FUNCTIONAL ASSESSMENT
PAIN_FUNCTIONAL_ASSESSMENT: PREVENTS OR INTERFERES SOME ACTIVE ACTIVITIES AND ADLS
PAIN_FUNCTIONAL_ASSESSMENT: ACTIVITIES ARE NOT PREVENTED

## 2021-01-01 ASSESSMENT — PAIN DESCRIPTION - LOCATION
LOCATION: ELBOW
LOCATION: HEAD
LOCATION: HEAD
LOCATION: ARM

## 2021-01-01 ASSESSMENT — PAIN SCALES - GENERAL
PAINLEVEL_OUTOF10: 3
PAINLEVEL_OUTOF10: 5
PAINLEVEL_OUTOF10: 0
PAINLEVEL_OUTOF10: 7
PAINLEVEL_OUTOF10: 0
PAINLEVEL_OUTOF10: 8
PAINLEVEL_OUTOF10: 0
PAINLEVEL_OUTOF10: 0
PAINLEVEL_OUTOF10: 7
PAINLEVEL_OUTOF10: 0
PAINLEVEL_OUTOF10: 10
PAINLEVEL_OUTOF10: 3
PAINLEVEL_OUTOF10: 10
PAINLEVEL_OUTOF10: 0

## 2021-01-01 ASSESSMENT — PAIN DESCRIPTION - ONSET
ONSET: ON-GOING
ONSET: GRADUAL
ONSET: ON-GOING

## 2021-01-01 ASSESSMENT — ENCOUNTER SYMPTOMS
DIARRHEA: 0
RHINORRHEA: 0
NAUSEA: 0
CONSTIPATION: 0
CHOKING: 1
TROUBLE SWALLOWING: 1
PHOTOPHOBIA: 0
SHORTNESS OF BREATH: 0
VOMITING: 0
SORE THROAT: 0
CHEST TIGHTNESS: 0
VOMITING: 0
ABDOMINAL PAIN: 0

## 2021-01-01 ASSESSMENT — PAIN DESCRIPTION - DESCRIPTORS
DESCRIPTORS: ACHING;HEADACHE
DESCRIPTORS: ACHING
DESCRIPTORS: HEADACHE;ACHING

## 2021-01-01 ASSESSMENT — PAIN DESCRIPTION - PAIN TYPE
TYPE: ACUTE PAIN

## 2021-01-01 ASSESSMENT — PAIN DESCRIPTION - ORIENTATION
ORIENTATION: LEFT
ORIENTATION: ANTERIOR
ORIENTATION: LEFT
ORIENTATION: ANTERIOR;POSTERIOR
ORIENTATION: LEFT

## 2021-01-01 ASSESSMENT — PAIN DESCRIPTION - FREQUENCY
FREQUENCY: INTERMITTENT
FREQUENCY: CONTINUOUS
FREQUENCY: INTERMITTENT
FREQUENCY: CONTINUOUS
FREQUENCY: INTERMITTENT
FREQUENCY: INTERMITTENT

## 2021-01-01 ASSESSMENT — PAIN DESCRIPTION - PROGRESSION
CLINICAL_PROGRESSION: NOT CHANGED

## 2021-01-01 ASSESSMENT — 9 HOLE PEG TEST
TESTTIME_SECONDS: 44
TEST_RESULT: IMPAIRED
TEST_RESULT: FUNCTIONAL
TESTTIME_SECONDS: 185

## 2021-10-25 PROBLEM — I63.9 ACUTE CEREBROVASCULAR ACCIDENT (CVA) (HCC): Status: ACTIVE | Noted: 2021-01-01

## 2021-10-25 NOTE — PROGRESS NOTES
[address] 59  INPATIENT OCCUPATIONAL THERAPY  [facility] NEUROSCIENCES 4A  EVALUATION    Time:    Time In: 6524  Time Out: 1513  Timed Code Treatment Minutes: 13 Minutes  Minutes: 28          Date: 10/25/2021  Patient Name: Apolinar Carnes,   Gender: male      MRN: [id number]  : 1945  (67 y.o.)  Referring Practitioner: Reina HERRERA CNP  Diagnosis: acute CVA  Additional Pertinent Hx: Per ER note on 10/25/2021: 67 y.o. male with pmhx of COPD. Htn, CKD, tobacco use who presents with acute onset left side weakness and facial droop, LKW 9am last night. Pt reports waking up from sleeping on the cough this AM and his left leg gave way; he was unable to stand up and fell without hitting his head or losing consciousness. Initial NIHSS was 2. MRI of brain results: Acute small to moderate-sized infarct in the right corona radiata. Restrictions/Precautions:  Restrictions/Precautions: Fall Risk    Subjective  Chart Reviewed: Yes, Orders, Progress Notes, History and Physical  Patient assessed for rehabilitation services?: Yes  Family / Caregiver Present: No    Subjective: cooperative    Pain:  Pain Assessment  Patient Currently in Pain: Yes (LLE with muscle spasms./cramps)-nurse notified  Pain Level: 8    Vitals: Vitals not assessed per clinical judgement, see nursing flowsheet    Social/Functional History:  Lives With: Alone  Type of Home: Apartment  Home Layout: One level  Home Access: Level entry  Home Equipment: Standard walker   Bathroom Shower/Tub: Tub/Shower unit  Bathroom Toilet: Standard  Bathroom Equipment: Grab bars in shower       ADL Assistance: [address] [place] [address] Avenue: Independent  Ambulation Assistance: Independent  Transfer Assistance: Independent    Active : Yes     Additional Comments: Pt reports fully indep PLOF without AD.     VISION:has glasses he wears when driving; noted R visual field preference though able to cue Pt to turn head to L side-Pt reports muscle tightness Decreased posture  Prognosis: Fair  REQUIRES OT FOLLOW UP: Yes  Decision Making: Medium Complexity  Safety Devices in place: Yes  Type of devices: All fall risk precautions in place, Call light within reach, Gait belt, Chair alarm in place, Nurse notified    Treatment Initiated: Treatment and education initiated within context of evaluation. Evaluation time included review of current medical information, gathering information related to past medical, social and functional history, completion of standardized testing, formal and informal observation of tasks, assessment of data and development of plan of care and goals. Treatment time included skilled education and facilitation of tasks to increase safety and independence with ADL's for improved functional independence and quality of life. Discharge Recommendations:  IP Rehab, Continue to assess pending progress    Patient Education:  OT Education: OT Role, Plan of Care, ADL Adaptive Strategies, Transfer Training, Precautions  Barriers to Learning: decreased insight & problem solving    Equipment Recommendations:  Equipment Needed: Yes  Other: Monitor pending progress    Plan:  Times per week: 6x  Current Treatment Recommendations: Balance Training, Self-Care / ADL, Safety Education & Training, ROM, Strengthening, Neuromuscular Re-education, Functional Mobility Training, Endurance Training, Cognitive Reorientation, Equipment Evaluation, Education, & procurement, Patient/Caregiver Education & Training. See long-term goal time frame for expected duration of plan of care. If no long-term goals established, a short length of stay is anticipated.     Goals:  Patient goals : go home  Short term goals  Time Frame for Short term goals: until discharge  Short term goal 1: Pt will tolerate 8-10 min EOB ADL task with 0>CGA for sitting balance  Short term goal 2: Pt will tolerate stand-pivot t/fs to various surfaces including BSC with min A x 1  Short term goal 3: Pt will tolerate standing 1 min with min A for increased ease of toileting routine  Short term goal 4: Pt will tolerate LUE AROM/AAROM/WB for increased function for returning to grooming & dressing tasks  Short term goal 5: Pt will tolerate further assessment of functional ambulation by OTR when appropriate  Long term goals  Time Frame for Long term goals : No LTG set d/t short ELOS         Following session, patient left in safe position with all fall risk precautions in place.

## 2021-10-25 NOTE — ED NOTES
Bed: 024A  Expected date:   Expected time:   Means of arrival: [facility]  Comments:     Jordan Spine  10/25/21 1027

## 2021-10-25 NOTE — ED NOTES
Pt transported to [facility] by cart. Floor notified.       Lawanda Brown  10/25/21 1320

## 2021-10-25 NOTE — PROGRESS NOTES
History of tobacco abuse     Hyperlipidemia     Hypertension     Irregular heart beat     Lightheadedness     MI (myocardial infarction) (HCC)     Night sweats     Noncompliance with medication regimen     Noncompliance with medical therapy.  Pneumonia     Renal failure syndrome     Ringing in the ears     SOB (shortness of breath) on exertion     Weight loss        SUBJECTIVE:  RN Mayte Hays with approval to proceed with evaluation. Patient resting in recliner chair upon arrival, awake and alert. Prior to admission, patient denied issues r/t swallow function with establishment of a regular diet+thin liquids in home setting (baseline diet). Patient did endorse episodes of coughing, \"every time I try to drink something\" with nursing confirming during nursing swallow screen. OBJECTIVE:    Pain:  No pain reported. Current Diet: NPO    Respiratory Status:  Independent    Behavioral Observation:  Alert, Oriented and Dysarthric    Oral Mechanism Evaluation:      Facial / Labial Impaired Mild-moderate L sided facial droop; reduced ROM   Lingual Impaired Decreased ROM, coordination, strength   Dentition WFL Full dentures   Velum Impaired    Vocal Quality WFL    Sensation [facility]    Cough WFL      Patient Evaluated Using: Thin H20 via cup, puree     Oral Phase:  Impaired:  Impaired AP Movement    Pharyngeal Phase: Impaired:  Delayed Swallow and Audible Swallow   *not able to fully validate presence of pharyngeal phase deficits without formal instrumentation/supportive imaging     Signs and Symptoms of Laryngeal Penetration/Aspiration: Immediate Cough and Wet Vocal Quality    Impresssions: Patient presents with mild-moderate oral dysphagia with inability to fully discern potential presence of pharyngeal phase deficits without formal instrumentation; however, concerns for some degree of pharyngeal dysfunction s/t clinical findings at date as well as relevancy of acute intracranial findings.  Oral phase uncoordinated with increased time required to manipulate bolus to achieve AP transit. Thin H20 via cup with concerns for reduced control/containment of viscous bolus leading to premature spillage/pharyngeal entry resulting in delayed onset of pharyngeal trigger (audible swallow produced). Immediate, significant coughing events on both conservative fluid+puree trials completed with wet vocal quality to follow, indicative for airway invasion events. Certainly not able to r/o totality of laryngeal penetration and/or tracheal aspiration occurrences as well as degree of pharyngeal deficits without supportive imaging. Recommendations for STRICT NPO diet level, medications via IV source as able. Will plan for f/u formal instrumentation via MBS on subsequent date. *post evaluation, patient without respiratory distress upon leaving room; RN Mikey Payment notified re: clinical findings and recommendations from the assessment; verbal receptiveness noted     RECOMMENDATIONS/ASSESSMENT:  Instrumental Evaluation: Modified Barium Swallow (MBS)  Diet Recommendations:  STRICT NPO  Strategies:  Recommend NPO and Strategies pending MBS completion   Rehabilitation Potential: good    EDUCATION:  Learner: Patient  Education:  Reviewed results and recommendations of this evaluation, Reviewed diet and strategies, Reviewed signs, symptoms and risks of aspiration, Reviewed ST goals and Plan of Care and Reviewed recommendations for follow-up  Evaluation of Education: Demonstrates with assistance, Needs further instruction and Family not present    PLAN:  Speech Therapy evaluation to assess speech, language, cognition and/or voice   Recommendations pending MBS    PATIENT GOAL:    Return to prior level of function. SHORT TERM GOALS:  Short-term Goals  Timeframe for Short-term Goals: 1-3 days  Goal 1: Complete formal instrumentation via MBS to further evaluate pharyngeal integrity to r/o dysfunction and to assist with dysphagia management POC development.   Goal 2: Staff will exhibit return demonstration for completion of comprehensive oral care procedural analysis to maximize oral integrity and to reduce potential bacteria colonization. Goal 3: Complete full speech/language/cognitive evaluation to further develop goals per POC s/t acute intracranial findings.     LONG TERM GOALS:  No LTG established given short ELOS      Jordi Murphy M.A., [address]

## 2021-10-25 NOTE — ED NOTES
Presents with dizziness and left sided facial droop. LKW yesterday at 2100. Patient taken straight to ct. Alert and oriented. Respirations easy and unlabored.       Dudley Mckenzie RN  10/25/21 1037

## 2021-10-25 NOTE — PROGRESS NOTES
Patient/family has been educated on their personal risk factors of:  Hypertension  smoking cessation  Carotid Artery stenosis    They have been given hand outs on the following medications:( give handouts/attach to AVS)   asa  Plavix  statins- atorvastatin  antihypertensive metoprolol  Treatment for stroke includes:  Risk factor modifications  Following the medication regime prescribed by physician      Educated on FAST-Face-Arm-Speech-Time    A stroke is a brain attack. Stroke is a brain injury. It occurs when the brain's blood supply is interrupted. Blood carries oxygen and nutrients to the brain. Without oxygen and nutrients from blood, brain tissue starts to die rapidly. This can happen in less than 10 minutes. A stroke occurs when blood flow to the brain is blocked (called ischemic stroke). This is caused by one of the following:   Sudden decreased blood flow   Damage to a blood vessel supplying blood to the brain can occur suddenly from either:   Injury   A clot that forms and breaks off from another part of the body (such as the heart or neck)   There are certain conditions which predispose people to form blood clots, such as:   Cancer   Pregnancy   Atrial fibrillation   Certain autoimmune diseases   Local blood clot   A build-up of fatty substances ( atherosclerotic plaque ) along the inner lining of the artery causes:   Narrowing of artery   Reduced elasticity   Local inflammation   Blood protein defects leading to increased clotting tendency   Decreased blood flow in the artery   Clot in an artery supplying the brain   Inflammatory conditions in the blood vessels (vasculitis)   A stroke may also occur if a blood vessel breaks and bleeds into or around the brain. This is called hemorrhagic stroke. This condition needs to be monitored closely. Be sure to keep all appointments. Have exams and blood tests done as directed.      Call 911 If Any of the Following Occurs   It is important that you and those

## 2021-10-25 NOTE — ED PROVIDER NOTES
EMERGENCY DEPARTMENT ENCOUNTER        CHIEF COMPLAINT    Chief Complaint   Patient presents with    Facial Droop       HPI    Isabella Canales is a 67 y.o. male with a hx of COPD, CHF, ACS who presents with acute left sided weakness since the onset this morning upon waking up. He apparently felt fine prior to bedtime last night. He was weak on left side with the facial droop per EMS, brought to the ED for immediate evaluation. The duration has been constant since the onset. The quality is stroke like symptoms. Associated with weakness. He denies chest pain or sob. No aggravating or alleviating factors. REVIEW OF SYSTEMS    Neuro: +left sided deficit, facial droop  Cardiac: No chest pain or palpitations  Respiratory: No shortness of breath or new cough  General: No fevers  : No dysuria or hematuria  GI: No vomiting or diarrhea  See HPI for further details. All other systems reviewed and are negative. PAST MEDICAL OR SURGICAL HISTORY    Past Medical History:   Diagnosis Date    Acute coronary syndrome (HCC)     Arrhythmia     Arteriosclerotic heart disease (ASHD)     CHF (congestive heart failure) (HCC)     Chills     COPD (chronic obstructive pulmonary disease) (HCC)     Dizziness - light-headed     HX OF:    Dysphagia     Emphysema     Esophagitis     Fatigue     HX OF:    Fever     GERD (gastroesophageal reflux disease)     History of chest pain     History of tinnitus     History of tobacco abuse     Hyperlipidemia     Hypertension     Irregular heart beat     Lightheadedness     [facility])     Night sweats     Noncompliance with medication regimen     Noncompliance with medical therapy.     Pneumonia     Renal failure syndrome     Ringing in the ears     SOB (shortness of breath) on exertion     Weight loss      Past Surgical History:   Procedure Laterality Date    CARDIAC SURGERY      2 stents    COLONOSCOPY      CORONARY ANGIOPLASTY WITH STENT PLACEMENT      pt has 3 stents    DIAGNOSTIC CARDIAC CATH LAB PROCEDURE  3 09 2009    Successful primary stenting of mid LAD using drug-eluting stent.  DIAGNOSTIC CARDIAC CATH LAB PROCEDURE  1 10 2011    LV demonstrated normal systolic function, EF 48%. On pullback, no gradient was noted. No critical lesions noted in all large epicardial vessels so that RCA is dominant vessel w/just very mild diffuse disease, about 20-30% lesions. Left main widely patent w/mild disease distally. Circumflex widely patent w/diffuse disease, also about 20-30%.  ENDOSCOPY, COLON, DIAGNOSTIC      HERNIA REPAIR  2008,2010    NV COLON CA SCRN NOT HI RSK IND Left 11/25/2017    COLONOSCOPY performed by Jarad Ellison MD at [address] [facility]    NV EGD TRANSORAL BIOPSY SINGLE/MULTIPLE Left 11/25/2017    EGD BIOPSY performed by Jarad Ellison MD at [address] [facility] ECHOCARDIOGRAM  1 10 2011    Size was normal. Systolic function was normal. EF estimated in range of 55-65%. No regional wall motion abnormalities. Wall thickness was normal. Doppler - LV diastolic function parameters were normal. This is technically limited study which may impair interpretation.  TRANSTHORACIC ECHOCARDIOGRAM  3 06 2009    This study is technically limited. Distal anterior apical wall hypokinesis. EF 40-45%. Left atrial size w/in normal limits. Trace MR. No evidence of aortic stenosis or aortic insufficiency. Right atrium and right ventricle are of normal contractility. Trace TR. No pericardial effusion.  UPPER GASTROINTESTINAL ENDOSCOPY Left 11/21/2017    EGD DIAGNOSTIC ONLY performed by Stevie Nieves MD at [address] [place] [address]    Allergies   Allergen Reactions    Xanax [Alprazolam] Other (See Comments)     Altered mental status    Codeine Hives, Nausea And Vomiting and Other (See Comments)     Skin becomes erythematous and sweaty.     Dilaudid [Hydromorphone] Hives, Nausea And Vomiting and Other (See Comments)     Skin becomes erythematous and sweaty.  Morphine Nausea And Vomiting and Rash     Skin becomes erythematous and sweaty.  Pneumococcal Vaccines Nausea And Vomiting     Reports vomiting x 1 day after vaccine       FAMILY OR SOCIAL HISTORY    Family History   Problem Relation Age of Onset    Heart Disease Mother     Cancer Mother     Heart Disease Father     Cancer Father     Cancer Sister     Prostate Cancer Neg Hx     Colon Cancer Neg Hx     Colon Polyps Neg Hx      Social History     Socioeconomic History    Marital status:      Spouse name: Not on file    Number of children: 3    Years of education: Not on file    Highest education level: Not on file   Occupational History    Not on file   Tobacco Use    Smoking status: Current Every Day Smoker     Packs/day: 1.00     Years: 40.00     Pack years: 40.00     Types: Cigarettes     Start date: 5/31/1976    Smokeless tobacco: Never Used   Vaping Use    Vaping Use: Never used   Substance and Sexual Activity    Alcohol use: No     Alcohol/week: 0.0 standard drinks    Drug use: No    Sexual activity: Not on file   Other Topics Concern    Not on file   Social History Narrative    Not on file     Social Determinants of Health     Financial Resource Strain:     Difficulty of Paying Living Expenses:    Food Insecurity:     Worried About Running Out of Food in the Last Year:     Ran Out of Food in the Last Year:    Transportation Needs:     Lack of Transportation (Medical):      Lack of Transportation (Non-Medical):    Physical Activity:     Days of Exercise per Week:     Minutes of Exercise per Session:    Stress:     Feeling of Stress :    Social Connections:     Frequency of Communication with Friends and Family:     Frequency of Social Gatherings with Friends and Family:     Attends [employer] Services:     Active Member of Clubs or Organizations:     Attends Club or Organization Meetings:     Marital Status:    Intimate Partner Violence:     Fear of Current or Ex-Partner:     Emotionally Abused:     Physically Abused:     Sexually Abused:        PHYSICAL EXAM    VITAL SIGNS: BP (!) 151/75   Pulse 67   Temp 97.7 °F (36.5 °C) (Oral)   Resp 18   Ht 5' 2\" (1.575 m)   Wt 119 lb 0.8 oz (54 kg)   SpO2 96%   BMI 21.77 kg/m²   Constitutional:  Well developed, well nourished, no acute distress  Eyes:  Pupils equally round and reactive to light, sclera nonicteric  HENT:  Atraumatic, patent airway  NECK: Normal range of motion, no JVD  Respiratory:  No respiratory distress, normal breath sounds, no wheezing   Cardiovascular:  normal rate, normal rhythm, no murmurs   GI:  Soft, nondistended, normal bowel sounds, nontender  Musculoskeletal:  No edema, no acute deformities   Integument:  Skin is warm and dry, no obvious rash    Vascular: Radial and DP pulses 2+ equal bilaterally  Neurologic: alert and oriented x3 (name, place, time/situation), no pronator drift of left arm, no  weakness. +sensory deficit to soft touch to left side of face and arm; facial droop (left sided) noted with clear speech    EKG Interpretation  EKG Interpretation. EKG Interpretation    Interpreted by emergency department physician on 10:37 10/25/21    Rhythm: normal sinus   Rate: 80 bpm  Axis: normal  Ectopy: none  Conduction: normal  ST Segments: no acute change  T Waves: no acute change  Q Waves: none    Clinical Impression: non-specific EKG      RADIOLOGY/PROCEDURES    MRI BRAIN WO CONTRAST   Final Result       1. Acute small to moderate-sized infarct in the right corona radiata. 2. Old lacunar infarcts in the left subinsular region and left corona radiata with the left corona radiata lesions having appeared in the interval since 2019. 3. Moderate patchy areas of T2/FLAIR hyperintensity in the supratentorial white matter and central ye likely representing sequelae of chronic microangiopathy old lacunar infarcts. **This report has been created using voice recognition software. It may contain minor errors which are inherent in voice recognition technology. **      Final report electronically signed by Dr. Jerris Gottron, MD on 10/25/2021 12:33 PM      CTA NECK W WO CONTRAST (CODE STROKE)   Final Result       1. Stable mural calcifications in the cavernous and clinoid segments without flow-limiting focal stenosis or aneurysm in the intracranial circulation. 2. Calcified and noncalcified mural plaque at the bilateral carotid bulbs with 51% stenosis on the right and no hemodynamically significant stenosis on the left by NASCET criteria. 3. Focal areas of moderate stenosis at the takeoff and proximal V2 segments of the dominant right vertebral artery. 4. Mild stenosis at the mid segment of left common carotid artery. **This report has been created using voice recognition software. It may contain minor errors which are inherent in voice recognition technology. **      Final report electronically signed by Dr. Jerris Gottron, MD on 10/25/2021 11:42 AM      XR CHEST PORTABLE   Final Result   There is no acute intrathoracic process. **This report has been created using voice recognition software. It may contain minor errors which are inherent in voice recognition technology. **      Final report electronically signed by Dr Kathie Jama on 10/25/2021 10:55 AM      CT HEAD WO CONTRAST   Final Result    No evidence of acute intracranial abnormality. **This report has been created using voice recognition software. It may contain minor errors which are inherent in voice recognition technology. **      Final report electronically signed by Dr. Jerris Gottron, MD on 10/25/2021 10:59 AM      CTA HEAD W WO CONTRAST (CODE STROKE)    (Results Pending)   FL MODIFIED BARIUM SWALLOW W VIDEO    (Results Pending)       ED COURSE & MEDICAL DECISION MAKING    Pertinent Labs & Imaging studies reviewed and interpreted. (See chart for details)    See chart for details of medications given during the ED stay. NIH Stroke Scale  Time: 7:12 PM  Person Administering Scale: Ricci Anderson MD  1a - Level of consciousness =          0  1b - LOC questions =          0  1c - LOC commands =          0  2 -   Best gaze =          0  3 -   Visual fields =          0  4 -   Facial palsy =          1  5a - Motor R arm =           0  5b - Motor L arm =          0  6a - Motor R leg =          0  6b - Motor L leg =          0  7 -   Limb Ataxia =          0  8 -   Sensory =          1  9 -   Best Language =          0  10 - Dysarthria =          0  11 - Extinction & inattention =          0    Score: 2 at 1030      Vitals:    10/25/21 1125 10/25/21 1229 10/25/21 1345 10/25/21 1621   BP: (!) 150/80 (!) 146/95 (!) 170/75 (!) 151/75   Pulse: 74 89 75 67   Resp: 20 16 18 18   Temp:   98.4 °F (36.9 °C) 97.7 °F (36.5 °C)   TempSrc:   Oral Oral   SpO2: 94% 98% 96% 96%   Weight:   119 lb 0.8 oz (54 kg)    Height:   5' 2\" (1.575 m)        Differential diagnosis: Thrombotic Stroke, Embolic Stroke, Hemorrhagic Stroke, TIA,  Hypoglycemia,  Mass Lesions, Metabolic cause, Head Injury, Encephalopathy, Multiple Sclerosis, Seizure, other    FINAL IMPRESSION    1. Cerebrovascular accident (CVA), unspecified mechanism ([facility] [address]        PLAN  Admit    MDM - pt was a stroke alert, with rapidly improving symptoms, with a NIHSS score of 2 (left sided facial droop, sensory deficits on left side). He even joked with the clinical team. Nonetheless, pt will benefit from loading dose of aspirin, with observation for his symptoms. No intervention planned unless CTA head/neck detects LVO. Pt admitted to Dr. Yancy Pal group, will follow with neurology/stroke team. Pt stable at time of admission.        Ángel Gilbert MD  10/25/21 [id number]

## 2021-10-25 NOTE — ED NOTES
Patient resting in bed. Respirations easy and unlabored. No distress noted. Call light within reach. Updated on POC. Will monitor.      Dudley Mckenzie RN  10/25/21 [id number]

## 2021-10-25 NOTE — H&P
Internal Medicine Specialties  H&P  10/25/2021  1:06 PM    Patient:  Ed Anthony  YOB: 1945    MRN: [id number]   Acct:  [id number]   77/158C  Primary Care Physician: Ivy Stevens MD    Chief Complaint:  Chief Complaint   Patient presents with    Facial Droop       History of Present Illness: The patient is a 67 y.o. male with pmhx of COPD. Htn, CKD, tobacco use who presents with acute onset left side weakness and facial droop, LKW 9am last night. Pt reports waking up from sleeping on the cough this AM and his left leg gave way; he was unable to stand up and fell without hitting his head or losing consciousness. Initial NIHSS was 2. In the emergency department CT head and CTA head/neck done; reports reviewed. MRI shows acute infarct right corona radiata, trop 0.020, EKG WNL. Pt denies any chest pain, SOB, N/V, abdominal pain, lower extremity edema. Pt seen by Neuro in ED and not a tPA candidate. He reports smoking 1PPD and no alcohol or drug use. Patient admitted for acute CVA. Pt remains a full code. Past Medical History:        Diagnosis Date    Acute coronary syndrome (HCC)     Arrhythmia     Arteriosclerotic heart disease (ASHD)     CHF (congestive heart failure) (HCC)     Chills     COPD (chronic obstructive pulmonary disease) (HCC)     Dizziness - light-headed     HX OF:    Dysphagia     Emphysema     Esophagitis     Fatigue     HX OF:    Fever     GERD (gastroesophageal reflux disease)     History of chest pain     History of tinnitus     History of tobacco abuse     Hyperlipidemia     Hypertension     Irregular heart beat     Lightheadedness     MI (myocardial infarction) (HCC)     Night sweats     Noncompliance with medication regimen     Noncompliance with medical therapy.     Pneumonia     Renal failure syndrome     Ringing in the ears     SOB (shortness of breath) on exertion     Weight loss        Past Surgical History:        Procedure Laterality Date    CARDIAC SURGERY      2 stents    COLONOSCOPY      CORONARY ANGIOPLASTY WITH STENT PLACEMENT      pt has 3 stents    DIAGNOSTIC CARDIAC CATH LAB PROCEDURE  3 09 2009    Successful primary stenting of mid LAD using drug-eluting stent.  DIAGNOSTIC CARDIAC CATH LAB PROCEDURE  1 10 2011    LV demonstrated normal systolic function, EF 84%. On pullback, no gradient was noted. No critical lesions noted in all large epicardial vessels so that RCA is dominant vessel w/just very mild diffuse disease, about 20-30% lesions. Left main widely patent w/mild disease distally. Circumflex widely patent w/diffuse disease, also about 20-30%.  ENDOSCOPY, COLON, DIAGNOSTIC      HERNIA REPAIR  2008,2010    CO COLON CA SCRN NOT HI RSK IND Left 11/25/2017    COLONOSCOPY performed by Umair Turk MD at [facility]    CO EGD TRANSORAL BIOPSY SINGLE/MULTIPLE Left 11/25/2017    EGD BIOPSY performed by Umair Turk MD at [address] ECHOCARDIOGRAM  1 10 2011    Size was normal. Systolic function was normal. EF estimated in range of 55-65%. No regional wall motion abnormalities. Wall thickness was normal. Doppler - LV diastolic function parameters were normal. This is technically limited study which may impair interpretation.  TRANSTHORACIC ECHOCARDIOGRAM  3 06 2009    This study is technically limited. Distal anterior apical wall hypokinesis. EF 40-45%. Left atrial size w/in normal limits. Trace MR. No evidence of aortic stenosis or aortic insufficiency. Right atrium and right ventricle are of normal contractility. Trace TR. No pericardial effusion.  UPPER GASTROINTESTINAL ENDOSCOPY Left 11/21/2017    EGD DIAGNOSTIC ONLY performed by Modesto Novak MD at [facility]       Home Medications: Not in a hospital admission.       Allergies:  Xanax [alprazolam], Codeine, Dilaudid [hydromorphone], Morphine, and Pneumococcal vaccines    Social History:    reports that he has been smoking cigarettes. He started smoking about 45 years ago. He has a 40.00 pack-year smoking history. He has never used smokeless tobacco. He reports that he does not drink alcohol and does not use drugs.         Family History:       Problem Relation Age of Onset    Heart Disease Mother     Cancer Mother     Heart Disease Father     Cancer Father     Cancer Sister     Prostate Cancer Neg Hx     Colon Cancer Neg Hx     Colon Polyps Neg Hx        Review of Systems:    General ROS: negative  Psychological ROS: negative  Hematological and Lymphatic ROS: negative  Endocrine ROS: negative  Vision:negative  ENT ROS: Denies  Respiratory ROS: no cough, shortness of breath, or wheezing  Cardiovascular ROS: no chest pain or dyspnea on exertion  Gastrointestinal ROS: no abdominal pain, change in bowel habits, or black or bloody stools  Genito-Urinary ROS: no dysuria, trouble voiding, or hematuria  Musculoskeletal ROS: negative  Neurological ROS: left side facial droop and weakness  Dermatological ROS: negative  Weight:no change in weight  Appetite:ok      Physical Exam:    Vitals:  Patient Vitals for the past 24 hrs:   BP Pulse Resp SpO2 Weight   10/25/21 1229 (!) 146/95 89 16 98 %    10/25/21 1125 (!) 150/80 74 20 94 %    10/25/21 1109 (!) 143/91 81 21 95 %    10/25/21 1033 (!) 173/92 91 16 94 % 119 lb 12.8 oz (54.3 kg)     Weight: Weight: 119 lb 12.8 oz (54.3 kg)     24 hour intake/output:No intake or output data in the 24 hours ending 10/25/21 1306    General appearance - alert, well appearing, and in no distress  Eyes - pupils equal and reactive, extraocular eye movements intact  Ears - bilateral TM's and external ear canals normal  Nose - normal and patent, no erythema, discharge or polyps  Mouth - mucous membranes moist, pharynx normal without lesions  Neck - supple, no significant adenopathy  Lymphatics - no palpable lymphadenopathy, no hepatosplenomegaly  Chest - Diminished breath sounds BL  Distant Breath Sounds: No  Heart - normal rate, regular rhythm, normal S1, S2, no murmurs, rubs, clicks or gallops  Abdomen - soft, nontender, nondistended, no masses or organomegaly  Obese: No; Protuberant: No  Neurological - alert, oriented, normal speech, mild left facial droop, left side weakness upper and lower extremity, able to follow commands  Musculoskeletal - no joint tenderness, deformity or swelling  Extremities - peripheral pulses normal, no pedal edema, no clubbing or cyanosis  Skin - normal coloration and turgor, no rashes, no suspicious skin lesions noted    Review of Labs and Diagnostic Testing:    CBC:   Recent Labs     10/25/21  1042   WBC 9.0   HGB 14.6   HCT 44.0   .0*        BMP:   Recent Labs     10/25/21  1042      K 4.3      CO2 24   BUN 26*   CREATININE 1.2   CALCIUM 9.9   GLUCOSE 105     PT/INR:   Recent Labs     10/25/21  1042   INR 1.05     APTT:   Recent Labs     10/25/21  1042   APTT 24.5      Lipids:   Recent Labs     10/25/21  1042   ALKPHOS 105   ALT 12   AST 18   BILITOT 0.4   LABALBU 4.5     Troponin:   Recent Labs     10/25/21  1042   TROPONINT 0.020*        Imaging:  CT HEAD WO CONTRAST    Result Date: 10/25/2021  PROCEDURE: CT HEAD WO CONTRAST CLINICAL INFORMATION: Stroke Symptoms. COMPARISON: CT head dated 10/10/2019. TECHNIQUE: Noncontrast 5 mm axial images were obtained through the brain. Sagittal and coronal reconstructions were created. All CT scans at this facility use dose modulation, iterative reconstruction, and/or weight-based dosing when appropriate to reduce radiation dose to as low as reasonably achievable. FINDINGS: There is stable mild volume loss. There are stable moderate confluent and patchy areas of hypodensity in the periventricular, subcortical deep white matter as evidence for chronic microvascular angiopathy.  Small hypodensities in the bilateral subinsular regions are stable, likely representing prominent perivascular spaces or old lacunar infarcts. Gray-white matter projection otherwise appears grossly preserved. No intracranial hemorrhage, mass effect or midline shift is identified. The calvarium appears  intact. Orbits are unremarkable. Visualized paranasal sinuses are clear. Mastoid air cells are clear. No evidence of acute intracranial abnormality. **This report has been created using voice recognition software. It may contain minor errors which are inherent in voice recognition technology. ** Final report electronically signed by Dr. Carole Sheehan MD on 10/25/2021 10:59 AM    CTA NECK W WO CONTRAST (CODE STROKE)    Result Date: 10/25/2021  PROCEDURE: CTA NECK W WO CONTRAST, CTA HEAD W WO CONTRAST CLINICAL INFORMATION: stroke. COMPARISON: CT head from the same date and CTA head and neck dated 10/10/2019. TECHNIQUE: 1 mm axial images were obtained from the aortic arch through the head in arterial phase during fast bolus administration of 80 mL [id number]-[id number] injected in the left AC. A noncontrast localizer was obtained. 3-D reconstructions were created on a dedicated 3-D workstation. These include multiplanar MPR images, multiplanar MIP images and centerline reconstructions. All CT scans at this facility use dose modulation, iterative reconstruction, and/or weight-based dosing when appropriate to reduce radiation dose to as low as reasonably achievable. FINDINGS:  CTA head: There are mural calcifications in the cavernous and clinoid segments of internal carotid arteries without flow-limiting stenosis or aneurysmal dilatation. These are stable compared to prior exam. The bilateral middle cerebral and anterior cerebral arteries are patent without focal abnormality identified. The basilar artery is patent without focal stenosis or visualized aneurysm. The bilateral posterior cerebral and superior cerebellar arteries are patent without focal abnormality identified. Dural  venous sinuses appear patent without focal filling defect.  No focal areas of abnormal parenchymal enhancement are identified. CTA NECK: There is a bovine type arch. There is mural calcification in the brachiocephalic and left subclavian arteries without flow-limiting stenosis. There is mild stenosis at the mid segment of the left common carotid artery. There is calcified and noncalcified  mural plaque at the carotid bifurcations with extension into the proximal right internal carotid artery. On the right side, the narrowest luminal diameter is 1.7 mm  with a point more distal, where the walls are parallel, measuring 3.5 mm. On the left side there is no hemodynamically significant stenosis. Cervical segments of internal carotid arteries are otherwise patent without focal stenosis. The right vertebral artery is mildly dominant. There is moderate stenosis at the takeoff of the right vertebral artery and proximal V2 segment. There are areas of mild stenosis more distally in the proximal and mid V2 segments on the right. Vertebral arteries are otherwise patent without other focal areas of stenosis. There is streak artifact from dental amalgam which partially obscures oral and perioral soft tissues. Given this caveat, mucosal surfaces of the aerodigestive tract are symmetric without focal nodular thickening or visualized mass. No cervical lymphadenopathy is identified. The parotid, submandibular and thyroid glands are unremarkable. There are moderate emphysematous changes of the visualized portions of the lungs. There are stable degenerative changes of the cervical spine. 1. Stable mural calcifications in the cavernous and clinoid segments without flow-limiting focal stenosis or aneurysm in the intracranial circulation. 2. Calcified and noncalcified mural plaque at the bilateral carotid bulbs with 51% stenosis on the right and no hemodynamically significant stenosis on the left by NASCET criteria.  3. Focal areas of moderate stenosis at the takeoff and proximal V2 segments of the dominant right vertebral artery. 4. Mild stenosis at the mid segment of left common carotid artery. **This report has been created using voice recognition software. It may contain minor errors which are inherent in voice recognition technology. ** Final report electronically signed by Dr. Jerris Gottron, MD on 10/25/2021 11:42 AM    XR CHEST PORTABLE    Result Date: 10/25/2021  PROCEDURE: XR CHEST PORTABLE CLINICAL INFORMATION: 25-year-old male with facial droop. Code stroke. COMPARISON: Radiograph 6/22/2020. TECHNIQUE: AP upright view of the chest was obtained. FINDINGS: There is scarring/atelectasis at the left lung base. There is eventration of the left hemidiaphragm. The cardiac silhouette and pulmonary vasculature are within normal limits. There is some calcified plaque in the arch of the aorta. There is no significant pleural effusion or pneumothorax. Visualized portions of the upper abdomen are within normal limits. The osseous structures are intact. No acute fractures or suspicious osseous lesions. There is no acute intrathoracic process. **This report has been created using voice recognition software. It may contain minor errors which are inherent in voice recognition technology. ** Final report electronically signed by Dr Kathie Jama on 10/25/2021 10:55 AM    MRI BRAIN WO CONTRAST    Result Date: 10/25/2021  PROCEDURE: MRI BRAIN WO CONTRAST INDICATION:  stroke-left sided weakness. COMPARISON: CT head from the same date and MRI brain dated 10/10/2019. TECHNIQUE: Multiplanar and multiple spin echo MRI images were obtained of the brain without contrast. FINDINGS: There is stable mild volume loss compared to prior MRI. There is a small to moderate-sized area of restricted diffusion in the right corona radiata with associated T2/flair prolongation at the periphery of the area of restriction. No other focal areas of  restricted diffusion are present.  There are moderate patchy areas of T2/FLAIR prolongation in the periventricular, subcortical and deep white matter and central ye without associated restricted diffusion, stable compared to prior MRI. No intra or extra-axial mass is identified. There is a prominent perivascular space in the right subinsular region. There is a focal area of encephalomalacia in the subinsular region on the left, stable compared to prior exam. There are also small focal areas of encephalomalacia in the left corona radiata which have appeared in the interval since 2019. The major vascular flow voids appear patent. Orbits are unremarkable. Paranasal sinuses are clear. There is trace fluid in the left mastoid air cells. 1. Acute small to moderate-sized infarct in the right corona radiata. 2. Old lacunar infarcts in the left subinsular region and left corona radiata with the left corona radiata lesions having appeared in the interval since 2019. 3. Moderate patchy areas of T2/FLAIR hyperintensity in the supratentorial white matter and central ye likely representing sequelae of chronic microangiopathy old lacunar infarcts. **This report has been created using voice recognition software. It may contain minor errors which are inherent in voice recognition technology. ** Final report electronically signed by Dr. Barby Adams MD on 10/25/2021 12:33 PM        Assessment/Plan:    1. Acute CVA  a. Neuro following  b. Daily ASA and cont home dose of Effient   c. Check complete echocardiogram   d. Neuro checks  e. Cont statin  f. Check A1C and lipid panel in AM  g. NS at 75/hr   h. Tobacco cessation  i. NPO until SLP clears for diet   2. Elevated trops  a. Trend  b. No chest pain or significant changes on EKG   3. HTN  a. Cont home BP meds with higher parameters to allow for permissive htn  4. HLD  a. Cont Lipitor   5. Tobacco use  a. Tobacco cessation education provided  b. Nicotine patch ordered   6. CKD  a. Monitor  b. At baseline currently  7. PTOT/SLP  8. DVT prophylaxis   a.  Lovenox Assessment and plan of care discussed with supervising physician, Dr Rosanna Hall. Electronically signed by SHARON Medeiros CNP on 10/25/2021 at 1:06 PM   Addendum/attestation by Sonja Mclaughlin MD:  I have seen and examined the patient independently. Face to face evaluation and examination was performed. The above evaluation and note has been reviewed. Laboratory and radiological data were reviewed. I Have discussed with So Alford CNP about this patient in detail. The above assessment and plan has been reviewed. Please see my modifications mentioned below.       My modifications:  Pt with left pronator drift  Electronically signed by Isabel Peace MD on 10/25/2021 at 5:07 PM         Copy: Primary Care Physician: Jodee López MD    ;l

## 2021-10-25 NOTE — ED NOTES
ED nurse-to-nurse bedside report    Chief Complaint   Patient presents with    Facial Droop      LOC: alert and orientated to name, place, date  Vital signs   Vitals:    10/25/21 1033 10/25/21 1109 10/25/21 1125 10/25/21 1229   BP: (!) 173/92 (!) 143/91 (!) 150/80 (!) 146/95   Pulse: 91 81 74 89   Resp: 16 21 20 16   SpO2: 94% 95% 94% 98%   Weight: 119 lb 12.8 oz (54.3 kg)         Pain:    Pain Interventions: none  Pain Goal: 0  Oxygen: No    Current needs required none   Telemetry: Yes  LDAs:   Peripheral IV 06/22/20 Left Antecubital (Active)       Peripheral IV 10/25/21 Left Antecubital (Active)   Site Assessment Clean;Dry; Intact 10/25/21 1036   Dressing Status Clean;Dry; Intact 10/25/21 1036     Continuous Infusions:    sodium chloride      sodium chloride       Mobility: Requires assistance * 1  Soriano Fall Risk Score: No flowsheet data found.        Dudley Mckenzie RN  10/25/21 [id number]

## 2021-10-25 NOTE — ED NOTES
Patient resting in bed. Respirations easy and unlabored. No distress noted. Call light within reach. Updated on POC. Will monitor.       Rayna Han RN  10/25/21 1049

## 2021-10-26 PROBLEM — E44.0 MODERATE MALNUTRITION (HCC): Chronic | Status: ACTIVE | Noted: 2021-01-01

## 2021-10-26 NOTE — PROGRESS NOTES
Palliative care eval received to assist with goals of care. Pt admitted for CVA, per chart review, he failed MBS and is NPO. Case discussed with Yazmin Bolaños CNP, plan is for NG tube placement to see how pt recovers swallow ability and possible discharge to [facility]. Writer in to see pt. CARLOS Henderson with physiatry is in with pt and family. Palliative care will follow up later today, as time allows, otherwise will follow up tomorrow. Pt's nurse,DAISY Cui updated. 15:00 Pt still unavailable, palliative care will follow up tomorrow.

## 2021-10-26 NOTE — CONSULTS
Physical Medicine & Rehabilitation   Consult Note      Admitting Physician: Anish Calderón MD    Primary Care Provider: Anish Calderón MD     Reason for Consult:  CVA. Rehab needs    History of Present Illness:  Brenden Rayo is a 67 y.o. male admitted to [address] [place] [address] on 10/25/2021. Patient  has a past medical history of Acute coronary syndrome ([facility] [address] Arrhythmia, Arteriosclerotic heart disease (ASHD), CHF (congestive heart failure) ([facility] [address] Chills, COPD (chronic obstructive pulmonary disease) ([facility] [address] Dizziness - light-headed, Dysphagia, Emphysema, Esophagitis, Fatigue, Fever, GERD (gastroesophageal reflux disease), History of chest pain, History of tinnitus, History of tobacco abuse, Hyperlipidemia, Hypertension, Irregular heart beat, Lightheadedness, MI (myocardial infarction) ([facility] [address] Night sweats, Noncompliance with medication regimen, Pneumonia, Renal failure syndrome, Ringing in the ears, SOB (shortness of breath) on exertion, and Weight loss. Patient presented to [facility] ER with left sided weakness, facial droop upon awakening the day of admission. Patient states he went to stand up and felt weak, falling to the floor. patient crawled to phone and called 911. CTH shows no acute intracranial abnormality, CT angiogram head and neck without any hemodynamically significant stenosis, MRI does show acute small to moderate sized infarct in the right corona radiata. He was not a candidate for tPA due to last known well being 9PM the day prior. Patient is seen today resting in bed. MBS today with SLP recommendation of continue NPO. Daughter and granddaughter present. Granddaughter is a nurse at [employer]. Discussed dysphagia and requiring NG tube. Started discussion on PEG tube if needed in the future. Granddaughter familiar with them and understands they can be removed/temporary. Educated on aspiration risk and ongoing suction/coughing/clearing to prevent pneumonia.   Discussed with patient and family about IPR and expectations with therapy. Feel patient is appropriate for IPR if the patient and family wish to pursue this once feeding is established. Current Rehabilitation Assessments:  PT:    Range of Motion:  Bilateral Lower Extremity: WNL  Strength:  Right Lower Extremity: Impaired - 4-/5  Left Lower Extremity: Impaired - 2+/5   Balance:  Static Sitting Balance:  Stand By Assistance, Contact Guard Assistance, X 1, with cues for safety, with verbal cues   Static Standing Balance: Moderate Assistance, X 1, with cues for safety, with verbal cues   Increased buckling of L LE, however, no LOB, able to correct self, cues for improved upright posture, fair demo, required UE support with HHA or use of bedside chair  Bed Mobility:  Rolling to Right: Moderate Assistance, X 1, with head of bed raised, with rail, with verbal cues , with increased time for completion   Supine to Sit: Moderate Assistance, X 1, with head of bed raised, with rail, with verbal cues   Required assist for B LE to bring onto EOB for completion. Pt required cues for proper technique of completion with assist required for trunk support to sit on EOB for safety. Transfers:  Sit to Stand: Moderate Assistance, X 1, with increased time for completion, cues for hand placement, with verbal cues  Stand to Sit:Moderate Assistance, X 1, with increased time for completion, cues for hand placement, with verbal cues  HHA required with PT blocking L LE due to knee buckling. Pt required cue for proper technique and hand placement to help assist with good demo. Ambulation:  Moderate Assistance, X 1, with cues for safety, with verbal cues , with increased time for completion  Distance: 2 ft to chair  Surface: Level Tile  Device:Hand-Held Assist  Gait Deviations:   Forward Flexed Posture, Slow Mandy, Decreased Gait Speed, Decreased Heel Strike Bilaterally, Narrow Base of Support, Unsteady Gait, Decreased Terminal Knee Extension and L knee buckling with amb, PT blocking L knee for support. Pt required cues for proper hand placement with completion with assist required for support, good demo. OT:    COGNITION: Slow Processing, Decreased Recall, Decreased Insight, Impaired Memory, Inattention, Decreased Problem Solving and Decreased Safety Awareness  ADL:   No ADL's completed this session. BALANCE:  Sitting Balance:  Stand By Assistance. EOB  Standing Balance: Contact Guard Assistance, Minimal Assistance, X 2.   x1 minute in prep for mobility. BED MOBILITY:  Sit to Supine: Minimal Assistance Raising BLE onto Bed. Scooting: Contact Guard Assistance EOB  TRANSFERS:  Sit to Stand:  Minimal Assistance, X 2.   Stand to Sit: Contact Guard Assistance, X 2.   FUNCTIONAL MOBILITY:  Assistive Device: Hand Held Assist  Assist Level:  Minimal Assistance and X 2. Distance:   Completed functional mobility short distance within pt room at very slow pace, no LOB noted. Pt requires seated rest break after trial of mobility, min fatigue noted. ADDITIONAL ACTIVITIES:  HAND ASSESSMENT  Hand Dominance: Right  Left Hand Strength -  (lbs)  Handle Setting 2: 8,10,8; avg- 8.6 #  Right Hand Strength -  (lbs)  Handle Setting 2: 32,26,36; avg- 31.3#  Fine Motor Skills  Left 9-Hole Peg Test: Impaired  Left 9 Hole Peg Test Time (secs): 185 (Pt able to place 3 pegs within trial)  Right 9-Hole Peg Test: Functional  Right 9 Hole Peg Test Time (secs): 44     ST:    DIAGNOSTIC IMPRESSIONS:  Patient presents with moderately-severe oropharyngeal phases evidenced by clinical findings from instrumental evaluation. Oral phase highly uncoordinated; patient demonstrated decreased bolus control, incoordination of manipulation of bolus, decreased bolus formation, and anterior and lateral loss of bolus from oral cavity. Patient demonstrated some awareness of bolus spilling from his oral cavity, but unable to improve despite cues.   With puree trial, patient demonstrated a bolus hold and required ST cues to swallow. Patient with gross premature spillage/phayrngeal entry to the level of the valleculae, lateral channels, and pyriform regions of all liquids trials. Swallow onset moderately delayed; patient demonstrated decreased TBR, hyolaryngeal elevation, epiglottic inversion, thyrohyoid approximation, anterior excursion/protraction, and pharyngeal constriction, with moderate residuals in the oral cavity, valleculae, lateral channels, and pyriform region. Patient with inability to fully clear stasis; ST with assistance of Yanker's suctioning to remove stasis from the oral cavity. Swallow function weakened as trials continued due to patient fatigue. The following airway invasion events are documented below: Thin barium via spoon: d/t poor control of bolus, gross+deep tracheal aspiration occurring BEFORE & DURING the swallow with immediate coughing with no success of ejecting viscous bolus. Mildly thick barium via spoon and cup: patient with gross+deep laryngeal penetration occurring BEFORE and DURING the swallow with no success of ejecting bolus, eventually resulting silent tracheal aspiration from un cleared laryngeal penetration  Moderately thick barium via cup: patient demonstrated shallow penetration DURING the swallow with the ability to eject spontaneously       Patient at [address] [place] [address] for aspiration given weakness/fatigue of swallow structures. ST recommends patient remain STRICT NPO; considerations for NG tube as source of nutrition. Patient not safe to consume anything PO due to aforementioned aspiration events demonstrated in MBS. Will prioritize dysphagia interventions with conservative PO trials with ST ONLY (Moderately thick and puree) as schedule permits, potentially completing repeat instrumental in x1 week timeframe, however, is largely dependent upon cognitive status due to recent CVA and engagement in dysphagia therapy.   *post evaluation, patient without respiratory distress upon leaving room; RN Chloe notified re: clinical findings and recommendations from the assessment; verbal receptiveness noted      Diet Recommendations:  NPO      Past Medical History:        Diagnosis Date    Acute coronary syndrome ([address]     Arrhythmia     Arteriosclerotic heart disease (ASHD)     CHF (congestive heart failure) ([address]     Chills     COPD (chronic obstructive pulmonary disease) ([address]     Dizziness - light-headed     HX OF:    Dysphagia     Emphysema     Esophagitis     Fatigue     HX OF:    Fever     GERD (gastroesophageal reflux disease)     History of chest pain     History of tinnitus     History of tobacco abuse     Hyperlipidemia     Hypertension     Irregular heart beat     Lightheadedness     MI (myocardial infarction) ([address]     Night sweats     Noncompliance with medication regimen     Noncompliance with medical therapy.  Pneumonia     Renal failure syndrome     Ringing in the ears     SOB (shortness of breath) on exertion     Weight loss        Past Surgical History:        Procedure Laterality Date    CARDIAC SURGERY      2 stents    COLONOSCOPY      CORONARY ANGIOPLASTY WITH STENT PLACEMENT      pt has 3 stents    DIAGNOSTIC CARDIAC CATH LAB PROCEDURE  3 09 2009    Successful primary stenting of mid LAD using drug-eluting stent.  DIAGNOSTIC CARDIAC CATH LAB PROCEDURE  1 10 2011    LV demonstrated normal systolic function, EF 13%. On pullback, no gradient was noted. No critical lesions noted in all large epicardial vessels so that RCA is dominant vessel w/just very mild diffuse disease, about 20-30% lesions. Left main widely patent w/mild disease distally. Circumflex widely patent w/diffuse disease, also about 20-30%.      ENDOSCOPY, COLON, DIAGNOSTIC      HERNIA REPAIR  2008,2010    MS COLON CA SCRN NOT HI RSK IND Left 11/25/2017    COLONOSCOPY performed by Lisa Craig MD at [facility]    MS EGD TRANSORAL BIOPSY SINGLE/MULTIPLE Left 11/25/2017    EGD BIOPSY performed by Queenie Evans MD at [facility] ECHOCARDIOGRAM  1 10 2011    Size was normal. Systolic function was normal. EF estimated in range of 55-65%. No regional wall motion abnormalities. Wall thickness was normal. Doppler - LV diastolic function parameters were normal. This is technically limited study which may impair interpretation.  TRANSTHORACIC ECHOCARDIOGRAM  3 06 2009    This study is technically limited. Distal anterior apical wall hypokinesis. EF 40-45%. Left atrial size w/in normal limits. Trace MR. No evidence of aortic stenosis or aortic insufficiency. Right atrium and right ventricle are of normal contractility. Trace TR. No pericardial effusion.  UPPER GASTROINTESTINAL ENDOSCOPY Left 11/21/2017    EGD DIAGNOSTIC ONLY performed by Armando Barnett MD at [facility]       Allergies: Allergies   Allergen Reactions    Xanax [Alprazolam] Other (See Comments)     Altered mental status    Codeine Hives, Nausea And Vomiting and Other (See Comments)     Skin becomes erythematous and sweaty.  Dilaudid [Hydromorphone] Hives, Nausea And Vomiting and Other (See Comments)     Skin becomes erythematous and sweaty.  Morphine Nausea And Vomiting and Rash     Skin becomes erythematous and sweaty.     Pneumococcal Vaccines Nausea And Vomiting     Reports vomiting x 1 day after vaccine        Current Medications:   Current Facility-Administered Medications: albuterol (PROVENTIL) nebulizer solution 2.5 mg, 2.5 mg, Nebulization, Q6H PRN  atorvastatin (LIPITOR) tablet 40 mg, 40 mg, Oral, Daily  isosorbide mononitrate (IMDUR) extended release tablet 30 mg, 30 mg, Oral, Daily  [Held by provider] metoprolol tartrate (LOPRESSOR) tablet 25 mg, 25 mg, Oral, BID  pantoprazole (PROTONIX) tablet 40 mg, 40 mg, Oral, QAM AC  ranolazine (RANEXA) extended release tablet 1,000 mg, 1,000 mg, Oral, BID  sodium chloride flush 0.9 % injection 5-40 mL, 5-40 mL, IntraVENous, 2 times per day  sodium chloride flush 0.9 % injection 5-40 mL, 5-40 mL, IntraVENous, PRN  0.9 % sodium chloride infusion, 25 mL, IntraVENous, PRN  acetaminophen (TYLENOL) tablet 650 mg, 650 mg, Oral, Q6H PRN **OR** acetaminophen (TYLENOL) suppository 650 mg, 650 mg, Rectal, Q6H PRN  promethazine (PHENERGAN) tablet 12.5 mg, 12.5 mg, Oral, Q6H PRN **OR** ondansetron (ZOFRAN) injection 4 mg, 4 mg, IntraVENous, Q6H PRN  enoxaparin (LOVENOX) injection 40 mg, 40 mg, SubCUTAneous, Daily  0.9 % sodium chloride infusion, , IntraVENous, Continuous  nicotine (NICODERM CQ) 21 MG/24HR 1 patch, 1 patch, TransDERmal, Daily  budesonide-formoterol (SYMBICORT) 160-4.5 MCG/ACT inhaler 2 puff, 2 puff, Inhalation, BID **AND** tiotropium (SPIRIVA RESPIMAT) 2.5 MCG/ACT inhaler 2 puff, 2 puff, Inhalation, Daily  clopidogrel (PLAVIX) tablet 75 mg, 75 mg, Oral, Daily  aspirin suppository 300 mg, 300 mg, Rectal, Daily    Social History:  Social History     Socioeconomic History    Marital status:      Spouse name: Not on file    Number of children: 3    Years of education: Not on file    Highest education level: Not on file   Occupational History    Not on file   Tobacco Use    Smoking status: Current Every Day Smoker     Packs/day: 1.00     Years: 40.00     Pack years: 40.00     Types: Cigarettes     Start date: 5/31/1976    Smokeless tobacco: Never Used   Vaping Use    Vaping Use: Never used   Substance and Sexual Activity    Alcohol use: No     Alcohol/week: 0.0 standard drinks    Drug use: No    Sexual activity: Not on file   Other Topics Concern    Not on file   Social History Narrative    Not on file     Social Determinants of Health     Financial Resource Strain:     Difficulty of Paying Living Expenses:    Food Insecurity:     Worried About Running Out of Food in the Last Year:     Ran Out of Food in the Last Year:    Transportation Needs:     Lack of Transportation (Medical):    Dimitry Lack of Transportation (Non-Medical):    Physical Activity:     Days of Exercise per Week:     Minutes of Exercise per Session:    Stress:     Feeling of Stress :    Social Connections:     Frequency of Communication with Friends and Family:     Frequency of Social Gatherings with Friends and Family:     Attends Faith Services:     Active Member of Clubs or Organizations:     Attends Club or Organization Meetings:     Marital Status:    Intimate Partner Violence:     Fear of Current or Ex-Partner:     Emotionally Abused:     Physically Abused:     Sexually Abused:      Lives With: Alone  Type of Home: Apartment  Home Layout: One level  Home Access: Level entry  Home Equipment: Standard walker      Bathroom Shower/Tub: Tub/Shower unit  Bathroom Toilet: Standard  Bathroom Equipment: Grab bars in shower     ADL Assistance: Independent  Homemaking Assistance: Independent  Ambulation Assistance: Independent  Transfer Assistance: Independent     Active : Yes  Occupation: Retired  Additional Comments: Pt reports fully indep PLOF without AD. Daughter and granddaughter close by. Granddaughter is a nurse. Patient's home is handicap accessible. Daughter possibly open to having patient stay with her but has 3 OSMIN and a two story home.      Family History:       Problem Relation Age of Onset    Heart Disease Mother     Cancer Mother     Heart Disease Father     Cancer Father     Cancer Sister     Prostate Cancer Neg Hx     Colon Cancer Neg Hx     Colon Polyps Neg Hx        Review of Systems:  CONSTITUTIONAL:  positive for  fatigue  EYES:  negative for  double vision, blurred vision, blind spots, visual disturbance and  Positive for Glasses  HEENT:  negative  RESPIRATORY:  positive for  cough with sputum  CARDIOVASCULAR:  negative  GASTROINTESTINAL:  positive for constipation  GENITOURINARY:  negative  SKIN:  negative  HEMATOLOGIC/LYMPHATIC:  negative  MUSCULOSKELETAL:  positive for  muscle weakness  NEUROLOGICAL:  positive for memory problems, speech problems, coordination problems, gait problems, dysphagia and weakness  BEHAVIOR/PSYCH:  negative  System review otherwise negative    Physical Exam:  BP (!) 166/78   Pulse 79   Temp 98.3 °F (36.8 °C) (Oral)   Resp 20   Ht 5' 2\" (1.575 m)   Wt 119 lb 0.8 oz (54 kg)   SpO2 94%   BMI 21.77 kg/m²   awake  Orientation:   person, place, time  Mood: within normal limits  Affect: calm  General appearance: Patient is well nourished, well developed, well groomed and in no acute distress, appearing stated age, noted facial droop with wet cough    Memory:   normal,   Attention/Concentration: abnormal - neglect to left side  Language:  abnormal - dysarthric    Cranial Nerves:  III,IV,VI: Extra Ocular Movements: abnormal difficulty with left  VII: Facial strength: abnormal - left facial droop, abnormal left shoulder shrug. ROM:  abnormal - left side decreased AROM due to ataxia  Motor Exam:  Motor exam is 5 out of 5 right upper and right lower extremities  4 out of 5 in left HF/HG, 3+/5 in left ADF/APF/EF/EE/shoulder abduction. Tone:  normal  Muscle bulk: within normal limits  Sensory:  Sensory intact  Coordination:   abnormal - LUE/LLE ataxia    Skin: warm and dry, no rash or erythema  Peripheral vascular: Pulses: Normal upper and lower extremity pulses; no hair noted on BLE.   Edema: no      Diagnostics:  Recent Results (from the past 24 hour(s))   Troponin    Collection Time: 10/25/21  6:39 PM   Result Value Ref Range    Troponin T 0.011 (A) ng/ml   Troponin    Collection Time: 10/25/21  9:41 PM   Result Value Ref Range    Troponin T 0.017 (A) ng/ml   Basic Metabolic Panel w/ Reflex to MG    Collection Time: 10/26/21  3:39 AM   Result Value Ref Range    Sodium 138 135 - 145 meq/L    Potassium reflex Magnesium 4.4 3.5 - 5.2 meq/L    Chloride 105 98 - 111 meq/L    CO2 19 (L) 23 - 33 meq/L    Glucose 79 70 - 108 mg/dL    BUN 19 7 - 22 mg/dL    CREATININE 0.9 0.4 - 1.2 mg/dL    Calcium 8.9 8.5 - 10.5 mg/dL   CBC auto differential    Collection Time: 10/26/21  3:39 AM   Result Value Ref Range    WBC 10.3 4.8 - 10.8 thou/mm3    RBC 4.01 (L) 4.70 - 6.10 mill/mm3    Hemoglobin 13.8 (L) 14.0 - 18.0 gm/dl    Hematocrit 42.6 42.0 - 52.0 %    .2 (H) 80.0 - 94.0 fL    MCH 34.4 (H) 26.0 - 33.0 pg    MCHC 32.4 32.2 - 35.5 gm/dl    RDW-CV 13.3 11.5 - 14.5 %    RDW-SD 52.2 (H) 35.0 - 45.0 fL    Platelets 436 220 - 164 thou/mm3    MPV 10.5 9.4 - 12.4 fL    Seg Neutrophils 77.7 %    Lymphocytes 14.2 %    Monocytes 7.0 %    Eosinophils 0.5 %    Basophils 0.3 %    Immature Granulocytes 0.3 %    Segs Absolute 8.0 (H) 1 - 7 thou/mm3    Lymphocytes Absolute 1.5 1.0 - 4.8 thou/mm3    Monocytes Absolute 0.7 0.4 - 1.3 thou/mm3    Eosinophils Absolute 0.1 0.0 - 0.4 thou/mm3    Basophils Absolute 0.0 0.0 - 0.1 thou/mm3    Immature Grans (Abs) 0.03 0.00 - 0.07 thou/mm3    nRBC 0 /100 wbc   Lipid panel    Collection Time: 10/26/21  3:39 AM   Result Value Ref Range    Cholesterol, Total 154 100 - 199 mg/dL    Triglycerides 96 0 - 199 mg/dL    HDL 47 mg/dL    LDL Calculated 88 mg/dL   Anion Gap    Collection Time: 10/26/21  3:39 AM   Result Value Ref Range    Anion Gap 14.0 8.0 - 16.0 meq/L   Glomerular Filtration Rate, Estimated    Collection Time: 10/26/21  3:39 AM   Result Value Ref Range    Est, Glom Filt Rate 82 (A) ml/min/1.73m2       PROCEDURE: MRI BRAIN WO CONTRAST       INDICATION:  stroke-left sided weakness.       COMPARISON: CT head from the same date and MRI brain dated 10/10/2019.       TECHNIQUE: Multiplanar and multiple spin echo MRI images were obtained of the brain without contrast.       FINDINGS:   There is stable mild volume loss compared to prior MRI. There is a small to moderate-sized area of restricted diffusion in the right corona radiata with associated T2/flair prolongation at the periphery of the area of restriction.  No other focal areas of    restricted diffusion are present. There are moderate patchy areas of T2/FLAIR prolongation in the periventricular, subcortical and deep white matter and central ye without associated restricted diffusion, stable compared to prior MRI. No intra or    extra-axial mass is identified. There is a prominent perivascular space in the right subinsular region. There is a focal area of encephalomalacia in the subinsular region on the left, stable compared to prior exam. There are also small focal areas of    encephalomalacia in the left corona radiata which have appeared in the interval since 2019.       The major vascular flow voids appear patent. Orbits are unremarkable. Paranasal sinuses are clear. There is trace fluid in the left mastoid air cells.           Impression       1. Acute small to moderate-sized infarct in the right corona radiata. 2. Old lacunar infarcts in the left subinsular region and left corona radiata with the left corona radiata lesions having appeared in the interval since 2019. 3. Moderate patchy areas of T2/FLAIR hyperintensity in the supratentorial white matter and central ye likely representing sequelae of chronic microangiopathy old lacunar infarcts. Impression:  CVA, acute infarct right corona radiata. Chronic left CVAs  Left hemiparesis  Left facial droop  Left neglect  Dysphagia  COPD  CKD  HTN  Tobacco use       Recommendations:  Continue current therapies  Await stable feeding source (PEG vs PO intake)  Await full cognitive eval  Feel patient is going to be appropriate for IPR for further therapy and care once feeding source established. Would need precert from [employer]. Patient and family to discuss options. Will follow     It was my pleasure to evaluate Pj Swenson today. Please call with questions.     Rober Santiago, APRN - CNP

## 2021-10-26 NOTE — PROGRESS NOTES
[facility]  INPATIENT PHYSICAL THERAPY  EVALUATION  [facility] 4A - 4A-05/005-A    Time In: 0030  Time Out: 6228  Timed Code Treatment Minutes: 8 Minutes  Minutes: 16          Date: 10/26/2021  Patient Name: Ly Handy,  Gender:  male        MRN: [id number]  : 1945  (67 y.o.)      Referring Practitioner: SHARON Jones CNP  Diagnosis: acute CVA  Additional Pertinent Hx: Per chart \"The patient is a 67 y.o. male with pmhx of COPD. Htn, CKD, tobacco use who presents with acute onset left side weakness and facial droop, LKW 9am last night. Pt reports waking up from sleeping on the cough this AM and his left leg gave way; he was unable to stand up and fell without hitting his head or losing consciousness. Initial NIHSS was 2. In the emergency department CT head and CTA head/neck done; reports reviewed. MRI shows acute infarct right corona radiata, trop 0.020, EKG WNL. Pt denies any chest pain, SOB, N/V, abdominal pain, lower extremity edema. Pt seen by Neuro in ED and not a tPA candidate. He reports smoking 1PPD and no alcohol or drug use. Patient admitted for acute CVA. Pt remains a full code. \"     Restrictions/Precautions:  Restrictions/Precautions: Fall Risk, General Precautions  Position Activity Restriction  Other position/activity restrictions: soft spoken, L LE weakness    Subjective:  Chart Reviewed: Yes  Patient assessed for rehabilitation services?: Yes  Family / Caregiver Present: No  Subjective: Attempted see pt in early morning, pt off unit for MBS. Nursing okd session. Pt in bed when therapy arrived with nursing student present at 2nd attempt, pt agreeable to PT session. Communicated with OT, [facility], about pt being up in chair coodinate transfering back to bed as time allows. Pt reports he is feeling stronger today then yesterday.      General:  Overall Orientation Status: Within Normal Limits  Follows Commands: Within Functional Limits         Hearing: Within functional limits         Pain: denies    Vitals: Nurse checked vitals prior to session    Social/Functional History:    Lives With: Alone  Type of Home: Apartment  Home Layout: One level  Home Access: Level entry  Home Equipment: Standard walker     Bathroom Shower/Tub: Tub/Shower unit  Bathroom Toilet: Standard  Bathroom Equipment: Grab bars in shower       ADL Assistance: Independent  Homemaking Assistance: Independent  Ambulation Assistance: Independent  Transfer Assistance: Independent    Active : Yes  Occupation: Retired  Additional Comments: Pt reports fully indep PLOF without AD. OBJECTIVE:  Range of Motion:  Bilateral Lower Extremity: WNL    Strength:  Right Lower Extremity: Impaired - 4-/5  Left Lower Extremity: Impaired - 2+/5     Balance:  Static Sitting Balance:  Stand By Assistance, Contact Guard Assistance, X 1, with cues for safety, with verbal cues   Static Standing Balance: Moderate Assistance, X 1, with cues for safety, with verbal cues   Increased buckling of L LE, however, no LOB, able to correct self, cues for improved upright posture, fair demo, required UE support with HHA or use of bedside chair  Bed Mobility:  Rolling to Right: Moderate Assistance, X 1, with head of bed raised, with rail, with verbal cues , with increased time for completion   Supine to Sit: Moderate Assistance, X 1, with head of bed raised, with rail, with verbal cues   Required assist for B LE to bring onto EOB for completion. Pt required cues for proper technique of completion with assist required for trunk support to sit on EOB for safety. Transfers:  Sit to Stand: Moderate Assistance, X 1, with increased time for completion, cues for hand placement, with verbal cues  Stand to Sit:Moderate Assistance, X 1, with increased time for completion, cues for hand placement, with verbal cues  HHA required with PT blocking L LE due to knee buckling.  Pt required cue for proper technique and hand placement to help assist with good demo. Ambulation:  Moderate Assistance, X 1, with cues for safety, with verbal cues , with increased time for completion  Distance: 2 ft to chair  Surface: Level Tile  Device:Hand-Held Assist  Gait Deviations: Forward Flexed Posture, Slow Mandy, Decreased Gait Speed, Decreased Heel Strike Bilaterally, Narrow Base of Support, Unsteady Gait, Decreased Terminal Knee Extension and L knee buckling with amb, PT blocking L knee for support. Pt required cues for proper hand placement with completion with assist required for support, good demo. Functional Outcome Measures: Completed  AM-PAC Inpatient Mobility Raw Score : 10  -PAC Inpatient T-Scale Score : 32.29    ASSESSMENT:  Activity Tolerance:  Patient tolerance of  treatment: good. Motivated      Treatment Initiated: Treatment and education initiated within context of evaluation. Evaluation time included review of current medical information, gathering information related to past medical, social and functional history, completion of standardized testing, formal and informal observation of tasks, assessment of data and development of plan of care and goals. Treatment time included skilled education and facilitation of tasks to increase safety and independence with functional mobility for improved independence and quality of life. Assessment: Body structures, Functions, Activity limitations: Decreased functional mobility , Decreased ADL status, Decreased balance, Decreased posture, Decreased strength, Decreased safe awareness, Decreased endurance  Assessment: Pt cont to require skilled PT services to increase strength, improve endurance, progress with endurance and mobility to progress towards PLOF and return home safely.   Prognosis: Good    REQUIRES PT FOLLOW UP: Yes    Discharge Recommendations:  Discharge Recommendations: Continue to assess pending progress, IP Rehab, Patient would benefit from continued therapy after discharge  Patient is exhibiting above listed deficits and requiring continued therapy. Patient would benefit from continued therapy on an inpatient rehab unit. Patient is able to tolerate 3 hours of intensive therapy 5-6 days/week. Without continued therapies pt at risk for functional decline, increased falls, and readmission to hospital.     Patient Education:  PT Education: Goals, PT Role, Plan of Care, Transfer Training, Gait Training, General Safety, Functional Mobility Training    Equipment Recommendations:  Equipment Needed: Yes  Mobility Devices: Mary Lou Clarington: Rolling  Other: may need RW depending on d/c    Plan:  Times per week: 6x N  Current Treatment Recommendations: Strengthening, Gait Training, Patient/Caregiver Education & Training, Stair training, Equipment Evaluation, Education, & procurement, Balance Training, Functional Mobility Training, Neuromuscular Re-education, Endurance Training, Home Exercise Program, Transfer Training, Positioning, Safety Education & Training    Goals:  Patient goals : to get back to normal  Short term goals  Time Frame for Short term goals: by discharge  Short term goal 1: Pt will amb with LRAD with CGA for 20 feet to progress with overall mobility. Short term goal 2: Pt will complete transfers with CGA for safety with LRAD for support as needed to progress towards PLOF safely. Short term goal 3: Pt will demo CGA for bed mobility tasks with modifications as needed to prepare for transfers. Short term goal 4: Pt will complete 10-20 reps of ther ex to increase overall mobility. Long term goals  Time Frame for Long term goals : NA due to short ELOS    Following session, patient left in safe position with all fall risk precautions in place. Pt left in bed with all needs and call light in reach with nursing student in room, chair alarm on.

## 2021-10-26 NOTE — PROGRESS NOTES
Patient in bed awake, call light and bedside table in reach. Denies need for toileting. Pathway clear, denies pain. Will continue to monitor. Iv site clean, dry, intact, iv infusing.

## 2021-10-26 NOTE — PROGRESS NOTES
NG tube inserted into left nare successfully, 14fr used. Awaiting x ray results to check for correct placement.

## 2021-10-26 NOTE — PROGRESS NOTES
[address]  Occupational Therapy  Daily Note  Time:   Time In: 5704  Time Out: 1043  Timed Code Treatment Minutes: 25 Minutes  Minutes: 25          Date: 10/26/2021  Patient Name: Eulalia Gardner,   Gender: male      Room: [facility]005-A  MRN: [id number]  : 1945  (67 y.o.)  Referring Practitioner: Lloyd HERRERA CNP  Diagnosis: acute CVA  Additional Pertinent Hx: Per ER note on 10/25/2021: 67 y.o. male with pmhx of COPD. Htn, CKD, tobacco use who presents with acute onset left side weakness and facial droop, LKW 9am last night. Pt reports waking up from sleeping on the cough this AM and his left leg gave way; he was unable to stand up and fell without hitting his head or losing consciousness. Initial NIHSS was 2. MRI of brain results: Acute small to moderate-sized infarct in the right corona radiata. Restrictions/Precautions:  Restrictions/Precautions: Fall Risk, General Precautions  Position Activity Restriction  Other position/activity restrictions: soft spoken, L LE weakness     SUBJECTIVE: Pt seated in bedside chair upon arrival, agreeable to OT session. Comment: Nursing staff in towards end of session. Pt stating he wishes to return to bed- this MARIE assisted staff for mobility back to bed for safety, OTR to assess next session. PAIN: denies. Vitals: Vitals not assessed per clinical judgement, see nursing flowsheet    COGNITION: Slow Processing, Decreased Recall, Decreased Insight, Impaired Memory, Inattention, Decreased Problem Solving and Decreased Safety Awareness    ADL:   No ADL's completed this session. BALANCE:  Sitting Balance:  Stand By Assistance. EOB  Standing Balance: Contact Guard Assistance, Minimal Assistance, X 2.   x1 minute in prep for mobility. BED MOBILITY:  Sit to Supine: Minimal Assistance Raising BLE onto Bed.   Scooting: Contact Guard Assistance EOB    TRANSFERS:  Sit to Stand:  Minimal Assistance, X 2.   Stand to Sit: Contact Guard Assistance, X 2.     FUNCTIONAL MOBILITY:  Assistive Device: Hand Held Assist  Assist Level:  Minimal Assistance and X 2. Distance:   Completed functional mobility short distance within pt room at very slow pace, no LOB noted. Pt requires seated rest break after trial of mobility, min fatigue noted. ADDITIONAL ACTIVITIES:  HAND ASSESSMENT    Hand Dominance: Right  Left Hand Strength -  (lbs)  Handle Setting 2: 8,10,8; avg- 8.6 #  Right Hand Strength -  (lbs)  Handle Setting 2: 32,26,36; avg- 31.3#  Fine Motor Skills  Left 9-Hole Peg Test: Impaired  Left 9 Hole Peg Test Time (secs): 185 (Pt able to place 3 pegs within trial)  Right 9-Hole Peg Test: Functional  Right 9 Hole Peg Test Time (secs): 44       ASSESSMENT:     Activity Tolerance:  Patient tolerance of  treatment: good. Discharge Recommendations: Continue to assess pending progress, Recommend Inpatient Rehabilitation and Patient would benefit from continued OT at discharge  Equipment Recommendations: Equipment Needed: Yes  Other: Monitor pending progress  Plan: Times per week: 6x  Current Treatment Recommendations: Balance Training, Self-Care / ADL, Safety Education & Training, ROM, Strengthening, Neuromuscular Re-education, Functional Mobility Training, Endurance Training, Cognitive Reorientation, Equipment Evaluation, Education, & procurement, Patient/Caregiver Education & Training    Patient Education  Patient Education: Hemibody Awareness/Attention, Importance of Increasing Activity and hand assessment, and safety with transfers.     Goals  Short term goals  Time Frame for Short term goals: until discharge  Short term goal 1: Pt will tolerate 8-10 min EOB ADL task with 0>CGA for sitting balance  Short term goal 2: Pt will tolerate stand-pivot t/fs to various surfaces including BSC with min A x 1  Short term goal 3: Pt will tolerate standing 1 min with min A for increased ease of toileting routine  Short term goal 4: Pt will tolerate LUE AROM/AAROM/WB for increased function for returning to grooming & dressing tasks  Short term goal 5: Pt will tolerate further assessment of functional ambulation by OTR when appropriate  Long term goals  Time Frame for Long term goals : No LTG set d/t short ELOS    Following session, patient left in safe position with all fall risk precautions in place.

## 2021-10-26 NOTE — PROGRESS NOTES
[address] [place] [address]  Modified Barium Swallow    SLP Individual Minutes  Time In: 8838  Time Out: 4446  Minutes: 14  Timed Code Treatment Minutes: 0 Minutes     Date: 10/26/2021  Patient Name: Tabitha Sofia      CSN: [id number]   : 1945  (67 y.o.)  Gender: male   Referring Physician:  Saralee Cranker, APRN - CNP  Diagnosis: Acute CVA  Secondary Diagnosis: Dysphagia  Precautions: Aspiration precautions, fall risk  History of Present Illness/Injury: Patient admitted to [facility] with above med dx; please refer to physician H&P for full details. Per chart review, \"70 y.o. male with pmhx of COPD. Htn, CKD, tobacco use who presents with acute onset left side weakness and facial droop, LKW 9am last night. Pt reports waking up from sleeping on the cough this AM and his left leg gave way; he was unable to stand up and fell without hitting his head or losing consciousness. Initial NIHSS was 2\". ST consulted to complete MBS to determine swallow function and establish POC. Impression       1. Acute small to moderate-sized infarct in the right corona radiata. 2. Old lacunar infarcts in the left subinsular region and left corona radiata with the left corona radiata lesions having appeared in the interval since 2019.    3. Moderate patchy areas of T2/FLAIR hyperintensity in the supratentorial white matter and central ye likely representing sequelae of chronic microangiopathy old lacunar infarcts.         has a past medical history of Acute coronary syndrome ([facility] [address] Arrhythmia, Arteriosclerotic heart disease (ASHD), CHF (congestive heart failure) (HCC), Chills, COPD (chronic obstructive pulmonary disease) (HCC), Dizziness - light-headed, Dysphagia, Emphysema, Esophagitis, Fatigue, Fever, GERD (gastroesophageal reflux disease), History of chest pain, History of tinnitus, History of tobacco abuse, Hyperlipidemia, Hypertension, Irregular heart beat, Lightheadedness, MI (myocardial infarction) ([address] Night sweats, Noncompliance with medication regimen, Pneumonia, Renal failure syndrome, Ringing in the ears, SOB (shortness of breath) on exertion, and Weight loss. Current Diet: NPO    Pain: No pain reported. SUBJECTIVE:  Patient with arrival to flour suite in bed positioning. Patient with positive participation throughout assessment; however limited verbal responses given mentation.     OBJECTIVE:    Respiratory Status:  Nasal Canula    Behavioral Observation:  Alert    PATIENT WAS EVALUATED USING:  Thin barium via spoon, mildy thick and moderately thick barium via spoon and cup, and puree    ORAL PREPARATION PHASE:  Impaired:  Uncoordinated Mastication, Decreased Bolus Control, Decreased Bolus Formation and Loss of Bolus from Lips/Anterior Spillage    ORAL PHASE: Residue in the Anterior Sulcus, Residue in the Lateral Sulcus - Right, Residue in the Lateral Sulcus - Left, Uncoordinated AP Movement, Slow AP Movement, Decreased Lingual Elevation and Uncontrolled Bolus/Diffuse Fall Over Tongue Base     ORAL PHASE TORI SCORE: (Dysphagia outcome and severity scale)  2 = Moderately Severe Dysphagia - Tolerates at least one consistency safely with total use of strategies - Severe oral residue and unable to clear or needs multiple cues - Non-oral nutrition    PHARYNGEAL PHASE:  Impaired: Delayed Swallow, Decreased Airway Protection, Decreased Epiglottic Inversion, Decreased Hyolaryngeal Elevation, Decreased Tongue Based Retraction, Residue in the Valleculae, Residue in the Pyriform Sinus, Residue Along the Posterior Pharyngeal Wall and Residue Along the Ariepiglottic Folds     PHARYNGEAL PHASE TORI SCORE: (Dysphagia outcome and severity scale)  2 = Moderately Severe Dysphagia - Tolerates at least one consistency safely with total use of strategies - Non-oral nutrition - Severe residue unable to clear, Aspiration with two or more consistencies with no cough, Aspiration with one or more consistency, no cough and airway penetration to the vocal folds with one or more consistency, no cough    EVIDENCE FOR LARYNGEAL PENETRATION AND/OR ASPIRATION:  Laryngeal penetration evident with thin barium, mildly thick, and honey thick barium  Audible aspiration evident with thin liquid and mildly thick barium  Silent aspiration evident with mildly thick barium    PENETRATION-ASPIRATION SCALE (PAS): Thin Liquids: 7 = Material enters the airway, passes below the vocal folds, and is not ejected from the trachea despite effort  Mildly Thick Liquids:  8 = Material enters the airway, passes below the vocal folds, and no effort is made to eject  Moderately Thick Liquids: 6 = Material enters the airway, passes below the vocal folds, and is ejected into the larynx or out of the airway  Puree:  1 = Material does not enter the airway    ESOPHAGEAL PHASE:   No significant findings    ATTEMPTED TECHNIQUES:  Small Bolus Size Effective For limited trials of mild and moderately thick    Straw Not Attempted    Cup Effective For limited trials of mild and moderately thick    Chin Tuck Not Attempted    Head Turn Not Attempted    Spoon Presentations Ineffective    Volitional Cough Not Attempted    Spontaneous Cough Ineffective           DIAGNOSTIC IMPRESSIONS:  Patient presents with moderately-severe oropharyngeal phases evidenced by clinical findings from instrumental evaluation. Oral phase highly uncoordinated; patient demonstrated decreased bolus control, incoordination of manipulation of bolus, decreased bolus formation, and anterior and lateral loss of bolus from oral cavity. Patient demonstrated some awareness of bolus spilling from his oral cavity, but unable to improve despite cues. With puree trial, patient demonstrated a bolus hold and required ST cues to swallow.   Patient with gross premature spillage/phayrngeal entry to the level of the valleculae, lateral channels, and pyriform regions of all liquids trials. Swallow onset moderately delayed; patient demonstrated decreased TBR, hyolaryngeal elevation, epiglottic inversion, thyrohyoid approximation, anterior excursion/protraction, and pharyngeal constriction, with moderate residuals in the oral cavity, valleculae, lateral channels, and pyriform region. Patient with inability to fully clear stasis; ST with assistance of Yanker's suctioning to remove stasis from the oral cavity. Swallow function weakened as trials continued due to patient fatigue. The following airway invasion events are documented below: Thin barium via spoon: d/t poor control of bolus, gross+deep tracheal aspiration occurring BEFORE & DURING the swallow with immediate coughing with no success of ejecting viscous bolus. Mildly thick barium via spoon and cup: patient with gross+deep laryngeal penetration occurring BEFORE and DURING the swallow with no success of ejecting bolus, eventually resulting silent tracheal aspiration from un cleared laryngeal penetration  Moderately thick barium via cup: patient demonstrated shallow penetration DURING the swallow with the ability to eject spontaneously      Patient at [facility] for aspiration given weakness/fatigue of swallow structures. ST recommends patient remain STRICT NPO; considerations for NG tube as source of nutrition. Patient not safe to consume anything PO due to aforementioned aspiration events demonstrated in MBS. Will prioritize dysphagia interventions with conservative PO trials with ST ONLY (Moderately thick and puree) as schedule permits, potentially completing repeat instrumental in x1 week timeframe, however, is largely dependent upon cognitive status due to recent CVA and engagement in dysphagia therapy.   *post evaluation, patient without respiratory distress upon leaving room; DAISY Salcedo notified re: clinical findings and recommendations from the assessment; verbal receptiveness noted        Diet Recommendations:  NPO  Strategies:  Recommend NPO   Rehabilitation Potential: good     EDUCATION:  Learner: Patient  Education:  Reviewed results and recommendations of this evaluation, Reviewed ST goals and Plan of Care and Reviewed recommendations for follow-up  Evaluation of Education: Eliza Post understanding, Needs further instruction, No evidence of learning and Family not present    PLAN:  Skilled SLP intervention on acute care 3-5 x per week or until goals met and/or pt plateaus in function. Specific interventions for next session may include: dysphagia management. PATIENT GOAL:    Did not state. Will further assess during treatment. SHORT TERM GOALS:  Short-term Goals  Timeframe for Short-term Goals: 2 weeks  Goal 1: Patient will complete conservative PO trials (moderately thick and puree) at bedside with ST ONLY to determine baseline swallow function and/or readiness for completion of repeat instrumental.  Goal 2: Staff will exhibit return demonstration for completion of comprehensive oral care procedural analysis to maximize oral integrity and to reduce potential bacteria colonization. Goal 3: Patient will complete oropharyngeal strengthening exercises (labial/lingual ROM/strength, effortful, meghan, and CTAR) 5-10x each with goof success to improve phayrngeal strength  Goal 4: Complete full speech/language/cognitive evaluation to further develop goals per POC s/t acute intracranial findings. LONG TERM GOALS:  No LTG established due to short ELOS. HIEN Greenwood., Student Intern  Allyn Silverio MA., Jonathon Almanza / [phone]

## 2021-10-26 NOTE — PROGRESS NOTES
Iv infusing, IV dressing clean, dry, intact. Patient in chair with chair alarm on. Pathway clear, denies need for toileting. Denies pain. Bedside table and call light within reach. Will continue to monitor.

## 2021-10-26 NOTE — PROGRESS NOTES
No change in Reassessment from shift assessment. Patient in bed resting with eyes closed. Pathway clear, denies pain and denies need for toileting. Bedside table and call light within reach. Iv infusing, IV site clean, dry, intact. Patient denies any needs at this time, will continue to monitor.

## 2021-10-26 NOTE — PROGRESS NOTES
Progress note      [facility]             Patient:  Montserrat Jensen  YOB: 1945    MRN: [id number]   Acct:  [de-identified]   [id number]  Primary Care Physician: Kristie Pollock MD    Admit Date: 10/25/2021           Subjective: Noted pt failed MBS. Pt seems weaker today than yesterday.       Objective:      Physical Exam:    Vitals:Patient Vitals for the past 24 hrs:   BP Temp Temp src Pulse Resp SpO2   10/26/21 1230 (!) 166/78 98.3 °F (36.8 °C) Oral 79 20 94 %   10/26/21 0800 139/74 97.6 °F (36.4 °C) Oral 76 20 97 %   10/26/21 0315 (!) 153/88 98.6 °F (37 °C) Oral 82 18 95 %   10/25/21 2339 (!) 160/98 99.4 °F (37.4 °C) Oral 83 18 99 %   10/25/21 1948 138/79 98.6 °F (37 °C) Oral 76 16 97 %   10/25/21 1621 (!) 151/75 97.7 °F (36.5 °C) Oral 67 18 96 %     Weight: Weight: 119 lb 0.8 oz (54 kg)     24 hour intake/output:    Intake/Output Summary (Last 24 hours) at 10/26/2021 1418  Last data filed at 10/26/2021 0803  Gross per 24 hour   Intake 1220.7 ml   Output    Net 1220.7 ml       General appearance - alert, well appearing, and in no distress  Eyes - pupils equal and reactive, extraocular eye movements intact  Nose - normal and patent, no erythema, discharge or polyps  Mouth - mucous membranes moist, pharynx normal without lesions  Neck - supple, no significant adenopathy  Lymphatics - no palpable lymphadenopathy, no hepatosplenomegaly  Chest - Diminished breath sounds BL  Distant Breath Sounds: No  Heart - normal rate, regular rhythm, normal S1, S2, no murmurs, rubs, clicks or gallops  Abdomen - soft, nontender, nondistended, no masses or organomegaly  Obese: No; Protuberant: No  Neurological - alert, oriented, mild dysarthria, mild left facial droop, left side weakness upper and lower extremity, with left pronator drift   Musculoskeletal - no joint tenderness, deformity or swelling  Extremities - peripheral pulses normal, no pedal edema, no clubbing or cyanosis  Skin - normal coloration and turgor, no rashes, no suspicious skin lesions noted  Review of Labs and Diagnostic Testing:    CBC:   Recent Labs     10/26/21  0339   WBC 10.3   HGB 13.8*   HCT 42.6   .2*        BMP:   Recent Labs     10/26/21  0339      K 4.4      CO2 19*   BUN 19   CREATININE 0.9   CALCIUM 8.9   GLUCOSE 79     PT/INR:   Recent Labs     10/25/21  1042   INR 1.05     APTT:   Recent Labs     10/25/21  1042   APTT 24.5      Lipids:   Recent Labs     10/25/21  1042   ALKPHOS 105   ALT 12   AST 18   BILITOT 0.4   LABALBU 4.5     Troponin:   Recent Labs     10/25/21  2141   TROPONINT 0.017*        Imaging:  [unfilled]    EKG:      Diet: Diet NPO        Data:   Scheduled Meds: Scheduled Meds:   atorvastatin  40 mg Oral Daily    isosorbide mononitrate  30 mg Oral Daily    [Held by provider] metoprolol tartrate  25 mg Oral BID    pantoprazole  40 mg Oral QAM AC    ranolazine  1,000 mg Oral BID    sodium chloride flush  5-40 mL IntraVENous 2 times per day    enoxaparin  40 mg SubCUTAneous Daily    nicotine  1 patch TransDERmal Daily    budesonide-formoterol  2 puff Inhalation BID    And    tiotropium  2 puff Inhalation Daily    clopidogrel  75 mg Oral Daily    aspirin  300 mg Rectal Daily     Continuous Infusions:   sodium chloride      sodium chloride 75 mL/hr at 10/26/21 0319     PRN Meds:.albuterol, sodium chloride flush, sodium chloride, acetaminophen **OR** acetaminophen, promethazine **OR** ondansetron  Continuous Infusions:   sodium chloride      sodium chloride 75 mL/hr at 10/26/21 0319         Assessment/Plan   1. Acute CVA  a. Neuro following  b. Cont daily ASA; Neuro switched Effient to Plavix after discussion with cardiology   c. Await echo to be read  d. Neuro checks  e. Cont statin  f. NS at 75/hr   g. Tobacco cessation  h. Pt failed MBS; NGT ordered for meds and will order TF   2. Elevated trops  a.  Trended stable  b. No chest pain  3. HTN  a. Cont home BP meds with higher parameters to allow for permissive htn  4. HLD  a. Cont Lipitor   5. Tobacco use  a. Tobacco cessation education provided  b. Nicotine patch ordered   6. CKD  a. Monitor  b. Better than baseline s/p contrast   7. PTOT/SLP  8. DVT prophylaxis   a. Lovenox     IRP has been consulted. Assessment and plan of care discussed with supervising physician, Dr Justice Feng. Electronically signed by SHARON Arzola CNP on 10/26/2021 at 2:18 PM    I have discussed the case, including pertinent history and exam findings with the NP. I have seen and examined the patient and the key elements of the encounter have been performed by me. I agree with the assessment, plan and orders as documented by the NP.     Electronically signed by John Kellogg MD on 10/26/2021 at 3:05 PM

## 2021-10-26 NOTE — PROGRESS NOTES
Comprehensive Nutrition Assessment    Type and Reason for Visit:  Initial (Verbal consult to eval for malnutrition, NPO d/t dysphagia)    Nutrition Recommendations/Plan:   -If pt remains NPO d/t dysphagia, recommend tubefeeding initiation: Jevity 1.5 at 15 ml/hr, increase 10 ml/hr every 4 hours as tolerated to goal of 45 ml/hr~ 1620 kcals, 69 grams protein, 233 grams CHO, 23 grams fiber, 821 mls water in 1080 mls volume. -Recommend consider 150 mls free water flush every 6 hours when off IVF's ~ 1680 mls total volume/1421 mls total water per 24 hours/day. -Diet recommendations per SLP. Nutrition Assessment:    Pt. moderately malnourished AEB criteria as listed below. At risk for further nutrition compromise r/t NPO d/t dysphagia, admit with acute CVA,  and underlying medical condition (hx: smoking, COPD, HTN, non-compliance with medical therapy, GERD, HLD, MI, CHF, pneumonia, weight loss, dysphagia). Nutrition recommendations/interventions as per above. Malnutrition Assessment:  Malnutrition Status: Moderate malnutrition    Context:  Chronic Illness     Findings of the 6 clinical characteristics of malnutrition:  Energy Intake:  7 - 75% or less estimated energy requirements for 1 month or longer  Weight Loss:   (No previous EMR weights, pt states~ 15% loss unplanned loss over the last year)     Body Fat Loss:  1 - Mild body fat loss Triceps, Fat Overlying Ribs   Muscle Mass Loss:   (mdoerate muscle mass loss) Temples (temporalis), Clavicles (pectoralis & deltoids), Thigh (quadraceps), Calf (gastrocnemius)  Fluid Accumulation:  No significant fluid accumulation     Strength:  Not Performed    Estimated Daily Nutrient Needs:  Energy (kcal):  7129-6822 kcals (25-30 kcals/kg/day); Weight Used for Energy Requirements:   (54 kg)     Protein (g):  65 grams or more (1.2 grams/kg/day); Weight Used for Protein Requirements:   (54 kg)        Fluid (ml/day):  0799-3280 mls (25-30 mls/kg/day);  Method Used for Fluid Requirements:         Nutrition Related Findings:   Pt seen, just had returned from [facility], stated \"I failed\". Having trouble managing his own secretions, using suctioning. States decline in appetite/intake for the last 1 year d/t \"not doing much\" with unplanned weight loss. Labs noted. Rx: lipitor, protonix, IVF at 75 ml/hr, zofran, phenergan. Wounds:  None       Current Nutrition Therapies:    Diet NPO    Anthropometric Measures:  · Height: 5' 2\" (157.5 cm)  · Current Body Weight: 119 lb 0.8 oz (54 kg) (10/26/21 no edema)   · Admission Body Weight: 119 lb 0.8 oz (54 kg) (10/25/21 no edema)    · Usual Body Weight:  (~ 140# per pt. No previous EMR weights in the last 1 year)     · Ideal Body Weight: 118 lbs;   · BMI: 21.8  · Adjusted Body Weight:  ; No Adjustment   · BMI Categories: Normal Weight (BMI 18.5-24. 9)       Nutrition Diagnosis:   · Moderate malnutrition, In context of chronic illness related to inadequate protein-energy intake as evidenced by poor intake prior to admission, moderate muscle loss, mild muscle loss      Nutrition Interventions:   Food and/or Nutrient Delivery:  Continue NPO (Consider tubefeeding start.)  Nutrition Education/Counseling:  Education initiated (Discussed swallowing study, awaiting plan of care)   Coordination of Nutrition Care:  Continue to monitor while inpatient, Speech Therapy    Goals:  Pt will receive EN to meet 75% or more of estimated nutrient needs while NPO       Nutrition Monitoring and Evaluation:   Behavioral-Environmental Outcomes:  None Identified   Food/Nutrient Intake Outcomes:  Diet Advancement/Tolerance, IVF Intake  Physical Signs/Symptoms Outcomes:  Biochemical Data, Chewing or Swallowing, GI Status, Fluid Status or Edema, Nutrition Focused Physical Findings, Weight, Skin     Discharge Planning:     Too soon to determine     Electronically signed by Amelie Chinchilla RD, LD on 10/26/21 at 9:58 AM EDT    Contact: [phone]

## 2021-10-26 NOTE — CARE COORDINATION
10/26/21, 7:29 AM EDT  DISCHARGE PLANNING EVALUATION:    Ale Neely       Admitted: 10/25/2021/ [address]  day: 1   Location: [address] [place] [address]-A Reason for admit: Acute cerebrovascular accident (CVA) ([address] [I63.9]  Cerebrovascular accident (CVA), unspecified mechanism ([address] [I63.9]   PMH:  has a past medical history of Acute coronary syndrome ([address] Arrhythmia, Arteriosclerotic heart disease (ASHD), CHF (congestive heart failure) ([address] Chills, COPD (chronic obstructive pulmonary disease) ([address] Dizziness - light-headed, Dysphagia, Emphysema, Esophagitis, Fatigue, Fever, GERD (gastroesophageal reflux disease), History of chest pain, History of tinnitus, History of tobacco abuse, Hyperlipidemia, Hypertension, Irregular heart beat, Lightheadedness, MI (myocardial infarction) ([address] Night sweats, Noncompliance with medication regimen, Pneumonia, Renal failure syndrome, Ringing in the ears, SOB (shortness of breath) on exertion, and Weight loss. Procedure:   MRI Brain WO Contrast    Impression:           1. Acute small to moderate-sized infarct in the right corona radiata. 2. Old lacunar infarcts in the left subinsular region and left corona radiata with the left corona radiata lesions having appeared in the interval since 2019. 3. Moderate patchy areas of T2/FLAIR hyperintensity in the supratentorial white matter and central ye likely representing sequelae of chronic microangiopathy old lacunar infarcts. CTA Neck WO Contrast   Impression:            1. Stable mural calcifications in the cavernous and clinoid segments without flow-limiting focal stenosis or aneurysm in the intracranial circulation. 2. Calcified and noncalcified mural plaque at the bilateral carotid bulbs with 51% stenosis on the right and no hemodynamically significant stenosis on the left by NASCET criteria. 3. Focal areas of moderate stenosis at the takeoff and proximal V2 segments of the dominant right vertebral artery.    4. Mild stenosis at the mid segment of left common carotid artery. ECHO    Barriers to Discharge:  From ED, Troponin 0.017, PT/OT/ST, neuro checks. Albuterol nebs, Lipitor, Plavix, Lovenox, Protonix. PCP: Janet Ovalle MD  Readmission Risk Score: 11.9%    Patient Goals/Plan/Treatment Preferences: Met with Gustavo Romo. He currently lives at home alone. He has a daughter that lives close by. Discharge plan is unsure at this time. Will follow PT/OT/ST, Physiatry recommendations for possible IP Rehab. Transportation/Food Security/Housekeeping Addressed:  No issues identified.

## 2021-10-26 NOTE — PROGRESS NOTES
Neurology Progress Note    Date:10/26/2021       [id number]  Patient [facility] Jamari Yan     YOB: 1945     Age:76 y.o. Requesting Physician: Noni Watts MD     Reason for Consult:  Evaluate for stroke alert    HPI: Chu Membreno who is a 67 y.o. male with a history of HTN, HLD, CHF, COPD, and current smoker (1PPD) who presents to [address] [place] [address] with complaints of left sided weakness. He reports he went to bed around 9pm and woke up this morning, after sleeping on the cough overnight, he attempted to stand up and felt that his legs buckled, he then fell between his couch and coffee table, reports he did not hit his head. He crawled into the kitchen and called 911, as he was unable to get up due to the left sided weakness. He denies any history of strokes. CTH shows no acute intracranial abnormality, CT angiogram head and neck without any hemodynamically significant stenosis, MRI does show acute small to moderate sized infarct in the right corona radiata. He was not a candidate for tPA due to LKW and no LVO on imaging. He denies any lightheadedness, paresthesias, dizziness, vision changes, or speech difficulty. Time of symptoms onset/last time seen at baseline:  10/24/2021 2100  Time of stroke alert:  1021  Time of Neurology arrival:  1022  Vascular risk factors:  HLD, HTN, smoker  Initial Glucose: 104  Old deficits from prior stroke:  n/a  BP in ED:  143/91  Initial NIHSS: 2  Modified Teller Score upon admission:  0  IV tPA administerd:  No  Time of Initial Imaging Read: ~1059     Interval history 10/26/2021:  Patient reports an okay night, did not sleep well. Still has the left sided weakness. Denies any dizziness, lightheadedness, paraesthesias. He mentioned that he did not pass his MBS. Review of Systems   Review of Systems   Neurological: Positive for facial asymmetry, speech difficulty (slurred speech), weakness (left sided) and headaches.  Negative for dizziness, seizures, syncope, light-headedness and numbness. Medications   Scheduled Meds:    atorvastatin  40 mg Oral Daily    isosorbide mononitrate  30 mg Oral Daily    [Held by provider] metoprolol tartrate  25 mg Oral BID    pantoprazole  40 mg Oral QAM AC    ranolazine  1,000 mg Oral BID    sodium chloride flush  5-40 mL IntraVENous 2 times per day    enoxaparin  40 mg SubCUTAneous Daily    nicotine  1 patch TransDERmal Daily    budesonide-formoterol  2 puff Inhalation BID    And    tiotropium  2 puff Inhalation Daily    clopidogrel  75 mg Oral Daily    aspirin  300 mg Rectal Daily     Continuous Infusions:    sodium chloride      sodium chloride 75 mL/hr at 10/26/21 0319     PRN Meds: albuterol, sodium chloride flush, sodium chloride, acetaminophen **OR** acetaminophen, promethazine **OR** ondansetron    Past History    Past Medical History:   has a past medical history of Acute coronary syndrome ([facility] Arrhythmia, Arteriosclerotic heart disease (ASHD), CHF (congestive heart failure) ([facility] Chills, COPD (chronic obstructive pulmonary disease) ([facility] Dizziness - light-headed, Dysphagia, Emphysema, Esophagitis, Fatigue, Fever, GERD (gastroesophageal reflux disease), History of chest pain, History of tinnitus, History of tobacco abuse, Hyperlipidemia, Hypertension, Irregular heart beat, Lightheadedness, MI (myocardial infarction) ([facility] Night sweats, Noncompliance with medication regimen, Pneumonia, Renal failure syndrome, Ringing in the ears, SOB (shortness of breath) on exertion, and Weight loss. Social History:   reports that he has been smoking cigarettes. He started smoking about 45 years ago. He has a 40.00 pack-year smoking history. He has never used smokeless tobacco. He reports that he does not drink alcohol and does not use drugs.      Family History:   Family History   Problem Relation Age of Onset    Heart Disease Mother     Cancer Mother     Heart Disease Father     Cancer Father     Cancer Sister    Lonnie Velez Prostate Cancer Neg Hx     Colon Cancer Neg Hx     Colon Polyps Neg Hx        Physical Examination        NIH Stroke Scale  1a Level of consciousness 0=alert; keenly responsive   1b LOC questions 0=Performs both tasks correctly   1c LOC commands 0=Performs both tasks correctly   2 Best Gaze 0=normal   3 Visual 0=No visual loss   4 Facial Palsy 1=Minor paralysis (flattened nasolabial fold, asymmetric on smiling)   5a Motor left arm 0=No drift, limb holds 90 (or 45) degrees for full 10 seconds   5b Motor right arm 0=No drift, limb holds 90 (or 45) degrees for full 10 seconds   6a Motor left leg 0=No drift, limb holds 90 (or 45) degrees for full 10 seconds   6b Motor right leg 0=No drift, limb holds 90 (or 45) degrees for full 10 seconds   7 Limb Ataxia 0=Absent   8 Sensory 1=Mild to moderate sensory loss; patient feels pinprick is less sharp or is dull on the affected side; there is a loss of superficial pain with pinprick but patient is aware She is being touched   9 Best Language 0=No aphasia, normal   10 Dysarthria 0=Normal   11 Extinction and Inattention 0=No abnormality   TOTAL  2   Time NIHSS performed: 10:18 AM    Vitals:  /74   Pulse 76   Temp 97.6 °F (36.4 °C) (Oral)   Resp 20   Ht 5' 2\" (1.575 m)   Wt 119 lb 0.8 oz (54 kg)   SpO2 97%   BMI 21.77 kg/m²   Temp (24hrs), Av.4 °F (36.9 °C), Min:97.6 °F (36.4 °C), Max:99.4 °F (37.4 °C)      I/O (24Hr): Intake/Output Summary (Last 24 hours) at 10/26/2021 1018  Last data filed at 10/26/2021 0803  Gross per 24 hour   Intake 1220.7 ml   Output    Net 1220.7 ml       Physical Exam  Constitutional:       Appearance: He is ill-appearing. HENT:      Head: Normocephalic and atraumatic. Right Ear: External ear normal.      Left Ear: External ear normal.      Nose: Nose normal.      Mouth/Throat:      Mouth: Mucous membranes are moist.   Eyes:      Extraocular Movements: EOM normal.      Pupils: Pupils are equal, round, and reactive to light. Right trapezius strength: normal  Left trapezius strength: weak    CN XII   CN XII normal.   Tongue deviation: none    Motor Exam   Muscle bulk: normal  Overall muscle tone: normal  Right arm pronator drift: absent  Left arm pronator drift: absent    RUE: 5/5  LUE: 4-/5  RLE: 4+/5  LLE: 4-/5     Sensory Exam   Light touch normal.     Gait, Coordination, and Reflexes     Coordination   Finger to nose coordination: normal  Heel to shin coordination: normal    Tremor   Resting tremor: absent  Intention tremor: absent  Action tremor: absent    Reflexes   Right brachioradialis: 2+  Left brachioradialis: 2+  Right patellar: 2+  Left patellar: 2+  Right achilles: 2+  Left achilles: 2+  Gait not formally tested        Labs/Imaging/Diagnostics   Labs:  CBC:  Recent Labs     10/25/21  1042 10/26/21  0339   WBC 9.0 10.3   RBC 4.27* 4.01*   HGB 14.6 13.8*   HCT 44.0 42.6   .0* 106.2*    154     CHEMISTRIES:  Recent Labs     10/25/21  1042 10/26/21  0339    138   K 4.3 4.4    105   CO2 24 19*   BUN 26* 19   CREATININE 1.2 0.9   GLUCOSE 105 79     PT/INR:  Recent Labs     10/25/21  1042   INR 1.05     APTT:  Recent Labs     10/25/21  1042   APTT 24.5     LIVER PROFILE:  Recent Labs     10/25/21  1042   AST 18   ALT 12   BILITOT 0.4   ALKPHOS 105       Imaging Last 24 Hours:  CT HEAD WO CONTRAST    Result Date: 10/25/2021  PROCEDURE: CT HEAD WO CONTRAST CLINICAL INFORMATION: Stroke Symptoms. COMPARISON: CT head dated 10/10/2019. TECHNIQUE: Noncontrast 5 mm axial images were obtained through the brain. Sagittal and coronal reconstructions were created. All CT scans at this facility use dose modulation, iterative reconstruction, and/or weight-based dosing when appropriate to reduce radiation dose to as low as reasonably achievable. FINDINGS: There is stable mild volume loss.  There are stable moderate confluent and patchy areas of hypodensity in the periventricular, subcortical deep white matter as posterior cerebral and superior cerebellar arteries are patent without focal abnormality identified. Dural  venous sinuses appear patent without focal filling defect. No focal areas of abnormal parenchymal enhancement are identified. CTA NECK: There is a bovine type arch. There is mural calcification in the brachiocephalic and left subclavian arteries without flow-limiting stenosis. There is mild stenosis at the mid segment of the left common carotid artery. There is calcified and noncalcified  mural plaque at the carotid bifurcations with extension into the proximal right internal carotid artery. On the right side, the narrowest luminal diameter is 1.7 mm  with a point more distal, where the walls are parallel, measuring 3.5 mm. On the left side there is no hemodynamically significant stenosis. Cervical segments of internal carotid arteries are otherwise patent without focal stenosis. The right vertebral artery is mildly dominant. There is moderate stenosis at the takeoff of the right vertebral artery and proximal V2 segment. There are areas of mild stenosis more distally in the proximal and mid V2 segments on the right. Vertebral arteries are otherwise patent without other focal areas of stenosis. There is streak artifact from dental amalgam which partially obscures oral and perioral soft tissues. Given this caveat, mucosal surfaces of the aerodigestive tract are symmetric without focal nodular thickening or visualized mass. No cervical lymphadenopathy is identified. The parotid, submandibular and thyroid glands are unremarkable. There are moderate emphysematous changes of the visualized portions of the lungs. There are stable degenerative changes of the cervical spine. 1. Stable mural calcifications in the cavernous and clinoid segments without flow-limiting focal stenosis or aneurysm in the intracranial circulation.  2. Calcified and noncalcified mural plaque at the bilateral carotid bulbs with 51% stenosis on the right and no hemodynamically significant stenosis on the left by NASCET criteria. 3. Focal areas of moderate stenosis at the takeoff and proximal V2 segments of the dominant right vertebral artery. 4. Mild stenosis at the mid segment of left common carotid artery. **This report has been created using voice recognition software. It may contain minor errors which are inherent in voice recognition technology. ** Final report electronically signed by Dr. Johann Cole MD on 10/25/2021 11:42 AM    XR CHEST PORTABLE    Result Date: 10/25/2021  PROCEDURE: XR CHEST PORTABLE CLINICAL INFORMATION: 76-year-old male with facial droop. Code stroke. COMPARISON: Radiograph 6/22/2020. TECHNIQUE: AP upright view of the chest was obtained. FINDINGS: There is scarring/atelectasis at the left lung base. There is eventration of the left hemidiaphragm. The cardiac silhouette and pulmonary vasculature are within normal limits. There is some calcified plaque in the arch of the aorta. There is no significant pleural effusion or pneumothorax. Visualized portions of the upper abdomen are within normal limits. The osseous structures are intact. No acute fractures or suspicious osseous lesions. There is no acute intrathoracic process. **This report has been created using voice recognition software. It may contain minor errors which are inherent in voice recognition technology. ** Final report electronically signed by Dr Dona Wallace on 10/25/2021 10:55 AM    MRI BRAIN WO CONTRAST    Result Date: 10/25/2021  PROCEDURE: MRI BRAIN WO CONTRAST INDICATION:  stroke-left sided weakness. COMPARISON: CT head from the same date and MRI brain dated 10/10/2019. TECHNIQUE: Multiplanar and multiple spin echo MRI images were obtained of the brain without contrast. FINDINGS: There is stable mild volume loss compared to prior MRI.  There is a small to moderate-sized area of restricted diffusion in the right corona radiata with associated T2/flair prolongation at the periphery of the area of restriction. No other focal areas of  restricted diffusion are present. There are moderate patchy areas of T2/FLAIR prolongation in the periventricular, subcortical and deep white matter and central ye without associated restricted diffusion, stable compared to prior MRI. No intra or extra-axial mass is identified. There is a prominent perivascular space in the right subinsular region. There is a focal area of encephalomalacia in the subinsular region on the left, stable compared to prior exam. There are also small focal areas of encephalomalacia in the left corona radiata which have appeared in the interval since 2019. The major vascular flow voids appear patent. Orbits are unremarkable. Paranasal sinuses are clear. There is trace fluid in the left mastoid air cells. 1. Acute small to moderate-sized infarct in the right corona radiata. 2. Old lacunar infarcts in the left subinsular region and left corona radiata with the left corona radiata lesions having appeared in the interval since 2019. 3. Moderate patchy areas of T2/FLAIR hyperintensity in the supratentorial white matter and central ye likely representing sequelae of chronic microangiopathy old lacunar infarcts. **This report has been created using voice recognition software. It may contain minor errors which are inherent in voice recognition technology. ** Final report electronically signed by Dr. Vinh Cat MD on 10/25/2021 12:33 PM        Assessment and Plan:        Hospital Problems         Last Modified POA    Acute cerebrovascular accident (CVA) ([address] 10/25/2021 Yes    Moderate malnutrition ([address] (Chronic) 10/26/2021 Yes          1.  Acute infarct right corona radiata   · MRI read above, shows new acute infarct in the right corona radiata  · CT angiogram head and neck without hemodynamically significant stenosis,  51% stenosis on the right carotid  · Cardiology ok to switch effient to plavix. Plavix 75mg added at this time  · Will order 300mg rectal ASA daily until sure patient is safe to swallow. He failed modified barium swallow study and will be getting an NG tube. Can switch or PO aspirin when indicated    NIHSS q shift   HgbA1C 6.1   Maintain normotension    2D Echo with bubble study, ordered   Continue Statin therapy for risk factor control   LDL 88, Goal 45-70   PT/OT/SLP Evaluation  -Patient failed modified barium swallow, NPO  -Will need alternative source for nutrition  -Plan is to place an NG tube   Maintain telemetry, monitor for atrial-fib   Maintain oxygenation saturation >94%   Start 0.9% Saline IV at 75 mL/hr   Monitor for infection   EKG NSR   Labs daily   SCDs and lovenox for DVT prophylaxis   CT head is needed if severe headache or altered mental status   Provide stroke education for individualized risk factors    Smoking cessation education for risk factor control   Neurology will sign off at this time. Please call with any questions or concerns     This case was discussed with Dr. Franc Beaulieu and he is in agreement with the assessment and plan.       Electronically signed by SHARON Mcelroy CNP on 10/26/21 at 10:20 AM EDT

## 2021-10-27 NOTE — PROGRESS NOTES
willing to walk in room and then back to bed. PAIN: 0/10: denies pain     Vitals: Vitals not assessed per clinical judgement, see nursing flowsheet    OBJECTIVE:  Bed Mobility:  Supine to Sit: Moderate Assistance, X 1, with head of bed raised, with rail, with verbal cues , with increased time for completion  Sit to Supine: Minimal Assistance, X 1, with head of bed flat, without rail, with verbal cues , with increased time for completion     Transfers:  Sit to Stand: [address], X 1, with increased time for completion, cues for hand placement  Stand to [facility], X 1, with increased time for completion, cues for hand placement    Ambulation:  Contact Guard Assistance, Minimal Assistance, X 1, with cues for safety, with increased time for completion  Distance: 10'  Surface: Level Tile  Device:Rolling Walker  Gait Deviations: Forward Flexed Posture, Slow Mandy, Decreased Step Length Bilaterally, Decreased Weight Shift Bilaterally, Decreased Gait Speed, Decreased Heel Strike Bilaterally, Wide Base of Support, Moderate Path Deviations, Unsteady Gait, increased difficulty managing walker required assist with sequencing, maximal verbal cues for sequencing and safety. Balance:  Static Sitting Balance:  Stand By Assistance  Static Standing Balance: Contact Guard Assistance      Functional Outcome Measures: Completed  AM-PAC Inpatient Mobility Raw Score : 16  AM-PAC Inpatient T-Scale Score : 40.78    ASSESSMENT:  Assessment: Patient progressing toward established goals. Activity Tolerance:  Patient tolerance of  treatment: good.       Equipment Recommendations:Equipment Needed: Yes  Mobility Devices: Cage Ganser: Rolling  Other: may need RW depending on d/c  Discharge Recommendations: Continue to assess pending progress and Recommend Inpatient Rehabilitation  Plan: Times per week: 6x N  Current Treatment Recommendations: Strengthening, Gait Training, Patient/Caregiver Education & Training, Stair training, Equipment Evaluation, Education, & procurement, Balance Training, Functional Mobility Training, Neuromuscular Re-education, Endurance Training, Home Exercise Program, Transfer Training, Positioning, Safety Education & Training    Patient Education  Patient Education: [employer], Transfers, Gait    Goals:  Patient goals : to get back to normal  Short term goals  Time Frame for Short term goals: by discharge  Short term goal 1: Pt will amb with LRAD with CGA for 20 feet to progress with overall mobility. Short term goal 2: Pt will complete transfers with CGA for safety with LRAD for support as needed to progress towards PLOF safely. Short term goal 3: Pt will demo CGA for bed mobility tasks with modifications as needed to prepare for transfers. Short term goal 4: Pt will complete 10-20 reps of ther ex to increase overall mobility. Long term goals  Time Frame for Long term goals : NA due to short ELOS    Following session, patient left in safe position with all fall risk precautions in place.     Lelo Serrano PT, DPT

## 2021-10-27 NOTE — PROGRESS NOTES
[address]  Occupational Therapy  Daily Note  Time:   Time In: 932  Time Out: 08  Timed Code Treatment Minutes: 28 Minutes  Minutes: 28          Date: 10/27/2021  Patient Name: Tip Landa,   Gender: male      Room: [facility]-A  MRN: [id number]  : 1945  (67 y.o.)  Referring Practitioner: Heidi HERRERA CNP  Diagnosis: acute CVA  Additional Pertinent Hx: Per ER note on 10/25/2021: 67 y.o. male with pmhx of COPD. Htn, CKD, tobacco use who presents with acute onset left side weakness and facial droop, LKW 9am last night. Pt reports waking up from sleeping on the cough this AM and his left leg gave way; he was unable to stand up and fell without hitting his head or losing consciousness. Initial NIHSS was 2. MRI of brain results: Acute small to moderate-sized infarct in the right corona radiata. Restrictions/Precautions:  Restrictions/Precautions: Fall Risk, General Precautions  Position Activity Restriction  Other position/activity restrictions: soft spoken, L LE weakness     SUBJECTIVE: Patient sleeping in bed upon arrival requiring minimal verbal stimulation to alert; agreeable to therapy this date. Patient demonstrates increased fatigue at end of session. PAIN: 8/10: Patient states \"I feel like I have the flu\" with body aches \"all over\"    Vitals: Oxygen: 95% RA    COGNITION: Slow Processing, Decreased Insight, Decreased Problem Solving, Decreased Safety Awareness, Decreased Arousal and Difficulty Following Commands    ADL:   Lower Extremity Dressing: Maximum Assistance. seated EOB in order to maribell B socks. BALANCE:  Sitting Balance:  Stand By Assistance, Contact Guard Assistance, X 1, with verbal cues , with increased time for completion. EOB x ~4min demonstrating occasional posterior lean requiring verbal/tactile cues for upright posture  Standing Balance: Contact Guard Assistance.  with RW, no LOB    BED MOBILITY:  Supine to Sit: Moderate Assistance, X 1, with head of bed raised, with rail, with verbal cues , with increased time for completion    Scooting: Moderate Assistance, X 1, with head of bed flat, with rail, with verbal cues , with increased time for completion  to EOB    TRANSFERS:  Sit to Stand:  Minimal Assistance, with increased time for completion, cues for hand placement. verbal cues for transitioning LUE to RW  Stand to Sit: Contact Guard Assistance, X 1, with increased time for completion, cues for hand placement, with verbal cues, to/from chair with arms. body positioning and hand placement; demonstrating understanding    FUNCTIONAL MOBILITY:  Assistive Device: Rolling Walker  Assist Level: Moderate Assistance. (assist mobility/balance, + assist for management of walker)  Distance: x 3feet EOB to bedside chair  Increased verbal cues for RW management; slow pace, short/shuffled gait with LLE   *Mobility assessed by OTR    ADDITIONAL ACTIVITIES:  Patient completed LUE ROM/strengthening exercises with skilled education on HEP:  completed x10 reps x1 set with AAROM in all joints and all planes in order to improve UE strength and activity tolerance required for BADL routine and toilet / shower transfers. Patient tolerated good, requiring moderate rest breaks. Patient also required moderate verbal/visual cues for technique. ASSESSMENT:     Activity Tolerance:  Patient tolerance of  treatment: fair.        Discharge Recommendations: Recommend ECF with OT and Patient would benefit from continued OT at discharge  Equipment Recommendations: Equipment Needed: Yes  Other: Monitor pending progress  Plan: Times per week: 6x  Current Treatment Recommendations: Balance Training, Self-Care / ADL, Safety Education & Training, ROM, Strengthening, Neuromuscular Re-education, Functional Mobility Training, Endurance Training, Cognitive Reorientation, Equipment Evaluation, Education, & procurement, Patient/Caregiver Education & Training    Patient Education  Patient Education: Role of OT, Plan of Care, ADL's, IADL's, Home Exercise Program, Hemibody Awareness/Attention, Importance of Increasing Activity and Assistive Device Safety    Goals  Short term goals  Time Frame for Short term goals: until discharge  Short term goal 1: Pt will tolerate 8-10 min EOB ADL task with 0>CGA for sitting balance  Short term goal 2: Pt will tolerate stand-pivot t/fs to various surfaces including BSC with min A x 1  Short term goal 3: Pt will tolerate standing 1 min with min A for increased ease of toileting routine  Short term goal 4: Pt will tolerate LUE AROM/AAROM/WB for increased function for returning to grooming & dressing tasks  Short term goal 5: Pt will tolerate further assessment of functional ambulation by OTR when appropriate  Long term goals  Time Frame for Long term goals : No LTG set d/t short ELOS     Revised Short-Term Goals  Short term goals  Time Frame for Short term goals: until discharge  Short term goal 1: Pt will tolerate 8-10 min EOB ADL task with 0>CGA for sitting balance  Short term goal 2: Pt will tolerate stand-pivot t/fs to various surfaces including BSC with min A x 1  Short term goal 3: Pt will tolerate standing 1 min with min A for increased ease of toileting routine  Short term goal 4: Pt will tolerate LUE AROM/AAROM/WB for increased function for returning to grooming & dressing tasks  Short term goal 5: Pt will complete functional mobility to/from BR with Joleen of 1 to increase indep with accessing environment for ADLs. Long term goals  Time Frame for Long term goals : No LTG set d/t short ELOS      Following session, patient left in safe position with all fall risk precautions in place. Treatment session and note completed by KINGA Colunga.   Assessment and goal revision of daily note completed by Anthony Francisco OT.

## 2021-10-27 NOTE — PROGRESS NOTES
Pt is awake in bed. Pt is alert and oriented x4. Mucous membranes are dry and red. Speech is slurred. Pupils are equal, round, and reactive to light. No JVD present. Heart rate and rhythm is normal. Apical pulse is strong. Lung sounds are diminished in all lobes. Breathing is labored with symmetrical chest movement. Bowel sounds are hypoactive in all four quadrants. Abdomen is soft and flat, no distention present. Skin color appropriate for ethnicity. Skin is warm and dry on upper extremities with no abrasions present. Cap refill and skin turgor both less than 3 seconds. Hand grasp is strong in right, weak in left. Radial pulse is strong and equal, +3. Lower extremities are warm and dry. No edema present. Pedal pulses are moderate bilaterally, +2. Pedal push and pull is strong in right, weak in left. No numbness or tingling in extremities. Full sensation in all extremities. Weakness in left extremities d/t stroke. Full ROM in right extremities. Call light and bedside table within reach. Pt denies any needs. Mery Hayes RSCANDICE/SN.

## 2021-10-27 NOTE — PROGRESS NOTES
Comprehensive Nutrition Assessment    Type and Reason for Visit:  Reassess, Consult (tubefeeding ordering and management)    Nutrition Recommendations/Plan:   -Recommended tubefeeding: Jevity 1.5 goal 45 ml/hr.  -Recommend 150 mls free water flush every 6 hours when off IVF's.  -Diet per SLP    Nutrition Assessment:    Pt improving from a nutritional standpoint AEB tubefeedings initiated 10/26/21 and currently tolerating well at goal rate while NPO d/t dysphagia. Remains at risk for further nutritional compromise r/t moderate malnutrition, continued NPO status d/t dysphagia, admit with acute CVA,  and underlying medical condition (hx: smoking, COPD, HTN, non-compliance with medical therapy, GERD, HLD, MI, CHF, pneumonia, weight loss, dysphagia). Nutrition recommendations/interventions as per above. Malnutrition Assessment:  Malnutrition Status: Moderate malnutrition    Context:  Chronic Illness     Findings of the 6 clinical characteristics of malnutrition:  Energy Intake:  7 - 75% or less estimated energy requirements for 1 month or longer  Weight Loss:   (No previous EMR weights, pt states~ 15% loss unplanned loss over the last year)     Body Fat Loss:  1 - Mild body fat loss Triceps, Fat Overlying Ribs   Muscle Mass Loss:   (moderate muscle mass loss) Temples (temporalis), Clavicles (pectoralis & deltoids), Thigh (quadraceps), Calf (gastrocnemius)  Fluid Accumulation:  No significant fluid accumulation     Strength:  Not Performed    Estimated Daily Nutrient Needs:  Energy (kcal):  5058-3603 kcals (25-30 kcals/kg/day); Weight Used for Energy Requirements:   (54 kg)     Protein (g):  65 grams or more (1.2 grams/kg/day); Weight Used for Protein Requirements:   (54 kg)        Fluid (ml/day):  5383-8988 mls (25-30 mls/kg/day); Nutrition Related Findings:   Patient seen earlier this morning, sitting in bedside chair, rather sleepy, NGT placed 10/26/21 for tubefeedings.   NPO d/t dysphagia, SLP None Identified   Food/Nutrient Intake Outcomes:  Diet Advancement/Tolerance, Enteral Nutrition Intake/Tolerance, IVF Intake  Physical Signs/Symptoms Outcomes:  Biochemical Data, Chewing or Swallowing, GI Status, Fluid Status or Edema, Nutrition Focused Physical Findings, Weight, Skin     Discharge Planning:     Too soon to determine     Electronically signed by Rosi Tom RD, LD on 10/27/21 at 12:01 PM EDT    Contact: [phone]

## 2021-10-27 NOTE — PROGRESS NOTES
Pt is resting in bed. Vitals taken. Pt repositioned with pillows and used urinal.  Pt refused to get up to chair. Pt refused a bath. Pt denies any needs. Call light and bedside table within reach. Bobbi STAFFORD/.

## 2021-10-27 NOTE — PROGRESS NOTES
Progress note      [facility]             Patient:  Oni Valdez  YOB: 1945    MRN: [id number]   Acct:  [de-identified]   4A-05/005-A  Primary Care Physician: Rosemary Corona MD    Admit Date: 10/25/2021           Subjective: NGT in place; TF at goal. Pt to continue to work with therapies. No complaints.       Objective:      Physical Exam:    Vitals:  Patient Vitals for the past 24 hrs:   BP Temp Temp src Pulse Resp SpO2   10/27/21 1210 (!) 142/82 97.6 °F (36.4 °C) Axillary 85 20 96 %   10/27/21 0745 120/80 98.1 °F (36.7 °C) Axillary 83 18 93 %   10/27/21 0445 (!) 153/77 98.5 °F (36.9 °C) Oral 87 18 93 %   10/26/21 2318 (!) 154/89   98 18 90 %   10/26/21 2107 (!) 148/82 98.5 °F (36.9 °C) Oral 93 16 100 %   10/26/21 1515 (!) 164/77 98.3 °F (36.8 °C) Oral 88  95 %   10/26/21 1230 (!) 166/78 98.3 °F (36.8 °C) Oral 79 20 94 %     Weight: Weight: 119 lb 0.8 oz (54 kg)     24 hour intake/output:    Intake/Output Summary (Last 24 hours) at 10/27/2021 1227  Last data filed at 10/27/2021 1215  Gross per 24 hour   Intake 1091 ml   Output 100 ml   Net 991 ml       General appearance - alert, well appearing, and in no distress  Eyes - pupils equal and reactive, extraocular eye movements intact  Nose - normal and patent, no erythema, discharge or polyps  Mouth - mucous membranes moist, pharynx normal without lesions  Neck - supple, no significant adenopathy  Lymphatics - no palpable lymphadenopathy, no hepatosplenomegaly  Chest - Diminished breath sounds BL  Distant Breath Sounds: No  Heart - normal rate, regular rhythm, normal S1, S2, no murmurs, rubs, clicks or gallops  Abdomen - soft, nontender, nondistended, no masses or organomegaly, NGT in place  Obese: No; Protuberant: No  Neurological - alert, oriented, mild dysarthria, mild left facial droop, left side weakness upper and lower extremity, with left pronator drift Musculoskeletal - no joint tenderness, deformity or swelling  Extremities - peripheral pulses normal, no pedal edema, no clubbing or cyanosis  Skin - normal coloration and turgor, no rashes, no suspicious skin lesions noted  Review of Labs and Diagnostic Testing:    CBC:   Recent Labs     10/26/21  0339   WBC 10.3   HGB 13.8*   HCT 42.6   .2*        BMP:   Recent Labs     10/26/21  0339      K 4.4      CO2 19*   BUN 19   CREATININE 0.9   CALCIUM 8.9   GLUCOSE 79     PT/INR:   Recent Labs     10/25/21  1042   INR 1.05     APTT:   Recent Labs     10/25/21  1042   APTT 24.5      Lipids:   Recent Labs     10/25/21  1042   ALKPHOS 105   ALT 12   AST 18   BILITOT 0.4   LABALBU 4.5     Troponin:   Recent Labs     10/25/21  2141   TROPONINT 0.017*        Imaging:  [unfilled]    EKG:      Diet: Diet NPO  ADULT TUBE FEEDING; Nasogastric; Standard with Fiber; Continuous; 15; Yes; 10; Q 4 hours; 45; 150; Q 6 hours        Data:   Scheduled Meds: Scheduled Meds:   aspirin  324 mg Oral Daily    atorvastatin  40 mg Oral Daily    isosorbide mononitrate  30 mg Oral Daily    [Held by provider] metoprolol tartrate  25 mg Oral BID    pantoprazole  40 mg Oral QAM AC    ranolazine  1,000 mg Oral BID    sodium chloride flush  5-40 mL IntraVENous 2 times per day    enoxaparin  40 mg SubCUTAneous Daily    nicotine  1 patch TransDERmal Daily    budesonide-formoterol  2 puff Inhalation BID    And    tiotropium  2 puff Inhalation Daily    clopidogrel  75 mg Oral Daily     Continuous Infusions:   sodium chloride       PRN Meds:.aspirin-acetaminophen-caffeine, albuterol, sodium chloride flush, sodium chloride, acetaminophen **OR** acetaminophen, promethazine **OR** ondansetron  Continuous Infusions:   sodium chloride           Assessment/Plan   1. Acute CVA  a. Neuro following  b.  Cont daily ASA; Neuro switched Effient to Plavix after discussion with cardiology   c. Echo shows normal EF and no shunt  d. Neuro checks  e. Cont statin  f. Tobacco cessation  g. Cont TF through NGT; D/C IVF  h. Cont to work with SLP/PT/OT  2. Elevated trops  a. Trended stable  b. No chest pain  3. HTN  a. Cont home BP meds   4. HLD  a. Cont Lipitor   5. Tobacco use  a. Tobacco cessation education provided  b. Nicotine patch ordered   6. CKD  a. Monitor  b. Better than baseline s/p contrast   7. PTOT/SLP  8. DVT prophylaxis   a. Lovenox     IRP has been consulted. Assessment and plan of care discussed with supervising physician, Dr Joni Gibbons.     Electronically signed by SHARON Hi CNP on 10/27/2021 at 12:27 PM

## 2021-10-27 NOTE — PROGRESS NOTES
SBAR report given to primary RN, [facility]. Pt is in bed, awake. Pt bathed and mouth suctioned. Speech therapy at bedside. Pt denies any needs. Call light and bedside table within reach. Paty STAFFORD/.

## 2021-10-27 NOTE — PROGRESS NOTES
Pt is resting with eyes closed in bed. Meds given. Physical therapy at bedside. Pt denies any needs at this time. Call light and bedside table within reach. Juani STAFFORD/.

## 2021-10-27 NOTE — PROGRESS NOTES
[facility] THERAPY MISSED TREATMENT NOTE  [facility] 4A      Date: 10/27/2021  Patient Name: Taryn Jones        MRN: [id number]    : 1945  (67 y.o.)    REASON FOR MISSED TREATMENT:  Attempted to see patient at 21  for completion of cognitive linguistic evaluation and for dysphagia management services. Upon arrival, fatigue notable with student nurse confirming lethargy at date; not able to improve/maintain wakefulness to an adequate state to permit participation in interventions. NGT notes to be in place for alternate means to nutrition/hydration. Will f/u later at date (schedule permitting) or on  as indicated to resume POC.        Jordi Murphy M.A., [address] [place] [address]

## 2021-10-27 NOTE — PROGRESS NOTES
55 [facility] NEUROSCIENCES 4A  Speech  Language  Cognitive Evaluation    SLP Individual Minutes  Time In: 1248  Time Out: 6455  Minutes: 16  Timed Code Treatment Minutes: 0 Minutes       Date: 10/27/2021  Patient Name: Mara Beltran      CSN: [id number]   : 1945  (67 y.o.)  Gender: male   Referring Physician:  SHARON Medeiros CNP  Diagnosis: Acute CVA   Secondary Diagnosis: Dysphagia, dysarthria, cognitive deficits  Precautions: Fall risk, aspiration precautions  History of Present Illness/Injury: Patient admitted to [facility] with above med dx; please refer to physician H&P for full details. Per chart review, \"70 y.o. male with pmhx of COPD. Htn, CKD, tobacco use who presents with acute onset left side weakness and facial droop, LKW 9am last night. Pt reports waking up from sleeping on the cough this AM and his left leg gave way; he was unable to stand up and fell without hitting his head or losing consciousness. Initial NIHSS was 2\". MRI brain:     Impression       1. Acute small to moderate-sized infarct in the right corona radiata. 2. Old lacunar infarcts in the left subinsular region and left corona radiata with the left corona radiata lesions having appeared in the interval since 2019. 3. Moderate patchy areas of T2/FLAIR hyperintensity in the supratentorial white matter and central ye likely representing sequelae of chronic microangiopathy old lacunar infarcts. ST consulted to complete cognitive linguistic evaluation to further develop goals per POC s/t acute intracranial findings.      Past Medical History:   Diagnosis Date    Acute coronary syndrome (HCC)     Arrhythmia     Arteriosclerotic heart disease (ASHD)     CHF (congestive heart failure) (HCC)     Chills     COPD (chronic obstructive pulmonary disease) (HCC)     Dizziness - light-headed     HX OF:    Dysphagia     Emphysema     Esophagitis     Fatigue     HX OF:    Fever     GERD (gastroesophageal reflux disease)     History of chest pain     History of tinnitus     History of tobacco abuse     Hyperlipidemia     Hypertension     Irregular heart beat     Lightheadedness     MI (myocardial infarction) (HCC)     Night sweats     Noncompliance with medication regimen     Noncompliance with medical therapy.  Pneumonia     Renal failure syndrome     Ringing in the ears     SOB (shortness of breath) on exertion     Weight loss        Pain: No pain reported. Subjective:  DAISY Nahs with approval to proceed with evaluation. Upon arrival, patient resting in bed with student nurse completing bathing routine. Fatigue notable and progressive as evaluation continued. No family present. SOCIAL HISTORY:   Living Arrangements: [place], independently; x1 daughter in the immediate area to provide supplemental assistance should be required. Work History: Retired  Education Level: [employer], no college   Driving Status: Active   Finance Management: Independent  Medication Management: Independent  ADL's: Independent. Hobbies: Genealogy, gardening   Vision Status: Glasses  Hearing: WFL  Type of Home: Apartment  Home Layout: One level  Home Access: Level entry  Home Equipment: Standard walker    ORAL MOTOR:  Facial / Labial Impaired Mild-moderate L sided facial droop; reduced ROM   Lingual Impaired Decreased ROM, coordination, strength   Dentition WFL Full dentures   Velum WFL    Vocal Quality Impaired Diminished functional intensity per perceptual measures   Sensation WFL    Cough Impaired Weak     SPEECH / VOICE:  Respiration:   Breathing Pattern: Clavicular  Respiration/Phonation Coordination: Impaired  Breath Support: Impaired: Inadequate Volume    Articulation: Impaired: Decreased Articulatory Precision, Blended Word Boundaries and Decreased Articulatory Coordination  DDK Rates (Norm Rates 4.5-7.5)   Rate:    Too Slow   Rhythm: .Impaired   Precision: Impaired  Word Level: Impaired  Sentence Level: Impaired  Paragraph Level: Impaired    Conversation:  50%-75%    LANGUAGE:  Receptive:  1 Step Commands: 3/3  2 Step Commands: 2/2  Simple Yes/No Questions: 3/3  Complex Yes/No Questions: 3/3    Expressive:  Automatic Speech: WFL numerical counting 1-10  Sentence Completion (open-ended): 3/3  Confrontational Naming: 3/3  Responsive Namin/2  Sentence Formulation: Intact for basic wants/needs  Conversational Speech: Impaired thought organization   Paraphasias: None evidenced  Repetition: 1/2 sentence level    COGNITION:  [place] Cognitive Assessment (MOCA) version 7.2 completed. Pt scored . Normal is greater than or equal to /. Inclusion of +1 point given highest level of education achieved less than/equal to 12th grade or GED with limited-0 post-secondary schooling   Orientation: 4/6  Immediate Recall: 4/5  Short-Term Recall: 0/5  Divergent Namin items named in 1 minute time frame (target=11)  Problem Solvin/3  Reasonin/2 verbal  Sequencin/  Thought Organization: Impaired  Insight: Adequate; however, unsure for functional implications  Attention: Adequate sustained and selective attention, basic level  Math Computation: 4/5 serial subtraction   Executive Functioning: 3/5    SWALLOWING:  Current Diet: NPO; NGT    Direct dysphagia interventions not provided at date d/t progressive fatigue evidenced. Of note, shallow respirations with wet vocal quality exhibited, concerning for overall secretion management with DAISY Corona confirming needs to utilize [employer] oral suctioning at date. MBS completed on 10/26/21 documenting moderately-severe oropharyngeal dysphagia with SILENT aspiration evidenced with mildly thick barium; audible aspiration with thin and mildly thick barium.  Will need to consider potential permanent means to nutrition/hydration measures (?PEG tube) as patient is at HIGH risk for airway invasion events with rehabilitation prognosis largely dependent upon cognitive status d/t recent CVA and engagement in dysphagia therapy. Will prioritize dysphagia interventions as schedule permits. RECOMMENDATIONS/ASSESSMENT:  DIAGNOSTIC IMPRESSIONS:  Patient presents with a mild-moderate cognitive deficit given impaired temporal orientation, immediate/delayed recall, working memory, problem solving, verbal/visual reasoning, math computation, executive functioning, thought organization, processing speed, and mental flexibility. Expressive and receptive language measures grossly intact with no apparent communicative breakdowns. However, speech intelligibility best approximates 60% in known context s/t presence of blended word boundaries, imprecise articulatory placement, and improper breath support; dysarthria noted. Skilled ST services are recommended to address the above aforementioned impairments to improve cognitive domains and speech production for optimal participation in current+future settings. Rehabilitation Potential: good    EDUCATION:  Learner: Patient  Education:  Reviewed results and recommendations of this evaluation, Reviewed ST goals and Plan of Care and Reviewed recommendations for follow-up  Evaluation of Education: Demonstrates with assistance, Needs further instruction and Family not present    PLAN:  Skilled SLP intervention on acute care 3-5 x per week or until goals met and/or pt plateaus in function. Specific interventions for next session may include: dysphagia management, cognitive tasks, speech production. PATIENT GOAL:    Did not state. Will further assess during treatment.     SHORT TERM GOALS:  Short-term Goals  Timeframe for Short-term Goals: 2 weeks  Goal 1: Patient will complete conservative PO trials (moderately thick and puree) at bedside with ST ONLY to determine baseline swallow function and/or readiness for completion of repeat instrumental.  Goal 2: Staff will exhibit return demonstration for completion of comprehensive oral care procedural analysis to maximize oral integrity and to reduce potential bacteria colonization. Goal 3: Patient will complete oropharyngeal strengthening exercises (labial/lingual ROM/strength, effortful, meghan, and CTAR) 5-10x each with goof success to improve phayrngeal strength  Goal 4: Patient will complete immediate/delayed recall tasks (inclusion of compensatory strategies) with 70% accuracy, mod cues to improve retention of pertinent information for participation in daily living tasks. Goal 5: Patient will complete problem solving, verbal/visual reasoning, thought organization, and executive functioning (time, money/math/finances, medications) tasks with 60% accuracy, mod cues to improve contributions within ADLs and IADLs. Goal 6: Patient will complete structured speech drills/tasks (DDK rates, AMRs, phrase repetition, tongue twisters) with implementation of SOS speech strategies to improve intelligibilty to 70% in attempts to reduce presence of communicative rbeakdowns.     LONG TERM GOALS:  No LTG established given short ESTEFANIA Villa M.A., [address] [facility]

## 2021-10-28 NOTE — PROGRESS NOTES
1239   Patient into ICU room 6 from unit 4A. Patient with eyes open. Follows simple commands. Gurgling labored respirations with accessory muscle use. Unable to understand speech. Nods head at times to questions. On 6LNC. Dr. Abhi Thakur states they are waiting on family to come to discuss code status. Patient reassured. [address] [facility] CNP speaks with patient and daughter and son at bedside. Code status and intubation discussed. Patient nods head at times to questions. Family in agreement to change code status to comfort care. 1830   Patient report phoned to [address] on unit 5K. Patient resting with eyes closed. Respirations unlabored. No distress noted. 1935  Patient transported to [phone] per transport staff.

## 2021-10-28 NOTE — SIGNIFICANT EVENT
Meeting with family at bedside, explained need for intubation and possible trach. I explained that the trach could be permanent and would require long term care. Family states that is not his wishes. I spoke with Rehan Baird and he agreed that he would not want trach and long term nursing care. He states that he would like to be made comfortable, family agrees that this has always been his wish. They support the decision for hospice. I called an updated Zev Stokes CNP for Dr. Tete Feng and she agrees with plan and will resume care. Electronically signed by [place] Conrad Sawyer CNP on 10/28/2021 at 4:15 PM

## 2021-10-28 NOTE — PROGRESS NOTES
Neuro made aware that pt has chosen comfort care and will go to hospice. We will sign off.      Kristen Kaur PA-C

## 2021-10-28 NOTE — PROGRESS NOTES
[address] [facility] 59  PHYSICAL THERAPY MISSED TREATMENT NOTE  [facility] NEUROSCIENCES 4A    Date: 10/28/2021  Patient Name: Syed Hernández        MRN: [id number]   : 1945  (67 y.o.)  Gender: male   Referring Practitioner: SHARON Cornejo CNP  Diagnosis: acute CVA         REASON FOR MISSED TREATMENT:  RN approved session as patient has been put on O2 nasal cannula, however he remains lethargic. This therapist attempted multiple times to awake patient, however he is unable to be alert and not communicating. PT to check back as schedule allows. RN made aware of attempt, and she states the Doctor is supposed to come check on patient today d/t this lethargic status.

## 2021-10-28 NOTE — PROGRESS NOTES
1236    Patient arrived to unit from  via ed. Patient transferred to ICU bed and placed on continuous ICU bedside monitor. Patient admitted for Acute cerebrovascular accident (CVA) [facility]) [I63.9]  Cerebrovascular accident (CVA), unspecified mechanism ([facility] [address] [I63.9]. Vitals obtained. See flowsheets. Patient's IV access includes lfa. Current infusions and rates of infusion include none. Assessment completed by jerrica. Two nurse skin assessment completed by lee and Alberto Lew. See flowsheets for assessment details. Policies and procedures of ICU unable to be explained to patient at this time. Family member(s)/representative(s) present at time of admission include none. Patient rights explained to family member(s)/representatives and patient, as able. Patient/patient's family member(s)/representative(s) N/A to have physician notified of their admission. All questions posed by patient's family member(s)/representative(s) and patient answered at this time.

## 2021-10-28 NOTE — CARE COORDINATION
10/28/21, 1:40 PM EDT    DISCHARGE ON [address] day: 3  Location: -06/006-A Reason for admit: Acute cerebrovascular accident (CVA) [facility]) [I63.9]  Cerebrovascular accident (CVA), unspecified mechanism ([facility] [address] [I63.9]   Procedure:   10/28 CXR   Impression:          A nasogastric catheter extends below the hemidiaphragms its tip in the projection of the left upper quadrant. Bilateral lower lobe atelectasis, left greater than right with elevation of the left hemidiaphragm appears unchanged. Evaluation of the left   lower lobe is limited due to diaphragmatic elevation. MRI Brain pending. Barriers to Discharge: GI consult placed for possible PEG placement. Pt transferred to ICU due to change in condition. Physiatry following. IV fluids, Duonebs, Asa, Lipitor, Plavix, Loenox, Protonix. PCP: Janet Palomares MD  Readmission Risk Score: 10.9%  Patient Goals/Plan/Treatment Preferences: From home alone. Family support. Will follow clinical course for needs.

## 2021-10-28 NOTE — PLAN OF CARE
Problem: Falls - Risk of:  Goal: Will remain free from falls  Description: Will remain free from falls  Outcome: Ongoing  Note: Call light in reach, bed in lowest position, and bed alarm activated. Education given on use of call light before ambulation and when in need of assistance. Patient expressed understanding. Hourly visual checks performed and charted. Toileting offered to patient. No falls this shift, at any time. Arm band and falling star in place. Will continue to monitor. Goal: Absence of physical injury  Description: Absence of physical injury  Outcome: Ongoing  Note: Call light in reach, bed in lowest position, and bed alarm activated. Education given on use of call light before ambulation and when in need of assistance. Patient expressed understanding. Hourly visual checks performed and charted. Toileting offered to patient. No falls this shift, at any time. Arm band and falling star in place. Will continue to monitor. Problem: Pain:  Goal: Pain level will decrease  Description: Pain level will decrease  Outcome: Ongoing  Note: Patient able to use 0-10 pain scale. Denies pain at this time. Patient has a pain goal of \"no pain. \" Agreeable to take PRN pain medications. Problem: HEMODYNAMIC STATUS  Goal: Patient has stable vital signs and fluid balance  Outcome: Ongoing  Note:   Vitals:    10/27/21 2344   BP: 130/66   Pulse: 87   Resp: 18   Temp: 98.8 °F (37.1 °C)   SpO2: 91%     Vital signs within normal range for patient. Will continue to monitor. Problem: ACTIVITY INTOLERANCE/IMPAIRED MOBILITY  Goal: Mobility/activity is maintained at optimum level for patient  Outcome: Ongoing  Note: Patient is up with 2 assist to the chair. Patient tolerating fairly well. PT and OT continue to work with patient. Problem: COMMUNICATION IMPAIRMENT  Goal: Ability to express needs and understand communication  Outcome: Ongoing  Note: Patient presents with mild dysarthria.  Patient is able to express needs and understand communication at this time. Will continue to monitor. Problem: Skin Integrity:  Goal: Skin integrity will stabilize  Description: Skin integrity will stabilize  Outcome: Ongoing  Note: No new signs of skin breakdown. Skin warm, dry, and intact. Mucous membranes pink and moist.  Assistance with turns/ambulation provided every 2 hours and PRN. Will continue to monitor. Problem: Discharge Planning:  Goal: Patients continuum of care needs are met  Description: Patients continuum of care needs are met  Outcome: Ongoing  Note: Discharge planning in process and discussed with patient/family. Social work consulted for any additional needs. Care manager aware of discharge needs. Patient is from home alone and plans to be discharged to [facility]. Problem: Nutrition Deficit:  Goal: Ability to achieve adequate nutritional intake will improve  Description: Ability to achieve adequate nutritional intake will improve  Outcome: Ongoing  Note: Patient continues to fail MBS. Patient tolerating TF via NGT. GI consult placed for PEG tube placement. Care plan reviewed with patient and family. Patient and family verbalize understanding of the plan of care and contribute to goal setting.

## 2021-10-28 NOTE — FLOWSHEET NOTE
10/28/21 3992   Provider Notification   Reason for Communication Evaluate   Provider Name Josi Marino   Provider Notification Advance Practice Clinician (CNS/NP/CNM/CRNA/PA)   Method of Communication Secure Message  (hypoxic)   Response See orders  (labs, cxr, abg)   Notification Time : Notified Josi MarinoCARLOS of increasing lethargy and large amounts of secretions. Labs, abg, and chest xray ordered.

## 2021-10-28 NOTE — PROGRESS NOTES
Neurology Progress Note    Date:10/28/2021       ARNV:[id number]  [address]     YOB: 1945     Age:76 y.o. Requesting Physician: Lucille Gonzáles MD     Reason for Consult:  Evaluate for stroke alert    HPI: Dorcas Barillas who is a 67 y.o. male with a history of HTN, HLD, CHF, COPD, and current smoker (1PPD) who presents to [facility] with complaints of left sided weakness. He reports he went to bed around 9pm and woke up this morning, after sleeping on the cough overnight, he attempted to stand up and felt that his legs buckled, he then fell between his couch and coffee table, reports he did not hit his head. He crawled into the kitchen and called 911, as he was unable to get up due to the left sided weakness. He denies any history of strokes. CTH shows no acute intracranial abnormality, CT angiogram head and neck without any hemodynamically significant stenosis, MRI does show acute small to moderate sized infarct in the right corona radiata. He was not a candidate for tPA due to LKW and no LVO on imaging. He denies any lightheadedness, paresthesias, dizziness, vision changes, or speech difficulty. Interval history 10/26/2021:  Patient reports an okay night, did not sleep well. Still has the left sided weakness. Denies any dizziness, lightheadedness, paraesthesias. He mentioned that he did not pass his MBS. Interval History 10/28/21:  NP of Primary Tem called to inform that pt with secretions and not managing. Considering intubation. Request revisit. Review of Systems   Review of Systems   Constitutional: Negative for chills and fever. HENT: Positive for trouble swallowing. Respiratory: Positive for choking. Cardiovascular: Negative for chest pain. Gastrointestinal: Negative for vomiting. Neurological: Positive for facial asymmetry, speech difficulty (slurred speech) and weakness (left sided). Negative for dizziness, seizures and numbness.      Medications Age of Onset    Heart Disease Mother     Cancer Mother     Heart Disease Father     Cancer Father     Cancer Sister     Prostate Cancer Neg Hx     Colon Cancer Neg Hx     Colon Polyps Neg Hx        Physical Examination        Vitals:  BP (!) 167/81   Pulse 96   Temp 99 °F (37.2 °C) (Rectal)   Resp (!) 34   Ht 5' 2\" (1.575 m)   Wt 121 lb 4.1 oz (55 kg)   SpO2 (!) 88%   BMI 22.18 kg/m²   Temp (24hrs), Av.3 °F (36.8 °C), Min:96.6 °F (35.9 °C), Max:99.1 °F (37.3 °C)      I/O (24Hr): Intake/Output Summary (Last 24 hours) at 10/28/2021 1505  Last data filed at 10/28/2021 1301  Gross per 24 hour   Intake 1412 ml   Output 100 ml   Net 1312 ml       Physical Exam  Constitutional:       Appearance: He is ill-appearing. HENT:      Head: Normocephalic and atraumatic. Right Ear: External ear normal.      Left Ear: External ear normal.      Nose: Nose normal.      Mouth/Throat:      Mouth: Mucous membranes are moist.   Eyes:      Extraocular Movements: EOM normal.      Pupils: Pupils are equal, round, and reactive to light. Cardiovascular:      Rate and Rhythm: Normal rate and regular rhythm. Pulses: Normal pulses. Heart sounds: Normal heart sounds. Pulmonary:      Effort: Pulmonary effort is normal.      Breath sounds: Normal breath sounds. Abdominal:      General: Bowel sounds are normal.      Palpations: Abdomen is soft. Musculoskeletal:         General: Normal range of motion. Cervical back: Normal range of motion and neck supple. Skin:     General: Skin is warm and dry. Neurological:      Mental Status: He is alert and oriented to person, place, and time. Deep Tendon Reflexes:      Reflex Scores:       Bicep reflexes are 2+ on the right side and 2+ on the left side. Patellar reflexes are 2+ on the right side and 2+ on the left side. Psychiatric:         Mood and Affect: Mood normal.         Speech: Speech is slurred.          Behavior: Behavior normal. Thought Content: Thought content normal.         Judgment: Judgment normal.       Neurologic Exam     Mental Status   Oriented to person, place, and time. Follows 2 step commands. Attention: normal. Concentration: normal.   Speech: slurred   Level of consciousness: alert  Knowledge: good. Able to name object. Able to read. Able to repeat. Cranial Nerves     CN II   Visual fields full to confrontation. CN III, IV, VI   Pupils are equal, round, and reactive to light. Extraocular motions are normal.   Right pupil: Size: 3 mm. Shape: regular. Reactivity: brisk. Left pupil: Size: 3 mm. Shape: regular. Reactivity: brisk. CN V   Facial sensation intact. CN VII   Left facial weakness: central (left sided facial droop)    CN VIII   CN VIII normal.     CN IX, X   CN X normal.   Right gag reflex: abnormal  Left gag reflex: abnormal    CN XI   CN XI normal.   Right trapezius strength: normal  Left trapezius strength: weak    CN XII   CN XII normal.   Tongue deviation: none    Motor Exam   Muscle bulk: normal  Overall muscle tone: normal  Right arm pronator drift: absent  Left arm pronator drift: absent    RUE: 4+/5  LUE: 4-/5  RLE: 4+/5  LLE: 4-/5     Sensory Exam   Light touch normal.     Gait, Coordination, and Reflexes     Tremor   Resting tremor: absent  Intention tremor: absent  Action tremor: absent    Reflexes   Right biceps: 2+  Left biceps: 2+  Right patellar: 2+  Left patellar: 2+  Gait not formally tested        Labs/Imaging/Diagnostics   Labs:  CBC:  Recent Labs     10/26/21  0339 10/28/21  0913   WBC 10.3 9.1   RBC 4.01* 4.44*   HGB 13.8* 15.0   HCT 42.6 46.3   .2* 104.3*    180     CHEMISTRIES:  Recent Labs     10/26/21  0339 10/28/21  0913    135   K 4.4 4.0    97*   CO2 19* 22*   BUN 19 15   CREATININE 0.9 0.8   GLUCOSE 79 161*     PT/INR:  No results for input(s): PROTIME, INR in the last 72 hours. APTT:  No results for input(s):  APTT in the last 72 hours.  LIVER PROFILE:  No results for input(s): AST, ALT, BILIDIR, BILITOT, ALKPHOS in the last 72 hours. Imaging Last 24 Hours:  CT HEAD WO CONTRAST    Result Date: 10/25/2021  PROCEDURE: CT HEAD WO CONTRAST CLINICAL INFORMATION: Stroke Symptoms. COMPARISON: CT head dated 10/10/2019. TECHNIQUE: Noncontrast 5 mm axial images were obtained through the brain. Sagittal and coronal reconstructions were created. All CT scans at this facility use dose modulation, iterative reconstruction, and/or weight-based dosing when appropriate to reduce radiation dose to as low as reasonably achievable. FINDINGS: There is stable mild volume loss. There are stable moderate confluent and patchy areas of hypodensity in the periventricular, subcortical deep white matter as evidence for chronic microvascular angiopathy. Small hypodensities in the bilateral subinsular regions are stable, likely representing prominent perivascular spaces or old lacunar infarcts. Gray-white matter projection otherwise appears grossly preserved. No intracranial hemorrhage, mass effect or midline shift is identified. The calvarium appears  intact. Orbits are unremarkable. Visualized paranasal sinuses are clear. Mastoid air cells are clear. No evidence of acute intracranial abnormality. **This report has been created using voice recognition software. It may contain minor errors which are inherent in voice recognition technology. ** Final report electronically signed by Dr. Raine Connolly MD on 10/25/2021 10:59 AM    CTA NECK W WO CONTRAST (CODE STROKE)    Result Date: 10/25/2021  PROCEDURE: CTA NECK W WO CONTRAST, CTA HEAD W WO CONTRAST CLINICAL INFORMATION: stroke. COMPARISON: CT head from the same date and CTA head and neck dated 10/10/2019. TECHNIQUE: 1 mm axial images were obtained from the aortic arch through the head in arterial phase during fast bolus administration of 80 mL Isovue-370 injected in the left AC.  A noncontrast localizer was obtained. 3-D reconstructions were created on a dedicated 3-D workstation. These include multiplanar MPR images, multiplanar MIP images and centerline reconstructions. All CT scans at this facility use dose modulation, iterative reconstruction, and/or weight-based dosing when appropriate to reduce radiation dose to as low as reasonably achievable. FINDINGS:  CTA head: There are mural calcifications in the cavernous and clinoid segments of internal carotid arteries without flow-limiting stenosis or aneurysmal dilatation. These are stable compared to prior exam. The bilateral middle cerebral and anterior cerebral arteries are patent without focal abnormality identified. The basilar artery is patent without focal stenosis or visualized aneurysm. The bilateral posterior cerebral and superior cerebellar arteries are patent without focal abnormality identified. Dural  venous sinuses appear patent without focal filling defect. No focal areas of abnormal parenchymal enhancement are identified. CTA NECK: There is a bovine type arch. There is mural calcification in the brachiocephalic and left subclavian arteries without flow-limiting stenosis. There is mild stenosis at the mid segment of the left common carotid artery. There is calcified and noncalcified  mural plaque at the carotid bifurcations with extension into the proximal right internal carotid artery. On the right side, the narrowest luminal diameter is 1.7 mm  with a point more distal, where the walls are parallel, measuring 3.5 mm. On the left side there is no hemodynamically significant stenosis. Cervical segments of internal carotid arteries are otherwise patent without focal stenosis. The right vertebral artery is mildly dominant. There is moderate stenosis at the takeoff of the right vertebral artery and proximal V2 segment. There are areas of mild stenosis more distally in the proximal and mid V2 segments on the right.  Vertebral arteries are otherwise patent without other focal areas of stenosis. There is streak artifact from dental amalgam which partially obscures oral and perioral soft tissues. Given this caveat, mucosal surfaces of the aerodigestive tract are symmetric without focal nodular thickening or visualized mass. No cervical lymphadenopathy is identified. The parotid, submandibular and thyroid glands are unremarkable. There are moderate emphysematous changes of the visualized portions of the lungs. There are stable degenerative changes of the cervical spine. 1. Stable mural calcifications in the cavernous and clinoid segments without flow-limiting focal stenosis or aneurysm in the intracranial circulation. 2. Calcified and noncalcified mural plaque at the bilateral carotid bulbs with 51% stenosis on the right and no hemodynamically significant stenosis on the left by NASCET criteria. 3. Focal areas of moderate stenosis at the takeoff and proximal V2 segments of the dominant right vertebral artery. 4. Mild stenosis at the mid segment of left common carotid artery. **This report has been created using voice recognition software. It may contain minor errors which are inherent in voice recognition technology. ** Final report electronically signed by Dr. Jack Logan MD on 10/25/2021 11:42 AM    XR CHEST PORTABLE    Result Date: 10/25/2021  PROCEDURE: XR CHEST PORTABLE CLINICAL INFORMATION: 41-year-old male with facial droop. Code stroke. COMPARISON: Radiograph 6/22/2020. TECHNIQUE: AP upright view of the chest was obtained. FINDINGS: There is scarring/atelectasis at the left lung base. There is eventration of the left hemidiaphragm. The cardiac silhouette and pulmonary vasculature are within normal limits. There is some calcified plaque in the arch of the aorta. There is no significant pleural effusion or pneumothorax. Visualized portions of the upper abdomen are within normal limits. The osseous structures are intact.  No acute fractures or suspicious osseous lesions. There is no acute intrathoracic process. **This report has been created using voice recognition software. It may contain minor errors which are inherent in voice recognition technology. ** Final report electronically signed by Dr Bob Loving on 10/25/2021 10:55 AM    MRI BRAIN WO CONTRAST    Result Date: 10/25/2021  PROCEDURE: MRI BRAIN WO CONTRAST INDICATION:  stroke-left sided weakness. COMPARISON: CT head from the same date and MRI brain dated 10/10/2019. TECHNIQUE: Multiplanar and multiple spin echo MRI images were obtained of the brain without contrast. FINDINGS: There is stable mild volume loss compared to prior MRI. There is a small to moderate-sized area of restricted diffusion in the right corona radiata with associated T2/flair prolongation at the periphery of the area of restriction. No other focal areas of  restricted diffusion are present. There are moderate patchy areas of T2/FLAIR prolongation in the periventricular, subcortical and deep white matter and central ye without associated restricted diffusion, stable compared to prior MRI. No intra or extra-axial mass is identified. There is a prominent perivascular space in the right subinsular region. There is a focal area of encephalomalacia in the subinsular region on the left, stable compared to prior exam. There are also small focal areas of encephalomalacia in the left corona radiata which have appeared in the interval since 2019. The major vascular flow voids appear patent. Orbits are unremarkable. Paranasal sinuses are clear. There is trace fluid in the left mastoid air cells. 1. Acute small to moderate-sized infarct in the right corona radiata. 2. Old lacunar infarcts in the left subinsular region and left corona radiata with the left corona radiata lesions having appeared in the interval since 2019.  3. Moderate patchy areas of T2/FLAIR hyperintensity in the supratentorial white matter and central ye likely representing sequelae of chronic microangiopathy old lacunar infarcts. **This report has been created using voice recognition software. It may contain minor errors which are inherent in voice recognition technology. ** Final report electronically signed by Dr. Lo Ness MD on 10/25/2021 12:33 PM        Assessment and Plan:        Hospital Problems         Last Modified POA    Acute cerebrovascular accident (CVA) ([address] 10/25/2021 Yes    Moderate malnutrition ([address] (Chronic) 10/26/2021 Yes          1.  Acute infarct right corona radiata   · MRI 10/25/21 with acute infarct in the right corona radiata  · CT angiogram head and neck without 10/25/21 hemodynamically significant stenosis,  51% stenosis on the right carotid  · 10/28/21 New onset loss of gag reflex and difficulty with managing secretions  · Pt not a candidate for TPA at this time due to recent stroke, recent CTAs with no significant stenosis so no CODE STROKE with CT, CTA today  · MRI ordered 10/28/21 due to loss of gag  · Pt transferred to ICU for intubation but family asking to wait for brother to come to make a decision regarding intubation vs DNR/DNI  · Robinul 2mg TID ordered for secretion management  · Mestinon 60mg q 6hrs ordered for secretion management   Continue ASA + Plavix   NIHSS q shift   HgbA1C 6.1   Maintain normotension    2D Echo with bubble study completed   Continue Statin therapy for risk factor control   LDL 88, Goal 45-70   PT/OT/SLP Evaluation  -Patient failed modified barium swallow, NPO  -NG tube in place   Maintain telemetry, monitor for atrial-fib   Maintain oxygenation saturation >94%   Start 0.9% Saline IV at 75 mL/hr   Monitor for infection   EKG NSR   Labs daily   SCDs and lovenox for DVT prophylaxis   CT head is needed if severe headache or altered mental status   Provide stroke education for individualized risk factors    Smoking cessation education for risk factor control   Neurology following    This patient was seen and evaluated with Dr. Mary Chowdary and he is in agreement with the assessment and plan.       Electronically signed by Smita Dumont PA-C on 10/28/21

## 2021-10-28 NOTE — PROGRESS NOTES
This RN attempted to call patients daughter, [facility]), multiple times with no answer and a full mailbox. Yazmin YA and Dr. Ned Dillon also attempted to call POA with no answer. Patient transferred to [facility] with all belongings. Bedside report given to [facility].

## 2021-10-28 NOTE — FLOWSHEET NOTE
Pt was unresponsive but his daughter and son were there. He was dealing acute stroke accident. The nurse said to me that he is now in comfort care. Family said that they are being sustained because of their roney in God. Words of encouragement was offered and prayer appreciated. 10/28/21 1718   Encounter Summary   Services provided to: Patient and family together   Referral/Consult From: [address] [place] [address] Road Visiting Yes  (10/28 NR)   Complexity of Encounter Moderate   Length of Encounter 15 minutes   Spiritual/Muslim   Type Spiritual support   Assessment Approachable;Calm   Intervention Prayer;Nurtured hope; Active listening;Empowerment;Sustaining presence/ Ministry of presence   Outcome Connection/belonging;Expressed gratitude;Encouraged; Hopeful

## 2021-10-28 NOTE — PROGRESS NOTES
Progress note      [facility]             Patient:  Kvng Olguin  YOB: 1945    MRN: [id number]   Acct:  [de-identified]   [id number]  Primary Care Physician: Rick Andre MD    Admit Date: 10/25/2021            Subjective: Pt more lethargic today. Having a lot of secretions and requiring a lot of suctioning. Not very communicative verbally but will nod and shake head.  He     Objective:      Physical Exam:    Vitals:  Patient Vitals for the past 24 hrs:   BP Temp Temp src Pulse Resp SpO2 Weight   10/28/21 0830 (!) 166/80 99.1 °F (37.3 °C) Oral 91 22 (!) 89 %    10/28/21 0757      94 %    10/28/21 0400 (!) 149/72 98.4 °F (36.9 °C) Oral 85 18 92 % 121 lb 4.1 oz (55 kg)   10/27/21 2344 130/66 98.8 °F (37.1 °C) Oral 87 18 91 %    10/27/21 1952 (!) 154/67 98.2 °F (36.8 °C) Axillary 88 24 94 %    10/27/21 1722 (!) 170/75   81      10/27/21 1641      97 %    10/27/21 1518 (!) 186/82 96.6 °F (35.9 °C) Axillary 80 20     10/27/21 1210 (!) 142/82 97.6 °F (36.4 °C) Axillary 85 20 96 %      Weight: Weight: 121 lb 4.1 oz (55 kg)     24 hour intake/output:    Intake/Output Summary (Last 24 hours) at 10/28/2021 1120  Last data filed at 10/28/2021 0400  Gross per 24 hour   Intake 2417.97 ml   Output 200 ml   Net 2217.97 ml       General appearance - alert, well appearing, and in no distress  Eyes - pupils equal and reactive, extraocular eye movements intact  Nose - normal and patent, no erythema, discharge or polyps  Mouth - mucous membranes moist, pharynx normal without lesions  Neck - supple, no significant adenopathy  Lymphatics - no palpable lymphadenopathy, no hepatosplenomegaly  Chest - Diminished breath sounds BL  Distant Breath Sounds: No  Heart - normal rate, regular rhythm, normal S1, S2, no murmurs, rubs, clicks or gallops  Abdomen - soft, nontender, nondistended, no masses or organomegaly, NGT in place  Obese: No; Protuberant: No  Neurological - alert, not oriented, dysarthria,  left facial droop, left side weakness upper and lower extremity, with left pronator drift, no gag reflex, pooling of secretions in throat  Musculoskeletal - no joint tenderness, deformity or swelling  Extremities - peripheral pulses normal, no pedal edema, no clubbing or cyanosis  Skin - normal coloration and turgor, no rashes, no suspicious skin lesions noted  Review of Labs and Diagnostic Testing:    CBC:   Recent Labs     10/28/21  0913   WBC 9.1   HGB 15.0   HCT 46.3   .3*        BMP:   Recent Labs     10/26/21  0339      K 4.4      CO2 19*   BUN 19   CREATININE 0.9   CALCIUM 8.9   GLUCOSE 79     PT/INR:   No results for input(s): PROTIME, INR in the last 72 hours. APTT:   No results for input(s): APTT in the last 72 hours. Lipids:   No results for input(s): ALKPHOS, ALT, AST, BILITOT, BILIDIR, LABALBU, AMYLASE, LIPASE in the last 72 hours.   Troponin:   Recent Labs     10/25/21  2141   TROPONINT 0.017*        Imaging:  [unfilled]    EKG:      Diet: Diet NPO  ADULT TUBE FEEDING; Nasogastric; Standard with Fiber; Continuous; 15; Yes; 10; Q 4 hours; 45; 150; Q 6 hours        Data:   Scheduled Meds: Scheduled Meds:   aspirin  324 mg Oral Daily    pantoprazole  40 mg IntraVENous Daily    amLODIPine  5 mg Oral Daily    atorvastatin  40 mg Oral Daily    [Held by provider] isosorbide mononitrate  30 mg Oral Daily    [Held by provider] metoprolol tartrate  25 mg Oral BID    ranolazine  1,000 mg Oral BID    sodium chloride flush  5-40 mL IntraVENous 2 times per day    enoxaparin  40 mg SubCUTAneous Daily    nicotine  1 patch TransDERmal Daily    budesonide-formoterol  2 puff Inhalation BID    And    tiotropium  2 puff Inhalation Daily    clopidogrel  75 mg Oral Daily     Continuous Infusions:   sodium chloride       PRN Meds:.aspirin-acetaminophen-caffeine, albuterol, sodium chloride flush, sodium chloride, acetaminophen **OR** acetaminophen, promethazine **OR** ondansetron  Continuous Infusions:   sodium chloride           Assessment/Plan   1. Acute CVA  a. Neuro following  b. Cont daily ASA; Neuro switched Effient to Plavix after discussion with cardiology   c. Echo shows normal EF and no shunt  d. Neuro checks  e. Cont statin  f. Tobacco cessation  g. Cont TF through NGT  h. Cont to work with SLP/PT/OT  2. Lethargy with hypoxia and difficulty swallowing secretions  1. ABG WNL  2. CBC WNL  3. CXR no signs of acute process   4. BMP pending  5. Discussed with ICU team for possible intubation for airway protection--they will come see   6. Called neuro and NP Cielo Montelongo came to the bedside  7. STAT MRI ordered   3. Elevated trops  a. Trended stable  b. No chest pain  4. HTN  a. Cont home BP meds   5. HLD  a. Cont Lipitor   6. Tobacco use  a. Tobacco cessation education provided  b. Nicotine patch ordered   7. CKD  a. Monitor  b. Better than baseline s/p contrast   8. PTOT/SLP  9. DVT prophylaxis   a. Lovenox         Assessment and plan of care discussed with supervising physician, Dr Justice Feng. Electronically signed by SHARON Arzola CNP on 10/28/2021 at 11:20 AM    I have discussed the case, including pertinent history and exam findings with the NP. I have seen and examined the patient and the key elements of the encounter have been performed by me. I agree with the assessment, plan and orders as documented by the NP.     Electronically signed by John Kellogg MD on 10/28/2021 at 5:52 PM

## 2021-10-29 NOTE — PROGRESS NOTES
[facility] 59  PHYSICAL THERAPY MISSED TREATMENT NOTE  [facility]    Date: 10/29/2021  Patient Name: Shantelle Lindsay        MRN: [id number]   : 1945  (67 y.o.)  Gender: male   Referring Practitioner: SHARON Mueller CNP  Diagnosis: acute CVA         REASON FOR MISSED TREATMENT:  Hold treatment today per RN, Patient put on comfort care.

## 2021-10-29 NOTE — H&P
Internal Medicine Specialties  H&P  10/29/2021  11:40 AM    Patient:  Husam Dominique  YOB: 1945    MRN: [id number]   Acct:  [de-identified]   [id number]  Primary Care Physician: Shanti Morales MD    Chief Complaint:  Chief Complaint   Patient presents with    Facial Droop       History of Present Illness:   Initial H+P: The patient is a 74 y. o. male with pmhx of COPD. Htn, CKD, tobacco use who presents with acute onset left side weakness and facial droop, LKW 9am last night. Pt reports waking up from sleeping on the cough this AM and his left leg gave way; he was unable to stand up and fell without hitting his head or losing consciousness. Initial NIHSS was 2. In the emergency department CT head and CTA head/neck done; reports reviewed. MRI shows acute infarct right corona radiata, trop 0.020, EKG WNL. Pt denies any chest pain, SOB, N/V, abdominal pain, lower extremity edema. Pt seen by Neuro in ED and not a tPA candidate. He reports smoking 1PPD and no alcohol or drug use. Patient admitted for acute CVA. Pt remains a full code.     Pt was initially seen to be improving  But did fail his MBS and hence an NGT was inserted and pt started on TF. On 10/28 pt had difficulty swallowing secretions and had more pronounced left side facial droop and slurred speech. Neuro was re-consulted and MRI STAT was ordered; no need for code stroke per neuro. ICU was consulted for intubation. After discussing goals of care with family it was decided that pt would not want to have trach placed and be connected to the ventilator and so comfort care measures were pursued. Hospice was consulted and pt transitioned to hospice.            Past Medical History:        Diagnosis Date    Acute coronary syndrome (HCC)     Arrhythmia     Arteriosclerotic heart disease (ASHD)     CHF (congestive heart failure) (HCC)     Chills     COPD (chronic obstructive pulmonary disease) (HCC)     Dizziness - light-headed     HX OF:    Dysphagia     Emphysema     Esophagitis     Fatigue     HX OF:    Fever     GERD (gastroesophageal reflux disease)     History of chest pain     History of tinnitus     History of tobacco abuse     Hyperlipidemia     Hypertension     Irregular heart beat     Lightheadedness     MI (myocardial infarction) (HCC)     Night sweats     Noncompliance with medication regimen     Noncompliance with medical therapy.  Pneumonia     Renal failure syndrome     Ringing in the ears     SOB (shortness of breath) on exertion     Weight loss        Past Surgical History:        Procedure Laterality Date    CARDIAC SURGERY      2 stents    COLONOSCOPY      CORONARY ANGIOPLASTY WITH STENT PLACEMENT      pt has 3 stents    DIAGNOSTIC CARDIAC CATH LAB PROCEDURE  3 09 2009    Successful primary stenting of mid LAD using drug-eluting stent.  DIAGNOSTIC CARDIAC CATH LAB PROCEDURE  1 10 2011    LV demonstrated normal systolic function, EF 80%. On pullback, no gradient was noted. No critical lesions noted in all large epicardial vessels so that RCA is dominant vessel w/just very mild diffuse disease, about 20-30% lesions. Left main widely patent w/mild disease distally. Circumflex widely patent w/diffuse disease, also about 20-30%.  ENDOSCOPY, COLON, DIAGNOSTIC      HERNIA REPAIR  2008,2010    DC COLON CA SCRN NOT HI RSK IND Left 11/25/2017    COLONOSCOPY performed by Lisa Craig MD at [facility]    DC EGD TRANSORAL BIOPSY SINGLE/MULTIPLE Left 11/25/2017    EGD BIOPSY performed by Lisa Craig MD at [address] ECHOCARDIOGRAM  1 10 2011    Size was normal. Systolic function was normal. EF estimated in range of 55-65%. No regional wall motion abnormalities. Wall thickness was normal. Doppler - LV diastolic function parameters were normal. This is technically limited study which may impair interpretation.      TRANSTHORACIC ECHOCARDIOGRAM  3 06 2009    This study is technically limited. Distal anterior apical wall hypokinesis. EF 40-45%. Left atrial size w/in normal limits. Trace MR. No evidence of aortic stenosis or aortic insufficiency. Right atrium and right ventricle are of normal contractility. Trace TR. No pericardial effusion.  UPPER GASTROINTESTINAL ENDOSCOPY Left 11/21/2017    EGD DIAGNOSTIC ONLY performed by Modesto Novak MD at [address] [facility]       Home Medications: Medications Prior to Admission: isosorbide mononitrate (IMDUR) 30 MG extended release tablet, Take 1 tablet by mouth daily  albuterol (PROVENTIL) (2.5 MG/3ML) 0.083% nebulizer solution, Take 3 mLs by nebulization every 6 hours as needed for Wheezing or Shortness of Breath  TRELEGY ELLIPTA 100-62.5-25 MCG/INH AEPB, inhale 1 puff by mouth and INTO THE LUNGS once daily Rinse mouth after use  albuterol sulfate  (90 Base) MCG/ACT inhaler, Inhale 2 puffs into the lungs every 6 hours as needed for Wheezing or Shortness of Breath  ranolazine (RANEXA) 1000 MG extended release tablet, take 1 tablet by mouth twice a day  pantoprazole (PROTONIX) 40 MG tablet, Take 1 tablet by mouth every morning (before breakfast)  prasugrel (EFFIENT) 10 MG TABS, take 1 tablet by mouth once daily  senna-docusate (PERICOLACE) 8.6-50 MG per tablet, Take by mouth as needed for Constipation (1-2 tabs daily as needed for constipation.)   aspirin 81 MG tablet, Take 1 tablet by mouth daily  [DISCONTINUED] metoprolol tartrate (LOPRESSOR) 25 MG tablet, Take 1 tablet by mouth 2 times daily  [DISCONTINUED] atorvastatin (LIPITOR) 40 MG tablet, Take 1 tablet by mouth daily  Nebulizers (AIRIAL COMPACT MINI NEBULIZER) MISC, 1 vial by Does not apply route every 6 hours as needed (Shortness of breath or wheezing) Please dispense 1 Nebulizer machine. Allergies:  Xanax [alprazolam], Codeine, Dilaudid [hydromorphone], Morphine, and Pneumococcal vaccines    Social History:    reports that he has been smoking cigarettes.  He started smoking about 45 years ago. He has a 40.00 pack-year smoking history. He has never used smokeless tobacco. He reports that he does not drink alcohol and does not use drugs.         Family History:       Problem Relation Age of Onset    Heart Disease Mother     Cancer Mother     Heart Disease Father     Cancer Father     Cancer Sister     Prostate Cancer Neg Hx     Colon Cancer Neg Hx     Colon Polyps Neg Hx        Review of Systems:    Unable to assess due to AMS      Physical Exam:    Vitals:  Patient Vitals for the past 24 hrs:   BP Temp Temp src Pulse Resp SpO2   10/29/21 0830 113/62 98.6 °F (37 °C) Axillary 93 28 90 %   10/28/21 2108 118/68 99 °F (37.2 °C) Oral 105 22 93 %   10/28/21 1900 127/74   104 28 (!) 89 %   10/28/21 1800 133/70   104 28 (!) 89 %   10/28/21 1700 123/76   102 25 98 %   10/28/21 1630    108 (!) 31 100 %   10/28/21 1600 (!) 159/86   98 30 100 %   10/28/21 1500 (!) 167/88   102 (!) 34 92 %   10/28/21 1415    90 28 99 %   10/28/21 1400 (!) 161/82   97 (!) 37 (!) 87 %   10/28/21 1330 (!) 167/81   96 (!) 34 (!) 88 %   10/28/21 1300 (!) 170/86   93 (!) 35 (!) 88 %   10/28/21 1245  99 °F (37.2 °C) Rectal      10/28/21 1239 (!) 144/77   90 (!) 31 91 %   10/28/21 1200      (!) 87 %   10/28/21 1149 (!) 143/68 98.2 °F (36.8 °C) Oral 86 (!) 32 92 %     Weight: Weight: 121 lb 4.1 oz (55 kg)     24 hour intake/output:    Intake/Output Summary (Last 24 hours) at 10/29/2021 1140  Last data filed at 10/28/2021 1301  Gross per 24 hour   Intake 483 ml   Output    Net 483 ml       General appearance - alert, well appearing, and in no distress  Eyes - pupils equal and reactive, extraocular eye movements intact  Nose - normal and patent, no erythema, discharge or polyps  Mouth - mucous membranes moist, pharynx normal without lesions  Neck - supple, no significant adenopathy  Lymphatics - no palpable lymphadenopathy, no hepatosplenomegaly  Chest - BL rhonchi and crackles   Distant Breath Sounds: No  Heart - normal rate, regular rhythm, normal S1, S2, no murmurs, rubs, clicks or gallops  Abdomen - soft, nontender, nondistended, no masses or organomegaly  Obese: No; Protuberant: No  Neurological - sleeping, will not wake to name  Musculoskeletal - no joint tenderness, deformity or swelling  Extremities - peripheral pulses normal, no pedal edema, no clubbing or cyanosis  Skin - normal coloration and turgor, no rashes, no suspicious skin lesions noted  Review of Labs and Diagnostic Testing:    CBC:   Recent Labs     10/28/21  0913   WBC 9.1   HGB 15.0   HCT 46.3   .3*        BMP:   Recent Labs     10/28/21  0913      K 4.0   CL 97*   CO2 22*   BUN 15   CREATININE 0.8   CALCIUM 9.0   GLUCOSE 161*     PT/INR: No results for input(s): PROTIME, INR in the last 72 hours. APTT: No results for input(s): APTT in the last 72 hours. Lipids: No results for input(s): ALKPHOS, ALT, AST, BILITOT, BILIDIR, LABALBU, AMYLASE, LIPASE in the last 72 hours. Troponin: No results for input(s): TROPONINT in the last 72 hours.      Imaging:  ECHO Complete 2D W Doppler W Color    Result Date: 10/26/2021  Transthoracic Echocardiography Report (TTE)  Demographics   Patient Name   Tabatha Goldstein  Gender               Male                 H   MR #           [id number]       Race                                                   Ethnicity   Account #      [de-identified]       Room Number          0005   Accession      [id number]      Date of Study        10/26/2021  Number   Date of Birth  1945      Referring Physician  Liborio Martinez CNP   Age            67 year(s)      Sonographer          Leanora Cabot, HAMZAH,                                                      RVT                                  Interpreting         Jack Grewal MD                                 Physician  Procedure Type of Study   TTE procedure:ECHOCARDIOGRAM COMPLETE 2D W DOPPLER W COLOR. Procedure Date Date: 10/26/2021 Start: 01:09 PM Study Location: Bedside Technical Quality: Limited visualization due to restricted mobility. Indications:Acute Stroke. Additional Medical History:Smoker, CVA, Hypertension, Stroke, COPD, Acute coronary syndrome, Hyperlipidemia, Cardiac arrest, Coronary artery disease, Shortness of breath, GERD. Patient Status: Routine Contrast Medium: Bubble Study. Height: 63 inches Weight: 119 pounds BSA: 1.55 m^2 BMI: 21.08 kg/m^2 BP: 153/88 mmHg Allergies   - See Epic. Conclusions   Summary  Normal left ventricle size and systolic function. Ejection fraction was  estimated at 60 %. There were no regional left ventricular wall motion  abnormalities and wall thickness was within normal limits. Doppler parameters were consistent with abnormal left ventricular  relaxation (grade 1 diastolic dysfunction). The left atrium is Mild to moderate dilated. There is No Right to Left shunt on bubble study with agitated saline. Signature   ----------------------------------------------------------------  Electronically signed by Yelena Myles MD (Interpreting  physician) on 10/26/2021 at 07:22 PM  ----------------------------------------------------------------   Findings   Mitral Valve  The mitral valve structure was normal with normal leaflet separation. DOPPLER: The transmitral velocity was within the normal range with no  evidence for mitral stenosis. There was no evidence of mitral  regurgitation. Aortic Valve  The aortic valve was trileaflet with normal thickness and cuspal  separation. DOPPLER: Transaortic velocity was within the normal range with  no evidence of aortic stenosis. There was no evidence of aortic  regurgitation. Tricuspid Valve  The tricuspid valve structure was normal with normal leaflet separation. DOPPLER: There was no evidence of tricuspid stenosis. There was no  evidence of tricuspid regurgitation.    Pulmonic Valve  The pulmonic valve leaflets exhibited normal thickness, no calcification,  and normal cuspal separation. DOPPLER: The transpulmonic velocity was  within the normal range with no evidence for regurgitation. Left Atrium  The left atrium is Mild to moderate dilated. Left Ventricle  Normal left ventricle size and systolic function. Ejection fraction was  estimated at 60 %. There were no regional left ventricular wall motion  abnormalities and wall thickness was within normal limits. Doppler parameters were consistent with abnormal left ventricular  relaxation (grade 1 diastolic dysfunction). Right Atrium  Right atrial size was normal.  There is No Right to Left shunt on bubble study with agitated saline. Right Ventricle  The right ventricular size was normal with normal systolic function and  wall thickness. Pericardial Effusion  The pericardium was normal in appearance with no evidence of a pericardial  effusion. Pleural Effusion  No evidence of pleural effusion. Aorta / Great Vessels  -Aortic root dimension within normal limits.  -The Pulmonary artery is within normal limits. -IVC size is within normal limits with normal respiratory phasic changes.   M-Mode/2D Measurements & Calculations   LV Diastolic   LV Systolic Dimension:    AV Cusp Separation: 1.7 cmLA  Dimension: 4   2.9 cm                    Dimension: 3.1 cmAO Root  cm             LV Volume Diastolic: 70   Dimension: 3 cmLA Area: 17 cm^2  LV FS:27.5 %   ml  LV PW          LV Volume Systolic: 20.1  Diastolic: 0.7 ml  cm             LV EDV/LV EDV Index: 70   RV Diastolic Dimension: 2.3 cm  Septum         ml/45 m^2LV ESV/LV ESV  Diastolic: 0.7 Index: 99.9 ml/21 m^2     LA/Aorta: 1.03  cm             EF Calculated: 54 %                                           LA volume/Index: 52.7 ml /34m^2  Doppler Measurements & Calculations   MV Peak E-Wave: 55.3 cm/s  AV Peak Velocity: 108  LVOT Peak Velocity: 82.7  MV Peak A-Wave: 87 cm/s    cm/s cm/s  MV E/A Ratio: 0.64         AV Peak Gradient: 4.67 LVOT Peak Gradient: 3  MV Peak Gradient: 1.22     mmHg                   mmHg  mmHg                                                    TV Peak E-Wave: 59.6  MV Deceleration Time: 201                         cm/s  msec                                              TV Peak A-Wave: 47.6                             IVRT: 92 msec          cm/s   MV E' Septal Velocity: 6.1                        TV Peak Gradient: 1.42  cm/s                       AV DVI (Vmax):0.77     mmHg  MV A' Septal Velocity: 16  cm/s                                              PV Peak Velocity: 77.1  MV E' Lateral Velocity:                           cm/s  5.4 cm/s                                          PV Peak Gradient: 2.38  MV A' Lateral Velocity:                           mmHg  13.9 cm/s  E/E' septal: 9.07  E/E' lateral: 10.24  [email or web address] [id number]    CTA HEAD W WO CONTRAST (CODE STROKE)    Result Date: 10/26/2021  PROCEDURE: CTA NECK W WO CONTRAST, CTA HEAD W WO CONTRAST CLINICAL INFORMATION: stroke. COMPARISON: CT head from the same date and CTA head and neck dated 10/10/2019. TECHNIQUE: 1 mm axial images were obtained from the aortic arch through the head in arterial phase during fast bolus administration of 80 mL Isovue-370 injected in the left AC. A noncontrast localizer was obtained. 3-D reconstructions were created on a dedicated 3-D workstation. These include multiplanar MPR images, multiplanar MIP images and centerline reconstructions. All CT scans at this facility use dose modulation, iterative reconstruction, and/or weight-based dosing when appropriate to reduce radiation dose to as low as reasonably achievable. FINDINGS:  CTA head:  There are mural calcifications in the cavernous and clinoid segments of internal carotid arteries without flow-limiting stenosis or aneurysmal dilatation. These are stable compared to prior exam. The bilateral middle cerebral and anterior cerebral arteries are patent without focal abnormality identified. The basilar artery is patent without focal stenosis or visualized aneurysm. The bilateral posterior cerebral and superior cerebellar arteries are patent without focal abnormality identified. Dural  venous sinuses appear patent without focal filling defect. No focal areas of abnormal parenchymal enhancement are identified. CTA NECK: There is a bovine type arch. There is mural calcification in the brachiocephalic and left subclavian arteries without flow-limiting stenosis. There is mild stenosis at the mid segment of the left common carotid artery. There is calcified and noncalcified  mural plaque at the carotid bifurcations with extension into the proximal right internal carotid artery. On the right side, the narrowest luminal diameter is 1.7 mm  with a point more distal, where the walls are parallel, measuring 3.5 mm. On the left side there is no hemodynamically significant stenosis. Cervical segments of internal carotid arteries are otherwise patent without focal stenosis. The right vertebral artery is mildly dominant. There is moderate stenosis at the takeoff of the right vertebral artery and proximal V2 segment. There are areas of mild stenosis more distally in the proximal and mid V2 segments on the right. Vertebral arteries are otherwise patent without other focal areas of stenosis. There is streak artifact from dental amalgam which partially obscures oral and perioral soft tissues. Given this caveat, mucosal surfaces of the aerodigestive tract are symmetric without focal nodular thickening or visualized mass. No cervical lymphadenopathy is identified. The parotid, submandibular and thyroid glands are unremarkable. There are moderate emphysematous changes of the visualized portions of the lungs.  There are stable degenerative changes of the cervical spine. 1. Stable mural calcifications in the cavernous and clinoid segments without flow-limiting focal stenosis or aneurysm in the intracranial circulation. 2. Calcified and noncalcified mural plaque at the bilateral carotid bulbs with 51% stenosis on the right and no hemodynamically significant stenosis on the left by NASCET criteria. 3. Focal areas of moderate stenosis at the takeoff and proximal V2 segments of the dominant right vertebral artery. 4. Mild stenosis at the mid segment of left common carotid artery. **This report has been created using voice recognition software. It may contain minor errors which are inherent in voice recognition technology. ** Final report electronically signed by Dr. Yakov Brown MD on 10/25/2021 11:42 AM    CT HEAD WO CONTRAST    Result Date: 10/25/2021  PROCEDURE: CT HEAD WO CONTRAST CLINICAL INFORMATION: Stroke Symptoms. COMPARISON: CT head dated 10/10/2019. TECHNIQUE: Noncontrast 5 mm axial images were obtained through the brain. Sagittal and coronal reconstructions were created. All CT scans at this facility use dose modulation, iterative reconstruction, and/or weight-based dosing when appropriate to reduce radiation dose to as low as reasonably achievable. FINDINGS: There is stable mild volume loss. There are stable moderate confluent and patchy areas of hypodensity in the periventricular, subcortical deep white matter as evidence for chronic microvascular angiopathy. Small hypodensities in the bilateral subinsular regions are stable, likely representing prominent perivascular spaces or old lacunar infarcts. Gray-white matter projection otherwise appears grossly preserved. No intracranial hemorrhage, mass effect or midline shift is identified. The calvarium appears  intact. Orbits are unremarkable. Visualized paranasal sinuses are clear. Mastoid air cells are clear. No evidence of acute intracranial abnormality.  **This report has been created bifurcations with extension into the proximal right internal carotid artery. On the right side, the narrowest luminal diameter is 1.7 mm  with a point more distal, where the walls are parallel, measuring 3.5 mm. On the left side there is no hemodynamically significant stenosis. Cervical segments of internal carotid arteries are otherwise patent without focal stenosis. The right vertebral artery is mildly dominant. There is moderate stenosis at the takeoff of the right vertebral artery and proximal V2 segment. There are areas of mild stenosis more distally in the proximal and mid V2 segments on the right. Vertebral arteries are otherwise patent without other focal areas of stenosis. There is streak artifact from dental amalgam which partially obscures oral and perioral soft tissues. Given this caveat, mucosal surfaces of the aerodigestive tract are symmetric without focal nodular thickening or visualized mass. No cervical lymphadenopathy is identified. The parotid, submandibular and thyroid glands are unremarkable. There are moderate emphysematous changes of the visualized portions of the lungs. There are stable degenerative changes of the cervical spine. 1. Stable mural calcifications in the cavernous and clinoid segments without flow-limiting focal stenosis or aneurysm in the intracranial circulation. 2. Calcified and noncalcified mural plaque at the bilateral carotid bulbs with 51% stenosis on the right and no hemodynamically significant stenosis on the left by NASCET criteria. 3. Focal areas of moderate stenosis at the takeoff and proximal V2 segments of the dominant right vertebral artery. 4. Mild stenosis at the mid segment of left common carotid artery. **This report has been created using voice recognition software. It may contain minor errors which are inherent in voice recognition technology. ** Final report electronically signed by Dr. Yakov Brown MD on 10/25/2021 11:42 AM    XR CHEST PORTABLE    Result Date: 10/28/2021  PROCEDURE: XR CHEST PORTABLE CLINICAL INFORMATION: Hypoxia. COMPARISON: Chest x-ray 10/25/2021. TECHNIQUE: AP portable chest radiograph performed. FINDINGS: Lines/tubes the nasogastric catheter extends below the hemidiaphragm suggests tip in the projection of the left upper quadrant. Heart/mediastinum: The heart size is normal. The pulmonary vascularity is unremarkable. Lungs: The left hemidiaphragm remains elevated. Atelectasis at the left lung base is unchanged. The lungs remain hyperinflated with flattening of the right hemidiaphragm, unchanged. No focal opacity, pleural effusion, or pneumothorax is observed. Bones: The visualized skeletal structures appear intact. A nasogastric catheter extends below the hemidiaphragms its tip in the projection of the left upper quadrant. Bilateral lower lobe atelectasis, left greater than right with elevation of the left hemidiaphragm appears unchanged. Evaluation of the left lower lobe is limited due to diaphragmatic elevation. **This report has been created using voice recognition software. It may contain minor errors which are inherent in voice recognition technology. ** Final report electronically signed by Dr Kaden Griffiths on 10/28/2021 10:11 AM    XR CHEST PORTABLE    Result Date: 10/25/2021  PROCEDURE: XR CHEST PORTABLE CLINICAL INFORMATION: 65-year-old male with facial droop. Code stroke. COMPARISON: Radiograph 6/22/2020. TECHNIQUE: AP upright view of the chest was obtained. FINDINGS: There is scarring/atelectasis at the left lung base. There is eventration of the left hemidiaphragm. The cardiac silhouette and pulmonary vasculature are within normal limits. There is some calcified plaque in the arch of the aorta. There is no significant pleural effusion or pneumothorax. Visualized portions of the upper abdomen are within normal limits. The osseous structures are intact. No acute fractures or suspicious osseous lesions. There is no acute intrathoracic process. **This report has been created using voice recognition software. It may contain minor errors which are inherent in voice recognition technology. ** Final report electronically signed by Dr Marycruz Jolley on 10/25/2021 10:55 AM    XR ABDOMEN FOR NG/OG/NE TUBE PLACEMENT    Result Date: 10/26/2021  PROCEDURE: XR ABDOMEN FOR NG/OG/NE TUBE PLACEMENT CLINICAL INFORMATION: Confirmation of course of NG/OG/NE tube and location of tip of tube COMPARISON: No prior study. TECHNIQUE: A single mobile view of the lower chest and upper abdomen was obtained to evaluate NG tube position. FINDINGS: The NG tube tip passes into stomach. No bowel distention is seen. Mild left basilar infiltrate. NG tube in good position. **This report has been created using voice recognition software. It may contain minor errors which are inherent in voice recognition technology. ** Final report electronically signed by Dr. Nneka Pinedo on 10/26/2021 3:55 PM    MRI BRAIN WO CONTRAST    Result Date: 10/25/2021  PROCEDURE: MRI BRAIN WO CONTRAST INDICATION:  stroke-left sided weakness. COMPARISON: CT head from the same date and MRI brain dated 10/10/2019. TECHNIQUE: Multiplanar and multiple spin echo MRI images were obtained of the brain without contrast. FINDINGS: There is stable mild volume loss compared to prior MRI. There is a small to moderate-sized area of restricted diffusion in the right corona radiata with associated T2/flair prolongation at the periphery of the area of restriction. No other focal areas of  restricted diffusion are present. There are moderate patchy areas of T2/FLAIR prolongation in the periventricular, subcortical and deep white matter and central ye without associated restricted diffusion, stable compared to prior MRI. No intra or extra-axial mass is identified. There is a prominent perivascular space in the right subinsular region.  There is a focal area of encephalomalacia in the subinsular region on the left, stable compared to prior exam. There are also small focal areas of encephalomalacia in the left corona radiata which have appeared in the interval since 2019. The major vascular flow voids appear patent. Orbits are unremarkable. Paranasal sinuses are clear. There is trace fluid in the left mastoid air cells. 1. Acute small to moderate-sized infarct in the right corona radiata. 2. Old lacunar infarcts in the left subinsular region and left corona radiata with the left corona radiata lesions having appeared in the interval since 2019. 3. Moderate patchy areas of T2/FLAIR hyperintensity in the supratentorial white matter and central ye likely representing sequelae of chronic microangiopathy old lacunar infarcts. **This report has been created using voice recognition software. It may contain minor errors which are inherent in voice recognition technology. ** Final report electronically signed by Dr. Shelton Gamez MD on 10/25/2021 12:33 PM    FL MODIFIED BARIUM SWALLOW W VIDEO    Result Date: 10/26/2021  PROCEDURE: FL MODIFIED BARIUM SWALLOW W VIDEO CLINICAL INFORMATION: Dysphasia TECHNIQUE: Fluoroscopy was provided for modified barium swallowing study performed by speech therapy. With the patient in the lateral projection, swallowing mechanism was evaluated using barium of various consistencies. The radiologist was available for the entire examination. 17 video clips were obtained. Total fluoroscopy time 1 minute 43 seconds. COMPARISON: None. FINDINGS: Oral, pharyngeal and esophageal structures appear normal. There is laryngeal penetration of honey thick, nectar thick and thin barium with aspiration of nectar thick and thin barium. 1. Laryngeal penetration of honey thick, nectar thick and thin barium with aspiration of nectar thick and thin barium. 2. Additional recommendations from the speech therapist will follow.  **This report has been created using voice recognition software. It may contain minor errors which are inherent in voice recognition technology. ** Final report electronically signed by Dr. Darrel Kovacs on 10/26/2021 9:51 AM        Assessment/Plan:    1. Continue comfort care medications as pt is now on hospice care  a. Fentanyl drip  b. Ativan PRN  c. Robinul PRN    Assessment and plan of care discussed with supervising physician, Dr Mann Carr. Electronically signed by SHARON Pearson CNP on 10/29/2021 at 11:40 AM         Copy: Primary Care Physician: Noni Watts MD    I have discussed the case, including pertinent history and exam findings with the NP. I have seen and examined the patient and the key elements of the encounter have been performed by me. I agree with the assessment, plan and orders as documented by the NP.     Electronically signed by Noni Watts MD on 10/29/2021 at 12:49 PM

## 2021-10-29 NOTE — PROGRESS NOTES
Referral completed with daughter and 2 granddaughters. Patient in bed with eyes closed. Hospice concepts, services and philosophies discussed. Patient meeting Inpatient criteria. Received ok by Dr Ignacio Mccabe to admit [facility] for CVA. Discharge/readmit completed by Dr Calles. All questions answered. Much support given and will continue. Consent forms signed.

## 2021-10-29 NOTE — PROGRESS NOTES
John Olson 59  OCCUPATIONAL THERAPY MISSED TREATMENT NOTE  [facility] 5[facility]  5K-25/025-A      Date: 10/29/2021  Patient Name: Marysol Marte        CSN: [id number]   : 1945  (67 y.o.)  Gender: male   Referring Practitioner: Hannah HERRERA CNP  Diagnosis: acute CVA         REASON FOR MISSED TREATMENT: patient transitioned to comfort care. discontinue OT services at this time.

## 2021-10-29 NOTE — DISCHARGE SUMMARY
Discharge Summary  10/29/2021  11:34 AM    Patient:  Shantelle Lindsay  YOB: 1945    MRN: [id number]   Acct: [de-identified]   5K-25/025-A   Primary Care Physician: Anabell Hensley MD    Admit date:  10/25/2021    Discharge date:  10/29/2021    Discharge Diagnoses:    Acute CVA   Acute hypoxic resp failure   Elevated trops  HTN  HLD  Tobacco use  CKD      Discharge Medications:       Jennifer Goodson   Home Medication Instructions [id number]    Printed on:10/29/21 1134   Medication Information                      albuterol (PROVENTIL) (2.5 MG/3ML) 0.083% nebulizer solution  Take 3 mLs by nebulization every 6 hours as needed for Wheezing or Shortness of Breath             albuterol sulfate  (90 Base) MCG/ACT inhaler  Inhale 2 puffs into the lungs every 6 hours as needed for Wheezing or Shortness of Breath             aspirin 81 MG tablet  Take 1 tablet by mouth daily             isosorbide mononitrate (IMDUR) 30 MG extended release tablet  Take 1 tablet by mouth daily             Nebulizers (AIR"[employer] COMPACT MINI NEBULIZER) MISC  1 vial by Does not apply route every 6 hours as needed (Shortness of breath or wheezing) Please dispense 1 Nebulizer machine.              pantoprazole (PROTONIX) 40 MG tablet  Take 1 tablet by mouth every morning (before breakfast)             prasugrel (EFFIENT) 10 MG TABS  take 1 tablet by mouth once daily             ranolazine (RANEXA) 1000 MG extended release tablet  take 1 tablet by mouth twice a day             senna-docusate (PERICOLACE) 8.6-50 MG per tablet  Take by mouth as needed for Constipation (1-2 tabs daily as needed for constipation.)              TRELEGY ELLIPTA 100-62.5-25 MCG/INH AEPB  inhale 1 puff by mouth and INTO THE LUNGS once daily Rinse mouth after use               Scheduled Meds: Scheduled Meds:   ketamine  100 mg IntraVENous Once    glycopyrrolate  2 mg Oral TID    pyridostigmine  60 mg Oral 4 times per day    aspirin  324 mg Oral Daily    pantoprazole  40 mg IntraVENous Daily    amLODIPine  5 mg Oral Daily    atorvastatin  40 mg Oral Daily    [Held by provider] isosorbide mononitrate  30 mg Oral Daily    [Held by provider] metoprolol tartrate  25 mg Oral BID    ranolazine  1,000 mg Oral BID    sodium chloride flush  5-40 mL IntraVENous 2 times per day    enoxaparin  40 mg SubCUTAneous Daily    nicotine  1 patch TransDERmal Daily    clopidogrel  75 mg Oral Daily     Continuous Infusions:   fentaNYL 500 mcg in sodium chloride 0.9% 100 ml infusion 40 mcg/hr (10/29/21 0835)    sodium chloride       PRN Meds:. LORazepam, glycopyrrolate, aspirin-acetaminophen-caffeine, albuterol, sodium chloride flush, sodium chloride, acetaminophen **OR** acetaminophen, promethazine **OR** ondansetron  Continuous Infusions:   fentaNYL 500 mcg in sodium chloride 0.9% 100 ml infusion 40 mcg/hr (10/29/21 0835)    sodium chloride         Intake/Output Summary (Last 24 hours) at 10/29/2021 1134  Last data filed at 10/28/2021 1301  Gross per 24 hour   Intake 483 ml   Output    Net 483 ml       Diet:  Diet NPO    Activity:  Activity as tolerated (Patient may move about with assist as indicated or with supervision.)      Consultants:  Neuro    Objective:  Lab Results   Component Value Date    WBC 9.1 10/28/2021    RBC 4.44 10/28/2021    RBC 4.58 09/20/2021    HGB 15.0 10/28/2021    HCT 46.3 10/28/2021    .3 10/28/2021    MCH 33.8 10/28/2021    MCHC 32.4 10/28/2021    RDW 14.0 09/20/2021     10/28/2021    MPV 9.7 10/28/2021     Lab Results   Component Value Date     10/28/2021    K 4.0 10/28/2021    K 4.4 10/26/2021    CL 97 10/28/2021    CO2 22 10/28/2021    BUN 15 10/28/2021    CREATININE 0.8 10/28/2021    GLUCOSE 161 10/28/2021    GLUCOSE 154 08/31/2011    CALCIUM 9.0 10/28/2021     Lab Results   Component Value Date    CALCIUM 9.0 10/28/2021     No results found for: IONCA  Lab Results   Component Value Date    MG 1.9 06/22/2020 Lab Results   Component Value Date    PHOS 3.3 11/19/2017     Lab Results   Component Value Date    BNP 34.6 02/28/2013     Lab Results   Component Value Date    ALKPHOS 105 10/25/2021    ALT 12 10/25/2021    AST 18 10/25/2021    PROT 7.1 10/25/2021    BILITOT 0.4 10/25/2021    BILIDIR 0.1 09/20/2021    LABALBU 4.5 10/25/2021    LABALBU 4.0 08/31/2011     Lab Results   Component Value Date    LACTA 1.6 06/05/2020     No results found for: AMYLASE  Lab Results   Component Value Date    LIPASE 21.5 10/10/2019     Lab Results   Component Value Date    CHOL 154 10/26/2021    TRIG 96 10/26/2021    HDL 47 10/26/2021    LDLCALC 88 10/26/2021     Recent Labs     10/28/21  1153   POCGLU 179*     No results for input(s): CKTOTAL, CKMB, TROPONINI in the last 72 hours. Lab Results   Component Value Date    LABA1C 6.1 10/25/2021     Lab Results   Component Value Date    INR 1.05 10/25/2021    PROTIME 11.4 09/20/2021     No results found for: PHART, PO2ART, MKB3LWL, DEO0SOL, [employer] [facility] Course: clinical course has not improved    Initial H+P: The patient is a 67 y.o. male with pmhx of COPD. Htn, CKD, tobacco use who presents with acute onset left side weakness and facial droop, LKW 9am last night. Pt reports waking up from sleeping on the cough this AM and his left leg gave way; he was unable to stand up and fell without hitting his head or losing consciousness. Initial NIHSS was 2. In the emergency department CT head and CTA head/neck done; reports reviewed. MRI shows acute infarct right corona radiata, trop 0.020, EKG WNL. Pt denies any chest pain, SOB, N/V, abdominal pain, lower extremity edema. Pt seen by Neuro in ED and not a tPA candidate. He reports smoking 1PPD and no alcohol or drug use. Patient admitted for acute CVA. Pt remains a full code. Pt was initially seen to be improving  But did fail his MBS and hence an NGT was inserted and pt started on TF.  On 10/28 pt had difficulty swallowing secretions and had more pronounced left side facial droop and slurred speech. Neuro was re-consulted and MRI STAT was ordered; no need for code stroke per neuro. ICU was consulted for intubation. After discussing goals of care with family it was decided that pt would not want to have trach placed and be connected to the ventilator and so comfort care measures were pursued. Hospice was consulted and pt transitioned to hospice. Vitals: /62   Pulse 93   Temp 98.6 °F (37 °C) (Axillary)   Resp 28   Ht 5' 2\" (1.575 m)   Wt 121 lb 4.1 oz (55 kg)   SpO2 90%   BMI 22.18 kg/m²   Physical Exam:  General appearance - alert, well appearing, and in no distress  Eyes - pupils equal and reactive, extraocular eye movements intact  Nose - normal and patent, no erythema, discharge or polyps  Mouth - mucous membranes moist, pharynx normal without lesions  Neck - supple, no significant adenopathy  Lymphatics - no palpable lymphadenopathy, no hepatosplenomegaly  Chest - BL rhonchi and crackles   Distant Breath Sounds: No  Heart - normal rate, regular rhythm, normal S1, S2, no murmurs, rubs, clicks or gallops  Abdomen - soft, nontender, nondistended, no masses or organomegaly  Obese: No; Protuberant: No  Neurological - sleeping, will not wake to name  Musculoskeletal - no joint tenderness, deformity or swelling  Extremities - peripheral pulses normal, no pedal edema, no clubbing or cyanosis  Skin - normal coloration and turgor, no rashes, no suspicious skin lesions noted    Disposition: inpatient hospice    Condition: Stable    Over 35 minutes spent on encounter    Assessment and plan of care discussed with supervising physician, Dr Kim Hensley. Judy Fox, APRN - CNP, CNP     Copy: Primary Care Physician: Saira Hung MD  Internal Medicine    I have discussed the case, including pertinent history and exam findings with the NP. I have seen and examined the patient and the key elements of the encounter have been performed by me.  I agree with the assessment, plan and orders as documented by the NP.     Electronically signed by Jose Martin Lomax MD on 10/29/2021 at 12:44 PM

## 2021-10-29 NOTE — PROGRESS NOTES
Patient RR 28bpm. Increased Fentanyl drip by 10mcg. Currently at 20mcg/hr. This nurse asked him if he was in pain and he shook his head no. Patient was asked if he feels anxious and he shook his head yes. Ativan was given at this time. Will continue to monitor.

## 2021-10-30 NOTE — PROGRESS NOTES
Hospice nurse visit made. Patient resting quietly in bed with family present. Fentanyl infusing at 75 mcg/hr. Patient required 2 increases in fentanyl, one dose of Robinul, and one dose of ativan in last 24 hrs. Respirations slightly labored and wet. Skin warm and dry. No signs of mottling or temperature change to extremities. Educated family on dying process and changes being seen. Discussed giving ativan to help decrease work of breathing and family in agreement. Updated patient's primary nurse Berna and dose of ativan given. Will also give Robinul when due. Assisted with turning patient and mouth care performed. Support given to family. Reminded staff of hospice 24/7 availability and to call if any questions or concerns.

## 2021-10-30 NOTE — PROGRESS NOTES
Progress note      [facility]             Patient:  Rosy Barney  YOB: 1945    MRN: [id number]   Acct:  [de-identified]   5K-25/025-A  Primary Care Physician: Mary Tomlin MD    Admit Date: 10/29/2021           Subjective: Pt resting peacefully.         Objective:      Physical Exam:    Vitals:Patient Vitals for the past 24 hrs:   BP Temp Temp src Pulse Resp SpO2   10/29/21 1934 (!) 120/59 99.5 °F (37.5 °C) Oral 105 26 (!) 80 %     Weight:       24 hour intake/output:    Intake/Output Summary (Last 24 hours) at 10/30/2021 7389  Last data filed at 10/29/2021 1936  Gross per 24 hour   Intake 743 ml   Output --   Net 743 ml       General appearance - alert, well appearing, and in no distress  Eyes - pupils equal and reactive, extraocular eye movements intact  Nose - normal and patent, no erythema, discharge or polyps  Mouth - mucous membranes moist, pharynx normal without lesions  Neck - supple, no significant adenopathy  Lymphatics - no palpable lymphadenopathy, no hepatosplenomegaly  Chest - BL rhonchi and crackles   Distant Breath Sounds: No  Heart - normal rate, regular rhythm, normal S1, S2, no murmurs, rubs, clicks or gallops  Abdomen - soft, nontender, nondistended, no masses or organomegaly  Obese: No; Protuberant: No  Neurological - sleeping, will not wake to name  Musculoskeletal - no joint tenderness, deformity or swelling  Extremities - peripheral pulses normal, no pedal edema, no clubbing or cyanosis  Skin - normal coloration and turgor, no rashes, no suspicious skin lesions noted    Review of Labs and Diagnostic Testing:    CBC:   Recent Labs     10/28/21  0913   WBC 9.1   HGB 15.0   HCT 46.3   .3*        BMP:   Recent Labs     10/28/21  0913      K 4.0   CL 97*   CO2 22*   BUN 15   CREATININE 0.8   CALCIUM 9.0   GLUCOSE 161*     PT/INR: No results for input(s): PROTIME, INR in the last 72 hours.  APTT: No results for input(s): APTT in the last 72 hours. Lipids: No results for input(s): ALKPHOS, ALT, AST, BILITOT, BILIDIR, LABALBU, AMYLASE, LIPASE in the last 72 hours. Troponin: No results for input(s): TROPONINT in the last 72 hours. Imaging:  [unfilled]    EKG:      Diet: Diet NPO        Data:   Scheduled Meds: Scheduled Meds:   glycopyrrolate  2 mg Oral TID     Continuous Infusions:   fentaNYL 500 mcg in sodium chloride 0.9% 100 ml infusion 75 mcg/hr (10/30/21 0450)     PRN Meds:.acetaminophen **OR** acetaminophen, glycopyrrolate, LORazepam  Continuous Infusions:   fentaNYL 500 mcg in sodium chloride 0.9% 100 ml infusion 75 mcg/hr (10/30/21 0450)         Assessment/plan   1. Continue comfort care meds    -Fentantyl Drip   -Ativan PRN   -Robinul PRN        Assessment and plan of care discussed with supervising physician, Dr Hai Shepard. Electronically signed by SHARON Calles CNP on 10/30/2021 at 7:24 AM    Addendum/attestation by Aristides Jackson MD:  I have seen and examined the patient independently. Face to face evaluation and examination was performed. The above evaluation and note has been reviewed. Laboratory and radiological data were reviewed. I Have discussed with Nabil Koch CNP about this patient in detail. The above assessment and plan has been reviewed. Please see my modifications mentioned below.       My modifications:  Electronically signed by Perri Cleayr MD on 10/30/2021 at 10:28 AM

## 2021-10-31 NOTE — PROGRESS NOTES
Progress note      [facility]             Patient:  Ale Neely  YOB: 1945    MRN: [id number]   Acct:  [de-identified]   5K-25/025-A  Primary Care Physician: Marcie Lainez MD    Admit Date: 10/29/2021           Subjective: Pt with shallow breathing      Objective:      Physical Exam:    Vitals:  Patient Vitals for the past 24 hrs:   BP Temp Temp src Pulse Resp SpO2   10/30/21 1937 (!) 100/56 100 °F (37.8 °C) Oral 125 28 (!) 68 %   10/30/21 0750 (!) 123/59 100 °F (37.8 °C) Axillary 120 22 (!) 69 %     Weight:       24 hour intake/output:    Intake/Output Summary (Last 24 hours) at 10/31/2021 0720  Last data filed at 10/30/2021 1813  Gross per 24 hour   Intake 743 ml   Output --   Net 743 ml       General appearance - unresponsive,  and in no distress  Eyes - pupils equal and reactive, extraocular eye movements intact  Nose - normal and patent, no erythema, discharge or polyps  Mouth - mucous membranes moist, pharynx normal without lesions  Neck - supple, no significant adenopathy  Chest - BL rhonchi and crackles   Distant Breath Sounds: No  Heart - normal rate, regular rhythm, normal S1, S2, no murmurs, rubs, clicks or gallops  Abdomen - soft, nontender, nondistended, no masses or organomegaly  Obese: No; Protuberant: No  Neurological - unresponsive  Musculoskeletal - no joint tenderness, deformity or swelling  Extremities - peripheral pulses normal, no pedal edema, no clubbing or cyanosis  Skin - normal coloration and turgor, no rashes, no suspicious skin lesions noted    Review of Labs and Diagnostic Testing:    CBC:   Recent Labs     10/28/21  0913   WBC 9.1   HGB 15.0   HCT 46.3   .3*        BMP:   Recent Labs     10/28/21  0913      K 4.0   CL 97*   CO2 22*   BUN 15   CREATININE 0.8   CALCIUM 9.0   GLUCOSE 161*     PT/INR: No results for input(s): PROTIME, INR in the last 72 hours.   APTT: No

## 2021-10-31 NOTE — DISCHARGE SUMMARY
Oni Valdez  Death Summary    Date of Admission:10/29/2021    Date of Death:10/31/2021    Admission diagnosis:  CVA    Secondary diagnoses:  Acute hypoxic resp failure   Elevated trops  HTN  HLD  Tobacco use  CKD      Consults:  Neuro    Procedures:  None    Hospital course:  Initial H+P: The patient is a 74 y. o. male with pmhx of COPD. Htn, CKD, tobacco use who presents with acute onset left side weakness and facial droop, LKW 9am last night. Pt reports waking up from sleeping on the cough this AM and his left leg gave way; he was unable to stand up and fell without hitting his head or losing consciousness. Initial NIHSS was 2. In the emergency department CT head and CTA head/neck done; reports reviewed. MRI shows acute infarct right corona radiata, trop 0.020, EKG WNL. Pt denies any chest pain, SOB, N/V, abdominal pain, lower extremity edema. Pt seen by Neuro in ED and not a tPA candidate. He reports smoking 1PPD and no alcohol or drug use. Patient admitted for acute CVA. Pt remains a full code.     Pt was initially seen to be improving  But did fail his MBS and hence an NGT was inserted and pt started on TF. On 10/28 pt had difficulty swallowing secretions and had more pronounced left side facial droop and slurred speech. Neuro was re-consulted and MRI STAT was ordered; no need for code stroke per neuro. ICU was consulted for intubation. After discussing goals of care with family it was decided that pt would not want to have trach placed and be connected to the ventilator and so comfort care measures were pursued. Hospice was consulted and pt transitioned to hospice. Pt was maintained on a fentanyl drip and was receiving IV ativan. Pt was found to be without vital signs after a brief period of agonal breathing 10/31/21 at 0713.       Time of death:  1776  Cause of death:  Respiratory failure     Electronically signed by SHARON Davila CNP on 10/31/2021 at 11:57 AM     Addendum/attestation by Ian Avitia Yefri Mcnair MD:  I have seen and examined the patient independently. Face to face evaluation and examination was performed. The above evaluation and note has been reviewed. Laboratory and radiological data were reviewed. I Have discussed with Shon Kaba CNP about this patient in detail. The above assessment and plan has been reviewed. Please see my modifications mentioned below.       My modifications:  Electronically signed by Lillie Templeton MD on 10/31/2021 at 4:30 PM          Copy: Primary Care Physician: Jose Martin Lomax MD

## 2022-09-28 NOTE — CONSULTS
Neurology Stroke Alert Note    Date:10/25/2021       Room:[address] [place] [address]  Patient 500 [facility] Jamari Yan     YOB: 1945     Age:76 y.o. Requesting Physician: Rubi Crocker MD     Reason for Consult:  Evaluate for stroke alert    HPI: Kvng Olguin who is a 67 y.o. male with a history of HTN, HLD, CHF, COPD, and current smoker (1PPD) who presents to [facility] with complaints of left sided weakness. He reports he went to bed around 9pm and woke up this morning, after sleeping on the cough overnight, he attempted to stand up and felt that his legs buckled, he then fell between his couch and coffee table, reports he did not hit his head. He crawled into the kitchen and called 911, as he was unable to get up due to the left sided weakness. He denies any history of strokes. CTH shows no acute intracranial abnormality, CT angiogram head and neck without any hemodynamically significant stenosis, MRI does show acute small to moderate sized infarct in the right corona radiata. He was not a candidate for tPA due to LKW and no LVO on imaging. He denies any lightheadedness, paresthesias, dizziness, vision changes, or speech difficulty. Time of symptoms onset/last time seen at baseline:  10/24/2021 2100  Time of stroke alert:  1021  Time of Neurology arrival:  1022  Vascular risk factors:  HLD, HTN, smoker  Initial Glucose: 104  Old deficits from prior stroke:  n/a  BP in ED:  143/91  Initial NIHSS: 2  Modified Rock[place] Score upon admission:  0  IV tPA administerd:  No  Time of Initial Imaging Read: ~1059         Review of Systems   Review of Systems   Constitutional: Negative for chills and fever. HENT: Negative for congestion, rhinorrhea and sore throat. Eyes: Negative for photophobia and visual disturbance. Respiratory: Negative for chest tightness and shortness of breath. Cardiovascular: Negative for chest pain. Gastrointestinal: Negative for abdominal pain, constipation, diarrhea, nausea and vomiting. Genitourinary: Negative for difficulty urinating and dysuria. Musculoskeletal: Negative for neck pain and neck stiffness. Skin: Negative for rash. Neurological: Positive for facial asymmetry, weakness (left sided) and headaches. Negative for dizziness, seizures, syncope, light-headedness and numbness. Hematological: Does not bruise/bleed easily. Psychiatric/Behavioral: Negative for confusion and decreased concentration. Medications   Scheduled Meds:    aspirin  325 mg Oral Daily    sodium chloride flush  5-40 mL IntraVENous 2 times per day    enoxaparin  40 mg SubCUTAneous Daily    nicotine  1 patch TransDERmal Daily     Continuous Infusions:    sodium chloride      sodium chloride       PRN Meds: sodium chloride flush, sodium chloride, acetaminophen **OR** acetaminophen, promethazine **OR** ondansetron    Past History    Past Medical History:   has a past medical history of Acute coronary syndrome ([facility] [address] Arrhythmia, Arteriosclerotic heart disease (ASHD), CHF (congestive heart failure) ([facility] [address] Chills, COPD (chronic obstructive pulmonary disease) ([facility] [address] Dizziness - light-headed, Dysphagia, Emphysema, Esophagitis, Fatigue, Fever, GERD (gastroesophageal reflux disease), History of chest pain, History of tinnitus, History of tobacco abuse, Hyperlipidemia, Hypertension, Irregular heart beat, Lightheadedness, MI (myocardial infarction) ([facility] [address] Night sweats, Noncompliance with medication regimen, Pneumonia, Renal failure syndrome, Ringing in the ears, SOB (shortness of breath) on exertion, and Weight loss. Social History:   reports that he has been smoking cigarettes. He started smoking about 45 years ago. He has a 40.00 pack-year smoking history. He has never used smokeless tobacco. He reports that he does not drink alcohol and does not use drugs.      Family History:   Family History   Problem Relation Age of Onset    Heart Disease Mother     Cancer Mother     Heart Disease Father     Cancer Father Cancer Sister     Prostate Cancer Neg Hx     Colon Cancer Neg Hx     Colon Polyps Neg Hx        Physical Examination        NIH Stroke Scale  1a Level of consciousness 0=alert; keenly responsive   1b LOC questions 0=Performs both tasks correctly   1c LOC commands 0=Performs both tasks correctly   2 Best Gaze 0=normal   3 Visual 0=No visual loss   4 Facial Palsy 1=Minor paralysis (flattened nasolabial fold, asymmetric on smiling)   5a Motor left arm 0=No drift, limb holds 90 (or 45) degrees for full 10 seconds   5b Motor right arm 0=No drift, limb holds 90 (or 45) degrees for full 10 seconds   6a Motor left leg 0=No drift, limb holds 90 (or 45) degrees for full 10 seconds   6b Motor right leg 0=No drift, limb holds 90 (or 45) degrees for full 10 seconds   7 Limb Ataxia 0=Absent   8 Sensory 1=Mild to moderate sensory loss; patient feels pinprick is less sharp or is dull on the affected side; there is a loss of superficial pain with pinprick but patient is aware She is being touched   9 Best Language 0=No aphasia, normal   10 Dysarthria 0=Normal   11 Extinction and Inattention 0=No abnormality   TOTAL  2   Time NIHSS performed: 1:08 PM    Vitals:  BP (!) 146/95   Pulse 89   Resp 16   Wt 119 lb 12.8 oz (54.3 kg)   SpO2 98%   BMI 21.91 kg/m²   No data recorded. I/O (24Hr): No intake or output data in the 24 hours ending 10/25/21 1308    Physical Exam  Constitutional:       Appearance: He is ill-appearing. HENT:      Head: Normocephalic and atraumatic. Right Ear: External ear normal.      Left Ear: External ear normal.      Nose: Nose normal.      Mouth/Throat:      Mouth: Mucous membranes are moist.   Eyes:      Extraocular Movements: EOM normal.      Pupils: Pupils are equal, round, and reactive to light. Cardiovascular:      Rate and Rhythm: Normal rate and regular rhythm. Pulses: Normal pulses. Heart sounds: Normal heart sounds.    Pulmonary:      Effort: Pulmonary effort is normal. Breath sounds: Normal breath sounds. Abdominal:      General: Bowel sounds are normal.      Palpations: Abdomen is soft. Musculoskeletal:         General: Normal range of motion. Cervical back: Normal range of motion and neck supple. Skin:     General: Skin is warm and dry. Neurological:      Mental Status: He is alert and oriented to person, place, and time. Coordination: Finger-Nose-Finger Test and Heel to [place] mart[place]nez Test normal.      Deep Tendon Reflexes:      Reflex Scores:       Brachioradialis reflexes are 2+ on the right side and 2+ on the left side. Patellar reflexes are 2+ on the right side and 2+ on the left side. Achilles reflexes are 2+ on the right side and 2+ on the left side. Psychiatric:         Mood and Affect: Mood normal.         Speech: Speech normal.         Behavior: Behavior normal.         Thought Content: Thought content normal.         Judgment: Judgment normal.       Neurologic Exam     Mental Status   Oriented to person, place, and time. Registration: recalls 3 of 3 objects. Follows 2 step commands. Attention: normal.   Speech: speech is normal   Level of consciousness: alert  Knowledge: good. Able to name object. Able to read. Able to repeat. Cranial Nerves     CN II   Visual fields full to confrontation. CN III, IV, VI   Pupils are equal, round, and reactive to light. Extraocular motions are normal.   Right pupil: Size: 3 mm. Shape: regular. Reactivity: brisk. Left pupil: Size: 3 mm. Shape: regular. Reactivity: brisk. CN V   Facial sensation intact. CN VII   Left facial weakness: left sided facial droop.     CN VIII   CN VIII normal.     CN IX, X   CN IX normal.   CN X normal.   Palate: symmetric    CN XI   CN XI normal.   Right trapezius strength: normal  Left trapezius strength: weak    CN XII   CN XII normal.   Tongue deviation: none    Motor Exam   Muscle bulk: normal  Overall muscle tone: normal  Right arm pronator drift: absent  Left arm pronator drift: absent    RUE: 5/5  LUE: 4-/5  RLE: 4+/5  LLE: 4-/5     Sensory Exam   Light touch normal.     Gait, Coordination, and Reflexes     Coordination   Finger to nose coordination: normal  Heel to shin coordination: normal    Tremor   Resting tremor: absent  Intention tremor: absent  Action tremor: absent    Reflexes   Right brachioradialis: 2+  Left brachioradialis: 2+  Right patellar: 2+  Left patellar: 2+  Right achilles: 2+  Left achilles: 2+  Gait not formally tested        Labs/Imaging/Diagnostics   Labs:  CBC:  Recent Labs     10/25/21  1042   WBC 9.0   RBC 4.27*   HGB 14.6   HCT 44.0   .0*        CHEMISTRIES:  Recent Labs     10/25/21  1042      K 4.3      CO2 24   BUN 26*   CREATININE 1.2   GLUCOSE 105     PT/INR:  Recent Labs     10/25/21  1042   INR 1.05     APTT:  Recent Labs     10/25/21  1042   APTT 24.5     LIVER PROFILE:  Recent Labs     10/25/21  1042   AST 18   ALT 12   BILITOT 0.4   ALKPHOS 105       Imaging Last 24 Hours:  CT HEAD WO CONTRAST    Result Date: 10/25/2021  PROCEDURE: CT HEAD WO CONTRAST CLINICAL INFORMATION: Stroke Symptoms. COMPARISON: CT head dated 10/10/2019. TECHNIQUE: Noncontrast 5 mm axial images were obtained through the brain. Sagittal and coronal reconstructions were created. All CT scans at this facility use dose modulation, iterative reconstruction, and/or weight-based dosing when appropriate to reduce radiation dose to as low as reasonably achievable. FINDINGS: There is stable mild volume loss. There are stable moderate confluent and patchy areas of hypodensity in the periventricular, subcortical deep white matter as evidence for chronic microvascular angiopathy. Small hypodensities in the bilateral subinsular regions are stable, likely representing prominent perivascular spaces or old lacunar infarcts. Gray-white matter projection otherwise appears grossly preserved.  No intracranial hemorrhage, mass effect or midline shift is identified. The calvarium appears  intact. Orbits are unremarkable. Visualized paranasal sinuses are clear. Mastoid air cells are clear. No evidence of acute intracranial abnormality. **This report has been created using voice recognition software. It may contain minor errors which are inherent in voice recognition technology. ** Final report electronically signed by Dr. Georgina Musa MD on 10/25/2021 10:59 AM    CTA NECK W WO CONTRAST (CODE STROKE)    Result Date: 10/25/2021  PROCEDURE: CTA NECK W WO CONTRAST, CTA HEAD W WO CONTRAST CLINICAL INFORMATION: stroke. COMPARISON: CT head from the same date and CTA head and neck dated 10/10/2019. TECHNIQUE: 1 mm axial images were obtained from the aortic arch through the head in arterial phase during fast bolus administration of 80 mL [id number]-370 injected in the left AC. A noncontrast localizer was obtained. 3-D reconstructions were created on a dedicated 3-D workstation. These include multiplanar MPR images, multiplanar MIP images and centerline reconstructions. All CT scans at this facility use dose modulation, iterative reconstruction, and/or weight-based dosing when appropriate to reduce radiation dose to as low as reasonably achievable. FINDINGS:  CTA head: There are mural calcifications in the cavernous and clinoid segments of internal carotid arteries without flow-limiting stenosis or aneurysmal dilatation. These are stable compared to prior exam. The bilateral middle cerebral and anterior cerebral arteries are patent without focal abnormality identified. The basilar artery is patent without focal stenosis or visualized aneurysm. The bilateral posterior cerebral and superior cerebellar arteries are patent without focal abnormality identified. Dural  venous sinuses appear patent without focal filling defect. No focal areas of abnormal parenchymal enhancement are identified. CTA NECK: There is a bovine type arch.  There is mural calcification in the brachiocephalic and left subclavian arteries without flow-limiting stenosis. There is mild stenosis at the mid segment of the left common carotid artery. There is calcified and noncalcified  mural plaque at the carotid bifurcations with extension into the proximal right internal carotid artery. On the right side, the narrowest luminal diameter is 1.7 mm  with a point more distal, where the walls are parallel, measuring 3.5 mm. On the left side there is no hemodynamically significant stenosis. Cervical segments of internal carotid arteries are otherwise patent without focal stenosis. The right vertebral artery is mildly dominant. There is moderate stenosis at the takeoff of the right vertebral artery and proximal V2 segment. There are areas of mild stenosis more distally in the proximal and mid V2 segments on the right. Vertebral arteries are otherwise patent without other focal areas of stenosis. There is streak artifact from dental amalgam which partially obscures oral and perioral soft tissues. Given this caveat, mucosal surfaces of the aerodigestive tract are symmetric without focal nodular thickening or visualized mass. No cervical lymphadenopathy is identified. The parotid, submandibular and thyroid glands are unremarkable. There are moderate emphysematous changes of the visualized portions of the lungs. There are stable degenerative changes of the cervical spine. 1. Stable mural calcifications in the cavernous and clinoid segments without flow-limiting focal stenosis or aneurysm in the intracranial circulation. 2. Calcified and noncalcified mural plaque at the bilateral carotid bulbs with 51% stenosis on the right and no hemodynamically significant stenosis on the left by NASCET criteria. 3. Focal areas of moderate stenosis at the takeoff and proximal V2 segments of the dominant right vertebral artery. 4. Mild stenosis at the mid segment of left common carotid artery.  **This report has been created using voice recognition software. It may contain minor errors which are inherent in voice recognition technology. ** Final report electronically signed by Dr. Zuleyma Nunez MD on 10/25/2021 11:42 AM    XR CHEST PORTABLE    Result Date: 10/25/2021  PROCEDURE: XR CHEST PORTABLE CLINICAL INFORMATION: 71-year-old male with facial droop. Code stroke. COMPARISON: Radiograph 6/22/2020. TECHNIQUE: AP upright view of the chest was obtained. FINDINGS: There is scarring/atelectasis at the left lung base. There is eventration of the left hemidiaphragm. The cardiac silhouette and pulmonary vasculature are within normal limits. There is some calcified plaque in the arch of the aorta. There is no significant pleural effusion or pneumothorax. Visualized portions of the upper abdomen are within normal limits. The osseous structures are intact. No acute fractures or suspicious osseous lesions. There is no acute intrathoracic process. **This report has been created using voice recognition software. It may contain minor errors which are inherent in voice recognition technology. ** Final report electronically signed by Dr Dee Dee Hughes on 10/25/2021 10:55 AM    MRI BRAIN WO CONTRAST    Result Date: 10/25/2021  PROCEDURE: MRI BRAIN WO CONTRAST INDICATION:  stroke-left sided weakness. COMPARISON: CT head from the same date and MRI brain dated 10/10/2019. TECHNIQUE: Multiplanar and multiple spin echo MRI images were obtained of the brain without contrast. FINDINGS: There is stable mild volume loss compared to prior MRI. There is a small to moderate-sized area of restricted diffusion in the right corona radiata with associated T2/flair prolongation at the periphery of the area of restriction. No other focal areas of  restricted diffusion are present.  There are moderate patchy areas of T2/FLAIR prolongation in the periventricular, subcortical and deep white matter and central ye without associated restricted diffusion, stable Administered By (Optional): RR RN compared to prior MRI. No intra or extra-axial mass is identified. There is a prominent perivascular space in the right subinsular region. There is a focal area of encephalomalacia in the subinsular region on the left, stable compared to prior exam. There are also small focal areas of encephalomalacia in the left corona radiata which have appeared in the interval since 2019. The major vascular flow voids appear patent. Orbits are unremarkable. Paranasal sinuses are clear. There is trace fluid in the left mastoid air cells. 1. Acute small to moderate-sized infarct in the right corona radiata. 2. Old lacunar infarcts in the left subinsular region and left corona radiata with the left corona radiata lesions having appeared in the interval since 2019. 3. Moderate patchy areas of T2/FLAIR hyperintensity in the supratentorial white matter and central ye likely representing sequelae of chronic microangiopathy old lacunar infarcts. **This report has been created using voice recognition software. It may contain minor errors which are inherent in voice recognition technology. ** Final report electronically signed by Dr. Manuel Sharp MD on 10/25/2021 12:33 PM        Assessment and Plan:        Hospital Problems           Last Modified POA    Acute cerebrovascular accident (CVA) ([facility] [address] 10/25/2021 Yes            Acute infarct right corona radiata   Admit to hospitalist, will need telemetry    MRI read above, shows new acute infarct in the right corona radiata  CT angiogram head and neck without hemodynamically significant stenosis,  51% stenosis on the right carotid  Patient failed swallow study and was unable to take loading dose of ASA. Cardiology ok to switch effient to plavix. Plavix 75mg added at this time  Will order 300mg rectal ASA daily until sure patient is safe to swallow.    NIHSS q shift  HgbA1C and Fasting Lipid Panel in am  Permissive HTN for the first 24 hours  2D Echo with bubble study, ordered  Continue Statin therapy for risk factor control  LDL Goal 45-70  PT/OT/SLP Evaluation-Speech to see in am to for a formal swallow eval  Maintain telemetry, monitor for atrial-fib  Maintain oxygenation saturation >94%  Start 0.9% Saline IV at 75 mL/hr  Dysphagia screen prior to oral intake  NPO pending swallow evaluation  Monitor for infection  Urinalysis with Reflex  Chest XRay  EKG, ordered  Labs daily  SCDs and lovenox for DVT prophylaxis  CT head is needed if severe headache or altered mental status  Provide stroke education for individualized risk factors   Smoking cessation education for risk factor control    This patient was seen and evaluated with Dr. Chinedu Garcia and he is in agreement with the assessment and plan. Electronically signed by SHARON Fuentes CNP on 10/25/21 at 1:08 PM EDT    I agree with the above plan. Patient is with subcortical stroke due to uncontrolled risk factors.      Modesto Miller MD

## 2024-09-09 NOTE — PROGRESS NOTES
Patient educated on discharge instructions, follow up appointments and medications. Patient voices very good understanding and has no further questions at this time. Patient to discharge lobby with all belongings and daughter. Physical Therapy Discharge    Visit Type: Discharge Summary- Daily Treatment Note  Visit: 13  Referring Provider: Lavelle Bailey PT  Medical Diagnosis (from order): Z98.890 - Post-operative state     SUBJECTIVE                                                                                                               Patient states his shoulder has been doing really good. He reports no problems with the shoulder. He states his shoulder exercises have been doing really good. Denies sharp pains with the exercises. He also reports sleeping is going well and that he's been able to increase his activity as he was able to power wash the house.   Current Functional Limitations: none    Pain / Symptoms  - Pain rating (out of 10): Current: 0       OBJECTIVE                                                                                                                     Range of Motion (ROM)   (degrees unless noted; active unless noted; norms in ( ); negative=lacking to 0, positive=beyond 0)  WNL: FARRUKH OLMEDO, except as noted  Shoulder:    - Flexion (180):        • Right: 142    - Abduction (180):        • Right: 135    - Internal Rotation:        - Behind Back:           • Right: thoracic spine    Strength  (out of 5 unless noted, standard test position unless noted)   5/5: FARRUKH OLMEDO, except as noted  Shoulder:     - Flexion:         • Right: 4+     - Abduction:        • Right: 5    - Internal Rotation:          • Right (at 0°): 5    - External Rotation:         • Right (at 0°): 5                      Outcome/Assessments  Outcome Measures:   Quick Disabilities of the Arm, Shoulder and Hand: QuickDash Total Score (Score will not calculate if more then 2 questions are left blank): 4.55   (scored 0-100; a higher score indicates greater disability) see flowsheet for additional documentation      Treatment     Therapeutic Exercise  UBE x2 minutes fwds/bwds level 2 for range of motion, activity tolerance, and upper extremity  strengthening, resistance level 2.  Shoulder abduction wall slide: 3x10  90/90 er with red t-band: 3x10  Standing shoulder scaption with red t-band: 3x10  Scapular wall clocks with yellow t-band: 3x5    Therapeutic Activity  Patient education provided on HEP instruction. Patient is able to demonstrate proper form with all HEP activities.       Skilled input: verbal instruction/cues, posture correction and tactile instruction/cues    Writer verbally educated and received verbal consent for hand placement, positioning of patient, and techniques to be performed today from patient for clothing adjustments for techniques, therapist position for techniques and hand placement and palpation for techniques as described above and how they are pertinent to the patient's plan of care.  Home Exercise Program  Access Code: [email or web address]  URL: [email or web address]  Date: 09/10/2024  Prepared by: Lavelle Bailey    Exercises  - Supine Shoulder Flexion AAROM with Dowel  - 1 x daily - 7 x weekly - 3 sets - 10 reps  - Seated Shoulder Flexion AAROM with Pulley Behind  - 1 x daily - 7 x weekly - 3 sets - 10 reps  - Seated Shoulder Abduction AAROM with Pulley Behind  - 1 x daily - 7 x weekly - 3 sets - 10 reps  - Standing Shoulder Flexion to 90 Degrees  - 1 x daily - 7 x weekly - 3 sets - 10 reps  - Standing Shoulder Abduction Full Range  - 1 x daily - 7 x weekly - 3 sets - 10 reps  - Standing Shoulder Abduction Slides at Wall  - 1 x daily - 7 x weekly - 3 sets - 10 reps  - Shoulder External Rotation and Scapular Retraction with Resistance  - 1 x daily - 7 x weekly - 3 sets - 10 reps  - Standing Single Arm Shoulder External Rotation in Abduction with Anchored Resistance  - 1 x daily - 7 x weekly - 3 sets - 10 reps  - Drawing [place]  - 1 x daily - 7 x weekly - 3 sets - 10 reps  - Standing Shoulder Scaption with Resistance  - 1 x daily - 7 x weekly - 3 sets - 10 reps  - Wall Clock with Theraband  - 1 x daily - 7 x weekly - 3  sets - 5 reps  - Standing Shoulder Internal Rotation Stretch with Towel  - 1 x daily - 7 x weekly - 3 sets - 10 reps      ASSESSMENT                                                                                                          To date the patient has made gains as expected.    Patient presents to therapy with improving strength, mobility, activity tolerance, and posture. Patient has slowly progressed strength and mobility of his right shoulder following rotator cuff repair on 5/29/24. Patient has minimal strength limitations and end range mobility restrictions which are addressed in his HEP. Patient has no functional limitations at this time and is appropriate for discharge from skilled therapy. Contact information provided if questions arise.   Pain/symptoms after session (out of 10): 0  Education:   - Results of above outlined education: Verbalizes understanding and Demonstrates understanding    PLAN                                                                                                                           Discharge from skilled therapy with instructions/recommendations to: continue home exercise program    Suggestions for next session as indicated: Patient is appropriate for discharge at this time.         Goals  Long Term Goals: to be met by end of plan of care  1. Patient will  and lift a light grocery bag,  steering wheel, perform light home/yard maintenance tasks with reported manageable/tolerable difficulty Status: met   2. Patient will complete independent upper body dressing without reported pain Status: met   3. Patient will reach overhead without reported pain for completion of household tasks such as putting dishes away. Status: met   4. Quick DASH: Patient will score 51 or lower on The Quick DASH to indicate a decreased level of impairment with lifting, carrying, household and self-care activity. (minimal clinically important difference: 14 of calculated score)  Status:  met   5. Patient will be independent with progressed and modified home exercise program. Status: met       Therapy procedure time and total treatment time can be found documented on the Time Entry flowsheet

## 2025-03-12 NOTE — PROGRESS NOTES
PHOTOS TAKEN, BIOPSY TAKEN WITH COLD FORCEPS, SPECIMEN LABELED & 1 JAR SENT TO LAB. EGD COMPLETED, PT TOLERATED WELL. independent/needs device

## (undated) DEVICE — FORCEP RAD JAW W/NEEDLE 160CM

## (undated) DEVICE — SNARE POLYP SM W13MMXL240CM SHTH DIA2.4MM OVL FLX DISP

## (undated) DEVICE — FORCEPS BX L L240CM DIA2.4MM RAD JAW 4 HOT FOR POLYP DISP

## (undated) DEVICE — DEFENDO AIR WATER SUCTION AND BIOPSY VALVE KIT FOR  OLYMPUS: Brand: DEFENDO AIR/WATER/SUCTION AND BIOPSY VALVE

## (undated) DEVICE — SUREFIT, DUAL DISPERSIVE ELECTRODE, CONTACT QUALITY MONITOR: Brand: SUREFIT

## (undated) DEVICE — 2000CC GUARDIAN II: Brand: GUARDIAN

## (undated) DEVICE — ENDO KIT: Brand: MEDLINE INDUSTRIES, INC.

## (undated) DEVICE — Device

## (undated) DEVICE — SOLUTION IV 1000ML 0.45% SOD CHL PH 5 INJ USP VIAFLX PLAS

## (undated) DEVICE — SET ADMIN 25ML L117IN PMP MOD CK VLV RLER CLMP 2 SMRTSITE

## (undated) DEVICE — NEEDLE SCLERO 23GA L4MM CATH L240CM CNTRST SHTH DIA1.8MM

## (undated) DEVICE — TRAP POLYP ETRAP

## (undated) DEVICE — FORCEPS BX L240CM JAW DIA3.2MM L CAP W/ NDL MIC MESH TOOTH